# Patient Record
Sex: MALE | Race: WHITE | NOT HISPANIC OR LATINO | Employment: OTHER | URBAN - METROPOLITAN AREA
[De-identification: names, ages, dates, MRNs, and addresses within clinical notes are randomized per-mention and may not be internally consistent; named-entity substitution may affect disease eponyms.]

---

## 2017-02-14 ENCOUNTER — OFFICE VISIT (OUTPATIENT)
Dept: FAMILY MEDICINE CLINIC | Facility: CLINIC | Age: 82
End: 2017-02-14

## 2017-02-14 VITALS
TEMPERATURE: 99 F | OXYGEN SATURATION: 96 % | DIASTOLIC BLOOD PRESSURE: 70 MMHG | HEART RATE: 79 BPM | BODY MASS INDEX: 29.82 KG/M2 | HEIGHT: 71 IN | WEIGHT: 213 LBS | SYSTOLIC BLOOD PRESSURE: 152 MMHG

## 2017-02-14 DIAGNOSIS — Z00.00 HEALTH CARE MAINTENANCE: ICD-10-CM

## 2017-02-14 DIAGNOSIS — I10 ESSENTIAL HYPERTENSION: Primary | ICD-10-CM

## 2017-02-14 DIAGNOSIS — E78.00 PURE HYPERCHOLESTEROLEMIA: ICD-10-CM

## 2017-02-14 DIAGNOSIS — G25.0 TREMOR, HEREDITARY, BENIGN: ICD-10-CM

## 2017-02-14 DIAGNOSIS — R39.15 URGENCY OF URINATION: ICD-10-CM

## 2017-02-14 PROCEDURE — 99213 OFFICE O/P EST LOW 20 MIN: CPT | Performed by: GENERAL PRACTICE

## 2017-02-14 RX ORDER — ATORVASTATIN CALCIUM 10 MG/1
10 TABLET, FILM COATED ORAL DAILY
Qty: 90 TABLET | Refills: 1 | Status: SHIPPED | OUTPATIENT
Start: 2017-02-14 | End: 2017-07-19 | Stop reason: SDUPTHER

## 2017-02-14 RX ORDER — AMLODIPINE BESYLATE 5 MG/1
5 TABLET ORAL DAILY
Qty: 90 TABLET | Refills: 1 | Status: SHIPPED | OUTPATIENT
Start: 2017-02-14 | End: 2017-07-19 | Stop reason: SDUPTHER

## 2017-02-14 RX ORDER — METOPROLOL TARTRATE 50 MG/1
50 TABLET, FILM COATED ORAL 2 TIMES DAILY
Qty: 180 TABLET | Refills: 1 | Status: SHIPPED | OUTPATIENT
Start: 2017-02-14 | End: 2017-07-19 | Stop reason: SDUPTHER

## 2017-02-14 NOTE — PROGRESS NOTES
Subjective   Jim Marvin is a 81 y.o. male.     Chief Complaint   Patient presents with   • Follow-up   • Hyperlipidemia   • Hypertension   • Med Refill     All meds        History of Present Illness patient is a 81 y.o.   Very nice  male who is here today for lysed last visit with me as I'm retiring the patient is due lab work and he is fasting patient states she is taking a little weight AND he is trying to get it off these dieting and getting some exercise he is actually gone from 2:30 down to 213 Valent in the last 3 or 4 months he gone from 222 2/2/13 this patient has never had a colonoscopy he is always refused I am going to give him a kit that he can check his stool for blood in 10 years to refuse to have a colonoscopy course a 81 years old male.  Patient is in need of refill on his Lipitor Lopressor and Norvasc.  As no other major complaint about himself                      The following portions of the patient's history were reviewed and updated as appropriate: allergies, current medications, past family history, past medical history, past social history, past surgical history and problem list.      Review of Systems   Cardiovascular:        Hypertension hyperlipidemia on medications   Endocrine:        Known treatment for prostate cancer with radiation this patient has not seen the [facility] doctors since Dr. Andersen left about a year ago.  We will be checking a PSA   Neurological:        Patient appears to have a little bit of a tremor says I have had this all my life.   Psychiatric/Behavioral:         states he is under some stress does not need any medications for this         Objective   Physical Exam   Neck:   A carotid bruit heard   Cardiovascular: Normal rate and regular rhythm.    Pulmonary/Chest: Effort normal and breath sounds normal.   Abdominal:   I have given the patient a kit to check his stool for blood since he refuses to have a colonoscopy   Genitourinary:   Genitourinary  Comments: History of prostate cancer treated with radiation is not had a PSA for about a year we will do that today   Neurological:   Patient has a little bit of a fine tremor which she states she is had all his life.         Assessment/Plan   Jim was seen today for follow-up, hyperlipidemia, hypertension and med refill.    Diagnoses and all orders for this visit:    Essential hypertension    Pure hypercholesterolemia    Tremor, hereditary, benign            Manish Gates MD  2/14/2017       Patient very nice 81-year-old gentleman who is here today basically said to get his medicines refilled and say goodbyes I'm retiring I have refilled his Lipitor Lopressor and Norvasc for him this patient has refused to get a colonoscopy over the years I have given him a stool kit to check his stool for blood this has been done before for him but its been a while.  Patient has a little bit of a fine tremor he says its been there all my life.  Patient is fasting so we will draw labs on him today told me should hear from me in 5-7 days he goes 10 days please call patient states he has pneumonia vaccines are up-to-date and he has had shingles vaccine

## 2017-02-15 ENCOUNTER — TELEPHONE (OUTPATIENT)
Dept: FAMILY MEDICINE CLINIC | Facility: CLINIC | Age: 82
End: 2017-02-15

## 2017-02-15 LAB
ALBUMIN SERPL-MCNC: 4 G/DL (ref 3.5–5.2)
ALBUMIN/GLOB SERPL: 1.4 G/DL
ALP SERPL-CCNC: 69 U/L (ref 39–117)
ALT SERPL-CCNC: 17 U/L (ref 1–41)
APPEARANCE UR: (no result)
AST SERPL-CCNC: 18 U/L (ref 1–40)
BACTERIA #/AREA URNS HPF: ABNORMAL /HPF
BILIRUB SERPL-MCNC: 0.5 MG/DL (ref 0.1–1.2)
BILIRUB UR QL STRIP: NEGATIVE
BUN SERPL-MCNC: 10 MG/DL (ref 8–23)
BUN/CREAT SERPL: 11 (ref 7–25)
CALCIUM SERPL-MCNC: 9.3 MG/DL (ref 8.6–10.5)
CASTS URNS MICRO: ABNORMAL
CHLORIDE SERPL-SCNC: 98 MMOL/L (ref 98–107)
CHOLEST SERPL-MCNC: 141 MG/DL (ref 0–200)
CO2 SERPL-SCNC: 27.4 MMOL/L (ref 22–29)
COLOR UR: (no result)
CREAT SERPL-MCNC: 0.91 MG/DL (ref 0.76–1.27)
EPI CELLS #/AREA URNS HPF: ABNORMAL /HPF
GLOBULIN SER CALC-MCNC: 2.9 GM/DL
GLUCOSE SERPL-MCNC: 105 MG/DL (ref 65–99)
GLUCOSE UR QL: NEGATIVE
HDLC SERPL-MCNC: 38 MG/DL (ref 40–60)
HGB UR QL STRIP: NEGATIVE
INTERPRETATION: NORMAL
KETONES UR QL STRIP: NEGATIVE
LDLC SERPL CALC-MCNC: 82 MG/DL (ref 0–100)
LEUKOCYTE ESTERASE UR QL STRIP: NEGATIVE
Lab: NORMAL
NITRITE UR QL STRIP: NEGATIVE
PH UR STRIP: 7.5 [PH] (ref 5–8)
POTASSIUM SERPL-SCNC: 4.7 MMOL/L (ref 3.5–5.2)
PROT SERPL-MCNC: 6.9 G/DL (ref 6–8.5)
PROT UR QL STRIP: (no result)
PSA SERPL-MCNC: 0.03 NG/ML (ref 0–4)
RBC #/AREA URNS HPF: ABNORMAL /HPF
SODIUM SERPL-SCNC: 138 MMOL/L (ref 136–145)
SP GR UR: 1.02 (ref 1–1.03)
TRIGL SERPL-MCNC: 104 MG/DL (ref 0–150)
UROBILINOGEN UR STRIP-MCNC: (no result) MG/DL
VLDLC SERPL CALC-MCNC: 20.8 MG/DL (ref 5–40)
WBC #/AREA URNS HPF: ABNORMAL /HPF

## 2017-02-15 NOTE — TELEPHONE ENCOUNTER
Told the patient his general chemistries are normal blood sugars kidney functions electrolytes all normal thyroid total cholesterol normal LDL normal does have a low HDL but he's had that for a little while to the lower and it was triglycerides also normal going to repeat these in 6 months also told me needs to get his weight down he says I'm working on it

## 2017-05-09 ENCOUNTER — OFFICE VISIT (OUTPATIENT)
Dept: INTERNAL MEDICINE | Facility: CLINIC | Age: 82
End: 2017-05-09

## 2017-05-09 VITALS
HEART RATE: 80 BPM | SYSTOLIC BLOOD PRESSURE: 148 MMHG | HEIGHT: 70 IN | DIASTOLIC BLOOD PRESSURE: 76 MMHG | RESPIRATION RATE: 16 BRPM | TEMPERATURE: 97.4 F | WEIGHT: 210 LBS | BODY MASS INDEX: 30.06 KG/M2

## 2017-05-09 DIAGNOSIS — I10 ESSENTIAL HYPERTENSION: ICD-10-CM

## 2017-05-09 DIAGNOSIS — R30.0 DYSURIA: ICD-10-CM

## 2017-05-09 DIAGNOSIS — E78.2 MIXED HYPERLIPIDEMIA: Primary | ICD-10-CM

## 2017-05-09 PROCEDURE — 99214 OFFICE O/P EST MOD 30 MIN: CPT | Performed by: FAMILY MEDICINE

## 2017-05-09 RX ORDER — DOXYCYCLINE HYCLATE 100 MG/1
100 CAPSULE ORAL 2 TIMES DAILY
Qty: 20 CAPSULE | Refills: 0 | Status: SHIPPED | OUTPATIENT
Start: 2017-05-09 | End: 2017-11-07

## 2017-05-10 ENCOUNTER — TELEPHONE (OUTPATIENT)
Dept: INTERNAL MEDICINE | Facility: CLINIC | Age: 82
End: 2017-05-10

## 2017-05-12 DIAGNOSIS — N39.0 URINARY TRACT INFECTION, SITE UNSPECIFIED: Primary | ICD-10-CM

## 2017-05-12 LAB
APPEARANCE UR: ABNORMAL
BACTERIA #/AREA URNS HPF: ABNORMAL /HPF
BACTERIA UR CULT: ABNORMAL
BACTERIA UR CULT: ABNORMAL
BILIRUB UR QL STRIP: NEGATIVE
BUN SERPL-MCNC: 12 MG/DL (ref 8–23)
BUN/CREAT SERPL: 12.5 (ref 7–25)
CALCIUM SERPL-MCNC: 9.4 MG/DL (ref 8.6–10.5)
CASTS URNS MICRO: ABNORMAL
CASTS URNS QL MICRO: PRESENT /LPF
CHLORIDE SERPL-SCNC: 99 MMOL/L (ref 98–107)
CO2 SERPL-SCNC: 26.1 MMOL/L (ref 22–29)
COLOR UR: ABNORMAL
CREAT SERPL-MCNC: 0.96 MG/DL (ref 0.76–1.27)
EPI CELLS #/AREA URNS HPF: ABNORMAL /HPF
GLUCOSE SERPL-MCNC: 108 MG/DL (ref 65–99)
GLUCOSE UR QL: NEGATIVE
HGB UR QL STRIP: ABNORMAL
KETONES UR QL STRIP: ABNORMAL
LEUKOCYTE ESTERASE UR QL STRIP: ABNORMAL
MICRO URNS: ABNORMAL
MUCOUS THREADS URNS QL MICRO: PRESENT /HPF
NITRITE UR QL STRIP: POSITIVE
OTHER ANTIBIOTIC SUSC ISLT: ABNORMAL
PH UR STRIP: 7 [PH] (ref 5–7.5)
POTASSIUM SERPL-SCNC: 4.8 MMOL/L (ref 3.5–5.2)
PROT UR QL STRIP: ABNORMAL
RBC #/AREA URNS HPF: >30 /HPF
SODIUM SERPL-SCNC: 139 MMOL/L (ref 136–145)
SP GR UR: 1.02 (ref 1–1.03)
URINALYSIS REFLEX: ABNORMAL
UROBILINOGEN UR STRIP-MCNC: 1 MG/DL (ref 0.2–1)
WBC #/AREA URNS HPF: >30 /HPF

## 2017-07-19 RX ORDER — ATORVASTATIN CALCIUM 10 MG/1
TABLET, FILM COATED ORAL
Qty: 90 TABLET | Refills: 0 | Status: SHIPPED | OUTPATIENT
Start: 2017-07-19 | End: 2017-11-04 | Stop reason: SDUPTHER

## 2017-07-19 RX ORDER — AMLODIPINE BESYLATE 5 MG/1
TABLET ORAL
Qty: 90 TABLET | Refills: 0 | Status: SHIPPED | OUTPATIENT
Start: 2017-07-19 | End: 2017-11-04 | Stop reason: SDUPTHER

## 2017-07-19 RX ORDER — METOPROLOL TARTRATE 50 MG/1
TABLET, FILM COATED ORAL
Qty: 180 TABLET | Refills: 0 | Status: SHIPPED | OUTPATIENT
Start: 2017-07-19 | End: 2017-11-04 | Stop reason: SDUPTHER

## 2017-11-06 RX ORDER — ATORVASTATIN CALCIUM 10 MG/1
TABLET, FILM COATED ORAL
Qty: 90 TABLET | Refills: 0 | Status: SHIPPED | OUTPATIENT
Start: 2017-11-06 | End: 2018-02-10 | Stop reason: SDUPTHER

## 2017-11-06 RX ORDER — METOPROLOL TARTRATE 50 MG/1
TABLET, FILM COATED ORAL
Qty: 180 TABLET | Refills: 0 | Status: SHIPPED | OUTPATIENT
Start: 2017-11-06 | End: 2018-02-10 | Stop reason: SDUPTHER

## 2017-11-06 RX ORDER — AMLODIPINE BESYLATE 5 MG/1
TABLET ORAL
Qty: 90 TABLET | Refills: 0 | Status: SHIPPED | OUTPATIENT
Start: 2017-11-06 | End: 2018-02-10 | Stop reason: SDUPTHER

## 2017-11-07 ENCOUNTER — OFFICE VISIT (OUTPATIENT)
Dept: INTERNAL MEDICINE | Facility: CLINIC | Age: 82
End: 2017-11-07

## 2017-11-07 VITALS
TEMPERATURE: 97 F | BODY MASS INDEX: 30.99 KG/M2 | HEART RATE: 76 BPM | OXYGEN SATURATION: 96 % | DIASTOLIC BLOOD PRESSURE: 70 MMHG | WEIGHT: 216 LBS | SYSTOLIC BLOOD PRESSURE: 126 MMHG

## 2017-11-07 DIAGNOSIS — I10 ESSENTIAL HYPERTENSION: ICD-10-CM

## 2017-11-07 DIAGNOSIS — Z00.00 MEDICARE ANNUAL WELLNESS VISIT, SUBSEQUENT: ICD-10-CM

## 2017-11-07 DIAGNOSIS — E78.2 MIXED HYPERLIPIDEMIA: Primary | ICD-10-CM

## 2017-11-07 PROCEDURE — 99213 OFFICE O/P EST LOW 20 MIN: CPT | Performed by: FAMILY MEDICINE

## 2017-11-07 PROCEDURE — G0439 PPPS, SUBSEQ VISIT: HCPCS | Performed by: FAMILY MEDICINE

## 2017-11-07 NOTE — PROGRESS NOTES
Subjective   Jim Marvin is a 82 y.o. male.     Chief Complaint   Patient presents with   • Hypertension   • Hyperlipidemia   • Annual Exam         History of Present Illness   Mr. Marvin is seen here for Medicare wellness visit as well as review of hypertension hyperlipidemia treatment.  He is seen Dr. Burger for prostate issues and follow-up as well.  Otherwise he defers and refuses immunizations been doing quite well otherwise he does exercise regularly.      The following portions of the patient's history were reviewed and updated as appropriate: allergies, current medications, past social history and problem list.    Review of Systems   Constitutional: Negative.    HENT: Negative.    Eyes: Negative.    Respiratory: Negative.    Cardiovascular: Negative.    Gastrointestinal: Negative.    Endocrine: Negative.    Genitourinary: Negative.    Musculoskeletal: Negative.    Skin: Negative.    Allergic/Immunologic: Negative.    Neurological: Negative.    Hematological: Negative.    Psychiatric/Behavioral: Negative.        Objective   Vitals:    11/07/17 1150   BP: 126/70   Pulse: 76   Temp: 97 °F (36.1 °C)   SpO2: 96%     Physical Exam   Constitutional: He is oriented to person, place, and time. He appears well-developed and well-nourished.   HENT:   Head: Normocephalic and atraumatic.   Right Ear: Tympanic membrane and external ear normal.   Left Ear: Tympanic membrane and external ear normal.   Nose: Nose normal.   Mouth/Throat: Oropharynx is clear and moist.   Eyes: Conjunctivae and EOM are normal. Pupils are equal, round, and reactive to light.   Neck: Normal range of motion. Neck supple. No JVD present. No thyromegaly present.   Cardiovascular: Normal rate, regular rhythm, normal heart sounds and intact distal pulses.    Pulmonary/Chest: Effort normal and breath sounds normal.   Abdominal: Soft. Bowel sounds are normal.   Musculoskeletal: Normal range of motion.   Lymphadenopathy:     He has no cervical  adenopathy.   Neurological: He is alert and oriented to person, place, and time. No cranial nerve deficit. Coordination normal.   Skin: Skin is warm and dry. No rash noted.   Psychiatric: He has a normal mood and affect. His behavior is normal. Judgment and thought content normal.   Vitals reviewed.      Assessment/Plan   Problem List Items Addressed This Visit        Cardiovascular and Mediastinum    Hyperlipidemia - Primary    Relevant Orders    Comprehensive Metabolic Panel    CBC & Differential    Lipid Panel With / Chol / HDL Ratio    Hypertension    Relevant Orders    Comprehensive Metabolic Panel    CBC & Differential    Lipid Panel With / Chol / HDL Ratio      Other Visit Diagnoses     Medicare annual wellness visit, subsequent          Plan: Recheck in 6 months and labs today.
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
## 2017-11-08 LAB
ALBUMIN SERPL-MCNC: 4.2 G/DL (ref 3.5–5.2)
ALBUMIN/GLOB SERPL: 1.4 G/DL
ALP SERPL-CCNC: 62 U/L (ref 39–117)
ALT SERPL-CCNC: 22 U/L (ref 1–41)
AST SERPL-CCNC: 18 U/L (ref 1–40)
BASOPHILS # BLD AUTO: 0.12 10*3/MM3 (ref 0–0.2)
BASOPHILS NFR BLD AUTO: 1.6 % (ref 0–1.5)
BILIRUB SERPL-MCNC: 0.6 MG/DL (ref 0.1–1.2)
BUN SERPL-MCNC: 12 MG/DL (ref 8–23)
BUN/CREAT SERPL: 13.3 (ref 7–25)
CALCIUM SERPL-MCNC: 9.4 MG/DL (ref 8.6–10.5)
CHLORIDE SERPL-SCNC: 99 MMOL/L (ref 98–107)
CHOLEST SERPL-MCNC: 150 MG/DL (ref 0–200)
CHOLEST/HDLC SERPL: 3.57 {RATIO}
CO2 SERPL-SCNC: 27.1 MMOL/L (ref 22–29)
CREAT SERPL-MCNC: 0.9 MG/DL (ref 0.76–1.27)
EOSINOPHIL # BLD AUTO: 0.37 10*3/MM3 (ref 0–0.7)
EOSINOPHIL NFR BLD AUTO: 4.8 % (ref 0.3–6.2)
ERYTHROCYTE [DISTWIDTH] IN BLOOD BY AUTOMATED COUNT: 13.2 % (ref 11.5–14.5)
GFR SERPLBLD CREATININE-BSD FMLA CKD-EPI: 81 ML/MIN/1.73
GFR SERPLBLD CREATININE-BSD FMLA CKD-EPI: 98 ML/MIN/1.73
GLOBULIN SER CALC-MCNC: 2.9 GM/DL
GLUCOSE SERPL-MCNC: 99 MG/DL (ref 65–99)
HCT VFR BLD AUTO: 47.3 % (ref 40.4–52.2)
HDLC SERPL-MCNC: 42 MG/DL (ref 40–60)
HGB BLD-MCNC: 15.5 G/DL (ref 13.7–17.6)
IMM GRANULOCYTES # BLD: 0.04 10*3/MM3 (ref 0–0.03)
IMM GRANULOCYTES NFR BLD: 0.5 % (ref 0–0.5)
LDLC SERPL CALC-MCNC: 77 MG/DL (ref 0–100)
LYMPHOCYTES # BLD AUTO: 1.31 10*3/MM3 (ref 0.9–4.8)
LYMPHOCYTES NFR BLD AUTO: 17.1 % (ref 19.6–45.3)
MCH RBC QN AUTO: 32.8 PG (ref 27–32.7)
MCHC RBC AUTO-ENTMCNC: 32.8 G/DL (ref 32.6–36.4)
MCV RBC AUTO: 100 FL (ref 79.8–96.2)
MONOCYTES # BLD AUTO: 1.15 10*3/MM3 (ref 0.2–1.2)
MONOCYTES NFR BLD AUTO: 15 % (ref 5–12)
NEUTROPHILS # BLD AUTO: 4.67 10*3/MM3 (ref 1.9–8.1)
NEUTROPHILS NFR BLD AUTO: 61 % (ref 42.7–76)
PLATELET # BLD AUTO: 157 10*3/MM3 (ref 140–500)
POTASSIUM SERPL-SCNC: 4.5 MMOL/L (ref 3.5–5.2)
PROT SERPL-MCNC: 7.1 G/DL (ref 6–8.5)
RBC # BLD AUTO: 4.73 10*6/MM3 (ref 4.6–6)
SODIUM SERPL-SCNC: 140 MMOL/L (ref 136–145)
TRIGL SERPL-MCNC: 153 MG/DL (ref 0–150)
VLDLC SERPL CALC-MCNC: 30.6 MG/DL (ref 5–40)
WBC # BLD AUTO: 7.66 10*3/MM3 (ref 4.5–10.7)

## 2018-02-12 RX ORDER — AMLODIPINE BESYLATE 5 MG/1
TABLET ORAL
Qty: 90 TABLET | Refills: 0 | Status: SHIPPED | OUTPATIENT
Start: 2018-02-12 | End: 2018-05-15 | Stop reason: SDUPTHER

## 2018-02-12 RX ORDER — ATORVASTATIN CALCIUM 10 MG/1
TABLET, FILM COATED ORAL
Qty: 90 TABLET | Refills: 0 | Status: SHIPPED | OUTPATIENT
Start: 2018-02-12 | End: 2018-05-15 | Stop reason: SDUPTHER

## 2018-02-12 RX ORDER — METOPROLOL TARTRATE 50 MG/1
TABLET, FILM COATED ORAL
Qty: 180 TABLET | Refills: 0 | Status: SHIPPED | OUTPATIENT
Start: 2018-02-12 | End: 2018-05-15 | Stop reason: SDUPTHER

## 2018-03-02 ENCOUNTER — DOCUMENTATION (OUTPATIENT)
Dept: INTERNAL MEDICINE | Facility: CLINIC | Age: 83
End: 2018-03-02

## 2018-05-15 ENCOUNTER — OFFICE VISIT (OUTPATIENT)
Dept: INTERNAL MEDICINE | Facility: CLINIC | Age: 83
End: 2018-05-15

## 2018-05-15 VITALS
HEART RATE: 74 BPM | DIASTOLIC BLOOD PRESSURE: 78 MMHG | WEIGHT: 216 LBS | OXYGEN SATURATION: 92 % | SYSTOLIC BLOOD PRESSURE: 142 MMHG | BODY MASS INDEX: 30.99 KG/M2 | TEMPERATURE: 95.9 F

## 2018-05-15 DIAGNOSIS — E78.2 MIXED HYPERLIPIDEMIA: ICD-10-CM

## 2018-05-15 DIAGNOSIS — I63.9 CEREBELLAR INFARCT (HCC): ICD-10-CM

## 2018-05-15 DIAGNOSIS — I10 ESSENTIAL HYPERTENSION: Primary | ICD-10-CM

## 2018-05-15 PROCEDURE — 99214 OFFICE O/P EST MOD 30 MIN: CPT | Performed by: FAMILY MEDICINE

## 2018-05-15 RX ORDER — AMLODIPINE BESYLATE 5 MG/1
5 TABLET ORAL DAILY
Qty: 90 TABLET | Refills: 1 | Status: SHIPPED | OUTPATIENT
Start: 2018-05-15 | End: 2018-11-15 | Stop reason: SDUPTHER

## 2018-05-15 RX ORDER — ATORVASTATIN CALCIUM 10 MG/1
20 TABLET, FILM COATED ORAL DAILY
Qty: 90 TABLET | Refills: 1 | Status: SHIPPED | OUTPATIENT
Start: 2018-05-15 | End: 2018-05-17 | Stop reason: DRUGHIGH

## 2018-05-15 RX ORDER — ASPIRIN 325 MG
325 TABLET ORAL
COMMUNITY
Start: 2018-02-23

## 2018-05-15 RX ORDER — ATORVASTATIN CALCIUM 10 MG/1
10 TABLET, FILM COATED ORAL DAILY
Qty: 90 TABLET | Refills: 1 | Status: SHIPPED | OUTPATIENT
Start: 2018-05-15 | End: 2018-05-15

## 2018-05-15 RX ORDER — METOPROLOL TARTRATE 50 MG/1
50 TABLET, FILM COATED ORAL 2 TIMES DAILY
Qty: 180 TABLET | Refills: 1 | Status: SHIPPED | OUTPATIENT
Start: 2018-05-15 | End: 2019-07-30 | Stop reason: SDUPTHER

## 2018-05-15 NOTE — PROGRESS NOTES
Subjective   Jim Marvin is a 82 y.o. male.     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  He was upset because they placed him from Lipitor 10 mg.  This is quite a jump with no explanation I described to him the fact that according to protocol easily do that with stroke patients.  At this point we'll go ahead and place him from Lipitor 10 Lipitor 20 and follow-up in 6 months.  He has a follow-up with cardiology with a loop recorder also has a follow-up with the nurse practitioner for neurology in June.  Symptomatically he is back to baseline.  His hospitalization was reviewed with him.    The following portions of the patient's history were reviewed and updated as appropriate: allergies, current medications, past social history and problem list.    Review of Systems   Constitutional: Negative.    HENT: Negative.    Eyes: Negative.    Respiratory: Negative.    Cardiovascular: Negative.    Gastrointestinal: Negative.    Endocrine: Negative.    Genitourinary: Negative.    Musculoskeletal: Negative.    Skin: Negative.    Allergic/Immunologic: Negative.    Neurological: Positive for tremors.       Objective   Vitals:    05/15/18 1129   BP: 142/78   Pulse: 74   Temp: 95.9 °F (35.5 °C)   SpO2: 92%     Physical Exam   Constitutional: He is oriented to person, place, and time. He appears well-developed.   HENT:   Head: Normocephalic.   Right Ear: External ear normal.   Left Ear: External ear normal.   Mouth/Throat: Oropharynx is clear and moist.   Eyes: Pupils are equal, round, and reactive to light.   Neck: Normal range of motion. Neck supple.   Cardiovascular: Normal rate, regular rhythm and normal heart sounds.    Pulmonary/Chest: Effort normal and breath sounds normal.   Abdominal: Soft. Bowel  sounds are normal.   Musculoskeletal:        Lumbar back: He exhibits decreased range of motion.   Neurological: He is alert and oriented to person, place, and time. He displays tremor. No cranial nerve deficit or sensory deficit. Coordination and gait normal.   Nursing note and vitals reviewed.      Assessment/Plan   Problem List Items Addressed This Visit        Cardiovascular and Mediastinum    Hyperlipidemia    Relevant Medications    atorvastatin (LIPITOR) 10 MG tablet    Hypertension - Primary      Other Visit Diagnoses     Cerebellar infarct          Plan: Increase Lipitor from 10-20 mg daily.  Recheck in 6 months labs at that time follow-up with neurology follow-up with cardiology for loop recorder.

## 2018-05-17 RX ORDER — ATORVASTATIN CALCIUM 20 MG/1
20 TABLET, FILM COATED ORAL DAILY
Qty: 90 TABLET | Refills: 1 | Status: SHIPPED | OUTPATIENT
Start: 2018-05-17 | End: 2018-08-09 | Stop reason: SDUPTHER

## 2018-08-09 RX ORDER — METOPROLOL TARTRATE 50 MG/1
TABLET, FILM COATED ORAL
Qty: 180 TABLET | Refills: 0 | Status: SHIPPED | OUTPATIENT
Start: 2018-08-09 | End: 2018-11-15 | Stop reason: SDUPTHER

## 2018-08-09 RX ORDER — AMLODIPINE BESYLATE 5 MG/1
TABLET ORAL
Qty: 90 TABLET | Refills: 0 | Status: SHIPPED | OUTPATIENT
Start: 2018-08-09 | End: 2018-11-08 | Stop reason: SDUPTHER

## 2018-08-09 RX ORDER — ATORVASTATIN CALCIUM 10 MG/1
TABLET, FILM COATED ORAL
Qty: 90 TABLET | Refills: 0 | Status: SHIPPED | OUTPATIENT
Start: 2018-08-09 | End: 2018-11-09 | Stop reason: SDUPTHER

## 2018-11-09 RX ORDER — ATORVASTATIN CALCIUM 20 MG/1
20 TABLET, FILM COATED ORAL DAILY
Qty: 90 TABLET | Refills: 1 | Status: SHIPPED | OUTPATIENT
Start: 2018-11-09 | End: 2019-05-06 | Stop reason: SDUPTHER

## 2018-11-09 RX ORDER — AMLODIPINE BESYLATE 5 MG/1
TABLET ORAL
Qty: 90 TABLET | Refills: 0 | Status: SHIPPED | OUTPATIENT
Start: 2018-11-09 | End: 2019-02-10 | Stop reason: SDUPTHER

## 2018-11-15 ENCOUNTER — OFFICE VISIT (OUTPATIENT)
Dept: INTERNAL MEDICINE | Facility: CLINIC | Age: 83
End: 2018-11-15

## 2018-11-15 VITALS
TEMPERATURE: 97.8 F | HEART RATE: 80 BPM | BODY MASS INDEX: 32 KG/M2 | DIASTOLIC BLOOD PRESSURE: 62 MMHG | SYSTOLIC BLOOD PRESSURE: 118 MMHG | OXYGEN SATURATION: 93 % | WEIGHT: 223 LBS

## 2018-11-15 DIAGNOSIS — I10 ESSENTIAL HYPERTENSION: Primary | ICD-10-CM

## 2018-11-15 DIAGNOSIS — E78.2 MIXED HYPERLIPIDEMIA: ICD-10-CM

## 2018-11-15 DIAGNOSIS — I63.9 CEREBELLAR INFARCT (HCC): ICD-10-CM

## 2018-11-15 PROCEDURE — 99213 OFFICE O/P EST LOW 20 MIN: CPT | Performed by: FAMILY MEDICINE

## 2018-11-15 NOTE — PROGRESS NOTES
Subjective   Jim Marvin is a 83 y.o. male.     Chief Complaint   Patient presents with   • Hypertension   • Hyperlipidemia         History of Present Illness   Delightful gentleman who continues with exercise and is pretty active.  He was admitted for cerebellar infarct at [facility] a couple years ago.  He is followed by Dr. Diaz who has a loop recorder but has not found any atrial fibrillation.  Otherwise relatively stable with history of hyperlipidemia hypertension.  See him back in 6 months for Medicare wellness.      The following portions of the patient's history were reviewed and updated as appropriate: allergies, current medications, past social history and problem list.    Review of Systems    Objective   Vitals:    11/15/18 1052   BP: 118/62   Pulse: 80   Temp: 97.8 °F (36.6 °C)   SpO2: 93%     Physical Exam   Constitutional: He is oriented to person, place, and time. He appears well-developed and well-nourished.   HENT:   Head: Normocephalic and atraumatic.   Right Ear: Tympanic membrane and external ear normal.   Left Ear: Tympanic membrane and external ear normal.   Nose: Nose normal.   Mouth/Throat: Oropharynx is clear and moist.   Eyes: Conjunctivae and EOM are normal. Pupils are equal, round, and reactive to light.   Neck: Normal range of motion. Neck supple. No JVD present. No thyromegaly present.   Cardiovascular: Normal rate, regular rhythm, normal heart sounds and intact distal pulses.   Pulmonary/Chest: Effort normal and breath sounds normal.   Abdominal: Soft. Bowel sounds are normal.   Musculoskeletal:        Right knee: He exhibits decreased range of motion.        Left knee: He exhibits decreased range of motion.        Lumbar back: He exhibits decreased range of motion.   Lymphadenopathy:     He has no cervical adenopathy.   Neurological: He is alert and oriented to person, place, and time. No cranial nerve deficit. Coordination normal.   Skin: Skin is warm and dry. No rash  noted.   Psychiatric: He has a normal mood and affect. His behavior is normal. Judgment and thought content normal.   Vitals reviewed.      Assessment/Plan   Problem List Items Addressed This Visit        Cardiovascular and Mediastinum    Hyperlipidemia    Hypertension - Primary      Other Visit Diagnoses     Cerebellar infarct (CMS/[facility])          Present for Medicare wellness 6 months meds remain the same.

## 2019-02-11 RX ORDER — AMLODIPINE BESYLATE 5 MG/1
TABLET ORAL
Qty: 90 TABLET | Refills: 1 | Status: SHIPPED | OUTPATIENT
Start: 2019-02-11 | End: 2019-07-30 | Stop reason: SDUPTHER

## 2019-02-11 RX ORDER — METOPROLOL TARTRATE 50 MG/1
TABLET, FILM COATED ORAL
Qty: 180 TABLET | Refills: 1 | Status: SHIPPED | OUTPATIENT
Start: 2019-02-11 | End: 2019-05-14 | Stop reason: SDUPTHER

## 2019-05-06 RX ORDER — ATORVASTATIN CALCIUM 20 MG/1
20 TABLET, FILM COATED ORAL DAILY
Qty: 90 TABLET | Refills: 1 | Status: SHIPPED | OUTPATIENT
Start: 2019-05-06 | End: 2019-10-26 | Stop reason: SDUPTHER

## 2019-05-14 ENCOUNTER — OFFICE VISIT (OUTPATIENT)
Dept: INTERNAL MEDICINE | Facility: CLINIC | Age: 84
End: 2019-05-14

## 2019-05-14 VITALS
SYSTOLIC BLOOD PRESSURE: 118 MMHG | DIASTOLIC BLOOD PRESSURE: 72 MMHG | OXYGEN SATURATION: 93 % | HEART RATE: 78 BPM | TEMPERATURE: 97.1 F | WEIGHT: 224 LBS | BODY MASS INDEX: 32.14 KG/M2

## 2019-05-14 DIAGNOSIS — I10 ESSENTIAL HYPERTENSION: Primary | ICD-10-CM

## 2019-05-14 DIAGNOSIS — I65.29 STENOSIS OF CAROTID ARTERY, UNSPECIFIED LATERALITY: ICD-10-CM

## 2019-05-14 DIAGNOSIS — Z00.00 MEDICARE ANNUAL WELLNESS VISIT, SUBSEQUENT: ICD-10-CM

## 2019-05-14 DIAGNOSIS — E78.2 MIXED HYPERLIPIDEMIA: ICD-10-CM

## 2019-05-14 PROCEDURE — G0439 PPPS, SUBSEQ VISIT: HCPCS | Performed by: FAMILY MEDICINE

## 2019-05-14 PROCEDURE — 99214 OFFICE O/P EST MOD 30 MIN: CPT | Performed by: FAMILY MEDICINE
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
## 2019-05-14 NOTE — PATIENT INSTRUCTIONS
Medicare Wellness  Personal Prevention Plan of Service     Date of Office Visit:  2019  Encounter Provider:  Antonio Finley Jr., MD  Place of Service:  [facility]  Patient Name: Jim Marvin  :  1935    As part of the Medicare Wellness portion of your visit today, we are providing you with this personalized preventive plan of services (PPPS). This plan is based upon recommendations of the [place] Preventive Services Task Force (USPSTF) and the Advisory Committee on Immunization Practices (ACIP).    This lists the preventive care services that should be considered, and provides dates of when you are due. Items listed as completed are up-to-date and do not require any further intervention.    Health Maintenance   Topic Date Due   • TDAP/TD VACCINES (1 - Tdap) 10/04/1956   • ZOSTER VACCINE (1 of 2) 10/04/1987   • MEDICARE ANNUAL WELLNESS  2018   • LIPID PANEL  2019   • PNEUMOCOCCAL VACCINES (65+ LOW/MEDIUM RISK) (2 of 2 - PPSV23) 2019 (Originally 11/15/2017)   • INFLUENZA VACCINE  2019       No orders of the defined types were placed in this encounter.      No Follow-up on file.

## 2019-05-14 NOTE — PROGRESS NOTES
Subjective   Jim Marvin is a 83 y.o. male.     Chief Complaint   Patient presents with   • Annual Exam   • Hypertension   • Hyperlipidemia         History of Present Illness   Delightful gentleman who is followed by vascular surgery [facility].  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  Will have follow-up with vascular surgery in the fall.  Regarding recheck labs to make sure these optimized on Lipitor treatment of lowering LDL.  Antihypertensive is continued.    Medicare wellness exam is performed as well.      The following portions of the patient's history were reviewed and updated as appropriate: allergies, current medications, past social history and problem list.    Review of Systems   Constitutional: Negative.    HENT: Negative.    Eyes: Negative.    Respiratory: Negative.    Cardiovascular: Negative.    Gastrointestinal: Negative.    Endocrine: Negative.    Genitourinary: Negative.    Musculoskeletal: Negative.    Skin: Negative.    Allergic/Immunologic: Negative.    Neurological: Negative.    Hematological: Negative.    Psychiatric/Behavioral: Negative.        Objective   Vitals:    05/14/19 1306   BP: 118/72   Pulse: 78   Temp: 97.1 °F (36.2 °C)   SpO2: 93%     Physical Exam   Constitutional: He is oriented to person, place, and time. He appears well-developed and well-nourished.   HENT:   Head: Normocephalic and atraumatic.   Right Ear: Tympanic membrane and external ear normal.   Left Ear: Tympanic membrane and external ear normal.   Nose: Nose normal.   Mouth/Throat: Oropharynx is clear and moist.   Eyes: Conjunctivae and EOM are normal. Pupils are equal, round, and reactive to light.   Neck: Normal range of motion. Neck supple. No JVD present. No thyromegaly present.   Cardiovascular: Normal rate, regular rhythm, normal heart sounds and intact  distal pulses.   Pulmonary/Chest: Effort normal and breath sounds normal.   Abdominal: Soft. Bowel sounds are normal.   Musculoskeletal:        Right hip: He exhibits decreased range of motion.        Left hip: He exhibits decreased range of motion.        Right knee: He exhibits decreased range of motion.        Left knee: He exhibits decreased range of motion.        Lumbar back: He exhibits decreased range of motion.   Lymphadenopathy:     He has no cervical adenopathy.   Neurological: He is alert and oriented to person, place, and time. No cranial nerve deficit. Coordination normal.   Skin: Skin is warm and dry. No rash noted.   Psychiatric: He has a normal mood and affect. His behavior is normal. Judgment and thought content normal.   Vitals reviewed.      Assessment/Plan   Problem List Items Addressed This Visit        Cardiovascular and Mediastinum    Hyperlipidemia    Relevant Orders    CBC & Differential    Comprehensive Metabolic Panel    Lipid Panel With / Chol / HDL Ratio    TSH    T4, Free    Urinalysis With Microscopic If Indicated (No Culture) - Urine, Clean Catch    Hypertension - Primary    Relevant Orders    CBC & Differential    Comprehensive Metabolic Panel    Lipid Panel With / Chol / HDL Ratio    TSH    T4, Free    Urinalysis With Microscopic If Indicated (No Culture) - Urine, Clean Catch      Other Visit Diagnoses     Stenosis of carotid artery, unspecified laterality        Relevant Orders    CBC & Differential    Comprehensive Metabolic Panel    Lipid Panel With / Chol / HDL Ratio    TSH    T4, Free    Urinalysis With Microscopic If Indicated (No Culture) - Urine, Clean Catch    Medicare annual wellness visit, subsequent          Labs today recheck in 6 months consider increasing Lipitor if need be.  Follow-up with vascular surgery.

## 2019-05-15 LAB
ALBUMIN SERPL-MCNC: 4.1 G/DL (ref 3.5–5.2)
ALBUMIN/GLOB SERPL: 1.4 G/DL
ALP SERPL-CCNC: 63 U/L (ref 39–117)
ALT SERPL-CCNC: 21 U/L (ref 1–41)
APPEARANCE UR: CLEAR
AST SERPL-CCNC: 19 U/L (ref 1–40)
BACTERIA #/AREA URNS HPF: NORMAL /HPF
BASOPHILS # BLD AUTO: 0.14 10*3/MM3 (ref 0–0.2)
BASOPHILS NFR BLD AUTO: 2 % (ref 0–1.5)
BILIRUB SERPL-MCNC: 0.6 MG/DL (ref 0.2–1.2)
BILIRUB UR QL STRIP: NEGATIVE
BUN SERPL-MCNC: 10 MG/DL (ref 8–23)
BUN/CREAT SERPL: 11.8 (ref 7–25)
CALCIUM SERPL-MCNC: 9.8 MG/DL (ref 8.6–10.5)
CASTS URNS MICRO: NORMAL
CHLORIDE SERPL-SCNC: 99 MMOL/L (ref 98–107)
CHOLEST SERPL-MCNC: 137 MG/DL (ref 0–200)
CHOLEST/HDLC SERPL: 3.11 {RATIO}
CO2 SERPL-SCNC: 25.3 MMOL/L (ref 22–29)
COLOR UR: YELLOW
CREAT SERPL-MCNC: 0.85 MG/DL (ref 0.76–1.27)
EOSINOPHIL # BLD AUTO: 0.37 10*3/MM3 (ref 0–0.4)
EOSINOPHIL NFR BLD AUTO: 5.2 % (ref 0.3–6.2)
EPI CELLS #/AREA URNS HPF: NORMAL /HPF
ERYTHROCYTE [DISTWIDTH] IN BLOOD BY AUTOMATED COUNT: 12.7 % (ref 12.3–15.4)
GLOBULIN SER CALC-MCNC: 3 GM/DL
GLUCOSE SERPL-MCNC: 95 MG/DL (ref 65–99)
GLUCOSE UR QL: NEGATIVE
HCT VFR BLD AUTO: 48.6 % (ref 37.5–51)
HDLC SERPL-MCNC: 44 MG/DL (ref 40–60)
HGB BLD-MCNC: 15.5 G/DL (ref 13–17.7)
HGB UR QL STRIP: NEGATIVE
IMM GRANULOCYTES # BLD AUTO: 0.07 10*3/MM3 (ref 0–0.05)
IMM GRANULOCYTES NFR BLD AUTO: 1 % (ref 0–0.5)
KETONES UR QL STRIP: NEGATIVE
LDLC SERPL CALC-MCNC: 69 MG/DL (ref 0–100)
LEUKOCYTE ESTERASE UR QL STRIP: ABNORMAL
LYMPHOCYTES # BLD AUTO: 1.28 10*3/MM3 (ref 0.7–3.1)
LYMPHOCYTES NFR BLD AUTO: 17.9 % (ref 19.6–45.3)
MCH RBC QN AUTO: 32.4 PG (ref 26.6–33)
MCHC RBC AUTO-ENTMCNC: 31.9 G/DL (ref 31.5–35.7)
MCV RBC AUTO: 101.5 FL (ref 79–97)
MONOCYTES # BLD AUTO: 0.98 10*3/MM3 (ref 0.1–0.9)
MONOCYTES NFR BLD AUTO: 13.7 % (ref 5–12)
NEUTROPHILS # BLD AUTO: 4.33 10*3/MM3 (ref 1.7–7)
NEUTROPHILS NFR BLD AUTO: 60.2 % (ref 42.7–76)
NITRITE UR QL STRIP: NEGATIVE
NRBC BLD AUTO-RTO: 0 /100 WBC (ref 0–0.2)
PH UR STRIP: 6.5 [PH] (ref 5–8)
PLATELET # BLD AUTO: 122 10*3/MM3 (ref 140–450)
POTASSIUM SERPL-SCNC: 4.7 MMOL/L (ref 3.5–5.2)
PROT SERPL-MCNC: 7.1 G/DL (ref 6–8.5)
PROT UR QL STRIP: NEGATIVE
RBC # BLD AUTO: 4.79 10*6/MM3 (ref 4.14–5.8)
RBC #/AREA URNS HPF: NORMAL /HPF
SODIUM SERPL-SCNC: 138 MMOL/L (ref 136–145)
SP GR UR: 1.02 (ref 1–1.03)
T4 FREE SERPL-MCNC: 1.01 NG/DL (ref 0.93–1.7)
TRIGL SERPL-MCNC: 122 MG/DL (ref 0–150)
TSH SERPL DL<=0.005 MIU/L-ACNC: 5.04 MIU/ML (ref 0.27–4.2)
UROBILINOGEN UR STRIP-MCNC: ABNORMAL MG/DL
VLDLC SERPL CALC-MCNC: 24.4 MG/DL
WBC # BLD AUTO: 7.17 10*3/MM3 (ref 3.4–10.8)
WBC #/AREA URNS HPF: NORMAL /HPF

## 2019-06-11 ENCOUNTER — TELEPHONE (OUTPATIENT)
Dept: INTERNAL MEDICINE | Facility: CLINIC | Age: 84
End: 2019-06-11

## 2019-07-30 RX ORDER — AMLODIPINE BESYLATE 5 MG/1
TABLET ORAL
Qty: 90 TABLET | Refills: 1 | Status: SHIPPED | OUTPATIENT
Start: 2019-07-30 | End: 2020-01-27

## 2019-07-30 RX ORDER — METOPROLOL TARTRATE 50 MG/1
TABLET, FILM COATED ORAL
Qty: 180 TABLET | Refills: 1 | Status: SHIPPED | OUTPATIENT
Start: 2019-07-30 | End: 2020-01-27

## 2019-10-28 RX ORDER — ATORVASTATIN CALCIUM 20 MG/1
20 TABLET, FILM COATED ORAL DAILY
Qty: 90 TABLET | Refills: 1 | Status: SHIPPED | OUTPATIENT
Start: 2019-10-28 | End: 2020-04-27

## 2019-11-19 ENCOUNTER — OFFICE VISIT (OUTPATIENT)
Dept: INTERNAL MEDICINE | Facility: CLINIC | Age: 84
End: 2019-11-19

## 2019-11-19 VITALS
OXYGEN SATURATION: 91 % | DIASTOLIC BLOOD PRESSURE: 88 MMHG | SYSTOLIC BLOOD PRESSURE: 122 MMHG | TEMPERATURE: 97 F | WEIGHT: 221 LBS | BODY MASS INDEX: 31.71 KG/M2 | HEART RATE: 80 BPM

## 2019-11-19 DIAGNOSIS — Z86.73 HISTORY OF CEREBELLAR STROKE: ICD-10-CM

## 2019-11-19 DIAGNOSIS — E78.2 MIXED HYPERLIPIDEMIA: ICD-10-CM

## 2019-11-19 DIAGNOSIS — I65.21 INTERNAL CAROTID ARTERY STENOSIS, RIGHT: ICD-10-CM

## 2019-11-19 DIAGNOSIS — I10 ESSENTIAL HYPERTENSION: Primary | ICD-10-CM

## 2019-11-19 PROCEDURE — 99213 OFFICE O/P EST LOW 20 MIN: CPT | Performed by: FAMILY MEDICINE

## 2019-11-19 NOTE — PROGRESS NOTES
Subjective   Jim Marvin is a 84 y.o. male.     Chief Complaint   Patient presents with   • Hypertension   • Hyperlipidemia   Cellulitis left foot resolved, right carotid stenosis.      History of Present Illness   Delightful gentleman recently in [facility] with cellulitis of the left foot.  This is resolved.  He has a history of right internal carotid stenosis up to 70%.  He is not to the great degree of needing carotid endarterectomy does not wish to do it.  Otherwise he maintains on aspirin treatment of hypertension hyperlipidemia.  He is functionally doing very well.      The following portions of the patient's history were reviewed and updated as appropriate: allergies, current medications, past social history and problem list.    Review of Systems   Constitutional: Negative.    HENT: Negative.    Eyes: Negative.    Respiratory: Negative.    Cardiovascular: Negative.    Gastrointestinal: Negative.    Endocrine: Negative.    Genitourinary: Negative.    Musculoskeletal: Positive for arthralgias.   Skin: Negative.    Allergic/Immunologic: Negative.    Neurological: Negative.    Hematological: Negative.    Psychiatric/Behavioral: Negative.        Objective   Vitals:    11/19/19 1354   BP: 122/88   Pulse: 80   Temp: 97 °F (36.1 °C)   SpO2: 91%     Physical Exam   Constitutional: He is oriented to person, place, and time. He appears well-developed and well-nourished.   HENT:   Head: Normocephalic and atraumatic.   Right Ear: Tympanic membrane and external ear normal.   Left Ear: Tympanic membrane and external ear normal.   Nose: Nose normal.   Mouth/Throat: Oropharynx is clear and moist.   Eyes: Conjunctivae and EOM are normal. Pupils are equal, round, and reactive to light.   Neck: Normal range of motion. Neck supple. No JVD present. No thyromegaly present.   Cardiovascular: Normal rate, regular rhythm, normal heart sounds and intact distal pulses.   Pulmonary/Chest: Effort normal and breath sounds  normal.   Abdominal: Soft. Bowel sounds are normal.   Musculoskeletal:        Lumbar back: He exhibits decreased range of motion.        Legs:  Lymphadenopathy:     He has no cervical adenopathy.   Neurological: He is alert and oriented to person, place, and time. No cranial nerve deficit. Coordination normal.   Skin: Skin is warm and dry. No rash noted.   Psychiatric: He has a normal mood and affect. His behavior is normal. Judgment and thought content normal.   Vitals reviewed.      Assessment/Plan   Problem List Items Addressed This Visit        Cardiovascular and Mediastinum    Hyperlipidemia    Hypertension - Primary      Other Visit Diagnoses     Internal carotid artery stenosis, right        History of cerebellar stroke          Continue current regimen with aspirin plus treatment of hyperlipidemia hypertension.  Follow-up in 6 months for Medicare wellness sooner if needed.  He refuses flu shot.

## 2020-01-27 RX ORDER — AMLODIPINE BESYLATE 5 MG/1
TABLET ORAL
Qty: 90 TABLET | Refills: 1 | Status: SHIPPED | OUTPATIENT
Start: 2020-01-27 | End: 2020-06-29 | Stop reason: SDUPTHER

## 2020-01-27 RX ORDER — METOPROLOL TARTRATE 50 MG/1
TABLET, FILM COATED ORAL
Qty: 180 TABLET | Refills: 1 | Status: SHIPPED | OUTPATIENT
Start: 2020-01-27 | End: 2020-06-29 | Stop reason: SDUPTHER

## 2020-04-27 RX ORDER — ATORVASTATIN CALCIUM 20 MG/1
20 TABLET, FILM COATED ORAL DAILY
Qty: 90 TABLET | Refills: 1 | Status: SHIPPED | OUTPATIENT
Start: 2020-04-27 | End: 2020-06-29 | Stop reason: SDUPTHER

## 2020-06-29 ENCOUNTER — OFFICE VISIT (OUTPATIENT)
Dept: INTERNAL MEDICINE | Facility: CLINIC | Age: 85
End: 2020-06-29

## 2020-06-29 VITALS
BODY MASS INDEX: 30.66 KG/M2 | OXYGEN SATURATION: 98 % | HEART RATE: 83 BPM | HEIGHT: 71 IN | SYSTOLIC BLOOD PRESSURE: 128 MMHG | WEIGHT: 219 LBS | DIASTOLIC BLOOD PRESSURE: 78 MMHG

## 2020-06-29 DIAGNOSIS — I10 ESSENTIAL HYPERTENSION: ICD-10-CM

## 2020-06-29 DIAGNOSIS — G25.0 ESSENTIAL TREMOR: ICD-10-CM

## 2020-06-29 DIAGNOSIS — Z00.00 MEDICARE ANNUAL WELLNESS VISIT, SUBSEQUENT: ICD-10-CM

## 2020-06-29 DIAGNOSIS — E78.2 MIXED HYPERLIPIDEMIA: Primary | ICD-10-CM

## 2020-06-29 PROCEDURE — 99214 OFFICE O/P EST MOD 30 MIN: CPT | Performed by: FAMILY MEDICINE

## 2020-06-29 PROCEDURE — G0439 PPPS, SUBSEQ VISIT: HCPCS | Performed by: FAMILY MEDICINE

## 2020-06-29 RX ORDER — ATORVASTATIN CALCIUM 20 MG/1
20 TABLET, FILM COATED ORAL DAILY
Qty: 90 TABLET | Refills: 1 | Status: SHIPPED | OUTPATIENT
Start: 2020-06-29 | End: 2021-01-12 | Stop reason: SDUPTHER

## 2020-06-29 RX ORDER — AMLODIPINE BESYLATE 5 MG/1
5 TABLET ORAL DAILY
Qty: 90 TABLET | Refills: 1 | Status: SHIPPED | OUTPATIENT
Start: 2020-06-29 | End: 2021-01-12 | Stop reason: SDUPTHER

## 2020-06-29 RX ORDER — PRIMIDONE 50 MG/1
TABLET ORAL
Qty: 60 TABLET | Refills: 1 | Status: SHIPPED | OUTPATIENT
Start: 2020-06-29 | End: 2021-01-12 | Stop reason: SDUPTHER

## 2020-06-29 RX ORDER — METOPROLOL TARTRATE 50 MG/1
50 TABLET, FILM COATED ORAL 2 TIMES DAILY
Qty: 180 TABLET | Refills: 1 | Status: SHIPPED | OUTPATIENT
Start: 2020-06-29 | End: 2021-01-12 | Stop reason: SDUPTHER

## 2020-06-29 NOTE — PROGRESS NOTES
Subjective   Jim Marvin is a 84 y.o. male.     Chief Complaint   Patient presents with   • Medicare Wellness-subsequent         History of Present Illness   Very pleasant gentleman treated for Hypertension hyperlipidemia.  Overall he is been doing very well he has been exercising his home staying safe as per his COVID-19.    We discussed essential tremor which I see is better with drinking alcohol.  Both of his parents had essential tremor as well.    Try low-dose of Mysoline as needed.  I will prescribe him half dose of Mysoline 50 mg twice daily as needed to start with.    The following portions of the patient's history were reviewed and updated as appropriate: allergies, current medications, past social history and problem list.    Review of Systems   Constitutional: Negative.    HENT: Negative.    Eyes: Negative.    Respiratory: Negative.    Cardiovascular: Negative.    Gastrointestinal: Negative.    Endocrine: Negative.    Genitourinary: Negative.    Musculoskeletal: Negative.    Skin: Negative.    Allergic/Immunologic: Negative.    Neurological: Positive for tremors.   Hematological: Negative.    Psychiatric/Behavioral: Negative.        Objective   Vitals:    06/29/20 1610   BP: 128/78   Pulse: 83   SpO2: 98%     Physical Exam   Constitutional: He is oriented to person, place, and time. He appears well-developed and well-nourished.   HENT:   Head: Normocephalic and atraumatic.   Right Ear: Tympanic membrane and external ear normal.   Left Ear: Tympanic membrane and external ear normal.   Nose: Nose normal.   Mouth/Throat: Oropharynx is clear and moist.   Eyes: Pupils are equal, round, and reactive to light. Conjunctivae and EOM are normal.   Neck: Normal range of motion. Neck supple. No JVD present. No thyromegaly present.   Cardiovascular: Normal rate, regular rhythm, normal heart sounds and intact distal pulses.   Pulmonary/Chest: Effort normal and breath sounds normal.   Abdominal: Soft. Bowel sounds  are normal.   Musculoskeletal: Normal range of motion.   Lymphadenopathy:     He has no cervical adenopathy.   Neurological: He is alert and oriented to person, place, and time. He displays tremor. No cranial nerve deficit. Coordination normal.   Tremor consistent with essential tremor especially right hand.   Skin: Skin is warm and dry. No rash noted.   Psychiatric: He has a normal mood and affect. His behavior is normal. Judgment and thought content normal.   Vitals reviewed.      Assessment/Plan   Problem List Items Addressed This Visit        Cardiovascular and Mediastinum    Hyperlipidemia - Primary    Hypertension      Other Visit Diagnoses     Medicare annual wellness visit, subsequent        Essential tremor          Plan: Labs in next visit medications refilled try Mysoline 50 mg half tablet twice daily as needed tremor.
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
## 2020-06-29 NOTE — PATIENT INSTRUCTIONS
Medicare Wellness  Personal Prevention Plan of Service     Date of Office Visit:  2020  Encounter Provider:  Antonio Finley Jr., MD  Place of Service:  [facility]  Patient Name: Jim Marvin  :  1935    As part of the Medicare Wellness portion of your visit today, we are providing you with this personalized preventive plan of services (PPPS). This plan is based upon recommendations of the [place] Preventive Services Task Force (USPSTF) and the Advisory Committee on Immunization Practices (ACIP).    This lists the preventive care services that should be considered, and provides dates of when you are due. Items listed as completed are up-to-date and do not require any further intervention.    Health Maintenance   Topic Date Due   • TDAP/TD VACCINES (1 - Tdap) 10/04/1948   • ZOSTER VACCINE (1 of 2) 10/04/1987   • Pneumococcal Vaccine Once at 65 Years Old  10/04/2002   • MEDICARE ANNUAL WELLNESS  2020   • LIPID PANEL  2020   • INFLUENZA VACCINE  2020       No orders of the defined types were placed in this encounter.      No follow-ups on file.

## 2020-06-30 RX ORDER — PRIMIDONE 50 MG/1
TABLET ORAL
Qty: 180 TABLET | OUTPATIENT
Start: 2020-06-30

## 2021-01-12 ENCOUNTER — OFFICE VISIT (OUTPATIENT)
Dept: INTERNAL MEDICINE | Facility: CLINIC | Age: 86
End: 2021-01-12

## 2021-01-12 VITALS
TEMPERATURE: 97.1 F | DIASTOLIC BLOOD PRESSURE: 78 MMHG | HEART RATE: 71 BPM | WEIGHT: 220 LBS | BODY MASS INDEX: 30.8 KG/M2 | HEIGHT: 71 IN | OXYGEN SATURATION: 95 % | SYSTOLIC BLOOD PRESSURE: 132 MMHG

## 2021-01-12 DIAGNOSIS — E78.2 MIXED HYPERLIPIDEMIA: Primary | ICD-10-CM

## 2021-01-12 DIAGNOSIS — I10 ESSENTIAL HYPERTENSION: ICD-10-CM

## 2021-01-12 DIAGNOSIS — G25.0 ESSENTIAL TREMOR: ICD-10-CM

## 2021-01-12 PROBLEM — I63.9 CEREBELLAR STROKE, ACUTE (HCC): Status: ACTIVE | Noted: 2018-02-20

## 2021-01-12 PROBLEM — I63.239 CAROTID STENOSIS, SYMPTOMATIC, WITH INFARCTION: Status: ACTIVE | Noted: 2018-02-20

## 2021-01-12 PROBLEM — L03.129 LYMPHANGITIS, ACUTE, LOWER LEG: Status: ACTIVE | Noted: 2019-10-04

## 2021-01-12 PROBLEM — L03.116 CELLULITIS OF LEFT LOWER EXTREMITY: Status: ACTIVE | Noted: 2019-10-03

## 2021-01-12 PROBLEM — J96.01 ACUTE RESPIRATORY FAILURE WITH HYPOXIA (HCC): Status: ACTIVE | Noted: 2019-10-03

## 2021-01-12 PROBLEM — J45.909 REACTIVE AIRWAY DISEASE: Status: ACTIVE | Noted: 2019-10-03

## 2021-01-12 PROBLEM — E66.9 OBESITY: Status: ACTIVE | Noted: 2019-10-03

## 2021-01-12 PROBLEM — I65.02 VERTEBRAL ARTERY OCCLUSION, LEFT: Status: ACTIVE | Noted: 2018-02-20

## 2021-01-12 PROCEDURE — 99213 OFFICE O/P EST LOW 20 MIN: CPT | Performed by: FAMILY MEDICINE

## 2021-01-12 RX ORDER — MULTIPLE VITAMINS W/ MINERALS TAB 9MG-400MCG
1 TAB ORAL DAILY
COMMUNITY

## 2021-01-12 RX ORDER — ATORVASTATIN CALCIUM 20 MG/1
20 TABLET, FILM COATED ORAL DAILY
Qty: 90 TABLET | Refills: 1 | Status: SHIPPED | OUTPATIENT
Start: 2021-01-12 | End: 2021-07-13 | Stop reason: SDUPTHER

## 2021-01-12 RX ORDER — AMLODIPINE BESYLATE 5 MG/1
5 TABLET ORAL DAILY
Qty: 90 TABLET | Refills: 1 | Status: SHIPPED | OUTPATIENT
Start: 2021-01-12 | End: 2021-01-20

## 2021-01-12 RX ORDER — PRIMIDONE 50 MG/1
TABLET ORAL
Qty: 60 TABLET | Refills: 1 | Status: SHIPPED | OUTPATIENT
Start: 2021-01-12 | End: 2021-07-13

## 2021-01-12 RX ORDER — METOPROLOL TARTRATE 50 MG/1
50 TABLET, FILM COATED ORAL 2 TIMES DAILY
Qty: 180 TABLET | Refills: 1 | Status: SHIPPED | OUTPATIENT
Start: 2021-01-12 | End: 2021-01-20

## 2021-01-12 NOTE — PROGRESS NOTES
"Chief Complaint  Hypertension (6 month follow up) and Hyperlipidemia    Subjective          Jim Marvin presents to [facility] for   Patient is a delightful gentleman with review of treatment of hypertension hyperlipidemia and essential tremor.  His medications are reviewed and continued without changing them.  He defers labs today.  We will see him back in 6 months for Medicare wellness.  Reviewed cardiac studies and loop recorder from [facility].      Objective   Vital Signs:   /78 (BP Location: Right arm, Patient Position: Sitting, Cuff Size: Adult)   Pulse 71   Temp 97.1 °F (36.2 °C)   Ht 180.3 cm (71\")   Wt 99.8 kg (220 lb)   SpO2 95%   BMI 30.68 kg/m²     Physical Exam  Vitals signs reviewed.   Constitutional:       Appearance: He is well-developed.   HENT:      Head: Normocephalic and atraumatic.      Right Ear: Tympanic membrane and external ear normal.      Left Ear: Tympanic membrane and external ear normal.   Eyes:      Conjunctiva/sclera: Conjunctivae normal.      Pupils: Pupils are equal, round, and reactive to light.   Neck:      Musculoskeletal: Normal range of motion and neck supple.      Thyroid: No thyromegaly.      Vascular: No JVD.   Cardiovascular:      Rate and Rhythm: Normal rate and regular rhythm.      Heart sounds: Normal heart sounds.   Pulmonary:      Effort: Pulmonary effort is normal.      Breath sounds: Normal breath sounds.   Abdominal:      General: Bowel sounds are normal.      Palpations: Abdomen is soft.   Musculoskeletal:      Lumbar back: He exhibits decreased range of motion.   Lymphadenopathy:      Cervical: No cervical adenopathy.   Skin:     General: Skin is warm and dry.      Findings: No rash.   Neurological:      Mental Status: He is alert and oriented to person, place, and time.      Cranial Nerves: No cranial nerve deficit.      Coordination: Coordination normal.   Psychiatric:         Behavior: Behavior normal.         " Thought Content: Thought content normal.         Judgment: Judgment normal.        Result Review :   The following data was reviewed by: Antonio Finley Jr., MD on 01/12/2021:      Data reviewed: Cardiology studies loop recorder          Assessment and Plan    Problem List Items Addressed This Visit        Cardiac and Vasculature    Hyperlipidemia - Primary    Relevant Medications    atorvastatin (LIPITOR) 20 MG tablet    Hypertension    Relevant Medications    amLODIPine (NORVASC) 5 MG tablet    metoprolol tartrate (LOPRESSOR) 50 MG tablet      Other Visit Diagnoses     Essential tremor        Relevant Medications    primidone (MYSOLINE) 50 MG tablet      Medications refill recheck in 6 months for Medicare wellness.    Follow Up   Return in about 6 months (around 7/12/2021) for Medicare Wellness.  Patient was given instructions and counseling regarding his condition or for health maintenance advice. Please see specific information pulled into the AVS if appropriate.

## 2021-01-13 RX ORDER — PRIMIDONE 50 MG/1
TABLET ORAL
Qty: 180 TABLET | OUTPATIENT
Start: 2021-01-13

## 2021-01-20 RX ORDER — METOPROLOL TARTRATE 50 MG/1
TABLET, FILM COATED ORAL
Qty: 180 TABLET | Refills: 1 | Status: SHIPPED | OUTPATIENT
Start: 2021-01-20 | End: 2021-07-13 | Stop reason: SDUPTHER

## 2021-01-20 RX ORDER — AMLODIPINE BESYLATE 5 MG/1
5 TABLET ORAL DAILY
Qty: 90 TABLET | Refills: 1 | Status: SHIPPED | OUTPATIENT
Start: 2021-01-20 | End: 2021-07-13 | Stop reason: SDUPTHER

## 2021-07-13 ENCOUNTER — OFFICE VISIT (OUTPATIENT)
Dept: INTERNAL MEDICINE | Facility: CLINIC | Age: 86
End: 2021-07-13

## 2021-07-13 VITALS
SYSTOLIC BLOOD PRESSURE: 124 MMHG | OXYGEN SATURATION: 91 % | BODY MASS INDEX: 30.8 KG/M2 | HEIGHT: 71 IN | DIASTOLIC BLOOD PRESSURE: 72 MMHG | HEART RATE: 82 BPM | TEMPERATURE: 98.6 F | WEIGHT: 220 LBS

## 2021-07-13 DIAGNOSIS — I10 ESSENTIAL HYPERTENSION: ICD-10-CM

## 2021-07-13 DIAGNOSIS — G25.0 ESSENTIAL TREMOR: ICD-10-CM

## 2021-07-13 DIAGNOSIS — Z00.00 MEDICARE ANNUAL WELLNESS VISIT, SUBSEQUENT: ICD-10-CM

## 2021-07-13 DIAGNOSIS — E78.2 MIXED HYPERLIPIDEMIA: Primary | ICD-10-CM

## 2021-07-13 PROCEDURE — 99213 OFFICE O/P EST LOW 20 MIN: CPT | Performed by: FAMILY MEDICINE

## 2021-07-13 PROCEDURE — G0439 PPPS, SUBSEQ VISIT: HCPCS | Performed by: FAMILY MEDICINE

## 2021-07-13 RX ORDER — AMLODIPINE BESYLATE 5 MG/1
5 TABLET ORAL DAILY
Qty: 90 TABLET | Refills: 1 | Status: SHIPPED | OUTPATIENT
Start: 2021-07-13 | End: 2022-01-18 | Stop reason: SDUPTHER

## 2021-07-13 RX ORDER — METOPROLOL TARTRATE 50 MG/1
50 TABLET, FILM COATED ORAL 2 TIMES DAILY
Qty: 180 TABLET | Refills: 1 | Status: SHIPPED | OUTPATIENT
Start: 2021-07-13 | End: 2022-01-18 | Stop reason: SDUPTHER

## 2021-07-13 RX ORDER — ATORVASTATIN CALCIUM 20 MG/1
20 TABLET, FILM COATED ORAL DAILY
Qty: 90 TABLET | Refills: 1 | Status: SHIPPED | OUTPATIENT
Start: 2021-07-13 | End: 2022-01-17

## 2021-07-13 NOTE — PROGRESS NOTES
"Chief Complaint  Medicare Wellness-subsequent    Subjective          Jim Marvin presents to [facility]  Pleasant gentleman here for recheck of hypertension hyperlipidemia essential tremor.  He is stopped taking primidone because he felt like he just did not like the side effects.  We will continue treating his hyperlipidemia and hypertension although he keeps hesitating getting lab work.  Symptomatically has been quite stable.      Objective   Vital Signs:   /72   Pulse 82   Temp 98.6 °F (37 °C)   Ht 180.3 cm (71\")   Wt 99.8 kg (220 lb)   SpO2 91%   BMI 30.68 kg/m²     Physical Exam  Vitals reviewed.   Constitutional:       Appearance: He is well-developed.   HENT:      Head: Normocephalic and atraumatic.      Right Ear: Tympanic membrane and external ear normal.      Left Ear: Tympanic membrane and external ear normal.   Eyes:      Conjunctiva/sclera: Conjunctivae normal.      Pupils: Pupils are equal, round, and reactive to light.   Neck:      Thyroid: No thyromegaly.      Vascular: No JVD.   Cardiovascular:      Rate and Rhythm: Normal rate and regular rhythm.      Heart sounds: Normal heart sounds.   Pulmonary:      Effort: Pulmonary effort is normal.      Breath sounds: Normal breath sounds.   Abdominal:      General: Bowel sounds are normal.      Palpations: Abdomen is soft.   Musculoskeletal:         General: Normal range of motion.      Cervical back: Normal range of motion and neck supple.   Lymphadenopathy:      Cervical: No cervical adenopathy.   Skin:     General: Skin is warm and dry.      Findings: No rash.   Neurological:      Mental Status: He is alert and oriented to person, place, and time.      Cranial Nerves: No cranial nerve deficit.      Motor: Tremor present.      Coordination: Coordination normal.   Psychiatric:         Behavior: Behavior normal.         Thought Content: Thought content normal.         Judgment: Judgment normal.        Result " Review :                 Assessment and Plan    Diagnoses and all orders for this visit:    1. Mixed hyperlipidemia (Primary)  Comments:  No change in current treatment    2. Essential hypertension  Comments:  No change in current treatment    3. Medicare annual wellness visit, subsequent    4. Essential tremor  Comments:  He does not wish to take primidone    Other orders  -     metoprolol tartrate (LOPRESSOR) 50 MG tablet; Take 1 tablet by mouth 2 (Two) Times a Day.  Dispense: 180 tablet; Refill: 1  -     atorvastatin (LIPITOR) 20 MG tablet; Take 1 tablet by mouth Daily.  Dispense: 90 tablet; Refill: 1  -     amLODIPine (NORVASC) 5 MG tablet; Take 1 tablet by mouth Daily.  Dispense: 90 tablet; Refill: 1        Follow Up   No follow-ups on file.  Patient was given instructions and counseling regarding his condition or for health maintenance advice. Please see specific information pulled into the AVS if appropriate.
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
## 2021-09-20 ENCOUNTER — NURSE TRIAGE (OUTPATIENT)
Dept: CALL CENTER | Facility: HOSPITAL | Age: 86
End: 2021-09-20

## 2021-09-20 NOTE — TELEPHONE ENCOUNTER
Rina-His daughter has noticed Sob for the last 2 weeks. He is not eating well. She noted that Sunday he was more Sob. He does not mention getting sob at rest, when he sleeps. He is getting sob with activity, not much of an activity for him. He is states his feet are swollen. She only sees the feet. Needs to be seen 24 hrs or go to the Ed, Do not desire the ED.    Reason for Disposition  • [1] MILD swelling of both ankles (i.e., pedal edema) AND [2] new-onset or worsening    Additional Information  • Negative: SEVERE difficulty breathing (e.g., struggling for each breath, speaks in single words)  • Negative: Looks like a broken bone or dislocated joint (e.g., crooked or deformed)  • Negative: Sounds like a life-threatening emergency to the triager  • Negative: Chest pain  • Negative: Followed a leg injury  • Negative: [1] Small area of swelling AND [2] followed an insect bite to the area  • Negative: Swelling of one ankle joint  • Negative: Swelling of knee is main symptom  • Negative: Pregnant  • Negative: Postpartum (from 0 to 6 weeks after delivery)  • Negative: Difficulty breathing at rest  • Negative: Entire foot is cool or blue in comparison to other side  • Negative: [1] Can't walk or can barely walk AND [2] new-onset  • Negative: [1] Difficulty breathing with exertion (e.g., walking) AND [2] new-onset or worsening  • Negative: [1] Red area or streak AND [2] fever  • Negative: [1] Swelling is painful to touch AND [2] fever  • Negative: [1] Cast on leg or ankle AND [2] now increased pain  • Negative: Patient sounds very sick or weak to the triager  • Negative: SEVERE leg swelling (e.g., swelling extends above knee, entire leg is swollen, weeping fluid)  • Negative: [1] Red area or streak [2] large (> 2 in. or 5 cm)  • Negative: [1] Thigh or calf pain AND [2] only 1 side AND [3] present > 1 hour  • Negative: [1] Thigh, calf, or ankle swelling AND [2] only 1 side  • Negative: [1] Thigh, calf, or ankle swelling AND  "[2] bilateral AND [3] 1 side is more swollen  • Negative: [1] MODERATE leg swelling (e.g., swelling extends up to knees) AND [2] new-onset or worsening  • Negative: Swelling of face, arm or hands  (Exception: slight puffiness of fingers occurring during hot weather)  • Negative: Looks like a boil, infected sore, deep ulcer or other infected rash (spreading redness, pus)    Answer Assessment - Initial Assessment Questions  1. ONSET: \"When did the swelling start?\" (e.g., minutes, hours, days)      A few weeks ago, has been getting worse.   2. LOCATION: \"What part of the leg is swollen?\"  \"Are both legs swollen or just one leg?\"      Both legs lower extremties and feet.   3. SEVERITY: \"How bad is the swelling?\" (e.g., localized; mild, moderate, severe)   - Localized - small area of swelling localized to one leg   - MILD pedal edema - swelling limited to foot and ankle, pitting edema < 1/4 inch (6 mm) deep, rest and elevation eliminate most or all swelling   - MODERATE edema - swelling of lower leg to knee, pitting edema > 1/4 inch (6 mm) deep, rest and elevation only partially reduce swelling   - SEVERE edema - swelling extends above knee, facial or hand swelling present       Mild   4. REDNESS: \"Does the swelling look red or infected?\"      n  5. PAIN: \"Is the swelling painful to touch?\" If Yes, ask: \"How painful is it?\"   (Scale 1-10; mild, moderate or severe)      n  6. FEVER: \"Do you have a fever?\" If Yes, ask: \"What is it, how was it measured, and when did it start?\"       n  7. CAUSE: \"What do you think is causing the leg swelling?\"      unknown  8. MEDICAL HISTORY: \"Do you have a history of heart failure, kidney disease, liver failure, or cancer?\"      n  9. RECURRENT SYMPTOM: \"Have you had leg swelling before?\" If Yes, ask: \"When was the last time?\" \"What happened that time?\"      y  10. OTHER SYMPTOMS: \"Do you have any other symptoms?\" (e.g., chest pain, difficulty breathing)        Sob with activity, With any " "activity.  11. PREGNANCY: \"Is there any chance you are pregnant?\" \"When was your last menstrual period?\"        n    Protocols used: LEG SWELLING AND EDEMA-ADULT-AH      "

## 2021-09-22 ENCOUNTER — HOSPITAL ENCOUNTER (INPATIENT)
Facility: HOSPITAL | Age: 86
LOS: 4 days | Discharge: HOME | End: 2021-09-26
Attending: EMERGENCY MEDICINE | Admitting: HOSPITALIST

## 2021-09-22 ENCOUNTER — OFFICE VISIT (OUTPATIENT)
Dept: INTERNAL MEDICINE | Facility: CLINIC | Age: 86
End: 2021-09-22

## 2021-09-22 ENCOUNTER — APPOINTMENT (OUTPATIENT)
Dept: GENERAL RADIOLOGY | Facility: HOSPITAL | Age: 86
End: 2021-09-22

## 2021-09-22 ENCOUNTER — APPOINTMENT (OUTPATIENT)
Dept: CT IMAGING | Facility: HOSPITAL | Age: 86
End: 2021-09-22

## 2021-09-22 VITALS
SYSTOLIC BLOOD PRESSURE: 142 MMHG | HEART RATE: 98 BPM | DIASTOLIC BLOOD PRESSURE: 88 MMHG | WEIGHT: 206.4 LBS | OXYGEN SATURATION: 84 % | BODY MASS INDEX: 28.9 KG/M2 | TEMPERATURE: 98.2 F | HEIGHT: 71 IN

## 2021-09-22 DIAGNOSIS — R06.09 DYSPNEA ON EXERTION: ICD-10-CM

## 2021-09-22 DIAGNOSIS — R09.02 HYPOXIA: ICD-10-CM

## 2021-09-22 DIAGNOSIS — R93.89 ABNORMAL CT OF THE CHEST: ICD-10-CM

## 2021-09-22 DIAGNOSIS — R91.8 GROUND GLASS OPACITY PRESENT ON IMAGING OF LUNG: Primary | ICD-10-CM

## 2021-09-22 DIAGNOSIS — I71.20 THORACIC AORTIC ANEURYSM WITHOUT RUPTURE (HCC): ICD-10-CM

## 2021-09-22 DIAGNOSIS — R09.02 HYPOXIA: Primary | ICD-10-CM

## 2021-09-22 LAB
ALBUMIN SERPL-MCNC: 3.4 G/DL (ref 3.5–5.2)
ALBUMIN/GLOB SERPL: 1.1 G/DL
ALP SERPL-CCNC: 63 U/L (ref 39–117)
ALT SERPL W P-5'-P-CCNC: 39 U/L (ref 1–41)
ANION GAP SERPL CALCULATED.3IONS-SCNC: 9 MMOL/L (ref 5–15)
APTT PPP: 31.9 SECONDS (ref 22.7–35.4)
AST SERPL-CCNC: 27 U/L (ref 1–40)
BILIRUB SERPL-MCNC: 0.8 MG/DL (ref 0–1.2)
BUN SERPL-MCNC: 10 MG/DL (ref 8–23)
BUN/CREAT SERPL: 14.1 (ref 7–25)
CALCIUM SPEC-SCNC: 8.7 MG/DL (ref 8.6–10.5)
CHLORIDE SERPL-SCNC: 100 MMOL/L (ref 98–107)
CO2 SERPL-SCNC: 23 MMOL/L (ref 22–29)
CREAT SERPL-MCNC: 0.71 MG/DL (ref 0.76–1.27)
D-LACTATE SERPL-SCNC: 1 MMOL/L (ref 0.5–2)
DEPRECATED RDW RBC AUTO: 41.6 FL (ref 37–54)
EOSINOPHIL # BLD MANUAL: 0.5 10*3/MM3 (ref 0–0.4)
EOSINOPHIL NFR BLD MANUAL: 7 % (ref 0.3–6.2)
ERYTHROCYTE [DISTWIDTH] IN BLOOD BY AUTOMATED COUNT: 12.2 % (ref 12.3–15.4)
GFR SERPL CREATININE-BSD FRML MDRD: 106 ML/MIN/1.73
GLOBULIN UR ELPH-MCNC: 3.1 GM/DL
GLUCOSE SERPL-MCNC: 109 MG/DL (ref 65–99)
HCT VFR BLD AUTO: 39.8 % (ref 37.5–51)
HGB BLD-MCNC: 13.6 G/DL (ref 13–17.7)
HOLD SPECIMEN: NORMAL
HOLD SPECIMEN: NORMAL
INR PPP: 1.13 (ref 0.9–1.1)
LYMPHOCYTES # BLD MANUAL: 0.64 10*3/MM3 (ref 0.7–3.1)
LYMPHOCYTES NFR BLD MANUAL: 8 % (ref 5–12)
LYMPHOCYTES NFR BLD MANUAL: 9 % (ref 19.6–45.3)
MCH RBC QN AUTO: 32.5 PG (ref 26.6–33)
MCHC RBC AUTO-ENTMCNC: 34.2 G/DL (ref 31.5–35.7)
MCV RBC AUTO: 95.2 FL (ref 79–97)
METAMYELOCYTES NFR BLD MANUAL: 1 % (ref 0–0)
MONOCYTES # BLD AUTO: 0.57 10*3/MM3 (ref 0.1–0.9)
NEUTROPHILS # BLD AUTO: 5.37 10*3/MM3 (ref 1.7–7)
NEUTROPHILS NFR BLD MANUAL: 75 % (ref 42.7–76)
NRBC BLD AUTO-RTO: 0 /100 WBC (ref 0–0.2)
NT-PROBNP SERPL-MCNC: 749.4 PG/ML (ref 0–1800)
PLAT MORPH BLD: NORMAL
PLATELET # BLD AUTO: 290 10*3/MM3 (ref 140–450)
PMV BLD AUTO: 8.5 FL (ref 6–12)
POTASSIUM SERPL-SCNC: 4.2 MMOL/L (ref 3.5–5.2)
PROCALCITONIN SERPL-MCNC: 0.07 NG/ML (ref 0–0.25)
PROT SERPL-MCNC: 6.5 G/DL (ref 6–8.5)
PROTHROMBIN TIME: 14.3 SECONDS (ref 11.7–14.2)
RBC # BLD AUTO: 4.18 10*6/MM3 (ref 4.14–5.8)
RBC MORPH BLD: NORMAL
SARS-COV-2 RNA RESP QL NAA+PROBE: NOT DETECTED
SODIUM SERPL-SCNC: 132 MMOL/L (ref 136–145)
TROPONIN T SERPL-MCNC: <0.01 NG/ML (ref 0–0.03)
WBC # BLD AUTO: 7.16 10*3/MM3 (ref 3.4–10.8)
WBC MORPH BLD: NORMAL
WHOLE BLOOD HOLD SPECIMEN: NORMAL
WHOLE BLOOD HOLD SPECIMEN: NORMAL

## 2021-09-22 PROCEDURE — 83605 ASSAY OF LACTIC ACID: CPT | Performed by: EMERGENCY MEDICINE

## 2021-09-22 PROCEDURE — 94799 UNLISTED PULMONARY SVC/PX: CPT

## 2021-09-22 PROCEDURE — 0 IOPAMIDOL PER 1 ML: Performed by: EMERGENCY MEDICINE

## 2021-09-22 PROCEDURE — 99284 EMERGENCY DEPT VISIT MOD MDM: CPT

## 2021-09-22 PROCEDURE — 71046 X-RAY EXAM CHEST 2 VIEWS: CPT

## 2021-09-22 PROCEDURE — 25010000002 MAGNESIUM SULFATE 2 GM/50ML SOLUTION: Performed by: EMERGENCY MEDICINE

## 2021-09-22 PROCEDURE — 85025 COMPLETE CBC W/AUTO DIFF WBC: CPT

## 2021-09-22 PROCEDURE — 25010000002 FUROSEMIDE PER 20 MG: Performed by: EMERGENCY MEDICINE

## 2021-09-22 PROCEDURE — 93005 ELECTROCARDIOGRAM TRACING: CPT

## 2021-09-22 PROCEDURE — 85730 THROMBOPLASTIN TIME PARTIAL: CPT | Performed by: EMERGENCY MEDICINE

## 2021-09-22 PROCEDURE — U0003 INFECTIOUS AGENT DETECTION BY NUCLEIC ACID (DNA OR RNA); SEVERE ACUTE RESPIRATORY SYNDROME CORONAVIRUS 2 (SARS-COV-2) (CORONAVIRUS DISEASE [COVID-19]), AMPLIFIED PROBE TECHNIQUE, MAKING USE OF HIGH THROUGHPUT TECHNOLOGIES AS DESCRIBED BY CMS-2020-01-R: HCPCS | Performed by: EMERGENCY MEDICINE

## 2021-09-22 PROCEDURE — 84484 ASSAY OF TROPONIN QUANT: CPT

## 2021-09-22 PROCEDURE — U0005 INFEC AGEN DETEC AMPLI PROBE: HCPCS | Performed by: EMERGENCY MEDICINE

## 2021-09-22 PROCEDURE — 85007 BL SMEAR W/DIFF WBC COUNT: CPT

## 2021-09-22 PROCEDURE — 80053 COMPREHEN METABOLIC PANEL: CPT

## 2021-09-22 PROCEDURE — 84145 PROCALCITONIN (PCT): CPT | Performed by: EMERGENCY MEDICINE

## 2021-09-22 PROCEDURE — 71275 CT ANGIOGRAPHY CHEST: CPT

## 2021-09-22 PROCEDURE — 99214 OFFICE O/P EST MOD 30 MIN: CPT | Performed by: NURSE PRACTITIONER

## 2021-09-22 PROCEDURE — 93010 ELECTROCARDIOGRAM REPORT: CPT | Performed by: INTERNAL MEDICINE

## 2021-09-22 PROCEDURE — 25010000002 DEXAMETHASONE PER 1 MG: Performed by: EMERGENCY MEDICINE

## 2021-09-22 PROCEDURE — 93005 ELECTROCARDIOGRAM TRACING: CPT | Performed by: EMERGENCY MEDICINE

## 2021-09-22 PROCEDURE — 94760 N-INVAS EAR/PLS OXIMETRY 1: CPT

## 2021-09-22 PROCEDURE — 83880 ASSAY OF NATRIURETIC PEPTIDE: CPT

## 2021-09-22 PROCEDURE — 87040 BLOOD CULTURE FOR BACTERIA: CPT | Performed by: EMERGENCY MEDICINE

## 2021-09-22 PROCEDURE — 85610 PROTHROMBIN TIME: CPT | Performed by: EMERGENCY MEDICINE

## 2021-09-22 RX ORDER — FUROSEMIDE 10 MG/ML
60 INJECTION INTRAMUSCULAR; INTRAVENOUS ONCE
Status: COMPLETED | OUTPATIENT
Start: 2021-09-22 | End: 2021-09-22

## 2021-09-22 RX ORDER — MAGNESIUM SULFATE HEPTAHYDRATE 40 MG/ML
2 INJECTION, SOLUTION INTRAVENOUS ONCE
Status: COMPLETED | OUTPATIENT
Start: 2021-09-22 | End: 2021-09-22

## 2021-09-22 RX ORDER — ALBUTEROL SULFATE 2.5 MG/3ML
2.5 SOLUTION RESPIRATORY (INHALATION)
Status: COMPLETED | OUTPATIENT
Start: 2021-09-22 | End: 2021-09-22

## 2021-09-22 RX ORDER — SODIUM CHLORIDE 0.9 % (FLUSH) 0.9 %
10 SYRINGE (ML) INJECTION AS NEEDED
Status: DISCONTINUED | OUTPATIENT
Start: 2021-09-22 | End: 2021-09-26 | Stop reason: HOSPADM

## 2021-09-22 RX ORDER — IPRATROPIUM BROMIDE AND ALBUTEROL SULFATE 2.5; .5 MG/3ML; MG/3ML
3 SOLUTION RESPIRATORY (INHALATION) ONCE
Status: COMPLETED | OUTPATIENT
Start: 2021-09-22 | End: 2021-09-22

## 2021-09-22 RX ORDER — DEXAMETHASONE SODIUM PHOSPHATE 10 MG/ML
6 INJECTION INTRAMUSCULAR; INTRAVENOUS ONCE
Status: COMPLETED | OUTPATIENT
Start: 2021-09-22 | End: 2021-09-22

## 2021-09-22 RX ADMIN — ALBUTEROL SULFATE 2.5 MG: 2.5 SOLUTION RESPIRATORY (INHALATION) at 21:49

## 2021-09-22 RX ADMIN — SODIUM CHLORIDE 500 ML: 9 INJECTION, SOLUTION INTRAVENOUS at 20:58

## 2021-09-22 RX ADMIN — IPRATROPIUM BROMIDE AND ALBUTEROL SULFATE 3 ML: 2.5; .5 SOLUTION RESPIRATORY (INHALATION) at 22:58

## 2021-09-22 RX ADMIN — IOPAMIDOL 95 ML: 755 INJECTION, SOLUTION INTRAVENOUS at 21:25

## 2021-09-22 RX ADMIN — DEXAMETHASONE SODIUM PHOSPHATE 6 MG: 10 INJECTION INTRAMUSCULAR; INTRAVENOUS at 23:01

## 2021-09-22 RX ADMIN — MAGNESIUM SULFATE HEPTAHYDRATE 2 G: 2 INJECTION, SOLUTION INTRAVENOUS at 21:00

## 2021-09-22 RX ADMIN — IPRATROPIUM BROMIDE AND ALBUTEROL SULFATE 3 ML: 2.5; .5 SOLUTION RESPIRATORY (INHALATION) at 21:34

## 2021-09-22 RX ADMIN — ALBUTEROL SULFATE 2.5 MG: 2.5 SOLUTION RESPIRATORY (INHALATION) at 21:40

## 2021-09-22 RX ADMIN — ALBUTEROL SULFATE 2.5 MG: 2.5 SOLUTION RESPIRATORY (INHALATION) at 23:05

## 2021-09-22 RX ADMIN — FUROSEMIDE 60 MG: 20 INJECTION, SOLUTION INTRAMUSCULAR; INTRAVENOUS at 23:03

## 2021-09-22 RX ADMIN — ALBUTEROL SULFATE 2.5 MG: 2.5 SOLUTION RESPIRATORY (INHALATION) at 23:17

## 2021-09-22 NOTE — PROGRESS NOTES
"        Chief Complaint  Shortness of Breath and Weight Loss (no appetite )       Subjective:      History of Present Illness {CC  Problem List  Visit  Diagnosis   Encounters  Notes  Medications  Labs  Result Review Imaging  Media :23}     Jim Marvin is a patient of Antonio Finley Jr., MD and presents to [facility] for shortness of breath.  He is new to me.  Dr Finley did not have an opening and I am seeing him for acute issue.     He states over the last 3 weeks he has not had appetite.  Breathing is more labored.  States when he gets up in morning he expectorates. Other than that, no cough.   No fever or chills.    He has had both COVID vaccines.   No known exposure.     He has had change of taste.  \"nothing tastes right\" and he has been eating less.     Prior smoker, never had colonoscopy. Hx prostate cancer s/p radiation.     No soa when laying down.  Sleeps well. He states when he gets up to move, he gets short of breath.  He denies chest pain.   He denies swelling in legs.  He has not been on long trip or been in bed for extended amount of time.  Denies HA or dizziness.     Lives alone:      Medical assistant came to get me as he was being roomed as O2 sats in 80s and he had labored breathing.     He was given duoneb and he remained in mid 80's.       He has agreed to be transferred to ER for further evaluation.      Objective:      Physical Exam  Vitals reviewed.   Constitutional:       Appearance: Normal appearance. He is well-developed.   HENT:      Head:      Comments: Wearing mask due to COVID   Neck:      Thyroid: No thyromegaly.   Cardiovascular:      Rate and Rhythm: Normal rate and regular rhythm.      Pulses: Normal pulses.      Heart sounds: Normal heart sounds. No murmur heard.     Pulmonary:      Effort: Pulmonary effort is normal.      Breath sounds: Normal breath sounds.      Comments: E/U   Diminished in bases   Labored breathing when I entered " "the room.   Musculoskeletal:      Cervical back: Normal range of motion and neck supple.   Lymphadenopathy:      Cervical: No cervical adenopathy.   Skin:     General: Skin is warm and dry.      Capillary Refill: Capillary refill takes 2 to 3 seconds.   Neurological:      Mental Status: He is alert and oriented to person, place, and time.      Comments: Tremors UEs (chronic per patient)    Psychiatric:         Mood and Affect: Mood normal.         Behavior: Behavior normal. Behavior is cooperative.         Thought Content: Thought content normal.         Judgment: Judgment normal.        Result Review  Data Reviewed:{ Labs  Result Review  Imaging  Med Tab  Media :23}          Vital Signs:   /88 (BP Location: Left arm, Patient Position: Sitting, Cuff Size: Adult)   Pulse 98   Temp 98.2 °F (36.8 °C)   Ht 180.3 cm (70.98\")   Wt 93.6 kg (206 lb 6.4 oz)   SpO2 (!) 84% Comment: after neb  BMI 28.80 kg/m²          Assessment and Plan:      Assessment and Plan {CC Problem List  Visit Diagnosis  ROS  Review (Popup)  Health Maintenance  Quality  BestPractice  Medications  SmartSets  SnapShot Encounters  Media :23}     Problem List Items Addressed This Visit     None      Visit Diagnoses     Hypoxia    -  Primary    Dyspnea on exertion            Acute: complicated   Will send to ER - I can not see any prior scans regarding possible chronic lung issues or cardiology work up.   He was admitted for resp failure in 2019 - looks like related to cellulitis at [facility].     Diff:  Covid, PE, HF     Two medical assistants transferred him to ER.  He gave his wallet to his daughter that met him there per MA.     Follow Up {Instructions Charge Capture  Follow-up Communications :23}     Return if symptoms worsen or fail to improve.  Patient was given instructions and counseling regarding his condition or for health maintenance advice. Please see specific information pulled into the AVS if " appropriate.    Dragon disclaimer:   Much of this encounter note is an electronic transcription/translation of spoken language to printed text. The electronic translation of spoken language may permit erroneous, or at times, nonsensical words or phrases to be inadvertently transcribed; Although I have reviewed the note for such errors, some may still exist.     Additional Patient Counseling:       There are no Patient Instructions on file for this visit.

## 2021-09-23 ENCOUNTER — TELEPHONE (OUTPATIENT)
Dept: INTERNAL MEDICINE | Facility: CLINIC | Age: 86
End: 2021-09-23

## 2021-09-23 PROBLEM — E44.0 MODERATE MALNUTRITION (HCC): Status: ACTIVE | Noted: 2021-09-23

## 2021-09-23 PROBLEM — J43.9 EMPHYSEMA LUNG: Status: ACTIVE | Noted: 2021-09-23

## 2021-09-23 PROBLEM — B33.8 RSV (RESPIRATORY SYNCYTIAL VIRUS INFECTION): Status: ACTIVE | Noted: 2021-09-23

## 2021-09-23 PROBLEM — R09.02 HYPOXIA: Status: ACTIVE | Noted: 2021-09-23

## 2021-09-23 PROBLEM — Z86.73 HISTORY OF CEREBELLAR STROKE: Status: ACTIVE | Noted: 2018-02-20

## 2021-09-23 PROBLEM — J96.01 ACUTE RESPIRATORY FAILURE WITH HYPOXIA (HCC): Status: ACTIVE | Noted: 2021-09-23

## 2021-09-23 LAB
ANION GAP SERPL CALCULATED.3IONS-SCNC: 10.9 MMOL/L (ref 5–15)
B PARAPERT DNA SPEC QL NAA+PROBE: NOT DETECTED
B PERT DNA SPEC QL NAA+PROBE: NOT DETECTED
BASOPHILS # BLD AUTO: 0.04 10*3/MM3 (ref 0–0.2)
BASOPHILS NFR BLD AUTO: 0.6 % (ref 0–1.5)
BUN SERPL-MCNC: 10 MG/DL (ref 8–23)
BUN/CREAT SERPL: 14.3 (ref 7–25)
C PNEUM DNA NPH QL NAA+NON-PROBE: NOT DETECTED
CALCIUM SPEC-SCNC: 8.6 MG/DL (ref 8.6–10.5)
CHLORIDE SERPL-SCNC: 99 MMOL/L (ref 98–107)
CO2 SERPL-SCNC: 24.1 MMOL/L (ref 22–29)
CREAT SERPL-MCNC: 0.7 MG/DL (ref 0.76–1.27)
DEPRECATED RDW RBC AUTO: 41.6 FL (ref 37–54)
EOSINOPHIL # BLD AUTO: 0 10*3/MM3 (ref 0–0.4)
EOSINOPHIL NFR BLD AUTO: 0 % (ref 0.3–6.2)
ERYTHROCYTE [DISTWIDTH] IN BLOOD BY AUTOMATED COUNT: 11.9 % (ref 12.3–15.4)
FLUAV SUBTYP SPEC NAA+PROBE: NOT DETECTED
FLUBV RNA ISLT QL NAA+PROBE: NOT DETECTED
GFR SERPL CREATININE-BSD FRML MDRD: 108 ML/MIN/1.73
GLUCOSE SERPL-MCNC: 228 MG/DL (ref 65–99)
HADV DNA SPEC NAA+PROBE: NOT DETECTED
HBA1C MFR BLD: 5.64 % (ref 4.8–5.6)
HCOV 229E RNA SPEC QL NAA+PROBE: NOT DETECTED
HCOV HKU1 RNA SPEC QL NAA+PROBE: NOT DETECTED
HCOV NL63 RNA SPEC QL NAA+PROBE: NOT DETECTED
HCOV OC43 RNA SPEC QL NAA+PROBE: NOT DETECTED
HCT VFR BLD AUTO: 41.2 % (ref 37.5–51)
HGB BLD-MCNC: 14.1 G/DL (ref 13–17.7)
HMPV RNA NPH QL NAA+NON-PROBE: NOT DETECTED
HPIV1 RNA SPEC QL NAA+PROBE: NOT DETECTED
HPIV2 RNA SPEC QL NAA+PROBE: NOT DETECTED
HPIV3 RNA NPH QL NAA+PROBE: NOT DETECTED
HPIV4 P GENE NPH QL NAA+PROBE: NOT DETECTED
IMM GRANULOCYTES # BLD AUTO: 0.1 10*3/MM3 (ref 0–0.05)
IMM GRANULOCYTES NFR BLD AUTO: 1.6 % (ref 0–0.5)
INR PPP: 1.15 (ref 0.9–1.1)
LDH SERPL-CCNC: 289 U/L (ref 135–225)
LYMPHOCYTES # BLD AUTO: 0.29 10*3/MM3 (ref 0.7–3.1)
LYMPHOCYTES NFR BLD AUTO: 4.7 % (ref 19.6–45.3)
M PNEUMO IGG SER IA-ACNC: NOT DETECTED
MAGNESIUM SERPL-MCNC: 2.4 MG/DL (ref 1.6–2.4)
MCH RBC QN AUTO: 32.9 PG (ref 26.6–33)
MCHC RBC AUTO-ENTMCNC: 34.2 G/DL (ref 31.5–35.7)
MCV RBC AUTO: 96.3 FL (ref 79–97)
MONOCYTES # BLD AUTO: 0.18 10*3/MM3 (ref 0.1–0.9)
MONOCYTES NFR BLD AUTO: 2.9 % (ref 5–12)
NEUTROPHILS NFR BLD AUTO: 5.56 10*3/MM3 (ref 1.7–7)
NEUTROPHILS NFR BLD AUTO: 90.2 % (ref 42.7–76)
NRBC BLD AUTO-RTO: 0 /100 WBC (ref 0–0.2)
PLATELET # BLD AUTO: 295 10*3/MM3 (ref 140–450)
PMV BLD AUTO: 8.9 FL (ref 6–12)
POTASSIUM SERPL-SCNC: 3.8 MMOL/L (ref 3.5–5.2)
PROTHROMBIN TIME: 14.5 SECONDS (ref 11.7–14.2)
RBC # BLD AUTO: 4.28 10*6/MM3 (ref 4.14–5.8)
RHINOVIRUS RNA SPEC NAA+PROBE: NOT DETECTED
RSV RNA NPH QL NAA+NON-PROBE: DETECTED
SARS-COV-2 RNA NPH QL NAA+NON-PROBE: NOT DETECTED
SODIUM SERPL-SCNC: 134 MMOL/L (ref 136–145)
WBC # BLD AUTO: 6.17 10*3/MM3 (ref 3.4–10.8)

## 2021-09-23 PROCEDURE — 85025 COMPLETE CBC W/AUTO DIFF WBC: CPT | Performed by: NURSE PRACTITIONER

## 2021-09-23 PROCEDURE — 25010000002 DEXAMETHASONE PER 1 MG: Performed by: NURSE PRACTITIONER

## 2021-09-23 PROCEDURE — 85610 PROTHROMBIN TIME: CPT | Performed by: NURSE PRACTITIONER

## 2021-09-23 PROCEDURE — 97166 OT EVAL MOD COMPLEX 45 MIN: CPT

## 2021-09-23 PROCEDURE — 83036 HEMOGLOBIN GLYCOSYLATED A1C: CPT | Performed by: NURSE PRACTITIONER

## 2021-09-23 PROCEDURE — 80048 BASIC METABOLIC PNL TOTAL CA: CPT | Performed by: NURSE PRACTITIONER

## 2021-09-23 PROCEDURE — 0202U NFCT DS 22 TRGT SARS-COV-2: CPT | Performed by: NURSE PRACTITIONER

## 2021-09-23 PROCEDURE — 83615 LACTATE (LD) (LDH) ENZYME: CPT | Performed by: INTERNAL MEDICINE

## 2021-09-23 PROCEDURE — 97535 SELF CARE MNGMENT TRAINING: CPT

## 2021-09-23 PROCEDURE — 83735 ASSAY OF MAGNESIUM: CPT | Performed by: NURSE PRACTITIONER

## 2021-09-23 RX ORDER — ACETAMINOPHEN 160 MG/5ML
650 SOLUTION ORAL EVERY 4 HOURS PRN
Status: DISCONTINUED | OUTPATIENT
Start: 2021-09-23 | End: 2021-09-26 | Stop reason: HOSPADM

## 2021-09-23 RX ORDER — ASPIRIN 325 MG
325 TABLET ORAL DAILY
Status: DISCONTINUED | OUTPATIENT
Start: 2021-09-23 | End: 2021-09-26 | Stop reason: HOSPADM

## 2021-09-23 RX ORDER — ONDANSETRON 2 MG/ML
4 INJECTION INTRAMUSCULAR; INTRAVENOUS EVERY 6 HOURS PRN
Status: DISCONTINUED | OUTPATIENT
Start: 2021-09-23 | End: 2021-09-26 | Stop reason: HOSPADM

## 2021-09-23 RX ORDER — SODIUM CHLORIDE 0.9 % (FLUSH) 0.9 %
10 SYRINGE (ML) INJECTION AS NEEDED
Status: DISCONTINUED | OUTPATIENT
Start: 2021-09-23 | End: 2021-09-26 | Stop reason: HOSPADM

## 2021-09-23 RX ORDER — ACETAMINOPHEN 325 MG/1
650 TABLET ORAL EVERY 4 HOURS PRN
Status: DISCONTINUED | OUTPATIENT
Start: 2021-09-23 | End: 2021-09-26 | Stop reason: HOSPADM

## 2021-09-23 RX ORDER — NITROGLYCERIN 0.4 MG/1
0.4 TABLET SUBLINGUAL
Status: DISCONTINUED | OUTPATIENT
Start: 2021-09-23 | End: 2021-09-26 | Stop reason: HOSPADM

## 2021-09-23 RX ORDER — DEXAMETHASONE SODIUM PHOSPHATE 10 MG/ML
6 INJECTION INTRAMUSCULAR; INTRAVENOUS DAILY
Status: DISCONTINUED | OUTPATIENT
Start: 2021-09-23 | End: 2021-09-23

## 2021-09-23 RX ORDER — SODIUM CHLORIDE 0.9 % (FLUSH) 0.9 %
10 SYRINGE (ML) INJECTION EVERY 12 HOURS SCHEDULED
Status: DISCONTINUED | OUTPATIENT
Start: 2021-09-23 | End: 2021-09-26 | Stop reason: HOSPADM

## 2021-09-23 RX ORDER — METOPROLOL TARTRATE 50 MG/1
50 TABLET, FILM COATED ORAL 2 TIMES DAILY
Status: DISCONTINUED | OUTPATIENT
Start: 2021-09-23 | End: 2021-09-26 | Stop reason: HOSPADM

## 2021-09-23 RX ORDER — ACETAMINOPHEN 650 MG/1
650 SUPPOSITORY RECTAL EVERY 4 HOURS PRN
Status: DISCONTINUED | OUTPATIENT
Start: 2021-09-23 | End: 2021-09-26 | Stop reason: HOSPADM

## 2021-09-23 RX ORDER — ATORVASTATIN CALCIUM 20 MG/1
20 TABLET, FILM COATED ORAL DAILY
Status: DISCONTINUED | OUTPATIENT
Start: 2021-09-23 | End: 2021-09-26 | Stop reason: HOSPADM

## 2021-09-23 RX ORDER — PREDNISONE 20 MG/1
40 TABLET ORAL
Status: DISCONTINUED | OUTPATIENT
Start: 2021-09-24 | End: 2021-09-25

## 2021-09-23 RX ORDER — DIPHENHYDRAMINE HCL 25 MG
25 CAPSULE ORAL EVERY 6 HOURS PRN
Status: DISCONTINUED | OUTPATIENT
Start: 2021-09-23 | End: 2021-09-26 | Stop reason: HOSPADM

## 2021-09-23 RX ADMIN — ASPIRIN 325 MG: 325 TABLET ORAL at 14:52

## 2021-09-23 RX ADMIN — ATORVASTATIN CALCIUM 20 MG: 20 TABLET, FILM COATED ORAL at 14:53

## 2021-09-23 RX ADMIN — SODIUM CHLORIDE, PRESERVATIVE FREE 10 ML: 5 INJECTION INTRAVENOUS at 09:20

## 2021-09-23 RX ADMIN — METOPROLOL TARTRATE 50 MG: 50 TABLET, FILM COATED ORAL at 21:26

## 2021-09-23 RX ADMIN — SODIUM CHLORIDE, PRESERVATIVE FREE 10 ML: 5 INJECTION INTRAVENOUS at 21:26

## 2021-09-23 RX ADMIN — DEXAMETHASONE SODIUM PHOSPHATE 6 MG: 10 INJECTION INTRAMUSCULAR; INTRAVENOUS at 09:20

## 2021-09-23 NOTE — TELEPHONE ENCOUNTER
Caller: jessica joya    Relationship: Emergency Contact    Best call back number: [phone]     What is the best time to reach you: ANYTIME    Who are you requesting to speak with (clinical staff, provider,  specific staff member): [phone]    What was the call regarding: THE PATIENT CURRENTLY HAS RSV. THE PATIENT'S DAUGHTER WOULD LIKE TO KNOW WHAT HE SHOULD DO FROM HERE AND HOW CONTAGIOUS  HE IS TO THE FAMILY HE RODE WITH IN THE CAR AND THE STAFF THAT HE SAW IN-OFFICE.    Do you require a callback: YES, PLEASE

## 2021-09-23 NOTE — TELEPHONE ENCOUNTER
I spoke to Madisyn (patients daughter) and informed her that yes RSV is contagious. I provided her the sx to watch out for and if they are displayed she would need to go the her family physician and let him/her know about her father testing positive. I told her the sx to look for is     Runny nose.  Decrease in appetite.  Coughing.  Sneezing.  Fever.  Wheezing.    Madisyn did indicate her and her spouse and grandchild have been around her father unmasked.    Madisyn indicated she understood and thank you.

## 2021-09-24 LAB
ANION GAP SERPL CALCULATED.3IONS-SCNC: 7.4 MMOL/L (ref 5–15)
BASOPHILS # BLD AUTO: 0.02 10*3/MM3 (ref 0–0.2)
BASOPHILS NFR BLD AUTO: 0.2 % (ref 0–1.5)
BUN SERPL-MCNC: 13 MG/DL (ref 8–23)
BUN/CREAT SERPL: 21.3 (ref 7–25)
CALCIUM SPEC-SCNC: 9.1 MG/DL (ref 8.6–10.5)
CHLORIDE SERPL-SCNC: 102 MMOL/L (ref 98–107)
CO2 SERPL-SCNC: 25.6 MMOL/L (ref 22–29)
CREAT SERPL-MCNC: 0.61 MG/DL (ref 0.76–1.27)
DEPRECATED RDW RBC AUTO: 40.6 FL (ref 37–54)
EOSINOPHIL # BLD AUTO: 0 10*3/MM3 (ref 0–0.4)
EOSINOPHIL NFR BLD AUTO: 0 % (ref 0.3–6.2)
ERYTHROCYTE [DISTWIDTH] IN BLOOD BY AUTOMATED COUNT: 12 % (ref 12.3–15.4)
GFR SERPL CREATININE-BSD FRML MDRD: 126 ML/MIN/1.73
GLUCOSE SERPL-MCNC: 127 MG/DL (ref 65–99)
HCT VFR BLD AUTO: 37.9 % (ref 37.5–51)
HGB BLD-MCNC: 13.3 G/DL (ref 13–17.7)
IMM GRANULOCYTES # BLD AUTO: 0.14 10*3/MM3 (ref 0–0.05)
IMM GRANULOCYTES NFR BLD AUTO: 1.2 % (ref 0–0.5)
LYMPHOCYTES # BLD AUTO: 0.48 10*3/MM3 (ref 0.7–3.1)
LYMPHOCYTES NFR BLD AUTO: 4 % (ref 19.6–45.3)
MCH RBC QN AUTO: 32.8 PG (ref 26.6–33)
MCHC RBC AUTO-ENTMCNC: 35.1 G/DL (ref 31.5–35.7)
MCV RBC AUTO: 93.6 FL (ref 79–97)
MONOCYTES # BLD AUTO: 0.98 10*3/MM3 (ref 0.1–0.9)
MONOCYTES NFR BLD AUTO: 8.1 % (ref 5–12)
NEUTROPHILS NFR BLD AUTO: 10.44 10*3/MM3 (ref 1.7–7)
NEUTROPHILS NFR BLD AUTO: 86.5 % (ref 42.7–76)
NRBC BLD AUTO-RTO: 0 /100 WBC (ref 0–0.2)
NT-PROBNP SERPL-MCNC: 317.1 PG/ML (ref 0–1800)
PLATELET # BLD AUTO: 353 10*3/MM3 (ref 140–450)
PMV BLD AUTO: 8.8 FL (ref 6–12)
POTASSIUM SERPL-SCNC: 4.3 MMOL/L (ref 3.5–5.2)
RBC # BLD AUTO: 4.05 10*6/MM3 (ref 4.14–5.8)
SODIUM SERPL-SCNC: 135 MMOL/L (ref 136–145)
WBC # BLD AUTO: 12.06 10*3/MM3 (ref 3.4–10.8)

## 2021-09-24 PROCEDURE — 63710000001 DIPHENHYDRAMINE PER 50 MG: Performed by: NURSE PRACTITIONER

## 2021-09-24 PROCEDURE — 97116 GAIT TRAINING THERAPY: CPT

## 2021-09-24 PROCEDURE — 25010000002 FUROSEMIDE PER 20 MG: Performed by: INTERNAL MEDICINE

## 2021-09-24 PROCEDURE — 83880 ASSAY OF NATRIURETIC PEPTIDE: CPT | Performed by: INTERNAL MEDICINE

## 2021-09-24 PROCEDURE — 85025 COMPLETE CBC W/AUTO DIFF WBC: CPT | Performed by: NURSE PRACTITIONER

## 2021-09-24 PROCEDURE — 97535 SELF CARE MNGMENT TRAINING: CPT

## 2021-09-24 PROCEDURE — 97161 PT EVAL LOW COMPLEX 20 MIN: CPT

## 2021-09-24 PROCEDURE — 94799 UNLISTED PULMONARY SVC/PX: CPT

## 2021-09-24 PROCEDURE — 80048 BASIC METABOLIC PNL TOTAL CA: CPT | Performed by: NURSE PRACTITIONER

## 2021-09-24 PROCEDURE — 63710000001 PREDNISONE PER 1 MG: Performed by: NURSE PRACTITIONER

## 2021-09-24 RX ORDER — FUROSEMIDE 10 MG/ML
40 INJECTION INTRAMUSCULAR; INTRAVENOUS ONCE
Status: COMPLETED | OUTPATIENT
Start: 2021-09-24 | End: 2021-09-24

## 2021-09-24 RX ADMIN — METOPROLOL TARTRATE 50 MG: 50 TABLET, FILM COATED ORAL at 20:03

## 2021-09-24 RX ADMIN — ATORVASTATIN CALCIUM 20 MG: 20 TABLET, FILM COATED ORAL at 08:22

## 2021-09-24 RX ADMIN — SODIUM CHLORIDE, PRESERVATIVE FREE 10 ML: 5 INJECTION INTRAVENOUS at 08:22

## 2021-09-24 RX ADMIN — DIPHENHYDRAMINE HYDROCHLORIDE 25 MG: 25 CAPSULE ORAL at 00:49

## 2021-09-24 RX ADMIN — FUROSEMIDE 40 MG: 10 INJECTION, SOLUTION INTRAMUSCULAR; INTRAVENOUS at 18:54

## 2021-09-24 RX ADMIN — DIPHENHYDRAMINE HYDROCHLORIDE 25 MG: 25 CAPSULE ORAL at 21:47

## 2021-09-24 RX ADMIN — SODIUM CHLORIDE, PRESERVATIVE FREE 10 ML: 5 INJECTION INTRAVENOUS at 20:03

## 2021-09-24 RX ADMIN — PREDNISONE 40 MG: 20 TABLET ORAL at 08:22

## 2021-09-24 RX ADMIN — METOPROLOL TARTRATE 50 MG: 50 TABLET, FILM COATED ORAL at 08:21

## 2021-09-24 RX ADMIN — ASPIRIN 325 MG: 325 TABLET ORAL at 08:21

## 2021-09-25 ENCOUNTER — APPOINTMENT (OUTPATIENT)
Dept: CARDIOLOGY | Facility: HOSPITAL | Age: 86
End: 2021-09-25

## 2021-09-25 LAB
ALBUMIN SERPL-MCNC: 3.2 G/DL (ref 3.5–5.2)
ANION GAP SERPL CALCULATED.3IONS-SCNC: 10.1 MMOL/L (ref 5–15)
ASCENDING AORTA: 3.9 CM
BASOPHILS # BLD AUTO: 0.02 10*3/MM3 (ref 0–0.2)
BASOPHILS NFR BLD AUTO: 0.2 % (ref 0–1.5)
BH CV ECHO MEAS - ACS: 2.1 CM
BH CV ECHO MEAS - AO MAX PG (FULL): 2.4 MMHG
BH CV ECHO MEAS - AO MAX PG: 5.7 MMHG
BH CV ECHO MEAS - AO MEAN PG (FULL): 2 MMHG
BH CV ECHO MEAS - AO MEAN PG: 4 MMHG
BH CV ECHO MEAS - AO ROOT AREA (BSA CORRECTED): 1.4
BH CV ECHO MEAS - AO ROOT AREA: 7.1 CM^2
BH CV ECHO MEAS - AO ROOT DIAM: 3 CM
BH CV ECHO MEAS - AO V2 MAX: 119 CM/SEC
BH CV ECHO MEAS - AO V2 MEAN: 93.6 CM/SEC
BH CV ECHO MEAS - AO V2 VTI: 28.7 CM
BH CV ECHO MEAS - ASC AORTA: 3.9 CM
BH CV ECHO MEAS - AVA(I,A): 2.7 CM^2
BH CV ECHO MEAS - AVA(I,D): 2.7 CM^2
BH CV ECHO MEAS - AVA(V,A): 2.6 CM^2
BH CV ECHO MEAS - AVA(V,D): 2.6 CM^2
BH CV ECHO MEAS - BSA(HAYCOCK): 2.2 M^2
BH CV ECHO MEAS - BSA: 2.1 M^2
BH CV ECHO MEAS - BZI_BMI: 28.5 KILOGRAMS/M^2
BH CV ECHO MEAS - BZI_METRIC_HEIGHT: 180.3 CM
BH CV ECHO MEAS - BZI_METRIC_WEIGHT: 92.5 KG
BH CV ECHO MEAS - EDV(CUBED): 68.9 ML
BH CV ECHO MEAS - EDV(MOD-SP2): 52 ML
BH CV ECHO MEAS - EDV(MOD-SP4): 63 ML
BH CV ECHO MEAS - EDV(TEICH): 74.2 ML
BH CV ECHO MEAS - EF(CUBED): 52.5 %
BH CV ECHO MEAS - EF(MOD-BP): 55.6 %
BH CV ECHO MEAS - EF(MOD-SP2): 53.8 %
BH CV ECHO MEAS - EF(MOD-SP4): 55.6 %
BH CV ECHO MEAS - EF(TEICH): 44.8 %
BH CV ECHO MEAS - ESV(CUBED): 32.8 ML
BH CV ECHO MEAS - ESV(MOD-SP2): 24 ML
BH CV ECHO MEAS - ESV(MOD-SP4): 28 ML
BH CV ECHO MEAS - ESV(TEICH): 41 ML
BH CV ECHO MEAS - FS: 22 %
BH CV ECHO MEAS - IVS/LVPW: 0.87
BH CV ECHO MEAS - IVSD: 1.3 CM
BH CV ECHO MEAS - LA DIMENSION: 3.2 CM
BH CV ECHO MEAS - LA/AO: 1.1
BH CV ECHO MEAS - LAT PEAK E' VEL: 9.9 CM/SEC
BH CV ECHO MEAS - LV DIASTOLIC VOL/BSA (35-75): 29.6 ML/M^2
BH CV ECHO MEAS - LV MASS(C)D: 216.6 GRAMS
BH CV ECHO MEAS - LV MASS(C)DI: 101.8 GRAMS/M^2
BH CV ECHO MEAS - LV MAX PG: 3.2 MMHG
BH CV ECHO MEAS - LV MEAN PG: 2 MMHG
BH CV ECHO MEAS - LV SYSTOLIC VOL/BSA (12-30): 13.2 ML/M^2
BH CV ECHO MEAS - LV V1 MAX: 90.1 CM/SEC
BH CV ECHO MEAS - LV V1 MEAN: 60.2 CM/SEC
BH CV ECHO MEAS - LV V1 VTI: 22.2 CM
BH CV ECHO MEAS - LVIDD: 4.1 CM
BH CV ECHO MEAS - LVIDS: 3.2 CM
BH CV ECHO MEAS - LVLD AP2: 7.4 CM
BH CV ECHO MEAS - LVLD AP4: 8.1 CM
BH CV ECHO MEAS - LVLS AP2: 7.1 CM
BH CV ECHO MEAS - LVLS AP4: 6.6 CM
BH CV ECHO MEAS - LVOT AREA (M): 3.5 CM^2
BH CV ECHO MEAS - LVOT AREA: 3.5 CM^2
BH CV ECHO MEAS - LVOT DIAM: 2.1 CM
BH CV ECHO MEAS - LVPWD: 1.5 CM
BH CV ECHO MEAS - MED PEAK E' VEL: 6.2 CM/SEC
BH CV ECHO MEAS - MV A MAX VEL: 94.7 CM/SEC
BH CV ECHO MEAS - MV DEC SLOPE: 165.5 CM/SEC^2
BH CV ECHO MEAS - MV DEC TIME: 0.34 SEC
BH CV ECHO MEAS - MV E MAX VEL: 51.4 CM/SEC
BH CV ECHO MEAS - MV E/A: 0.54
BH CV ECHO MEAS - MV MAX PG: 3.8 MMHG
BH CV ECHO MEAS - MV MEAN PG: 1 MMHG
BH CV ECHO MEAS - MV P1/2T MAX VEL: 59.7 CM/SEC
BH CV ECHO MEAS - MV P1/2T: 105.7 MSEC
BH CV ECHO MEAS - MV V2 MAX: 97.6 CM/SEC
BH CV ECHO MEAS - MV V2 MEAN: 50.8 CM/SEC
BH CV ECHO MEAS - MV V2 VTI: 20.4 CM
BH CV ECHO MEAS - MVA P1/2T LCG: 3.7 CM^2
BH CV ECHO MEAS - MVA(P1/2T): 2.1 CM^2
BH CV ECHO MEAS - MVA(VTI): 3.8 CM^2
BH CV ECHO MEAS - PA ACC TIME: 0.12 SEC
BH CV ECHO MEAS - PA MAX PG (FULL): 1.8 MMHG
BH CV ECHO MEAS - PA MAX PG: 3.3 MMHG
BH CV ECHO MEAS - PA PR(ACCEL): 25.5 MMHG
BH CV ECHO MEAS - PA V2 MAX: 90.9 CM/SEC
BH CV ECHO MEAS - PVA(V,A): 3.6 CM^2
BH CV ECHO MEAS - PVA(V,D): 3.6 CM^2
BH CV ECHO MEAS - QP/QS: 0.86
BH CV ECHO MEAS - RAP SYSTOLE: 3 MMHG
BH CV ECHO MEAS - RV MAX PG: 1.5 MMHG
BH CV ECHO MEAS - RV MEAN PG: 1 MMHG
BH CV ECHO MEAS - RV V1 MAX: 61.4 CM/SEC
BH CV ECHO MEAS - RV V1 MEAN: 43.8 CM/SEC
BH CV ECHO MEAS - RV V1 VTI: 12.4 CM
BH CV ECHO MEAS - RVOT AREA: 5.3 CM^2
BH CV ECHO MEAS - RVOT DIAM: 2.6 CM
BH CV ECHO MEAS - RVSP: 24.5 MMHG
BH CV ECHO MEAS - SI(AO): 95.4 ML/M^2
BH CV ECHO MEAS - SI(CUBED): 17 ML/M^2
BH CV ECHO MEAS - SI(LVOT): 36.2 ML/M^2
BH CV ECHO MEAS - SI(MOD-SP2): 13.2 ML/M^2
BH CV ECHO MEAS - SI(MOD-SP4): 16.5 ML/M^2
BH CV ECHO MEAS - SI(TEICH): 15.6 ML/M^2
BH CV ECHO MEAS - SV(AO): 202.9 ML
BH CV ECHO MEAS - SV(CUBED): 36.2 ML
BH CV ECHO MEAS - SV(LVOT): 76.9 ML
BH CV ECHO MEAS - SV(MOD-SP2): 28 ML
BH CV ECHO MEAS - SV(MOD-SP4): 35 ML
BH CV ECHO MEAS - SV(RVOT): 65.8 ML
BH CV ECHO MEAS - SV(TEICH): 33.3 ML
BH CV ECHO MEAS - TR MAX VEL: 232 CM/SEC
BH CV ECHO MEASUREMENTS AVERAGE E/E' RATIO: 6.39
BH CV XLRA - RV BASE: 3.4 CM
BH CV XLRA - RV LENGTH: 7.1 CM
BH CV XLRA - RV MID: 2.3 CM
BH CV XLRA - TDI S': 11.7 CM/SEC
BUN SERPL-MCNC: 20 MG/DL (ref 8–23)
BUN/CREAT SERPL: 28.6 (ref 7–25)
CALCIUM SPEC-SCNC: 8.9 MG/DL (ref 8.6–10.5)
CHLORIDE SERPL-SCNC: 102 MMOL/L (ref 98–107)
CO2 SERPL-SCNC: 25.9 MMOL/L (ref 22–29)
CREAT SERPL-MCNC: 0.7 MG/DL (ref 0.76–1.27)
DEPRECATED RDW RBC AUTO: 41.2 FL (ref 37–54)
EOSINOPHIL # BLD AUTO: 0 10*3/MM3 (ref 0–0.4)
EOSINOPHIL NFR BLD AUTO: 0 % (ref 0.3–6.2)
ERYTHROCYTE [DISTWIDTH] IN BLOOD BY AUTOMATED COUNT: 11.8 % (ref 12.3–15.4)
GFR SERPL CREATININE-BSD FRML MDRD: 108 ML/MIN/1.73
GLUCOSE SERPL-MCNC: 94 MG/DL (ref 65–99)
HCT VFR BLD AUTO: 39.9 % (ref 37.5–51)
HGB BLD-MCNC: 13.5 G/DL (ref 13–17.7)
IMM GRANULOCYTES # BLD AUTO: 0.11 10*3/MM3 (ref 0–0.05)
IMM GRANULOCYTES NFR BLD AUTO: 1 % (ref 0–0.5)
LEFT ATRIUM VOLUME INDEX: 13.3 ML/M2
LYMPHOCYTES # BLD AUTO: 0.79 10*3/MM3 (ref 0.7–3.1)
LYMPHOCYTES NFR BLD AUTO: 7.5 % (ref 19.6–45.3)
MCH RBC QN AUTO: 32.5 PG (ref 26.6–33)
MCHC RBC AUTO-ENTMCNC: 33.8 G/DL (ref 31.5–35.7)
MCV RBC AUTO: 96.1 FL (ref 79–97)
MONOCYTES # BLD AUTO: 0.94 10*3/MM3 (ref 0.1–0.9)
MONOCYTES NFR BLD AUTO: 8.9 % (ref 5–12)
NEUTROPHILS NFR BLD AUTO: 8.72 10*3/MM3 (ref 1.7–7)
NEUTROPHILS NFR BLD AUTO: 82.4 % (ref 42.7–76)
NRBC BLD AUTO-RTO: 0 /100 WBC (ref 0–0.2)
PHOSPHATE SERPL-MCNC: 3.5 MG/DL (ref 2.5–4.5)
PLATELET # BLD AUTO: 355 10*3/MM3 (ref 140–450)
PMV BLD AUTO: 8.7 FL (ref 6–12)
POTASSIUM SERPL-SCNC: 4.1 MMOL/L (ref 3.5–5.2)
RBC # BLD AUTO: 4.15 10*6/MM3 (ref 4.14–5.8)
SINUS: 3 CM
SODIUM SERPL-SCNC: 138 MMOL/L (ref 136–145)
STJ: 3.1 CM
URATE SERPL-MCNC: 6.4 MG/DL (ref 3.4–7)
WBC # BLD AUTO: 10.58 10*3/MM3 (ref 3.4–10.8)

## 2021-09-25 PROCEDURE — 63710000001 PREDNISONE PER 1 MG: Performed by: NURSE PRACTITIONER

## 2021-09-25 PROCEDURE — 63710000001 DIPHENHYDRAMINE PER 50 MG: Performed by: NURSE PRACTITIONER

## 2021-09-25 PROCEDURE — 93306 TTE W/DOPPLER COMPLETE: CPT

## 2021-09-25 PROCEDURE — 93306 TTE W/DOPPLER COMPLETE: CPT | Performed by: INTERNAL MEDICINE

## 2021-09-25 PROCEDURE — 94799 UNLISTED PULMONARY SVC/PX: CPT

## 2021-09-25 PROCEDURE — 80069 RENAL FUNCTION PANEL: CPT | Performed by: HOSPITALIST

## 2021-09-25 PROCEDURE — 25010000002 PERFLUTREN (DEFINITY) 8.476 MG IN SODIUM CHLORIDE (PF) 0.9 % 10 ML INJECTION: Performed by: INTERNAL MEDICINE

## 2021-09-25 PROCEDURE — 84550 ASSAY OF BLOOD/URIC ACID: CPT | Performed by: HOSPITALIST

## 2021-09-25 PROCEDURE — 85025 COMPLETE CBC W/AUTO DIFF WBC: CPT | Performed by: HOSPITALIST

## 2021-09-25 RX ORDER — PREDNISONE 20 MG/1
20 TABLET ORAL
Status: DISCONTINUED | OUTPATIENT
Start: 2021-09-26 | End: 2021-09-26 | Stop reason: HOSPADM

## 2021-09-25 RX ADMIN — ATORVASTATIN CALCIUM 20 MG: 20 TABLET, FILM COATED ORAL at 09:22

## 2021-09-25 RX ADMIN — PERFLUTREN 3 ML: 6.52 INJECTION, SUSPENSION INTRAVENOUS at 08:48

## 2021-09-25 RX ADMIN — ASPIRIN 325 MG: 325 TABLET ORAL at 09:22

## 2021-09-25 RX ADMIN — SODIUM CHLORIDE, PRESERVATIVE FREE 10 ML: 5 INJECTION INTRAVENOUS at 20:32

## 2021-09-25 RX ADMIN — METOPROLOL TARTRATE 50 MG: 50 TABLET, FILM COATED ORAL at 20:31

## 2021-09-25 RX ADMIN — SODIUM CHLORIDE, PRESERVATIVE FREE 10 ML: 5 INJECTION INTRAVENOUS at 09:23

## 2021-09-25 RX ADMIN — DIPHENHYDRAMINE HYDROCHLORIDE 25 MG: 25 CAPSULE ORAL at 22:06

## 2021-09-25 RX ADMIN — PREDNISONE 40 MG: 20 TABLET ORAL at 09:22

## 2021-09-25 RX ADMIN — METOPROLOL TARTRATE 50 MG: 50 TABLET, FILM COATED ORAL at 09:22

## 2021-09-26 ENCOUNTER — READMISSION MANAGEMENT (OUTPATIENT)
Dept: CALL CENTER | Facility: HOSPITAL | Age: 86
End: 2021-09-26

## 2021-09-26 VITALS
HEART RATE: 71 BPM | BODY MASS INDEX: 28.56 KG/M2 | SYSTOLIC BLOOD PRESSURE: 160 MMHG | OXYGEN SATURATION: 93 % | RESPIRATION RATE: 16 BRPM | HEIGHT: 71 IN | DIASTOLIC BLOOD PRESSURE: 83 MMHG | WEIGHT: 204 LBS | TEMPERATURE: 95.7 F

## 2021-09-26 PROCEDURE — 63710000001 PREDNISONE PER 1 MG: Performed by: INTERNAL MEDICINE

## 2021-09-26 PROCEDURE — 94618 PULMONARY STRESS TESTING: CPT

## 2021-09-26 RX ORDER — PREDNISONE 20 MG/1
20 TABLET ORAL
Qty: 4 TABLET | Refills: 0 | Status: SHIPPED | OUTPATIENT
Start: 2021-09-27 | End: 2021-10-01

## 2021-09-26 RX ADMIN — SODIUM CHLORIDE, PRESERVATIVE FREE 10 ML: 5 INJECTION INTRAVENOUS at 08:52

## 2021-09-26 RX ADMIN — METOPROLOL TARTRATE 50 MG: 50 TABLET, FILM COATED ORAL at 08:51

## 2021-09-26 RX ADMIN — ASPIRIN 325 MG: 325 TABLET ORAL at 08:51

## 2021-09-26 RX ADMIN — ATORVASTATIN CALCIUM 20 MG: 20 TABLET, FILM COATED ORAL at 08:51

## 2021-09-26 RX ADMIN — PREDNISONE 20 MG: 20 TABLET ORAL at 08:51

## 2021-09-26 NOTE — OUTREACH NOTE
Prep Survey      Responses   [facility] facility patient discharged from?  [place]   Is LACE score < 7 ?  No   Emergency Room discharge w/ pulse ox?  No   Eligibility  [facility]   Date of Admission  09/22/21   Date of Discharge  09/26/21   Discharge Disposition  Home or Self Care   Discharge diagnosis  acute hypoxic resp failure, RSV PNA, Hx COPD   Does the patient have one of the following disease processes/diagnoses(primary or secondary)?  COPD/Pneumonia   Does the patient have Home health ordered?  No   Is there a DME ordered?  Yes   What DME was ordered?  O2 from [place]   Prep survey completed?  Yes          Susie Dowell RN

## 2021-09-27 ENCOUNTER — TRANSITIONAL CARE MANAGEMENT TELEPHONE ENCOUNTER (OUTPATIENT)
Dept: CALL CENTER | Facility: HOSPITAL | Age: 86
End: 2021-09-27

## 2021-09-27 LAB
BACTERIA SPEC AEROBE CULT: NORMAL
BACTERIA SPEC AEROBE CULT: NORMAL

## 2021-09-27 NOTE — OUTREACH NOTE
"Call Center TCM Note      Responses   [facility] patient discharged from?  [place]   Does the patient have one of the following disease processes/diagnoses(primary or secondary)?  COPD/Pneumonia   Was the primary reason for admission:  Pneumonia   TCM attempt successful?  Yes   Call start time  1810   Call end time  1814   Discharge diagnosis  acute hypoxic resp failure, RSV PNA, Hx COPD   Meds reviewed with patient/caregiver?  Yes   Is the patient having any side effects they believe may be caused by any medication additions or changes?  No   Does the patient have all medications ordered at discharge?  Yes   Is the patient taking all medications as directed (includes completed medication regime)?  Yes   Does the patient have a primary care provider?   Yes   Does the patient have an appointment with their PCP or specialist within 7 days of discharge?  No   Comments regarding PCP  Patient has declined a PCP appt. He \"might talk to him later over the phone but declines a appt at present.    What is preventing the patient from scheduling follow up appointments within 7 days of discharge?  Haven't had time   Nursing Interventions  Advised patient to make appointment, Educated patient on importance of making appointment   Has the patient kept scheduled appointments due by today?  N/A   Has home health visited the patient within 72 hours of discharge?  N/A   What DME was ordered?  O2 from [place]   Has all DME been delivered?  Yes   [facility]   Did the patient receive a copy of their discharge instructions?  Yes   Nursing interventions  Reviewed instructions with patient   What is the patient's perception of their health status since discharge?  Improving   Nursing Interventions  Nurse provided patient education   If the patient is a current smoker, are they able to teach back resources for cessation?  Not a smoker   Is the patient/caregiver able to teach back the hierarchy of who to call/visit " for symptoms/problems? PCP, Specialist, Home health nurse, Urgent Care, ED, 911  Yes   Is the patient/caregiver able to teach back signs and symptoms of worsening condition:  Fever/chills, Shortness of breath, Chest pain   TCM call completed?  Yes          Ivan Ravi RN    9/27/2021, 18:14 EDT

## 2021-10-11 ENCOUNTER — TELEPHONE (OUTPATIENT)
Dept: INTERNAL MEDICINE | Facility: CLINIC | Age: 86
End: 2021-10-11

## 2021-10-11 DIAGNOSIS — R91.8 GROUND GLASS OPACITY PRESENT ON IMAGING OF LUNG: Primary | ICD-10-CM

## 2021-10-11 NOTE — TELEPHONE ENCOUNTER
Caller: jessica joya    Relationship to patient: Emergency Contact    Best call back number: [phone]    Patient is needing: PATIENT NEEDS A HOSPITAL FOLLOW UP, WAS DISCHARGED ON 9/26, AND WILL NEED A REFERRAL TO A DERMATOLOGIST.   HUB ATTEMPTED TO WARM TRANSFER AND WAS HUNG UP ON.    PLEASE REACH OUT TO DAUGHTER AND SCHEDULE.

## 2021-10-11 NOTE — TELEPHONE ENCOUNTER
pts daughter calling says pt has appt this Friday to see Mayur for hospital follow up.  MD in the hospital order CT CHEST W/ CONTRAST but [facility] cannot get pt in until November.  She says it needs to be done by 10/20/2021.  She asked if we could call and get appt and a freestanding imaging location.  [facility] can do tomorrow but I need an order I can send to them.  Please advise.

## 2021-10-15 ENCOUNTER — OFFICE VISIT (OUTPATIENT)
Dept: INTERNAL MEDICINE | Facility: CLINIC | Age: 86
End: 2021-10-15

## 2021-10-15 VITALS
SYSTOLIC BLOOD PRESSURE: 132 MMHG | RESPIRATION RATE: 16 BRPM | OXYGEN SATURATION: 95 % | WEIGHT: 200.4 LBS | HEIGHT: 71 IN | HEART RATE: 86 BPM | DIASTOLIC BLOOD PRESSURE: 60 MMHG | BODY MASS INDEX: 28.06 KG/M2 | TEMPERATURE: 98.4 F

## 2021-10-15 DIAGNOSIS — E78.2 MIXED HYPERLIPIDEMIA: Chronic | ICD-10-CM

## 2021-10-15 DIAGNOSIS — R09.02 HYPOXIA: ICD-10-CM

## 2021-10-15 DIAGNOSIS — R91.8 GROUND GLASS OPACITY PRESENT ON IMAGING OF LUNG: ICD-10-CM

## 2021-10-15 DIAGNOSIS — I10 PRIMARY HYPERTENSION: Chronic | ICD-10-CM

## 2021-10-15 DIAGNOSIS — B33.8 RSV (RESPIRATORY SYNCYTIAL VIRUS INFECTION): ICD-10-CM

## 2021-10-15 DIAGNOSIS — J43.9 PULMONARY EMPHYSEMA, UNSPECIFIED EMPHYSEMA TYPE (HCC): ICD-10-CM

## 2021-10-15 DIAGNOSIS — J96.01 ACUTE RESPIRATORY FAILURE WITH HYPOXIA (HCC): Primary | ICD-10-CM

## 2021-10-15 PROCEDURE — 99214 OFFICE O/P EST MOD 30 MIN: CPT | Performed by: NURSE PRACTITIONER

## 2021-10-15 NOTE — PROGRESS NOTES
Chief Complaint  Hospital Follow Up Visit       Subjective:      History of Present Illness {CC  Problem List  Visit  Diagnosis   Encounters  Notes  Medications  Labs  Result Review Imaging  Media :23}     Jim Marvin is a patient of Antonio Finley Jr., MD and presents to [facility] for hospital follow up.     He chronically has hypertension, hyperlipidemia, emphysema.     He presented to ER with SOA after seeing me on 9/22/21.  In office that day, he was hypoxic.     He was admitted and found to have RSV.   Pulmonology evaluated - no CHF.  BNP was normal.   Underlying fibrosis is still consideration.    He was weaned down to 2 Liters NC and d/cd home on 9/26/2021. He continued prednisone for one day after discharge.     Needs follow up CT one month from discharge.   (order placed)     May require bronch for evaluation of ILD.     Quit smoking 25 years ago.   Feels better.   Strength better.   Appetite has improved.     He continues lopressor and norvasc for hypertension. No CP.   He continues lipitor for hyperlipidemia.   He is on ASA appropriately for prior cerebellar stroke.     Current outpatient and discharge medications have been reconciled for the patient.  Reviewed by: Drew Thorpe III, NP-C    History of Present Illness        Objective:      Physical Exam  Vitals reviewed.   Constitutional:       Appearance: Normal appearance. He is well-developed.      Comments: On 2 liters nasal canula    HENT:      Head:      Comments: Wearing mask due to COVID   Neck:      Thyroid: No thyromegaly.   Cardiovascular:      Rate and Rhythm: Normal rate and regular rhythm.      Pulses: Normal pulses.      Heart sounds: Normal heart sounds.   Pulmonary:      Effort: Pulmonary effort is normal.      Breath sounds: Normal breath sounds.      Comments: E/U   Musculoskeletal:         General: Normal range of motion.      Cervical back: Normal range of motion and  "neck supple.   Lymphadenopathy:      Cervical: No cervical adenopathy.   Skin:     General: Skin is warm and dry.      Capillary Refill: Capillary refill takes 2 to 3 seconds.   Neurological:      Mental Status: He is alert and oriented to person, place, and time.   Psychiatric:         Mood and Affect: Mood normal.         Behavior: Behavior normal. Behavior is cooperative.         Thought Content: Thought content normal.         Judgment: Judgment normal.        Result Review  Data Reviewed:{ Labs  Result Review  Imaging  Med Tab  Media :23}   The following data was reviewed by: Drew Thorpe III, NP-C on 10/15/2021  Lab Results - Last 18 Months   Lab Units 09/25/21  0317 09/24/21  0551 09/23/21  0637 09/22/21  1710 09/22/21  1710   BUN mg/dL 20 13 10   < > 10   CREATININE mg/dL 0.70* 0.61* 0.70*   < > 0.71*   EGFR IF NONAFRICN AM mL/min/1.73 108 126 108   < > 106   SODIUM mmol/L 138 135* 134*   < > 132*   POTASSIUM mmol/L 4.1 4.3 3.8   < > 4.2   CHLORIDE mmol/L 102 102 99   < > 100   CALCIUM mg/dL 8.9 9.1 8.6   < > 8.7   ALBUMIN g/dL 3.20*  --   --   --  3.40*   BILIRUBIN mg/dL  --   --   --   --  0.8   ALK PHOS U/L  --   --   --   --  63   AST (SGOT) U/L  --   --   --   --  27   ALT (SGPT) U/L  --   --   --   --  39   HEMOGLOBIN A1C %  --   --  5.64*  --   --    WBC 10*3/mm3 10.58 12.06* 6.17   < > 7.16   RBC 10*6/mm3 4.15 4.05* 4.28   < > 4.18   HEMATOCRIT % 39.9 37.9 41.2   < > 39.8   MCV fL 96.1 93.6 96.3   < > 95.2   MCH pg 32.5 32.8 32.9   < > 32.5   INR   --   --  1.15*  --  1.13*   URIC ACID mg/dL 6.4  --   --   --   --     < > = values in this interval not displayed.     Recent hospitalization notes 9/26/2021   Vital Signs:   /60 (BP Location: Left arm, Patient Position: Sitting, Cuff Size: Adult)   Pulse 86   Temp 98.4 °F (36.9 °C) (Temporal)   Resp 16   Ht 180.3 cm (71\")   Wt 90.9 kg (200 lb 6.4 oz)   SpO2 95% Comment: 2 l NC  BMI 27.95 kg/m²          Assessment and Plan: "      Assessment and Plan {CC Problem List  Visit Diagnosis  ROS  Review (Popup)  Health Maintenance  Quality  BestPractice  Medications  SmartSets  SnapShot Encounters  Media :23}     Problem List Items Addressed This Visit        Cardiac and Vasculature    Hyperlipidemia (Chronic)    Current Assessment & Plan     Lipid abnormalities are improving with treatment.  Pharmacotherapy as ordered.  Lipids will be reassessed in 6 months.         Hypertension (Chronic)    Current Assessment & Plan     Hypertension is improving with treatment.  Continue current treatment regimen.  Dietary sodium restriction.  Regular aerobic exercise.  Blood pressure will be reassessed at the next regular appointment.            Infectious Diseases    RSV (respiratory syncytial virus infection)       Pulmonary and Pneumonias    Acute respiratory failure with hypoxia (HCC) - Primary    Relevant Orders    Ambulatory Referral to Pulmonology    Emphysema lung (HCC)    Relevant Orders    Ambulatory Referral to Pulmonology    Ground glass opacity present on imaging of lung    Relevant Orders    Ambulatory Referral to Pulmonology    Hypoxia    Current Assessment & Plan     Continue 2 Liters NC   Follow up with pulmonology.   Follow up CT is ordered.                  Follow Up {Instructions Charge Capture  Follow-up Communications :23}     Return in about 2 months (around 12/15/2021).    Patient was given instructions and counseling regarding his condition or for health maintenance advice. Please see specific information pulled into the AVS if appropriate.    Dragon disclaimer:   Much of this encounter note is an electronic transcription/translation of spoken language to printed text. The electronic translation of spoken language may permit erroneous, or at times, nonsensical words or phrases to be inadvertently transcribed; Although I have reviewed the note for such errors, some may still exist.     Additional Patient Counseling:        There are no Patient Instructions on file for this visit.

## 2021-10-20 ENCOUNTER — TELEPHONE (OUTPATIENT)
Dept: INTERNAL MEDICINE | Facility: CLINIC | Age: 86
End: 2021-10-20

## 2021-10-20 NOTE — TELEPHONE ENCOUNTER
----- Message from Remigio Middleton sent at 10/20/2021  9:44 AM EDT -----  Regarding: LOST POCKET KNIFE  Caller: jessica joya    Relationship: Emergency Contact    Best call back number: [phone]    What is the best time to reach you: ANYTIME    What was the call regarding: PATIENT WAS IN THE OFFICE ON 10/15, AND HAS LOST HIS POCKET KNIFE. THE PATIENT'S DAUGHTER WOULD LIKE A CALLBACK IF THE KNIFE HAS BEEN FOUND IN THE OFFICE. SMALL CASE POCKET KNIFE.    Do you require a callback: YES IF FOUND

## 2021-10-21 LAB — QT INTERVAL: 403 MS

## 2021-11-05 ENCOUNTER — HOSPITAL ENCOUNTER (OUTPATIENT)
Dept: CT IMAGING | Facility: HOSPITAL | Age: 86
Discharge: HOME | End: 2021-11-05
Admitting: HOSPITALIST

## 2021-11-05 DIAGNOSIS — R91.8 GROUND GLASS OPACITY PRESENT ON IMAGING OF LUNG: ICD-10-CM

## 2021-11-05 LAB — CREAT BLDA-MCNC: 0.9 MG/DL (ref 0.6–1.3)

## 2021-11-05 PROCEDURE — 71260 CT THORAX DX C+: CPT

## 2021-11-05 PROCEDURE — 82565 ASSAY OF CREATININE: CPT

## 2021-11-05 PROCEDURE — 25010000002 IOPAMIDOL 61 % SOLUTION: Performed by: HOSPITALIST

## 2021-11-05 RX ADMIN — IOPAMIDOL 85 ML: 612 INJECTION, SOLUTION INTRAVENOUS at 16:51

## 2021-11-10 ENCOUNTER — TELEPHONE (OUTPATIENT)
Dept: INTERNAL MEDICINE | Facility: CLINIC | Age: 86
End: 2021-11-10

## 2021-11-10 NOTE — TELEPHONE ENCOUNTER
Caller: jessica joya    Relationship: Emergency Contact    Best call back number: [phone]    Caller requesting test results: DAUGHTER     What test was performed: CT LUNG     When was the test performed: 11-    Where was the test performed: [facility]

## 2021-11-12 ENCOUNTER — TELEPHONE (OUTPATIENT)
Dept: INTERNAL MEDICINE | Facility: CLINIC | Age: 86
End: 2021-11-12

## 2021-11-12 NOTE — TELEPHONE ENCOUNTER
I reviewed the CT result which shows that the previous groundglass opacities from the hospitalization had resolved as well as the lymph nodes getting smaller.  He will follow-up with pulmonary if he desires.  I could not say whether or not for him to keep the oxygen.

## 2021-11-12 NOTE — TELEPHONE ENCOUNTER
Caller: jessica joya    Relationship to patient: Emergency Contact    Best call back number: [phone] (H)    Patient is needing: PATIENT DAUGHTER CALLING TO CHECK THE STATUS OF TEST RESULTS FROM PATIENT CT SCAN ON LUNGS ON 11/05/21        PLEASE ADVISE

## 2022-01-17 RX ORDER — ATORVASTATIN CALCIUM 20 MG/1
20 TABLET, FILM COATED ORAL DAILY
Qty: 90 TABLET | Refills: 1 | Status: SHIPPED | OUTPATIENT
Start: 2022-01-17 | End: 2022-01-18 | Stop reason: SDUPTHER

## 2022-01-18 ENCOUNTER — OFFICE VISIT (OUTPATIENT)
Dept: INTERNAL MEDICINE | Facility: CLINIC | Age: 87
End: 2022-01-18

## 2022-01-18 VITALS
SYSTOLIC BLOOD PRESSURE: 125 MMHG | HEIGHT: 71 IN | OXYGEN SATURATION: 90 % | TEMPERATURE: 99 F | WEIGHT: 212 LBS | HEART RATE: 82 BPM | BODY MASS INDEX: 29.68 KG/M2 | DIASTOLIC BLOOD PRESSURE: 80 MMHG

## 2022-01-18 DIAGNOSIS — E78.2 MIXED HYPERLIPIDEMIA: Chronic | ICD-10-CM

## 2022-01-18 DIAGNOSIS — J12.1 PNEUMONIA DUE TO RESPIRATORY SYNCYTIAL VIRUS (RSV): Primary | ICD-10-CM

## 2022-01-18 DIAGNOSIS — I10 PRIMARY HYPERTENSION: Chronic | ICD-10-CM

## 2022-01-18 DIAGNOSIS — J43.9 PULMONARY EMPHYSEMA, UNSPECIFIED EMPHYSEMA TYPE: ICD-10-CM

## 2022-01-18 PROCEDURE — 99213 OFFICE O/P EST LOW 20 MIN: CPT | Performed by: FAMILY MEDICINE

## 2022-01-18 RX ORDER — METOPROLOL TARTRATE 50 MG/1
50 TABLET, FILM COATED ORAL 2 TIMES DAILY
Qty: 180 TABLET | Refills: 1 | Status: SHIPPED | OUTPATIENT
Start: 2022-01-18 | End: 2022-07-14

## 2022-01-18 RX ORDER — AMLODIPINE BESYLATE 5 MG/1
5 TABLET ORAL DAILY
Qty: 90 TABLET | Refills: 1 | Status: SHIPPED | OUTPATIENT
Start: 2022-01-18 | End: 2022-07-14

## 2022-01-18 RX ORDER — ATORVASTATIN CALCIUM 20 MG/1
20 TABLET, FILM COATED ORAL DAILY
Qty: 90 TABLET | Refills: 1 | Status: SHIPPED | OUTPATIENT
Start: 2022-01-18 | End: 2022-07-19 | Stop reason: SDUPTHER

## 2022-01-18 NOTE — PROGRESS NOTES
"Chief Complaint  Hyperlipidemia and Hypertension    Subjective          Jim Marvin presents to [facility]  Patient had pretty significant RSV pneumonia with hospitalization and lost 17 pounds. He has since recovered and has as needed nasal oxygen with a concentrator at home. A CT scan performed last fall showed marked improvement.    Reviewed history of pulmonary emphysema active management hyperlipidemia hypertension.      Objective   Vital Signs:   /80   Pulse 82   Temp 99 °F (37.2 °C) (Temporal)   Ht 180.3 cm (70.98\")   Wt 96.2 kg (212 lb)   SpO2 90%   BMI 29.58 kg/m²     Physical Exam  Vitals reviewed.   Constitutional:       Appearance: He is well-developed.   HENT:      Head: Normocephalic and atraumatic.      Right Ear: Tympanic membrane and external ear normal.      Left Ear: Tympanic membrane and external ear normal.      Nose: Nose normal.   Eyes:      Conjunctiva/sclera: Conjunctivae normal.      Pupils: Pupils are equal, round, and reactive to light.   Neck:      Thyroid: No thyromegaly.      Vascular: No JVD.   Cardiovascular:      Rate and Rhythm: Normal rate and regular rhythm.      Heart sounds: Normal heart sounds.   Pulmonary:      Effort: Pulmonary effort is normal.      Breath sounds: Examination of the right-lower field reveals decreased breath sounds. Examination of the left-lower field reveals decreased breath sounds. Decreased breath sounds present.   Abdominal:      General: Bowel sounds are normal.      Palpations: Abdomen is soft.   Musculoskeletal:      Cervical back: Normal range of motion and neck supple.      Lumbar back: Decreased range of motion.   Lymphadenopathy:      Cervical: No cervical adenopathy.   Skin:     General: Skin is warm and dry.      Findings: No rash.   Neurological:      Mental Status: He is alert and oriented to person, place, and time.      Cranial Nerves: No cranial nerve deficit.      Coordination: Coordination " normal.   Psychiatric:         Behavior: Behavior normal.         Thought Content: Thought content normal.         Judgment: Judgment normal.        Result Review :   The following data was reviewed by: Antonio Finley Jr, MD on 01/18/2022:  Common labs    Common Labsle 9/24/21 9/24/21 9/25/21 9/25/21 9/25/21 11/5/21    0551 [id number] 0317 [id number] 0317    Glucose 127 (A)    94    BUN 13    20    Creatinine 0.61 (A)    0.70 (A) 0.90   eGFR Non African Am 126    108    Sodium 135 (A)    138    Potassium 4.3    4.1    Chloride 102    102    Calcium 9.1    8.9    Albumin     3.20 (A)    WBC  12.06 (A) 10.58      Hemoglobin  13.3 13.5      Hematocrit  37.9 39.9      Platelets  353 355      Uric Acid    6.4     (A) Abnormal value       Comments are available for some flowsheets but are not being displayed.           Data reviewed: Radiologic studies CT chest          Assessment and Plan    Diagnoses and all orders for this visit:    1. Pneumonia due to respiratory syncytial virus (RSV) (Primary)    2. Mixed hyperlipidemia    3. Primary hypertension    4. Pulmonary emphysema, unspecified emphysema type (HCC)    Other orders  -     amLODIPine (NORVASC) 5 MG tablet; Take 1 tablet by mouth Daily.  Dispense: 90 tablet; Refill: 1  -     metoprolol tartrate (LOPRESSOR) 50 MG tablet; Take 1 tablet by mouth 2 (Two) Times a Day.  Dispense: 180 tablet; Refill: 1  -     atorvastatin (LIPITOR) 20 MG tablet; Take 1 tablet by mouth Daily.  Dispense: 90 tablet; Refill: 1        Follow Up   Return in about 6 months (around 7/18/2022) for Medicare Wellness.  Patient was given instructions and counseling regarding his condition or for health maintenance advice. Please see specific information pulled into the AVS if appropriate.

## 2022-01-28 ENCOUNTER — TELEPHONE (OUTPATIENT)
Dept: INTERNAL MEDICINE | Facility: CLINIC | Age: 87
End: 2022-01-28

## 2022-01-28 NOTE — TELEPHONE ENCOUNTER
Caller: Jim Marvin    Relationship: Self    Best call back number:[phone]      What is the best time to reach you: ANYTIME     Who are you requesting to speak with (clinical staff, provider,  specific staff member): CLINICAL STAFF    What was the call regarding: PATIENT STATES HER HAD A MISSED CALL. HE STATED IT MIGHT BE ABOUT HIS OXYGEN.     ATTEMPTED TO WARM TRANSFER BUT WAS UNSUCCESSFUL     Do you require a callback: YES

## 2022-02-11 ENCOUNTER — TELEPHONE (OUTPATIENT)
Dept: INTERNAL MEDICINE | Facility: CLINIC | Age: 87
End: 2022-02-11

## 2022-02-11 NOTE — TELEPHONE ENCOUNTER
Caller: SAHIL AVALOS    Relationship: [facility]    Best call back number: [phone]    What orders are you requesting (i.e. lab or imaging): DISCONTINUE OXYGEN ORDER    Additional notes: ITS FROM [facility] AT [facility]     CAN FAX ORDER -[phone] PATIENT DOESN'T WANT OXYGEN ANY LONGER

## 2022-07-14 RX ORDER — AMLODIPINE BESYLATE 5 MG/1
5 TABLET ORAL DAILY
Qty: 90 TABLET | Refills: 1 | Status: SHIPPED | OUTPATIENT
Start: 2022-07-14 | End: 2022-07-19 | Stop reason: SDUPTHER

## 2022-07-14 RX ORDER — METOPROLOL TARTRATE 50 MG/1
TABLET, FILM COATED ORAL
Qty: 180 TABLET | Refills: 1 | Status: SHIPPED | OUTPATIENT
Start: 2022-07-14 | End: 2022-07-19 | Stop reason: SDUPTHER

## 2022-07-19 ENCOUNTER — OFFICE VISIT (OUTPATIENT)
Dept: INTERNAL MEDICINE | Facility: CLINIC | Age: 87
End: 2022-07-19

## 2022-07-19 VITALS
SYSTOLIC BLOOD PRESSURE: 136 MMHG | OXYGEN SATURATION: 93 % | BODY MASS INDEX: 31.1 KG/M2 | WEIGHT: 210 LBS | TEMPERATURE: 98.4 F | DIASTOLIC BLOOD PRESSURE: 78 MMHG | HEART RATE: 79 BPM | HEIGHT: 69 IN

## 2022-07-19 DIAGNOSIS — E78.2 MIXED HYPERLIPIDEMIA: Primary | Chronic | ICD-10-CM

## 2022-07-19 DIAGNOSIS — J43.9 PULMONARY EMPHYSEMA, UNSPECIFIED EMPHYSEMA TYPE: ICD-10-CM

## 2022-07-19 DIAGNOSIS — I10 PRIMARY HYPERTENSION: Chronic | ICD-10-CM

## 2022-07-19 DIAGNOSIS — Z00.00 MEDICARE ANNUAL WELLNESS VISIT, SUBSEQUENT: ICD-10-CM

## 2022-07-19 PROCEDURE — 1170F FXNL STATUS ASSESSED: CPT | Performed by: FAMILY MEDICINE

## 2022-07-19 PROCEDURE — 1126F AMNT PAIN NOTED NONE PRSNT: CPT | Performed by: FAMILY MEDICINE

## 2022-07-19 PROCEDURE — 99213 OFFICE O/P EST LOW 20 MIN: CPT | Performed by: FAMILY MEDICINE

## 2022-07-19 PROCEDURE — G0439 PPPS, SUBSEQ VISIT: HCPCS | Performed by: FAMILY MEDICINE

## 2022-07-19 PROCEDURE — 1159F MED LIST DOCD IN RCRD: CPT | Performed by: FAMILY MEDICINE

## 2022-07-19 RX ORDER — AMLODIPINE BESYLATE 5 MG/1
5 TABLET ORAL DAILY
Qty: 90 TABLET | Refills: 1 | Status: SHIPPED | OUTPATIENT
Start: 2022-07-19

## 2022-07-19 RX ORDER — ATORVASTATIN CALCIUM 20 MG/1
20 TABLET, FILM COATED ORAL DAILY
Qty: 90 TABLET | Refills: 1 | Status: SHIPPED | OUTPATIENT
Start: 2022-07-19

## 2022-07-19 RX ORDER — METOPROLOL TARTRATE 50 MG/1
50 TABLET, FILM COATED ORAL 2 TIMES DAILY
Qty: 180 TABLET | Refills: 1 | Status: SHIPPED | OUTPATIENT
Start: 2022-07-19

## 2022-07-19 RX ORDER — VIT C/HESPERIDIN/BIOFLAVONOIDS 500-100 MG
TABLET ORAL EVERY OTHER DAY
COMMUNITY

## 2022-07-19 NOTE — PROGRESS NOTES
"Chief Complaint  Medicare Wellness-subsequent    Subjective        Jim Marvin presents to [facility]  Patient is seen for Medicare wellness as well as review of treatment of hypertension and hyperlipidemia.  He has recovered from previous RSV pneumonia with improvement in CT last fall.  Active management of hypertension hyperlipidemia reviewed with refill of medications.      Objective   Vital Signs:  /78 (BP Location: Left arm, Patient Position: Sitting, Cuff Size: Adult)   Pulse 79   Temp 98.4 °F (36.9 °C) (Tympanic)   Ht 175.3 cm (69\")   Wt 95.3 kg (210 lb)   SpO2 93%   BMI 31.01 kg/m²   Estimated body mass index is 31.01 kg/m² as calculated from the following:    Height as of this encounter: 175.3 cm (69\").    Weight as of this encounter: 95.3 kg (210 lb).          Physical Exam  Vitals reviewed.   Constitutional:       Appearance: He is well-developed. He is obese.   HENT:      Head: Normocephalic and atraumatic.      Right Ear: Tympanic membrane and external ear normal.      Left Ear: Tympanic membrane and external ear normal.   Eyes:      Conjunctiva/sclera: Conjunctivae normal.      Pupils: Pupils are equal, round, and reactive to light.   Neck:      Thyroid: No thyromegaly.      Vascular: No JVD.   Cardiovascular:      Rate and Rhythm: Normal rate and regular rhythm.      Heart sounds: Normal heart sounds.   Pulmonary:      Effort: Pulmonary effort is normal.      Breath sounds: Normal breath sounds.   Abdominal:      General: Bowel sounds are normal.      Palpations: Abdomen is soft.   Musculoskeletal:      Cervical back: Normal range of motion and neck supple.      Lumbar back: Decreased range of motion.   Lymphadenopathy:      Cervical: No cervical adenopathy.   Skin:     General: Skin is warm and dry.      Findings: No rash.   Neurological:      Mental Status: He is alert and oriented to person, place, and time.      Cranial Nerves: No cranial nerve " deficit.      Coordination: Coordination normal.      Gait: Gait abnormal.   Psychiatric:         Behavior: Behavior normal.         Thought Content: Thought content normal.         Judgment: Judgment normal.        Result Review :  The following data was reviewed by: Antonio Finley MD on 07/19/2022:    Data reviewed: Radiologic studies CT chest          Assessment and Plan   Diagnoses and all orders for this visit:    1. Mixed hyperlipidemia (Primary)    2. Primary hypertension    3. Pulmonary emphysema, unspecified emphysema type (HCC)    4. Medicare annual wellness visit, subsequent    Other orders  -     atorvastatin (LIPITOR) 20 MG tablet; Take 1 tablet by mouth Daily.  Dispense: 90 tablet; Refill: 1  -     amLODIPine (NORVASC) 5 MG tablet; Take 1 tablet by mouth Daily.  Dispense: 90 tablet; Refill: 1  -     metoprolol tartrate (LOPRESSOR) 50 MG tablet; Take 1 tablet by mouth 2 (Two) Times a Day.  Dispense: 180 tablet; Refill: 1             Follow Up   Return in about 1 year (around 7/19/2023) for Recheck, Medicare Wellness.  Patient was given instructions and counseling regarding his condition or for health maintenance advice. Please see specific information pulled into the AVS if appropriate.

## 2022-07-19 NOTE — PATIENT INSTRUCTIONS
Medicare Wellness  Personal Prevention Plan of Service     Date of Office Visit:    Encounter Provider:  Antonio Finley MD  Place of Service:  [facility]  Patient Name: Jim Marvin  :  1935    As part of the Medicare Wellness portion of your visit today, we are providing you with this personalized preventive plan of services (PPPS). This plan is based upon recommendations of the [place] Preventive Services Task Force (USPSTF) and the Advisory Committee on Immunization Practices (ACIP).    This lists the preventive care services that should be considered, and provides dates of when you are due. Items listed as completed are up-to-date and do not require any further intervention.    Health Maintenance   Topic Date Due    Pneumococcal Vaccine 65+ (1 - PCV) Never done    TDAP/TD VACCINES (1 - Tdap) Never done    ZOSTER VACCINE (1 of 2) Never done    LIPID PANEL  2020    COVID-19 Vaccine (4 - Booster for Pfizer series) 2022    ANNUAL WELLNESS VISIT  2022    INFLUENZA VACCINE  10/01/2022       No orders of the defined types were placed in this encounter.      No follow-ups on file.          
via end of day email/distribution list
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
## 2022-11-23 ENCOUNTER — OFFICE VISIT (OUTPATIENT)
Dept: INTERNAL MEDICINE | Facility: CLINIC | Age: 87
End: 2022-11-23

## 2022-11-23 VITALS
DIASTOLIC BLOOD PRESSURE: 65 MMHG | TEMPERATURE: 98 F | OXYGEN SATURATION: 96 % | HEART RATE: 70 BPM | BODY MASS INDEX: 30.96 KG/M2 | WEIGHT: 209 LBS | HEIGHT: 69 IN | SYSTOLIC BLOOD PRESSURE: 106 MMHG

## 2022-11-23 DIAGNOSIS — J44.1 COPD WITH EXACERBATION: ICD-10-CM

## 2022-11-23 DIAGNOSIS — R05.1 ACUTE COUGH: Primary | ICD-10-CM

## 2022-11-23 DIAGNOSIS — J10.1 INFLUENZA A: ICD-10-CM

## 2022-11-23 LAB
EXPIRATION DATE: ABNORMAL
FLUAV AG UPPER RESP QL IA.RAPID: DETECTED
FLUBV AG UPPER RESP QL IA.RAPID: NOT DETECTED
INTERNAL CONTROL: ABNORMAL
Lab: ABNORMAL
SARS-COV-2 AG UPPER RESP QL IA.RAPID: NOT DETECTED

## 2022-11-23 PROCEDURE — 99213 OFFICE O/P EST LOW 20 MIN: CPT | Performed by: NURSE PRACTITIONER

## 2022-11-23 PROCEDURE — 87428 SARSCOV & INF VIR A&B AG IA: CPT | Performed by: NURSE PRACTITIONER

## 2022-11-23 RX ORDER — GUAIFENESIN 600 MG/1
600 TABLET, EXTENDED RELEASE ORAL 2 TIMES DAILY
Qty: 14 TABLET | Refills: 0 | Status: SHIPPED | OUTPATIENT
Start: 2022-11-23 | End: 2022-11-30

## 2022-11-23 RX ORDER — METHYLPREDNISOLONE 4 MG/1
TABLET ORAL
Qty: 21 TABLET | Refills: 0 | Status: SHIPPED | OUTPATIENT
Start: 2022-11-23

## 2022-11-23 NOTE — PROGRESS NOTES
"Chief Complaint  Cough, Loss Of Appetite , and Nasal Congestion    Subjective    {Problem List  Visit Diagnosis   Encounters  Notes  Medications  Labs  Result Review Imaging  Media :23}    Jim Marvin presents to [facility]  History of Present Illness  Patient presents for evaluation of cough and decreased appetite.  This is an 87-year-old male patient of Dr. Finley with history of emphysema.    Cough has been present for about 3 days.  He endorses a decrease in appetite since then as well.  He denies outright shortness of breath.  He is a former smoker, quit in 1996.  He has an underlying diagnosis of emphysema.    He denies any known ill contacts.  He has not had a flu shot this season.  He has a history of RSV in September 2021.  He was hospitalized for this.    Denies development of other new issues today.      Objective   Vital Signs:  /65 (BP Location: Right arm, Patient Position: Sitting, Cuff Size: Adult)   Pulse 70   Temp 98 °F (36.7 °C) (Infrared)   Ht 175.3 cm (69\")   Wt 94.8 kg (209 lb)   SpO2 96%   BMI 30.86 kg/m²   Estimated body mass index is 30.86 kg/m² as calculated from the following:    Height as of this encounter: 175.3 cm (69\").    Weight as of this encounter: 94.8 kg (209 lb).          Physical Exam  Vitals and nursing note reviewed.   Constitutional:       General: He is not in acute distress.     Appearance: Normal appearance. He is well-developed. He is not ill-appearing, toxic-appearing or diaphoretic.   HENT:      Head: Normocephalic and atraumatic.      Right Ear: External ear normal.      Left Ear: External ear normal.   Eyes:      Pupils: Pupils are equal, round, and reactive to light.   Cardiovascular:      Rate and Rhythm: Normal rate and regular rhythm.      Pulses: Normal pulses.      Heart sounds: Normal heart sounds.   Pulmonary:      Effort: Pulmonary effort is normal. No respiratory distress.      Breath sounds: No stridor. " Wheezing (  Bilateral upper lobes) present. No rhonchi or rales.   Chest:      Chest wall: No tenderness.   Abdominal:      General: Bowel sounds are normal. There is no distension.      Palpations: Abdomen is soft.      Tenderness: There is no abdominal tenderness.   Musculoskeletal:         General: Normal range of motion.      Cervical back: Normal range of motion and neck supple.   Skin:     General: Skin is warm and dry.      Capillary Refill: Capillary refill takes less than 2 seconds.   Neurological:      General: No focal deficit present.      Mental Status: He is alert and oriented to person, place, and time. Mental status is at baseline.   Psychiatric:         Mood and Affect: Mood normal.         Behavior: Behavior normal.         Thought Content: Thought content normal.         Judgment: Judgment normal.        Result Review :  The following data was reviewed by: KATY Frazier on 11/23/2022:      Current outpatient and discharge medications have been reconciled for the patient.  Reviewed by: KATY Frazier    Lab Results   Component Value Date    GLUCOSE 94 09/25/2021    BUN 20 09/25/2021    CREATININE 0.90 11/05/2021    EGFRIFNONA 108 09/25/2021    EGFRIFAFRI 105 05/14/2019    BCR 28.6 (H) 09/25/2021    K 4.1 09/25/2021    CO2 25.9 09/25/2021    CALCIUM 8.9 09/25/2021    PROTENTOTREF 7.1 05/14/2019    ALBUMIN 3.20 (L) 09/25/2021    LABIL2 1.4 05/14/2019    AST 27 09/22/2021    ALT 39 09/22/2021             Assessment and Plan   Diagnoses and all orders for this visit:    1. Acute cough (Primary)  -     POCT SARS-CoV-2 Antigen SANTOS + Flu    2. COPD with exacerbation (HCC)  -     methylPREDNISolone (MEDROL) 4 MG dose pack; Take as directed on package instructions.  Dispense: 21 tablet; Refill: 0  -     guaiFENesin (Mucinex) 600 MG 12 hr tablet; Take 1 tablet by mouth 2 (Two) Times a Day for 7 days.  Dispense: 14 tablet; Refill: 0    3. Influenza A  -     methylPREDNISolone (MEDROL) 4 MG dose  pack; Take as directed on package instructions.  Dispense: 21 tablet; Refill: 0  -     guaiFENesin (Mucinex) 600 MG 12 hr tablet; Take 1 tablet by mouth 2 (Two) Times a Day for 7 days.  Dispense: 14 tablet; Refill: 0      He has flu A.  Unfortunately he is out of the 48-hour window for Tamiflu and I do not think it would be beneficial.  I do want to add a Medrol Dosepak and due to wheezing in the bilateral upper lobes and underlying history of emphysema with acute exacerbation related to flu A.    Add Mucinex 600 mg twice daily x7 days along with increasing fluids.    He knows that for any acute shortness of breath or development of chest discomfort he should proceed to the ER.    Follow-up as needed if symptoms persist or worsen and routinely with PCP, Dr. Finley.       Follow Up   Return if symptoms worsen or fail to improve.  Patient was given instructions and counseling regarding his condition or for health maintenance advice. Please see specific information pulled into the AVS if appropriate.

## 2023-04-10 RX ORDER — METOPROLOL TARTRATE 50 MG/1
TABLET, FILM COATED ORAL
Qty: 180 TABLET | Refills: 1 | Status: SHIPPED | OUTPATIENT
Start: 2023-04-10

## 2023-04-10 RX ORDER — AMLODIPINE BESYLATE 5 MG/1
5 TABLET ORAL DAILY
Qty: 90 TABLET | Refills: 1 | Status: SHIPPED | OUTPATIENT
Start: 2023-04-10

## 2023-04-10 RX ORDER — ATORVASTATIN CALCIUM 20 MG/1
20 TABLET, FILM COATED ORAL DAILY
Qty: 90 TABLET | Refills: 1 | Status: SHIPPED | OUTPATIENT
Start: 2023-04-10

## 2023-04-10 NOTE — TELEPHONE ENCOUNTER
Rx Refill Note  Requested Prescriptions     Pending Prescriptions Disp Refills   • metoprolol tartrate (LOPRESSOR) 50 MG tablet [Pharmacy Med Name: METOPROLOL TARTRATE 50MG TABLETS] 180 tablet 1     Sig: TAKE 1 TABLET BY MOUTH TWICE DAILY   • atorvastatin (LIPITOR) 20 MG tablet [Pharmacy Med Name: ATORVASTATIN 20MG TABLETS] 90 tablet 1     Sig: TAKE 1 TABLET BY MOUTH DAILY   • amLODIPine (NORVASC) 5 MG tablet [Pharmacy Med Name: AMLODIPINE BESYLATE 5MG TABLETS] 90 tablet 1     Sig: TAKE 1 TABLET BY MOUTH DAILY      Last office visit with prescribing clinician: 7/19/2022   Last telemedicine visit with prescribing clinician: 7/24/2023   Next office visit with prescribing clinician: 7/24/2023                         Would you like a call back once the refill request has been completed: [] Yes [] No    If the office needs to give you a call back, can they leave a voicemail: [] Yes [] No    Terri Stanley  04/10/23, 10:25 EDT

## 2023-07-24 ENCOUNTER — OFFICE VISIT (OUTPATIENT)
Dept: INTERNAL MEDICINE | Facility: CLINIC | Age: 88
End: 2023-07-24
Payer: MEDICARE

## 2023-07-24 VITALS
BODY MASS INDEX: 31.45 KG/M2 | OXYGEN SATURATION: 92 % | DIASTOLIC BLOOD PRESSURE: 74 MMHG | HEART RATE: 78 BPM | SYSTOLIC BLOOD PRESSURE: 136 MMHG | WEIGHT: 213 LBS

## 2023-07-24 DIAGNOSIS — I10 PRIMARY HYPERTENSION: Chronic | ICD-10-CM

## 2023-07-24 DIAGNOSIS — E78.2 MIXED HYPERLIPIDEMIA: Chronic | ICD-10-CM

## 2023-07-24 DIAGNOSIS — S41.102A OPEN WOUND OF LEFT UPPER ARM, INITIAL ENCOUNTER: Primary | ICD-10-CM

## 2023-07-24 DIAGNOSIS — Z00.00 MEDICARE ANNUAL WELLNESS VISIT, SUBSEQUENT: ICD-10-CM

## 2023-07-24 PROCEDURE — 99214 OFFICE O/P EST MOD 30 MIN: CPT | Performed by: FAMILY MEDICINE

## 2023-07-24 PROCEDURE — G0439 PPPS, SUBSEQ VISIT: HCPCS | Performed by: FAMILY MEDICINE

## 2023-07-24 PROCEDURE — 1170F FXNL STATUS ASSESSED: CPT | Performed by: FAMILY MEDICINE

## 2023-07-24 PROCEDURE — 1160F RVW MEDS BY RX/DR IN RCRD: CPT | Performed by: FAMILY MEDICINE

## 2023-07-24 PROCEDURE — 3075F SYST BP GE 130 - 139MM HG: CPT | Performed by: FAMILY MEDICINE

## 2023-07-24 PROCEDURE — 3078F DIAST BP <80 MM HG: CPT | Performed by: FAMILY MEDICINE

## 2023-07-24 PROCEDURE — 1159F MED LIST DOCD IN RCRD: CPT | Performed by: FAMILY MEDICINE

## 2023-07-24 RX ORDER — METOPROLOL TARTRATE 50 MG/1
50 TABLET, FILM COATED ORAL 2 TIMES DAILY
Qty: 180 TABLET | Refills: 1 | Status: SHIPPED | OUTPATIENT
Start: 2023-07-24

## 2023-07-24 RX ORDER — AMLODIPINE BESYLATE 5 MG/1
5 TABLET ORAL DAILY
Qty: 90 TABLET | Refills: 1 | Status: SHIPPED | OUTPATIENT
Start: 2023-07-24

## 2023-07-24 RX ORDER — ATORVASTATIN CALCIUM 20 MG/1
20 TABLET, FILM COATED ORAL DAILY
Qty: 90 TABLET | Refills: 1 | Status: SHIPPED | OUTPATIENT
Start: 2023-07-24

## 2023-07-24 NOTE — PATIENT INSTRUCTIONS
Medicare Wellness  Personal Prevention Plan of Service     Date of Office Visit:    Encounter Provider:  Antonio Finley MD  Place of Service:  [facility]  Patient Name: Jim Marvin  :  1935    As part of the Medicare Wellness portion of your visit today, we are providing you with this personalized preventive plan of services (PPPS). This plan is based upon recommendations of the [place] Preventive Services Task Force (USPSTF) and the Advisory Committee on Immunization Practices (ACIP).    This lists the preventive care services that should be considered, and provides dates of when you are due. Items listed as completed are up-to-date and do not require any further intervention.    Health Maintenance   Topic Date Due    LIPID PANEL  2020    ZOSTER VACCINE (1 of 2) 2024 (Originally 10/4/1987)    TDAP/TD VACCINES (1 - Tdap) 2024 (Originally 10/4/1956)    COVID-19 Vaccine (4 - Pfizer series) 2024 (Originally 2022)    Pneumococcal Vaccine 65+ (1 - PCV) 2024 (Originally 10/4/1941)    INFLUENZA VACCINE  10/01/2023    ANNUAL WELLNESS VISIT  2024       Orders Placed This Encounter   Procedures    Ambulatory Referral to Dermatology     Referral Priority:   Routine     Referral Type:   Consultation     Referral Reason:   Specialty Services Required     Referred to Provider:   Manish Manning MD     Requested Specialty:   Dermatology     Number of Visits Requested:   1       No follow-ups on file.

## 2023-07-24 NOTE — PROGRESS NOTES
"Chief Complaint  Medicare Wellness-subsequent    Subjective        Jim Marvin presents to [facility]  History of Present Illness  Juma is a delightful sahra who is treated for blood pressure and hyperlipidemia.  He defers labs for the moment.  He is doing well on his current medication.  About 9 months ago he was sitting outside and had a sharp pain in the left upper arm and has had slow to heal wound with a scab that falls off periodically.  Consideration will be spider bite but will get dermatology take a look at it preceded by treatment with mupirocin ointment 3 times daily.    Otherwise continue treatment for hypertension hyperlipidemia current medications which are working.  Previously had RSV with home oxygen for period of time but is gotten over that as well.      Objective   Vital Signs:  /74 (BP Location: Left arm, Patient Position: Sitting, Cuff Size: Adult)   Pulse 78   Wt 96.6 kg (213 lb)   SpO2 92%   BMI 31.45 kg/m²   Estimated body mass index is 31.45 kg/m² as calculated from the following:    Height as of 11/23/22: 175.3 cm (69\").    Weight as of this encounter: 96.6 kg (213 lb).             Physical Exam  Vitals reviewed.   Constitutional:       Appearance: He is well-developed. He is obese.   HENT:      Head: Normocephalic and atraumatic.      Right Ear: Tympanic membrane and external ear normal.      Left Ear: Tympanic membrane and external ear normal.      Nose: Nose normal.   Eyes:      Conjunctiva/sclera: Conjunctivae normal.      Pupils: Pupils are equal, round, and reactive to light.   Neck:      Thyroid: No thyromegaly.      Vascular: No JVD.   Cardiovascular:      Rate and Rhythm: Normal rate and regular rhythm.      Heart sounds: Normal heart sounds.   Pulmonary:      Effort: Pulmonary effort is normal.      Breath sounds: Normal breath sounds.   Abdominal:      General: Bowel sounds are normal.      Palpations: Abdomen is soft.   Musculoskeletal:  "        General: Normal range of motion.      Cervical back: Normal range of motion and neck supple.   Lymphadenopathy:      Cervical: No cervical adenopathy.   Skin:     General: Skin is warm and dry.      Findings: No rash.   Neurological:      Mental Status: He is alert and oriented to person, place, and time.      Cranial Nerves: No cranial nerve deficit.      Coordination: Coordination normal.   Psychiatric:         Behavior: Behavior normal.         Thought Content: Thought content normal.         Judgment: Judgment normal.      Result Review :  The following data was reviewed by: Antonio Finley MD on 07/24/2023:                 Assessment and Plan   Diagnoses and all orders for this visit:    1. Open wound of left upper arm, initial encounter (Primary)  Comments:  We will try Bactroban/mupirocin ointment 3 times daily for what looks to be a chronic spider bite and referral to Dr. Manning dermatology  Orders:  -     Ambulatory Referral to Dermatology    2. Primary hypertension  Comments:  metoprolol 50-mg bid amlodipine 5 mg daily    3. Mixed hyperlipidemia  Comments:  atorvastatin 20 mg daily    4. Medicare annual wellness visit, subsequent    Other orders  -     mupirocin (BACTROBAN) 2 % ointment; Apply 1 application  topically to the appropriate area as directed 3 (Three) Times a Day.  Dispense: 30 g; Refill: 1  -     amLODIPine (NORVASC) 5 MG tablet; Take 1 tablet by mouth Daily.  Dispense: 90 tablet; Refill: 1  -     atorvastatin (LIPITOR) 20 MG tablet; Take 1 tablet by mouth Daily.  Dispense: 90 tablet; Refill: 1  -     metoprolol tartrate (LOPRESSOR) 50 MG tablet; Take 1 tablet by mouth 2 (Two) Times a Day.  Dispense: 180 tablet; Refill: 1             Follow Up   No follow-ups on file.  Patient was given instructions and counseling regarding his condition or for health maintenance advice. Please see specific information pulled into the AVS if appropriate.
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
## 2024-01-18 RX ORDER — AMLODIPINE BESYLATE 5 MG/1
5 TABLET ORAL DAILY
Qty: 90 TABLET | Refills: 1 | Status: SHIPPED | OUTPATIENT
Start: 2024-01-18

## 2024-01-18 RX ORDER — ATORVASTATIN CALCIUM 20 MG/1
20 TABLET, FILM COATED ORAL DAILY
Qty: 90 TABLET | Refills: 1 | Status: SHIPPED | OUTPATIENT
Start: 2024-01-18

## 2024-01-18 RX ORDER — METOPROLOL TARTRATE 50 MG/1
50 TABLET, FILM COATED ORAL 2 TIMES DAILY
Qty: 180 TABLET | Refills: 1 | Status: SHIPPED | OUTPATIENT
Start: 2024-01-18

## 2024-05-08 ENCOUNTER — TELEPHONE (OUTPATIENT)
Dept: INTERNAL MEDICINE | Facility: CLINIC | Age: 89
End: 2024-05-08
Payer: MEDICARE
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
## 2024-05-09 NOTE — TELEPHONE ENCOUNTER
Caller: jessica joya    Relationship: Emergency Contact    Best call back number: [phone]    Who are you requesting to speak with (clinical staff, provider,  specific staff member): DR GRANGER     What was the call regarding: PLEASE ADVISE ABOUT REQUEST FOR EPI PEN. PATIENT IS GETTING ANXIOUS

## 2024-05-13 RX ORDER — EPINEPHRINE 0.3 MG/.3ML
0.3 INJECTION SUBCUTANEOUS ONCE
Qty: 1 EACH | Refills: 2 | Status: SHIPPED | OUTPATIENT
Start: 2024-05-13 | End: 2024-05-13

## 2024-06-03 ENCOUNTER — NURSE TRIAGE (OUTPATIENT)
Dept: CALL CENTER | Facility: HOSPITAL | Age: 89
End: 2024-06-03
Payer: MEDICARE

## 2024-06-03 NOTE — TELEPHONE ENCOUNTER
"Patient's daughter wants to get him an appointment with his PCP for shortness of breath. He has mild shortness of breath that is worse with exertion. He refuses to go to the hospital and wants to just order oxygen on [employer].   Triage completed and warm transferred to the office.   Reason for Disposition   [1] MILD difficulty breathing (e.g., minimal/no SOB at rest, SOB with walking, pulse <100) AND [2] NEW-onset or WORSE than normal    Additional Information   Negative: SEVERE difficulty breathing (e.g., struggling for each breath, speaks in single words)   Negative: [1] Breathing stopped AND [2] hasn't returned   Negative: Choking on something   Negative: Bluish (or gray) lips or face now   Negative: Difficult to awaken or acting confused (e.g., disoriented, slurred speech)   Negative: Passed out (i.e., lost consciousness, collapsed and was not responding)   Negative: Wheezing started suddenly after medicine, an allergic food or bee sting   Negative: Stridor (harsh sound while breathing in)   Negative: Slow, shallow and weak breathing   Negative: Sounds like a life-threatening emergency to the triager   Negative: Chest pain   Negative: [1] Wheezing (high pitched whistling sound) AND [2] previous asthma attacks or use of asthma medicines   Negative: [1] Difficulty breathing AND [2] only present when coughing   Negative: [1] Difficulty breathing AND [2] only from stuffy or runny nose   Negative: [1] Difficulty breathing AND [2] within 14 days of COVID-19 Exposure   Negative: [1] MODERATE difficulty breathing (e.g., speaks in phrases, SOB even at rest, pulse 100-120) AND [2] NEW-onset or WORSE than normal   Negative: Oxygen level (e.g., pulse oximetry) 90 percent or lower   Negative: Wheezing can be heard across the room   Negative: Drooling or spitting out saliva (because can't swallow)   Negative: History of prior \"blood clot\" in leg or lungs (i.e., deep vein thrombosis, pulmonary embolism)   Negative: History of " "inherited increased risk of blood clots (e.g., Factor 5 Leiden, Anti-thrombin 3, Protein C or Protein S deficiency, Prothrombin mutation)   Negative: Major surgery in the past month   Negative: Hip or leg fracture (broken bone) in past month (or had cast on leg or ankle in past month)   Negative: Illness requiring prolonged bedrest in past month (e.g., immobilization, long hospital stay)   Negative: Long-distance travel in past month (e.g., car, bus, train, plane; with trip lasting 6 or more hours)   Negative: Cancer treatment in past six months (or has cancer now)   Negative: Extra heartbeats, irregular heart beating, or heart is beating very fast  (i.e., \"palpitations\")   Negative: Fever > 103 F (39.4 C)   Negative: [1] Fever > 101 F (38.3 C) AND [2] age > 60 years   Negative: [1] Fever > 100.0 F (37.8 C) AND [2] bedridden (e.g., CVA, chronic illness, recovering from surgery)   Negative: [1] Fever > 100.0 F (37.8 C) AND [2] diabetes mellitus or weak immune system (e.g., HIV positive, cancer chemo, splenectomy, organ transplant, chronic steroids)   Negative: [1] Periods where breathing stops and then resumes normally AND [2] bedridden (e.g., CVA)   Negative: Pregnant or postpartum (from 0 to 6 weeks after delivery)   Negative: Patient sounds very sick or weak to the triager    Answer Assessment - Initial Assessment Questions  1. RESPIRATORY STATUS: \"Describe your breathing?\" (e.g., wheezing, shortness of breath, unable to speak, severe coughing)       Shortness of breath  2. ONSET: \"When did this breathing problem begin?\"       Few weeks   3. PATTERN \"Does the difficult breathing come and go, or has it been constant since it started?\"       Comes and goes   4. SEVERITY: \"How bad is your breathing?\" (e.g., mild, moderate, severe)     - MILD: No SOB at rest, mild SOB with walking, speaks normally in sentences, can lie down, no retractions, pulse < 100.     - MODERATE: SOB at rest, SOB with minimal exertion and " "prefers to sit, cannot lie down flat, speaks in phrases, mild retractions, audible wheezing, pulse 100-120.     - SEVERE: Very SOB at rest, speaks in single words, struggling to breathe, sitting hunched forward, retractions, pulse > 120    Mild to moderate   5. RECURRENT SYMPTOM: \"Have you had difficulty breathing before?\" If Yes, ask: \"When was the last time?\" and \"What happened that time?\"       yes  6. CARDIAC HISTORY: \"Do you have any history of heart disease?\" (e.g., heart attack, angina, bypass surgery, angioplasty)       No   7. LUNG HISTORY: \"Do you have any history of lung disease?\"  (e.g., pulmonary embolus, asthma, emphysema)      Former smoker, RSV in the past,   8. CAUSE: \"What do you think is causing the breathing problem?\"      Not sure   9. OTHER SYMPTOMS: \"Do you have any other symptoms? (e.g., dizziness, runny nose, cough, chest pain, fever)     Cough   10. O2 SATURATION MONITOR:  \"Do you use an oxygen saturation monitor (pulse oximeter) at home?\" If Yes, ask: \"What is your reading (oxygen level) today?\" \"What is your usual oxygen saturation reading?\" (e.g., 95%)      No   11. PREGNANCY: \"Is there any chance you are pregnant?\" \"When was your last menstrual period?\"   No   12. TRAVEL: \"Have you traveled out of the country in the last month?\" (e.g., travel history, exposures)   No    Protocols used: Breathing Difficulty-ADULT-AH    "

## 2024-06-04 ENCOUNTER — HOSPITAL ENCOUNTER (INPATIENT)
Facility: HOSPITAL | Age: 89
LOS: 4 days | Discharge: HOME | DRG: 189 | End: 2024-06-08
Attending: STUDENT IN AN ORGANIZED HEALTH CARE EDUCATION/TRAINING PROGRAM | Admitting: INTERNAL MEDICINE
Payer: MEDICARE

## 2024-06-04 ENCOUNTER — APPOINTMENT (OUTPATIENT)
Dept: CT IMAGING | Facility: HOSPITAL | Age: 89
DRG: 189 | End: 2024-06-04
Payer: MEDICARE

## 2024-06-04 ENCOUNTER — OFFICE VISIT (OUTPATIENT)
Dept: INTERNAL MEDICINE | Facility: CLINIC | Age: 89
End: 2024-06-04
Payer: MEDICARE

## 2024-06-04 ENCOUNTER — APPOINTMENT (OUTPATIENT)
Dept: GENERAL RADIOLOGY | Facility: HOSPITAL | Age: 89
DRG: 189 | End: 2024-06-04
Payer: MEDICARE

## 2024-06-04 VITALS
HEIGHT: 69 IN | BODY MASS INDEX: 30.07 KG/M2 | OXYGEN SATURATION: 81 % | WEIGHT: 203 LBS | DIASTOLIC BLOOD PRESSURE: 56 MMHG | TEMPERATURE: 98.8 F | SYSTOLIC BLOOD PRESSURE: 104 MMHG | HEART RATE: 106 BPM

## 2024-06-04 DIAGNOSIS — J43.1 PANLOBULAR EMPHYSEMA: ICD-10-CM

## 2024-06-04 DIAGNOSIS — J84.9 INTERSTITIAL LUNG DISEASE: ICD-10-CM

## 2024-06-04 DIAGNOSIS — J96.21 ACUTE ON CHRONIC RESPIRATORY FAILURE WITH HYPOXIA: Primary | ICD-10-CM

## 2024-06-04 DIAGNOSIS — R09.02 HYPOXIC: Primary | ICD-10-CM

## 2024-06-04 DIAGNOSIS — J96.01 ACUTE RESPIRATORY FAILURE WITH HYPOXIA: ICD-10-CM

## 2024-06-04 DIAGNOSIS — R06.02 SHORTNESS OF BREATH: ICD-10-CM

## 2024-06-04 DIAGNOSIS — J43.8 OTHER EMPHYSEMA: ICD-10-CM

## 2024-06-04 DIAGNOSIS — J96.01 ACUTE HYPOXIC RESPIRATORY FAILURE: ICD-10-CM

## 2024-06-04 LAB
ALBUMIN SERPL-MCNC: 3.4 G/DL (ref 3.5–5.2)
ALBUMIN/GLOB SERPL: 1.1 G/DL
ALP SERPL-CCNC: 71 U/L (ref 39–117)
ALT SERPL W P-5'-P-CCNC: 28 U/L (ref 1–41)
ANION GAP SERPL CALCULATED.3IONS-SCNC: 9.4 MMOL/L (ref 5–15)
AST SERPL-CCNC: 17 U/L (ref 1–40)
B PARAPERT DNA SPEC QL NAA+PROBE: NOT DETECTED
B PERT DNA SPEC QL NAA+PROBE: NOT DETECTED
BASOPHILS # BLD AUTO: 0.14 10*3/MM3 (ref 0–0.2)
BASOPHILS NFR BLD AUTO: 1.7 % (ref 0–1.5)
BILIRUB SERPL-MCNC: 0.9 MG/DL (ref 0–1.2)
BUN SERPL-MCNC: 12 MG/DL (ref 8–23)
BUN/CREAT SERPL: 14.1 (ref 7–25)
C PNEUM DNA NPH QL NAA+NON-PROBE: NOT DETECTED
CALCIUM SPEC-SCNC: 9.4 MG/DL (ref 8.6–10.5)
CHLORIDE SERPL-SCNC: 98 MMOL/L (ref 98–107)
CO2 SERPL-SCNC: 26.6 MMOL/L (ref 22–29)
CREAT SERPL-MCNC: 0.85 MG/DL (ref 0.76–1.27)
DEPRECATED RDW RBC AUTO: 42.5 FL (ref 37–54)
EGFRCR SERPLBLD CKD-EPI 2021: 84.6 ML/MIN/1.73
EOSINOPHIL # BLD AUTO: 0.33 10*3/MM3 (ref 0–0.4)
EOSINOPHIL NFR BLD AUTO: 3.9 % (ref 0.3–6.2)
ERYTHROCYTE [DISTWIDTH] IN BLOOD BY AUTOMATED COUNT: 12.1 % (ref 12.3–15.4)
FLUAV SUBTYP SPEC NAA+PROBE: NOT DETECTED
FLUBV RNA ISLT QL NAA+PROBE: NOT DETECTED
GLOBULIN UR ELPH-MCNC: 3.1 GM/DL
GLUCOSE SERPL-MCNC: 127 MG/DL (ref 65–99)
HADV DNA SPEC NAA+PROBE: NOT DETECTED
HCOV 229E RNA SPEC QL NAA+PROBE: NOT DETECTED
HCOV HKU1 RNA SPEC QL NAA+PROBE: NOT DETECTED
HCOV NL63 RNA SPEC QL NAA+PROBE: NOT DETECTED
HCOV OC43 RNA SPEC QL NAA+PROBE: NOT DETECTED
HCT VFR BLD AUTO: 43.9 % (ref 37.5–51)
HGB BLD-MCNC: 15.2 G/DL (ref 13–17.7)
HMPV RNA NPH QL NAA+NON-PROBE: NOT DETECTED
HOLD SPECIMEN: NORMAL
HOLD SPECIMEN: NORMAL
HPIV1 RNA ISLT QL NAA+PROBE: NOT DETECTED
HPIV2 RNA SPEC QL NAA+PROBE: NOT DETECTED
HPIV3 RNA NPH QL NAA+PROBE: NOT DETECTED
HPIV4 P GENE NPH QL NAA+PROBE: NOT DETECTED
IMM GRANULOCYTES # BLD AUTO: 0.18 10*3/MM3 (ref 0–0.05)
IMM GRANULOCYTES NFR BLD AUTO: 2.1 % (ref 0–0.5)
LYMPHOCYTES # BLD AUTO: 0.6 10*3/MM3 (ref 0.7–3.1)
LYMPHOCYTES NFR BLD AUTO: 7.1 % (ref 19.6–45.3)
M PNEUMO IGG SER IA-ACNC: NOT DETECTED
MCH RBC QN AUTO: 33.3 PG (ref 26.6–33)
MCHC RBC AUTO-ENTMCNC: 34.6 G/DL (ref 31.5–35.7)
MCV RBC AUTO: 96.3 FL (ref 79–97)
MONOCYTES # BLD AUTO: 1.68 10*3/MM3 (ref 0.1–0.9)
MONOCYTES NFR BLD AUTO: 19.9 % (ref 5–12)
NEUTROPHILS NFR BLD AUTO: 5.52 10*3/MM3 (ref 1.7–7)
NEUTROPHILS NFR BLD AUTO: 65.3 % (ref 42.7–76)
NRBC BLD AUTO-RTO: 0 /100 WBC (ref 0–0.2)
NT-PROBNP SERPL-MCNC: 1120 PG/ML (ref 0–1800)
PLATELET # BLD AUTO: 302 10*3/MM3 (ref 140–450)
PMV BLD AUTO: 8.4 FL (ref 6–12)
POTASSIUM SERPL-SCNC: 4.4 MMOL/L (ref 3.5–5.2)
PROT SERPL-MCNC: 6.5 G/DL (ref 6–8.5)
QT INTERVAL: 394 MS
QTC INTERVAL: 458 MS
RBC # BLD AUTO: 4.56 10*6/MM3 (ref 4.14–5.8)
RHINOVIRUS RNA SPEC NAA+PROBE: NOT DETECTED
RSV RNA NPH QL NAA+NON-PROBE: NOT DETECTED
SARS-COV-2 RNA NPH QL NAA+NON-PROBE: NOT DETECTED
SODIUM SERPL-SCNC: 134 MMOL/L (ref 136–145)
TROPONIN T SERPL HS-MCNC: 25 NG/L
WBC NRBC COR # BLD AUTO: 8.45 10*3/MM3 (ref 3.4–10.8)
WHOLE BLOOD HOLD COAG: NORMAL
WHOLE BLOOD HOLD SPECIMEN: NORMAL

## 2024-06-04 PROCEDURE — 0202U NFCT DS 22 TRGT SARS-COV-2: CPT | Performed by: STUDENT IN AN ORGANIZED HEALTH CARE EDUCATION/TRAINING PROGRAM

## 2024-06-04 PROCEDURE — 71045 X-RAY EXAM CHEST 1 VIEW: CPT

## 2024-06-04 PROCEDURE — G0378 HOSPITAL OBSERVATION PER HR: HCPCS

## 2024-06-04 PROCEDURE — 1159F MED LIST DOCD IN RCRD: CPT

## 2024-06-04 PROCEDURE — 1126F AMNT PAIN NOTED NONE PRSNT: CPT

## 2024-06-04 PROCEDURE — 99213 OFFICE O/P EST LOW 20 MIN: CPT

## 2024-06-04 PROCEDURE — 1160F RVW MEDS BY RX/DR IN RCRD: CPT

## 2024-06-04 PROCEDURE — 85025 COMPLETE CBC W/AUTO DIFF WBC: CPT | Performed by: STUDENT IN AN ORGANIZED HEALTH CARE EDUCATION/TRAINING PROGRAM

## 2024-06-04 PROCEDURE — 80053 COMPREHEN METABOLIC PANEL: CPT | Performed by: STUDENT IN AN ORGANIZED HEALTH CARE EDUCATION/TRAINING PROGRAM

## 2024-06-04 PROCEDURE — 25510000001 IOPAMIDOL PER 1 ML: Performed by: HOSPITALIST

## 2024-06-04 PROCEDURE — 99285 EMERGENCY DEPT VISIT HI MDM: CPT

## 2024-06-04 PROCEDURE — 93010 ELECTROCARDIOGRAM REPORT: CPT | Performed by: INTERNAL MEDICINE

## 2024-06-04 PROCEDURE — 93005 ELECTROCARDIOGRAM TRACING: CPT | Performed by: STUDENT IN AN ORGANIZED HEALTH CARE EDUCATION/TRAINING PROGRAM

## 2024-06-04 PROCEDURE — 83880 ASSAY OF NATRIURETIC PEPTIDE: CPT | Performed by: STUDENT IN AN ORGANIZED HEALTH CARE EDUCATION/TRAINING PROGRAM

## 2024-06-04 PROCEDURE — 84484 ASSAY OF TROPONIN QUANT: CPT | Performed by: STUDENT IN AN ORGANIZED HEALTH CARE EDUCATION/TRAINING PROGRAM

## 2024-06-04 PROCEDURE — 94799 UNLISTED PULMONARY SVC/PX: CPT

## 2024-06-04 PROCEDURE — 71275 CT ANGIOGRAPHY CHEST: CPT

## 2024-06-04 RX ORDER — UREA 10 %
2.5 LOTION (ML) TOPICAL NIGHTLY PRN
Status: DISCONTINUED | OUTPATIENT
Start: 2024-06-04 | End: 2024-06-08 | Stop reason: HOSPADM

## 2024-06-04 RX ORDER — BISACODYL 5 MG/1
5 TABLET, DELAYED RELEASE ORAL DAILY PRN
Status: DISCONTINUED | OUTPATIENT
Start: 2024-06-04 | End: 2024-06-08 | Stop reason: HOSPADM

## 2024-06-04 RX ORDER — DIPHENOXYLATE HYDROCHLORIDE AND ATROPINE SULFATE 2.5; .025 MG/1; MG/1
1 TABLET ORAL DAILY
Status: DISCONTINUED | OUTPATIENT
Start: 2024-06-05 | End: 2024-06-08 | Stop reason: HOSPADM

## 2024-06-04 RX ORDER — ATORVASTATIN CALCIUM 20 MG/1
20 TABLET, FILM COATED ORAL DAILY
Status: DISCONTINUED | OUTPATIENT
Start: 2024-06-05 | End: 2024-06-08 | Stop reason: HOSPADM

## 2024-06-04 RX ORDER — IPRATROPIUM BROMIDE AND ALBUTEROL SULFATE 2.5; .5 MG/3ML; MG/3ML
3 SOLUTION RESPIRATORY (INHALATION)
Status: DISCONTINUED | OUTPATIENT
Start: 2024-06-05 | End: 2024-06-08 | Stop reason: HOSPADM

## 2024-06-04 RX ORDER — BISACODYL 10 MG
10 SUPPOSITORY, RECTAL RECTAL DAILY PRN
Status: DISCONTINUED | OUTPATIENT
Start: 2024-06-04 | End: 2024-06-08 | Stop reason: HOSPADM

## 2024-06-04 RX ORDER — EPINEPHRINE 0.3 MG/.3ML
INJECTION SUBCUTANEOUS
COMMUNITY
Start: 2024-05-13

## 2024-06-04 RX ORDER — METOPROLOL TARTRATE 50 MG/1
50 TABLET, FILM COATED ORAL 2 TIMES DAILY
Status: DISCONTINUED | OUTPATIENT
Start: 2024-06-05 | End: 2024-06-08 | Stop reason: HOSPADM

## 2024-06-04 RX ORDER — MELATONIN
1000 DAILY
COMMUNITY

## 2024-06-04 RX ORDER — AMLODIPINE BESYLATE 5 MG/1
5 TABLET ORAL DAILY
Status: DISCONTINUED | OUTPATIENT
Start: 2024-06-05 | End: 2024-06-08 | Stop reason: HOSPADM

## 2024-06-04 RX ORDER — SODIUM CHLORIDE 0.9 % (FLUSH) 0.9 %
10 SYRINGE (ML) INJECTION AS NEEDED
Status: DISCONTINUED | OUTPATIENT
Start: 2024-06-04 | End: 2024-06-08 | Stop reason: HOSPADM

## 2024-06-04 RX ORDER — ALBUTEROL SULFATE 2.5 MG/3ML
2.5 SOLUTION RESPIRATORY (INHALATION) EVERY 6 HOURS PRN
Status: DISCONTINUED | OUTPATIENT
Start: 2024-06-04 | End: 2024-06-08 | Stop reason: HOSPADM

## 2024-06-04 RX ORDER — ONDANSETRON 2 MG/ML
4 INJECTION INTRAMUSCULAR; INTRAVENOUS EVERY 6 HOURS PRN
Status: DISCONTINUED | OUTPATIENT
Start: 2024-06-04 | End: 2024-06-08 | Stop reason: HOSPADM

## 2024-06-04 RX ORDER — AMOXICILLIN 250 MG
2 CAPSULE ORAL 2 TIMES DAILY PRN
Status: DISCONTINUED | OUTPATIENT
Start: 2024-06-04 | End: 2024-06-08 | Stop reason: HOSPADM

## 2024-06-04 RX ORDER — ACETAMINOPHEN 325 MG/1
650 TABLET ORAL EVERY 4 HOURS PRN
Status: DISCONTINUED | OUTPATIENT
Start: 2024-06-04 | End: 2024-06-08 | Stop reason: HOSPADM

## 2024-06-04 RX ORDER — POLYETHYLENE GLYCOL 3350 17 G/17G
17 POWDER, FOR SOLUTION ORAL DAILY PRN
Status: DISCONTINUED | OUTPATIENT
Start: 2024-06-04 | End: 2024-06-08 | Stop reason: HOSPADM

## 2024-06-04 RX ORDER — ONDANSETRON 4 MG/1
4 TABLET, ORALLY DISINTEGRATING ORAL EVERY 6 HOURS PRN
Status: DISCONTINUED | OUTPATIENT
Start: 2024-06-04 | End: 2024-06-08 | Stop reason: HOSPADM

## 2024-06-04 RX ORDER — METHYLPREDNISOLONE SODIUM SUCCINATE 40 MG/ML
40 INJECTION, POWDER, LYOPHILIZED, FOR SOLUTION INTRAMUSCULAR; INTRAVENOUS EVERY 8 HOURS
Status: DISCONTINUED | OUTPATIENT
Start: 2024-06-05 | End: 2024-06-07

## 2024-06-04 RX ORDER — MELATONIN
1000 DAILY
Status: DISCONTINUED | OUTPATIENT
Start: 2024-06-05 | End: 2024-06-08 | Stop reason: HOSPADM

## 2024-06-04 RX ORDER — ASPIRIN 325 MG
325 TABLET ORAL DAILY
Status: DISCONTINUED | OUTPATIENT
Start: 2024-06-05 | End: 2024-06-08 | Stop reason: HOSPADM

## 2024-06-04 RX ADMIN — IOPAMIDOL 95 ML: 755 INJECTION, SOLUTION INTRAVENOUS at 16:51

## 2024-06-04 NOTE — PLAN OF CARE
Problem: Adult Inpatient Plan of Care  Goal: Plan of Care Review  Outcome: Ongoing, Progressing  Flowsheets (Taken 6/4/2024 1697)  Progress: no change  Plan of Care Reviewed With: patient  Outcome Evaluation: patient admitted from ER. on 7 liters high flow. aaox4.   Goal Outcome Evaluation:  Plan of Care Reviewed With: patient        Progress: no change  Outcome Evaluation: patient admitted from ER. on 7 liters high flow. aaox4.

## 2024-06-04 NOTE — PROGRESS NOTES
Clinical Pharmacy Services: Medication History    Jim Marvin is a 88 y.o. male presenting to [facility] for   Chief Complaint   Patient presents with    Shortness of Breath       He  has no past medical history on file.    Allergies as of 06/04/2024 - Reviewed 06/04/2024   Allergen Reaction Noted    Ace inhibitors Other (See Comments) 05/17/2016    Lisinopril Other (See Comments) 05/17/2016    Amlodipine Swelling 05/06/2024       Medication information was obtained from: Patient   Pharmacy and Phone Number:     Prior to Admission Medications       Prescriptions Last Dose Informant Patient Reported? Taking?    amLODIPine (NORVASC) 5 MG tablet  Self No Yes    TAKE 1 TABLET BY MOUTH DAILY    aspirin 325 MG tablet  Self Yes Yes    Take 1 tablet by mouth Daily.    atorvastatin (LIPITOR) 20 MG tablet  Self No Yes    TAKE 1 TABLET BY MOUTH DAILY    cholecalciferol (Vitamin D, Cholecalciferol,) 25 MCG (1000 UT) tablet  Self Yes Yes    Take 1 tablet by mouth Daily.    metoprolol tartrate (LOPRESSOR) 50 MG tablet  Self No Yes    TAKE 1 TABLET BY MOUTH TWICE DAILY    Multiple Vitamin (MULTI VITAMIN DAILY) tablet  Self Yes Yes    Take 1 tablet by mouth Daily.    Omega-3 Fatty Acids (OMEGA-3 FISH OIL) 1000 MG capsule  Self Yes Yes    Take 1 capsule by mouth Daily.    EPINEPHrine (EPIPEN) 0.3 MG/0.3ML solution auto-injector injection   Yes No    ADMINISTER 0.3 ML IN THE MUSCLE 1 TIME FOR 1 DOSE AS DIRECTED BY PRESCRIBER    multivitamin with minerals tablet tablet   Yes No    Take 1 tablet by mouth Daily.    mupirocin (BACTROBAN) 2 % ointment   No No    Apply 1 application  topically to the appropriate area as directed 3 (Three) Times a Day.    Zinc 30 MG tablet  Self Yes No    Take 1 tablet by mouth Every Other Day.              Medication notes:     This medication list is complete to the best of my knowledge as of 6/4/2024    Please call if questions.    Nataliia Caballero  Medication History Technician    [phone]    6/4/2024 17:36 EDT

## 2024-06-04 NOTE — Clinical Note
Level of Care: Telemetry [5]   Diagnosis: Hypoxia [507760]   Admitting Physician: JULIO C PERKINS [[id number]]   Attending Physician: JULIO C PERKINS [[id number]]   Certification: I Certify That Inpatient Hospital Services Are Medically Necessary For Greater Than 2 Midnights
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
## 2024-06-04 NOTE — PROGRESS NOTES
"Chief Complaint  Shortness of Breath and Anorexia (Unintentional weight loss)    Subjective        Jim Marvin presents to [facility]IMARY [facility]  History of Present Illness  88-year-old male presenting with shortness of breath and unintentional weight loss.  Patient of Dr. Finley.  Symptoms started about a month ago including shortness of breath.  Notices it especially when picking up his newspaper in the morning.  Bending over and then walking causes him to lose his breath.  Has to take extensive amount of time to recover.  States that this feels similar to 2-1/2 years ago when he had RSV.    Was seen in the ER on 5/6 for angioedema.  ER providers believe that amlodipine was a root cause for angioedema.  Patient has been on amlodipine for years.  States that he typically takes Benadryl if anything ever acts up like that.  Last dose of Benadryl was yesterday.  Has been continuing to take amlodipine at home since leaving ER at that time.    Has had significantly decreased appetite.  Has lost 10 pounds in the past month.    Patient's oxygenation was 81% on room air upon arrival.  EMS called to take patient to ER.  Shortness of Breath    Anorexia        Objective   Vital Signs:  /56 (BP Location: Left arm, Patient Position: Sitting, Cuff Size: Large Adult)   Pulse 106   Temp 98.8 °F (37.1 °C)   Ht 175.3 cm (69\")   Wt 92.1 kg (203 lb)   SpO2 (!) 81%   BMI 29.98 kg/m²   Estimated body mass index is 29.98 kg/m² as calculated from the following:    Height as of this encounter: 175.3 cm (69\").    Weight as of this encounter: 92.1 kg (203 lb).               Physical Exam  Vitals reviewed.   Constitutional:       Appearance: Normal appearance.   Cardiovascular:      Rate and Rhythm: Normal rate and regular rhythm.      Pulses: Normal pulses.      Heart sounds: Normal heart sounds.   Pulmonary:      Effort: Respiratory distress present.      Breath sounds: Wheezing present.   Musculoskeletal:   "       General: Normal range of motion.      Cervical back: Normal range of motion.   Skin:     General: Skin is warm and dry.      Capillary Refill: Capillary refill takes less than 2 seconds.   Neurological:      General: No focal deficit present.      Mental Status: He is alert and oriented to person, place, and time.   Psychiatric:         Mood and Affect: Mood normal.         Behavior: Behavior normal.         Thought Content: Thought content normal.         Judgment: Judgment normal.        Result Review :      Common labs          6/5/2024    03:05 6/6/2024    03:19 6/7/2024    05:12   Common Labs   Glucose 123  214  152    BUN 10  20  17    Creatinine 0.63  0.79  0.60    Sodium 134  134  134    Potassium 4.6  4.1  4.4    Chloride 98  99  102    Calcium 8.7  8.8  8.5    WBC 9.50  13.72  13.78    Hemoglobin 15.0  14.2  14.3    Hematocrit 44.3  42.5  40.9    Platelets 302  308  318    Hemoglobin A1C   6.00          No current facility-administered medications on file prior to visit.     Current Outpatient Medications on File Prior to Visit   Medication Sig Dispense Refill    amLODIPine (NORVASC) 5 MG tablet TAKE 1 TABLET BY MOUTH DAILY 90 tablet 1    aspirin 325 MG tablet Take 1 tablet by mouth Daily.      atorvastatin (LIPITOR) 20 MG tablet TAKE 1 TABLET BY MOUTH DAILY 90 tablet 1    cholecalciferol (Vitamin D, Cholecalciferol,) 25 MCG (1000 UT) tablet Take 1 tablet by mouth Daily.      EPINEPHrine (EPIPEN) 0.3 MG/0.3ML solution auto-injector injection ADMINISTER 0.3 ML IN THE MUSCLE 1 TIME FOR 1 DOSE AS DIRECTED BY PRESCRIBER      metoprolol tartrate (LOPRESSOR) 50 MG tablet TAKE 1 TABLET BY MOUTH TWICE DAILY 180 tablet 1    Multiple Vitamin (MULTI VITAMIN DAILY) tablet Take 1 tablet by mouth Daily.      multivitamin with minerals tablet tablet Take 1 tablet by mouth Daily.      mupirocin (BACTROBAN) 2 % ointment Apply 1 application  topically to the appropriate area as directed 3 (Three) Times a Day. 30 g 1     Omega-3 Fatty Acids (OMEGA-3 FISH OIL) 1000 MG capsule Take 1 capsule by mouth Daily.      Zinc 30 MG tablet Take 1 tablet by mouth Every Other Day.                  Assessment and Plan     Diagnoses and all orders for this visit:    1. Hypoxic (Primary)    2. Shortness of breath        Patient Instructions   EMS to take to the ER for further evaluation.           Follow Up     No follow-ups on file.  Patient was given instructions and counseling regarding his condition or for health maintenance advice. Please see specific information pulled into the AVS if appropriate.

## 2024-06-05 PROBLEM — I65.29 CAROTID ARTERY STENOSIS: Chronic | Status: ACTIVE | Noted: 2024-06-05

## 2024-06-05 LAB
ANION GAP SERPL CALCULATED.3IONS-SCNC: 10 MMOL/L (ref 5–15)
BUN SERPL-MCNC: 10 MG/DL (ref 8–23)
BUN/CREAT SERPL: 15.9 (ref 7–25)
CALCIUM SPEC-SCNC: 8.7 MG/DL (ref 8.6–10.5)
CHLORIDE SERPL-SCNC: 98 MMOL/L (ref 98–107)
CO2 SERPL-SCNC: 26 MMOL/L (ref 22–29)
CREAT SERPL-MCNC: 0.63 MG/DL (ref 0.76–1.27)
DEPRECATED RDW RBC AUTO: 42.1 FL (ref 37–54)
EGFRCR SERPLBLD CKD-EPI 2021: 92.6 ML/MIN/1.73
ERYTHROCYTE [DISTWIDTH] IN BLOOD BY AUTOMATED COUNT: 12 % (ref 12.3–15.4)
GEN 5 2HR TROPONIN T REFLEX: 19 NG/L
GLUCOSE SERPL-MCNC: 123 MG/DL (ref 65–99)
HCT VFR BLD AUTO: 44.3 % (ref 37.5–51)
HGB BLD-MCNC: 15 G/DL (ref 13–17.7)
MCH RBC QN AUTO: 32.6 PG (ref 26.6–33)
MCHC RBC AUTO-ENTMCNC: 33.9 G/DL (ref 31.5–35.7)
MCV RBC AUTO: 96.3 FL (ref 79–97)
PLATELET # BLD AUTO: 302 10*3/MM3 (ref 140–450)
PMV BLD AUTO: 8.6 FL (ref 6–12)
POTASSIUM SERPL-SCNC: 4.6 MMOL/L (ref 3.5–5.2)
RBC # BLD AUTO: 4.6 10*6/MM3 (ref 4.14–5.8)
SODIUM SERPL-SCNC: 134 MMOL/L (ref 136–145)
TROPONIN T DELTA: -2 NG/L
TROPONIN T SERPL HS-MCNC: 21 NG/L
WBC NRBC COR # BLD AUTO: 9.5 10*3/MM3 (ref 3.4–10.8)

## 2024-06-05 PROCEDURE — 80048 BASIC METABOLIC PNL TOTAL CA: CPT | Performed by: INTERNAL MEDICINE

## 2024-06-05 PROCEDURE — 86235 NUCLEAR ANTIGEN ANTIBODY: CPT | Performed by: INTERNAL MEDICINE

## 2024-06-05 PROCEDURE — 86225 DNA ANTIBODY NATIVE: CPT | Performed by: INTERNAL MEDICINE

## 2024-06-05 PROCEDURE — 25010000002 METHYLPREDNISOLONE PER 40 MG: Performed by: INTERNAL MEDICINE

## 2024-06-05 PROCEDURE — 94760 N-INVAS EAR/PLS OXIMETRY 1: CPT

## 2024-06-05 PROCEDURE — 94664 DEMO&/EVAL PT USE INHALER: CPT

## 2024-06-05 PROCEDURE — 84484 ASSAY OF TROPONIN QUANT: CPT | Performed by: INTERNAL MEDICINE

## 2024-06-05 PROCEDURE — 94799 UNLISTED PULMONARY SVC/PX: CPT

## 2024-06-05 PROCEDURE — 94640 AIRWAY INHALATION TREATMENT: CPT

## 2024-06-05 PROCEDURE — 36415 COLL VENOUS BLD VENIPUNCTURE: CPT | Performed by: INTERNAL MEDICINE

## 2024-06-05 PROCEDURE — 94761 N-INVAS EAR/PLS OXIMETRY MLT: CPT

## 2024-06-05 PROCEDURE — 85027 COMPLETE CBC AUTOMATED: CPT | Performed by: INTERNAL MEDICINE

## 2024-06-05 RX ORDER — PANTOPRAZOLE SODIUM 40 MG/1
40 TABLET, DELAYED RELEASE ORAL
Status: DISCONTINUED | OUTPATIENT
Start: 2024-06-05 | End: 2024-06-08 | Stop reason: HOSPADM

## 2024-06-05 RX ADMIN — ASPIRIN 325 MG: 325 TABLET ORAL at 08:20

## 2024-06-05 RX ADMIN — METHYLPREDNISOLONE SODIUM SUCCINATE 40 MG: 40 INJECTION, POWDER, FOR SOLUTION INTRAMUSCULAR; INTRAVENOUS at 00:12

## 2024-06-05 RX ADMIN — METOPROLOL TARTRATE 50 MG: 50 TABLET, FILM COATED ORAL at 00:12

## 2024-06-05 RX ADMIN — Medication 1 TABLET: at 08:20

## 2024-06-05 RX ADMIN — IPRATROPIUM BROMIDE AND ALBUTEROL SULFATE 3 ML: .5; 3 SOLUTION RESPIRATORY (INHALATION) at 20:27

## 2024-06-05 RX ADMIN — METOPROLOL TARTRATE 50 MG: 50 TABLET, FILM COATED ORAL at 08:20

## 2024-06-05 RX ADMIN — IPRATROPIUM BROMIDE AND ALBUTEROL SULFATE 3 ML: .5; 3 SOLUTION RESPIRATORY (INHALATION) at 12:22

## 2024-06-05 RX ADMIN — IPRATROPIUM BROMIDE AND ALBUTEROL SULFATE 3 ML: .5; 3 SOLUTION RESPIRATORY (INHALATION) at 06:37

## 2024-06-05 RX ADMIN — METHYLPREDNISOLONE SODIUM SUCCINATE 40 MG: 40 INJECTION, POWDER, FOR SOLUTION INTRAMUSCULAR; INTRAVENOUS at 17:04

## 2024-06-05 RX ADMIN — METOPROLOL TARTRATE 50 MG: 50 TABLET, FILM COATED ORAL at 20:16

## 2024-06-05 RX ADMIN — ATORVASTATIN CALCIUM 20 MG: 20 TABLET, FILM COATED ORAL at 08:20

## 2024-06-05 RX ADMIN — PANTOPRAZOLE SODIUM 40 MG: 40 TABLET, DELAYED RELEASE ORAL at 17:04

## 2024-06-05 RX ADMIN — METHYLPREDNISOLONE SODIUM SUCCINATE 40 MG: 40 INJECTION, POWDER, FOR SOLUTION INTRAMUSCULAR; INTRAVENOUS at 08:20

## 2024-06-05 RX ADMIN — AMLODIPINE BESYLATE 5 MG: 5 TABLET ORAL at 08:20

## 2024-06-05 RX ADMIN — Medication 1000 UNITS: at 08:29

## 2024-06-05 RX ADMIN — METHYLPREDNISOLONE SODIUM SUCCINATE 40 MG: 40 INJECTION, POWDER, FOR SOLUTION INTRAMUSCULAR; INTRAVENOUS at 23:41

## 2024-06-05 NOTE — PROGRESS NOTES
Name: Jim Marvin ADMIT: 2024   : 1935  PCP: Antonio Finley MD    MRN: [id number] LOS: 1 days   AGE/SEX: 88 y.o. male  ROOM: [facility]     Subjective   Subjective   Sitting up at the side of the bed, eating breakfast.  Patient reports he is feeling better, denies current shortness of breath, however reports he does have shortness of breath with ambulation.  Does have nonproductive cough, denies chest pain or palpitations.  No nausea, vomiting, abdominal pain.    Review of Systems   As above  Objective   Objective   Vital Signs  Temp:  [97.3 °F (36.3 °C)-98.9 °F (37.2 °C)] 97.3 °F (36.3 °C)  Heart Rate:  [71-91] 72  Resp:  [16-24] 18  BP: (117-147)/(60-97) 117/60  SpO2:  [90 %-98 %] 93 %  on  Flow (L/min):  [6-7] 7;   Device (Oxygen Therapy): high-flow nasal cannula;humidified  Body mass index is 29.76 kg/m².  Physical Exam    General: Alert and oriented x3, no acute distress  HEENT: Normocephalic, atraumatic  CV: Regular rate and rhythm, no murmurs rubs or gallops  Lungs: Bilateral expiratory wheezing,+ rales, on 6 L nasal cannula  Abdomen: Soft, nontender, nondistended  Extremities: No significant peripheral edema , no cyanosis     Results Review     I reviewed the patient's new clinical results.  Results from last 7 days   Lab Units 24  0305 24  1530   WBC 10*3/mm3 9.50 8.45   HEMOGLOBIN g/dL 15.0 15.2   PLATELETS 10*3/mm3 302 302     Results from last 7 days   Lab Units 24  0305 24  1530   SODIUM mmol/L 134* 134*   POTASSIUM mmol/L 4.6 4.4   CHLORIDE mmol/L 98 98   CO2 mmol/L 26.0 26.6   BUN mg/dL 10 12   CREATININE mg/dL 0.63* 0.85   GLUCOSE mg/dL 123* 127*   Estimated Creatinine Clearance: 94 mL/min (A) (by C-G formula based on SCr of 0.63 mg/dL (L)).  Results from last 7 days   Lab Units 24  1530   ALBUMIN g/dL 3.4*   BILIRUBIN mg/dL 0.9   ALK PHOS U/L 71   AST (SGOT) U/L 17   ALT (SGPT) U/L 28     Results from last 7 days   Lab Units 24  0305 24  1530  "  CALCIUM mg/dL 8.7 9.4   ALBUMIN g/dL  --  3.4*       COVID19   Date Value Ref Range Status   06/04/2024 Not Detected Not Detected - Ref. Range Final   09/23/2021 Not Detected Not Detected - Ref. Range Final     No results found for: \"HGBA1C\", \"POCGLU\"        CT Angiogram Chest Pulmonary Embolism  Narrative: CT ANGIOGRAM OF THE CHEST WITH CONTRAST INCLUDING RECONSTRUCTION IMAGES  06/04/2024     HISTORY: Shortness of breath. Hypoxia.     TECHNIQUE: Following the intravenous contrast injection CT angiography  was performed through the chest. Sagittal, coronal and 3D reconstruction  images were reviewed.     FINDINGS: The pulmonary arterial system is well opacified with no  evidence of pulmonary embolus. There is some aortic calcification.  Maximum diameter of the ascending aorta is approximately 4.1 cm. A few  small to slightly enlarged mediastinal and right hilar lymph nodes are  seen. A subcarinal node measures approximately 3.3 cm x 1.6 cm. There is  an approximately 2 cm right hilar lymph node. There are mild  interstitial changes of the lungs slightly more severe on the right than  on the left. These appear to have progressed slightly since the  11/05/2021 study but still may be chronic in nature. No suspicious  appearing lung masses are seen.     There are degenerative changes of the left shoulder. Right shoulder is  not included in the field-of-view.     Impression: 1. No evidence of pulmonary embolus.  2. Mild dilatation of the ascending aorta.  3. Interstitial changes of the lungs may be chronic in nature, right  greater than left.  4. Small amount of mediastinal and right hilar lymphadenopathy.  5. Single gallstone.  6. A short-term follow-up CT of the chest in 3 months to 4 months could  be obtained to assess stability of these findings.     Radiation dose reduction techniques were utilized, including automated  exposure control and exposure modulation based on body size.        XR Chest 1 View  Narrative: " XR CHEST 1 VW-     HISTORY: Male who is 88 years-old, CHF/COPD protocol     TECHNIQUE: Frontal view of the chest     COMPARISON: 9/22/2021     FINDINGS: The heart size is borderline. Cardiac loop recorder is  apparent. Pulmonary vasculature is congested. Aorta is tortuous. No  focal pulmonary consolidation, pleural effusion, or pneumothorax. No  acute osseous process.     Impression: No focal pulmonary consolidation. Borderline heart size with  pulmonary vascular congestion. Tortuous aorta.     This report was finalized on 6/4/2024 3:29 PM by Dr. Ortiz Barber M.D on Workstation: [id number]       Scheduled Medications  amLODIPine, 5 mg, Oral, Daily  aspirin, 325 mg, Oral, Daily  atorvastatin, 20 mg, Oral, Daily  cholecalciferol, 1,000 Units, Oral, Daily  ipratropium-albuterol, 3 mL, Nebulization, Q6H While Awake - RT  methylPREDNISolone sodium succinate, 40 mg, Intravenous, Q8H  metoprolol tartrate, 50 mg, Oral, BID  multivitamin, 1 tablet, Oral, Daily  pantoprazole, 40 mg, Oral, Q AM    Infusions   Diet  Diet: Cardiac; Healthy Heart (2-3 Na+); Fluid Consistency: Thin (IDDSI 0)    I have personally reviewed     [x]  Laboratory   [x]  Microbiology   [x]  Radiology   [x]  EKG/Telemetry  [x]  Cardiology/Vascular   []  Pathology    []  Records       Assessment/Plan     Active Hospital Problems    Diagnosis  POA    **Hypoxia [R09.02]  Yes    Carotid artery stenosis [I65.29]  Yes    Acute hypoxic respiratory failure [J96.01]  Yes    Emphysema lung [J43.9]  Yes    Acute respiratory failure with hypoxia [J96.01]  Yes    Hypertension [I10]  Yes    Hyperlipidemia [E78.5]  Yes      Resolved Hospital Problems   No resolved problems to display.       Patient is a 88-year-old male with history of hypertension, hyperlipidemia, COPD, presented to the ED complaining of shortness of breath that  started 2 weeks prior to admission and gradually worsened.    Acute hypoxic respiratory failure  COPD  Interstitial lung disease  -CT on  admission no PE, did show interstitial changes of the lungs may be chronic in nature, right  greater than left.Small amount of mediastinal and right hilar lymphadenopathy.  -Tmax was 100.6 on admission, has been afebrile since, WBC has been normal  -Respiratory panel was negative  -Monitor off antibiotics.  -Continue scheduled DuoNebs, IV methylprednisolone,  -Pulmonology consulted    Essential hypertension  -BP acutely stable, continue amlodipine 5 mg daily      History of CVA/bilateral carotid artery stenosis  Continue high-dose aspirin, statin      SCDs for DVT prophylaxis.  Full code.  Discussed with patient.  Discussed with RN  Anticipate discharge TBD      Copied text in this note has been reviewed and is accurate as of 06/05/24.         Dictated utilizing Dragon dictation        Eric Mosley MD  [facility]  06/05/24  15:31 EDT

## 2024-06-05 NOTE — H&P
HISTORY AND PHYSICAL   [facility]        Date of Admission: 2024  Patient Identification:  Name: Jim Marvin  Age: 88 y.o.  Sex: male  :  1935  MRN: [id number]                     Primary Care Physician: Antonio Finley MD    Chief Complaint:  88 year old gentleman presented to the emergency room with shortness of breath and low oxygen sats; he was seen at urgent care and sent to the ED; symptoms started two weeks ago; he denies chest pain; no leg swelling or weight gain; he feels worse with activity and better with rest; he is not normally on oxygen; denies sick contacts    History of Present Illness:   As above    Past Medical History:  History reviewed. No pertinent past medical history.  Past Surgical History:  Past Surgical History:   Procedure Laterality Date    CATARACT EXTRACTION Left 2022      Home Meds:  Medications Prior to Admission   Medication Sig Dispense Refill Last Dose    amLODIPine (NORVASC) 5 MG tablet TAKE 1 TABLET BY MOUTH DAILY 90 tablet 1 6/3/2024    aspirin 325 MG tablet Take 1 tablet by mouth Daily.   6/3/2024    atorvastatin (LIPITOR) 20 MG tablet TAKE 1 TABLET BY MOUTH DAILY 90 tablet 1 6/3/2024    cholecalciferol (Vitamin D, Cholecalciferol,) 25 MCG (1000 UT) tablet Take 1 tablet by mouth Daily.   Past Week    metoprolol tartrate (LOPRESSOR) 50 MG tablet TAKE 1 TABLET BY MOUTH TWICE DAILY 180 tablet 1 2024    Multiple Vitamin (MULTI VITAMIN DAILY) tablet Take 1 tablet by mouth Daily.   6/3/2024    Omega-3 Fatty Acids (OMEGA-3 FISH OIL) 1000 MG capsule Take 1 capsule by mouth Daily.   6/3/2024    EPINEPHrine (EPIPEN) 0.3 MG/0.3ML solution auto-injector injection ADMINISTER 0.3 ML IN THE MUSCLE 1 TIME FOR 1 DOSE AS DIRECTED BY PRESCRIBER       multivitamin with minerals tablet tablet Take 1 tablet by mouth Daily.       mupirocin (BACTROBAN) 2 % ointment Apply 1 application  topically to the appropriate area as directed 3 (Three) Times a Day. 30 g 1      Zinc 30 MG tablet Take 1 tablet by mouth Every Other Day.          Allergies:  Allergies   Allergen Reactions    Ace Inhibitors Other (See Comments)     Cannot remember     Lisinopril Other (See Comments)     Cannot remember    Amlodipine Swelling     angioedema     Immunizations:  Immunization History   Administered Date(s) Administered    COVID-19 (PFIZER) Purple Cap Monovalent 2021, 2021, 2021     Social History:   Social History     Social History Narrative    Not on file     Social History     Socioeconomic History    Marital status:    Tobacco Use    Smoking status: Former     Current packs/day: 0.00     Average packs/day: 1 pack/day for 40.0 years (40.0 ttl pk-yrs)     Types: Cigarettes     Start date:      Quit date:      Years since quittin.4    Smokeless tobacco: Never    Tobacco comments:     Quit 19 years ago    Vaping Use    Vaping status: Never Used   Substance and Sexual Activity    Alcohol use: Yes     Comment: 15-20 francisca and diet coke a week    Drug use: Never       Family History:  History reviewed. No pertinent family history.     Review of Systems  See history of present illness and past medical history.  Patient denies headache, dizziness, syncope, falls, trauma, change in vision, change in hearing, change in taste, changes in weight, changes in appetite, focal weakness, numbness, or paresthesia.  Patient denies chest pain, sinus pressure, rhinorrhea, epistaxis, hemoptysis, nausea, vomiting,hematemesis, diarrhea, constipation or hematochezia.  Denies cold or heat intolerance, polydipsia, polyuria, polyphagia. Denies hematuria, pyuria, dysuria, hesitancy, frequency or urgency. Denies consumption of raw and under cooked meats foods or change in water source.  Denies fever, chills, sweats, night sweats.  Denies missing any routine medications. Remainder of ROS is negative.    Objective:  T Max 24 hrs: Temp (24hrs), Av.8 °F (37.1 °C), Min:97.8 °F (36.6  "°C), Max:100.6 °F (38.1 °C)    Vitals Ranges:   Temp:  [97.8 °F (36.6 °C)-100.6 °F (38.1 °C)] 98.9 °F (37.2 °C)  Heart Rate:  [] 84  Resp:  [16-26] 16  BP: (104-147)/(56-97) 146/71      Exam:  /71 (BP Location: Left arm, Patient Position: Lying)   Pulse 84   Temp 98.9 °F (37.2 °C) (Oral)   Resp 16   Ht 175.3 cm (69\")   Wt 92.1 kg (203 lb)   SpO2 91%   BMI 29.98 kg/m²     General Appearance:    Alert, cooperative, no distress, appears stated age   Head:    Normocephalic, without obvious abnormality, atraumatic   Eyes:    PERRL, conjunctivae/corneas clear, EOM's intact, both eyes   Ears:    Normal external ear canals, both ears   Nose:   Nares normal, septum midline, mucosa normal, no drainage    or sinus tenderness   Throat:   Lips, mucosa, and tongue normal   Neck:   Supple, symmetrical, trachea midline, no adenopathy;     thyroid:  no enlargement/tenderness/nodules; no carotid    bruit or JVD   Back:     Symmetric, no curvature, ROM normal, no CVA tenderness   Lungs:     Decreased breath sounds bilaterally, respirations unlabored   Chest Wall:    No tenderness or deformity    Heart:    Regular rate and rhythm, S1 and S2 normal, no murmur, rub   or gallop   Abdomen:     Soft, nontender, bowel sounds active all four quadrants,     no masses, no hepatomegaly, no splenomegaly   Extremities:   Extremities normal, atraumatic, no cyanosis or edema      .    Data Review:  Labs in chart were reviewed.  WBC   Date Value Ref Range Status   06/04/2024 8.45 3.40 - 10.80 10*3/mm3 Final     Hemoglobin   Date Value Ref Range Status   06/04/2024 15.2 13.0 - 17.7 g/dL Final     Hematocrit   Date Value Ref Range Status   06/04/2024 43.9 37.5 - 51.0 % Final     Platelets   Date Value Ref Range Status   06/04/2024 302 140 - 450 10*3/mm3 Final     Sodium   Date Value Ref Range Status   06/04/2024 134 (L) 136 - 145 mmol/L Final     Potassium   Date Value Ref Range Status   06/04/2024 4.4 3.5 - 5.2 mmol/L Final "     Chloride   Date Value Ref Range Status   06/04/2024 98 98 - 107 mmol/L Final     CO2   Date Value Ref Range Status   06/04/2024 26.6 22.0 - 29.0 mmol/L Final     BUN   Date Value Ref Range Status   06/04/2024 12 8 - 23 mg/dL Final     Creatinine   Date Value Ref Range Status   06/04/2024 0.85 0.76 - 1.27 mg/dL Final     Glucose   Date Value Ref Range Status   06/04/2024 127 (H) 65 - 99 mg/dL Final     Calcium   Date Value Ref Range Status   06/04/2024 9.4 8.6 - 10.5 mg/dL Final                Imaging Results (All)       Procedure Component Value Units Date/Time    CT Angiogram Chest Pulmonary Embolism [[id number]] Collected: 06/04/24 1713     Updated: 06/04/24 1713    Narrative:      CT ANGIOGRAM OF THE CHEST WITH CONTRAST INCLUDING RECONSTRUCTION IMAGES  06/04/2024     HISTORY: Shortness of breath. Hypoxia.     TECHNIQUE: Following the intravenous contrast injection CT angiography  was performed through the chest. Sagittal, coronal and 3D reconstruction  images were reviewed.     FINDINGS: The pulmonary arterial system is well opacified with no  evidence of pulmonary embolus. There is some aortic calcification.  Maximum diameter of the ascending aorta is approximately 4.1 cm. A few  small to slightly enlarged mediastinal and right hilar lymph nodes are  seen. A subcarinal node measures approximately 3.3 cm x 1.6 cm. There is  an approximately 2 cm right hilar lymph node. There are mild  interstitial changes of the lungs slightly more severe on the right than  on the left. These appear to have progressed slightly since the  11/05/2021 study but still may be chronic in nature. No suspicious  appearing lung masses are seen.     There are degenerative changes of the left shoulder. Right shoulder is  not included in the field-of-view.       Impression:      1. No evidence of pulmonary embolus.  2. Mild dilatation of the ascending aorta.  3. Interstitial changes of the lungs may be chronic in nature, right  greater  than left.  4. Small amount of mediastinal and right hilar lymphadenopathy.  5. Single gallstone.  6. A short-term follow-up CT of the chest in 3 months to 4 months could  be obtained to assess stability of these findings.     Radiation dose reduction techniques were utilized, including automated  exposure control and exposure modulation based on body size.          XR Chest 1 View [[id number]] Collected: 06/04/24 1523     Updated: 06/04/24 1532    Narrative:      XR CHEST 1 VW-     HISTORY: Male who is 88 years-old, CHF/COPD protocol     TECHNIQUE: Frontal view of the chest     COMPARISON: 9/22/2021     FINDINGS: The heart size is borderline. Cardiac loop recorder is  apparent. Pulmonary vasculature is congested. Aorta is tortuous. No  focal pulmonary consolidation, pleural effusion, or pneumothorax. No  acute osseous process.       Impression:      No focal pulmonary consolidation. Borderline heart size with  pulmonary vascular congestion. Tortuous aorta.     This report was finalized on 6/4/2024 3:29 PM by Dr. Ortiz Barber M.D on Workstation: [id number]                 Assessment:  Active Hospital Problems    Diagnosis  POA    **Hypoxia [R09.02]  Yes    Acute hypoxic respiratory failure [J96.01]  Yes      Resolved Hospital Problems   No resolved problems to display.   Copd  Hypertension  Hyperglycemia  Obesity     Plan:  Steroids mininebs  No evidence of chf  Monitor on telemetry  Repeat troponin  Dw patient and ed provider    Rianna Castro MD  6/4/2024  23:43 EDT

## 2024-06-05 NOTE — PLAN OF CARE
Goal Outcome Evaluation:  Plan of Care Reviewed With: patient        Progress: no change  Outcome Evaluation: VSS, pt resting. Oxygen between 7-10L high flow. IV steroids given.

## 2024-06-05 NOTE — CONSULTS
Patient Identification:  Jim Marvin  88 y.o.  male  1935  [id number]          LOS 1    Requesting physician: Dr Mosley    Reason for Consultation: Hypoxemia and interstitial disease    History of Present Illness:   88-year-old male presented to the hospital with worsening shortness of breath.  Symptoms had significantly worsened over the last 2 weeks.  Says that he has had shortness of breath with activity for the last 2 and half years ever since he had RSV infection.  Has not used oxygen at home previously.  Previous smoker and quit many years ago.  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  Denies chest pain, purulent sputum, hemoptysis, night sweats or unintentional weight loss.  Chest imaging shows interstitial disease and emphysematous changes with blebs in the upper lobes.    Past Medical History:  History reviewed. No pertinent past medical history.    Past Surgical History:  Past Surgical History:   Procedure Laterality Date    CATARACT EXTRACTION Left 07/06/2022        Home Meds:  Medications Prior to Admission   Medication Sig Dispense Refill Last Dose    amLODIPine (NORVASC) 5 MG tablet TAKE 1 TABLET BY MOUTH DAILY 90 tablet 1 6/3/2024    aspirin 325 MG tablet Take 1 tablet by mouth Daily.   6/3/2024    atorvastatin (LIPITOR) 20 MG tablet TAKE 1 TABLET BY MOUTH DAILY 90 tablet 1 6/3/2024    cholecalciferol (Vitamin D, Cholecalciferol,) 25 MCG (1000 UT) tablet Take 1 tablet by mouth Daily.   Past Week    metoprolol tartrate (LOPRESSOR) 50 MG tablet TAKE 1 TABLET BY MOUTH TWICE DAILY 180 tablet 1 6/4/2024    Multiple Vitamin (MULTI VITAMIN DAILY) tablet Take 1 tablet by mouth Daily.   6/3/2024    Omega-3 Fatty Acids (OMEGA-3 FISH OIL) 1000 MG capsule Take 1 capsule by mouth Daily.   6/3/2024    EPINEPHrine (EPIPEN) 0.3 MG/0.3ML solution auto-injector injection ADMINISTER 0.3 ML IN THE MUSCLE 1 TIME FOR 1 DOSE AS DIRECTED BY PRESCRIBER       multivitamin with  "minerals tablet tablet Take 1 tablet by mouth Daily.       mupirocin (BACTROBAN) 2 % ointment Apply 1 application  topically to the appropriate area as directed 3 (Three) Times a Day. 30 g 1     Zinc 30 MG tablet Take 1 tablet by mouth Every Other Day.            Allergies:  Allergies   Allergen Reactions    Ace Inhibitors Other (See Comments)     Cannot remember     Lisinopril Other (See Comments)     Cannot remember    Amlodipine Swelling     angioedema       Social History:   Social History     Socioeconomic History    Marital status:    Tobacco Use    Smoking status: Former     Current packs/day: 0.00     Average packs/day: 1 pack/day for 40.0 years (40.0 ttl pk-yrs)     Types: Cigarettes     Start date:      Quit date:      Years since quittin.4    Smokeless tobacco: Never    Tobacco comments:     Quit 19 years ago    Vaping Use    Vaping status: Never Used   Substance and Sexual Activity    Alcohol use: Yes     Comment: 15-20 francisca and diet coke a week    Drug use: Never       Family History:  History reviewed. No pertinent family history.    Review of Systems:  Denies fevers or chills  Denies nausea or vomiting  No new vision or hearing changes  No chest pain  Shortness of breath  No diarrhea, hematemesis or hematochezia, no dysuria or frequency  No musculoskeletal complaints  No heat or cold intolerance  No skin rashes  No dizziness or confusion.  No seizure activity  No new anxiety or depression  12 system review of systems performed and all else negative    Objective:    PHYSICAL EXAM:    /60 (BP Location: Right arm, Patient Position: Lying)   Pulse 72   Temp 97.3 °F (36.3 °C) (Oral)   Resp 18   Ht 175.3 cm (69\")   Wt 91.4 kg (201 lb 8 oz)   SpO2 93%   BMI 29.76 kg/m²  Body mass index is 29.76 kg/m². 93% 91.4 kg (201 lb 8 oz)    GENERAL APPEARANCE:   Well developed  Well nourished  No acute distress   EYES:    PERRL                                                             "               Conjunctivae normal  Sclerae nonicteric.  HENT:   Atraumatic, normocephalic  External ears and nose normal  Moist mucous membranes and no ulcers  NECK:  Thyroid not enlarged  Trachea midline   RESPIRATORY:    Nonlabored breathing   Normal breath sounds  Mild bibasilar rales.  Bilateral end-expiratory wheezing  No dullness  CARDIOVASCULAR:    RRR  Normal S1, S2  No murmur  Lower extremity edema: none    GI:   Bowel sounds normal  Abdomen soft , nondistended, nontender  No abdominal masses  MUSCULOSKELETAL:  Normal movement of extremities  No tenderness, no deformities  No clubbing or cyanosis   Skin:    No visible rashes  No palpable nodules  Cap refill normal.  No mottling.   PSYCHIATRIC:  Speech and behavior appropriate  Normal mood and affect  Oriented to person, place and time  NEUROLOGIC:  Cranial nerves II through XII grossly intact.  Sensation intact.      Lab Review:   Results from last 7 days   Lab Units 06/05/24  0305 06/04/24  1530   WBC 10*3/mm3 9.50 8.45   HEMOGLOBIN g/dL 15.0 15.2   HEMATOCRIT % 44.3 43.9   PLATELETS 10*3/mm3 302 302     Results from last 7 days   Lab Units 06/05/24  0305 06/04/24  1530   SODIUM mmol/L 134* 134*   POTASSIUM mmol/L 4.6 4.4   CHLORIDE mmol/L 98 98   CO2 mmol/L 26.0 26.6   BUN mg/dL 10 12   CREATININE mg/dL 0.63* 0.85   CALCIUM mg/dL 8.7 9.4   BILIRUBIN mg/dL  --  0.9   ALK PHOS U/L  --  71   ALT (SGPT) U/L  --  28   AST (SGOT) U/L  --  17   GLUCOSE mg/dL 123* 127*             Procalitonin Results:                    Imaging reviewed  chest CT reviewed           Assessment:  Interstitial lung disease  Emphysematous blebs  COPD with wheezing  Acute hypoxemic respiratory failure  Hypertension  Truncal obesity      Recommendations:  Difficult to discern how much is interstitial lung disease versus edema.  Will get 2D echo for further evaluation.  Last echo was in 2021.  High-resolution CT chest, inspiratory, expiratory, prone and supine.  Checking ILD autoimmune  labs.  Plan for pulmonary function testing as outpatient in our office after discharge.  Wean oxygen as able.  Add bronchodilators and steroid for COPD with exacerbation and wheezing.      Thank you for allowing me to participate in the care of this patient.  I will continue to follow along with you.      Gibson Aquino MD  [facility], [facility]  Pulmonary and Critical Care Medicine    6/5/2024  08:32 EDT

## 2024-06-05 NOTE — CASE MANAGEMENT/SOCIAL WORK
Discharge Planning Assessment  [facility]     Patient Name: Jim Marvin  MRN: [id number]  Today's Date: 6/5/2024    Admit Date: 6/4/2024    Plan: Home alone   Discharge Needs Assessment       Row Name 06/05/24 1613       Living Environment    People in Home alone    Current Living Arrangements home    Potentially Unsafe Housing Conditions none    In the past 12 months has the electric, gas, oil, or water company threatened to shut off services in your home? No    Primary Care Provided by self    Provides Primary Care For no one    Family Caregiver if Needed child(linda), adult    Family Caregiver Names Daughter Madisyn [phone]    Quality of Family Relationships supportive    Able to Return to Prior Arrangements yes       Resource/Environmental Concerns    Resource/Environmental Concerns none    Transportation Concerns none       Transportation Needs    In the past 12 months, has lack of transportation kept you from medical appointments or from getting medications? no    In the past 12 months, has lack of transportation kept you from meetings, work, or from getting things needed for daily living? No       Food Insecurity    Within the past 12 months, you worried that your food would run out before you got the money to buy more. Never true    Within the past 12 months, the food you bought just didn't last and you didn't have money to get more. Never true       Transition Planning    Patient/Family Anticipates Transition to home    Patient/Family Anticipated Services at Transition durable medical equipment    Transportation Anticipated family or friend will provide       Discharge Needs Assessment    Equipment Currently Used at Home none    Concerns to be Addressed no discharge needs identified    Anticipated Changes Related to Illness none    Equipment Needed After Discharge oxygen                   Discharge Plan       Row Name 06/05/24 1615       Plan    Plan Home alone    Patient/Family in Agreement with  Plan yes    Plan Comments Met with pt in room. Introduced self, explained  role, verified face sheet. Plan is for him to return home, his daughter will transport. He lives home alone but he has several family members who are available to help him if needed. He uses no DME and has not used HH in the past. He may need oxygen when he discharges. Will need an at rest oxygen reading and may need an overnight ox. CCP to follow. FARRUKH Saunders RN                  Continued Care and Services - Admitted Since 6/4/2024    No active coordination exists for this encounter.          Demographic Summary       Row Name 06/05/24 1613       General Information    Admission Type inpatient    Arrived From home    Preferred Language English                   Functional Status    No documentation.                  Psychosocial    No documentation.                  Abuse/Neglect    No documentation.                  Legal    No documentation.                  Substance Abuse    No documentation.                  Patient Forms    No documentation.                     Wallace Serrano RN

## 2024-06-05 NOTE — PLAN OF CARE
Problem: Adult Inpatient Plan of Care  Goal: Plan of Care Review  Outcome: Ongoing, Progressing   Goal Outcome Evaluation:         VSS. 6-7L HF NC, increased to 10L when up to toilet, exertional dyspnea. Ax1 to toilet. No pain per pt. IV solu-medrol steroids started.

## 2024-06-05 NOTE — CONSULTS
"Nutrition Services    Patient Name:  Jim Marvin  YOB: 1935  MRN: [id number]  Admit Date:  6/4/2024    Assessment Date:  06/05/24    Summary: Consult for RN admit screen.  Pt is a 89 y/o male who presented with SOB. Pt has a hx of HLD, HTN, carotid stenosis, COPD.   Pt sitting up in bed finishing his breakfast when I visited. Pt appeared to have eaten about 75% of breakfast this morning. Pt stated for the past 2 weeks he has not had much of an appetite and has been eating less than normal. Pt was not able to quantify the amount he has been eating. Pt stated he weighed 214 lbs two weeks ago and now he is down to 203 lbs. RD unable to verify weight loss due to limited recent weights in EMR. Pt denied any chew/swallow issues or n/v. RD offered supplements and pt was agreeable.     RECS:  Boost Plus Vanilla QD    CLINICAL NUTRITION ASSESSMENT      Reason for Assessment Nurse Admission Screen, Physician Consult     Diagnosis/Problem   SOB. Pt has a hx of HLD, HTN, carotid stenosis, COPD.    Medical/Surgical History History reviewed. No pertinent past medical history.    Past Surgical History:   Procedure Laterality Date    CATARACT EXTRACTION Left 07/06/2022        Anthropometrics        Current Height  Current Weight  BMI kg/m2 Height: 175.3 cm (69\")  Weight: 91.4 kg (201 lb 8 oz) (06/05/24 0500)  Body mass index is 29.76 kg/m².   Adjusted BMI (if applicable)    BMI Category Overweight (25 - 29.9)   Ideal Body Weight (IBW) 155 lbs   Usual Body Weight (UBW) 214 lbs   Weight Trend Unknown   Weight History Wt Readings from Last 30 Encounters:   06/05/24 0500 91.4 kg (201 lb 8 oz)   06/04/24 1832 92.1 kg (203 lb)   06/04/24 1320 92.1 kg (203 lb)   07/24/23 1328 96.6 kg (213 lb)   11/23/22 1250 94.8 kg (209 lb)   07/19/22 1324 95.3 kg (210 lb)   01/18/22 1343 96.2 kg (212 lb)   10/15/21 1259 90.9 kg (200 lb 6.4 oz)   09/25/21 0804 92.5 kg (204 lb)   09/23/21 0053 92.6 kg (204 lb 1.6 oz)   09/22/21 1509 " 93.6 kg (206 lb 6.4 oz)   07/13/21 1358 99.8 kg (220 lb)   01/12/21 1522 99.8 kg (220 lb)   06/29/20 1610 99.3 kg (219 lb)   11/19/19 1354 100 kg (221 lb)   05/14/19 1306 102 kg (224 lb)   11/15/18 1052 101 kg (223 lb)   05/15/18 1129 98 kg (216 lb)   11/07/17 1150 98 kg (216 lb)   05/09/17 1149 95.3 kg (210 lb)   02/14/17 0952 96.6 kg (213 lb)   05/17/16 1027 101 kg (222 lb)   10/20/15 1030 101 kg (222 lb 0.1 oz)   03/20/15 1123 103 kg (225 lb 15.9 oz)   06/20/14 1046 106 kg (233 lb 0.1 oz)   04/26/13 1117 95.7 kg (211 lb)   04/15/13 1514 96.2 kg (211 lb 15.9 oz)   02/26/13 1243 99.3 kg (219 lb 0.1 oz)        Estimated/Assessed Needs        Energy Requirements    Weight for Calculation 71 kg IBW   Method for Estimation  25-30 kcal/kg   EST Needs (kcal/day) 4335-2530       Protein Requirements    Weight for Calculation 71 kg IBW   EST Protein Needs (g/kg) 1.2 - 1.5 gm/kg   EST Daily Needs (g/day) .5       Fluid Requirements     Method for Estimation 1 mL/kcal    Estimated Needs (mL/day)        Fluid Deficit    Current Na Level (mEq/L)    Desired Na Level (mEq/L)    Estimated Fluid Deficit (L)       Labs       Pertinent Labs    Results from last 7 days   Lab Units 06/05/24  0305 06/04/24  1530   SODIUM mmol/L 134* 134*   POTASSIUM mmol/L 4.6 4.4   CHLORIDE mmol/L 98 98   CO2 mmol/L 26.0 26.6   BUN mg/dL 10 12   CREATININE mg/dL 0.63* 0.85   CALCIUM mg/dL 8.7 9.4   BILIRUBIN mg/dL  --  0.9   ALK PHOS U/L  --  71   ALT (SGPT) U/L  --  28   AST (SGOT) U/L  --  17   GLUCOSE mg/dL 123* 127*     Results from last 7 days   Lab Units 06/05/24  0305 06/04/24  1530   HEMOGLOBIN g/dL 15.0 15.2   HEMATOCRIT % 44.3 43.9   WBC 10*3/mm3 9.50 8.45   ALBUMIN g/dL  --  3.4*     Results from last 7 days   Lab Units 06/05/24  0305 06/04/24  1530   PLATELETS 10*3/mm3 302 302     COVID19   Date Value Ref Range Status   06/04/2024 Not Detected Not Detected - Ref. Range Final     Lab Results   Component Value Date    HGBA1C 5.64 (H)  09/23/2021          Medications           Scheduled Medications amLODIPine, 5 mg, Oral, Daily  aspirin, 325 mg, Oral, Daily  atorvastatin, 20 mg, Oral, Daily  cholecalciferol, 1,000 Units, Oral, Daily  ipratropium-albuterol, 3 mL, Nebulization, Q6H While Awake - RT  methylPREDNISolone sodium succinate, 40 mg, Intravenous, Q8H  metoprolol tartrate, 50 mg, Oral, BID  multivitamin, 1 tablet, Oral, Daily       Infusions     PRN Medications   acetaminophen    albuterol    senna-docusate sodium **AND** polyethylene glycol **AND** bisacodyl **AND** bisacodyl    melatonin    ondansetron ODT **OR** ondansetron    sodium chloride     Physical Findings          General Findings alert, oriented, overweight, on oxygen therapy   Oral/Mouth Cavity dental appliance   Edema  not assessed   Gastrointestinal WDL   Skin  skin intact   Tubes/Drains/Lines none   NFPE Not indicated at this time   --  Current Nutrition Orders & Evaluation of Intake       Oral Nutrition     Food Allergies NKFA   Current PO Diet Diet: Cardiac; Healthy Heart (2-3 Na+); Fluid Consistency: Thin (IDDSI 0)   Supplement n/a   PO Evaluation     % PO Intake Decreased appetite x 2 weeks per pt    Factors Affecting Intake: decreased appetite     PES STATEMENT / NUTRITION DIAGNOSIS      Nutrition Dx Problem  Problem: Inadequate Oral Intake  Etiology: Medical Diagnosis - SOB. Pt has a hx of HLD, HTN, carotid stenosis, COPD.     Signs/Symptoms: Report of Minimal PO Intake   --  NUTRITION INTERVENTION / PLAN OF CARE      Intervention Goal(s) Maintain nutrition status, Establish goals of care, Meet estimated needs, Increase intake, Accepts oral nutrition supplement, No significant weight loss, and PO intake goal %: 75%         RD Intervention/Action Encourage intake, Continue to monitor, Care plan reviewed, and Recommend/order: Boost QD         Prescription/Orders:       PO Diet       Supplements Boost QD      Enteral Nutrition       Parenteral Nutrition    New  Prescription Ordered? Yes   --      Monitor/Evaluation Per protocol, PO intake, Supplement intake, Weight, Skin status, GI status, Symptoms, POC/GOC, Swallow function   Discharge Plan/Needs No discharge needs identified at this time   --    RD to follow per protocol.      Electronically signed by:  Ramesh Otto  06/05/24 08:37 EDT

## 2024-06-06 ENCOUNTER — APPOINTMENT (OUTPATIENT)
Dept: CT IMAGING | Facility: HOSPITAL | Age: 89
DRG: 189 | End: 2024-06-06
Payer: MEDICARE

## 2024-06-06 LAB
ANION GAP SERPL CALCULATED.3IONS-SCNC: 8 MMOL/L (ref 5–15)
BUN SERPL-MCNC: 20 MG/DL (ref 8–23)
BUN/CREAT SERPL: 25.3 (ref 7–25)
CALCIUM SPEC-SCNC: 8.8 MG/DL (ref 8.6–10.5)
CENTROMERE B AB SER-ACNC: <0.2 AI (ref 0–0.9)
CHLORIDE SERPL-SCNC: 99 MMOL/L (ref 98–107)
CHROMATIN AB SERPL-ACNC: <0.2 AI (ref 0–0.9)
CO2 SERPL-SCNC: 27 MMOL/L (ref 22–29)
CREAT SERPL-MCNC: 0.79 MG/DL (ref 0.76–1.27)
DEPRECATED RDW RBC AUTO: 42.8 FL (ref 37–54)
DSDNA AB SER-ACNC: <1 IU/ML (ref 0–9)
EGFRCR SERPLBLD CKD-EPI 2021: 86.5 ML/MIN/1.73
ENA JO1 AB SER-ACNC: <0.2 AI (ref 0–0.9)
ENA RNP AB SER-ACNC: <0.2 AI (ref 0–0.9)
ENA SCL70 AB SER-ACNC: <0.2 AI (ref 0–0.9)
ENA SM AB SER-ACNC: <0.2 AI (ref 0–0.9)
ENA SS-A AB SER-ACNC: <0.2 AI (ref 0–0.9)
ENA SS-B AB SER-ACNC: <0.2 AI (ref 0–0.9)
ERYTHROCYTE [DISTWIDTH] IN BLOOD BY AUTOMATED COUNT: 11.7 % (ref 12.3–15.4)
GLUCOSE SERPL-MCNC: 214 MG/DL (ref 65–99)
HCT VFR BLD AUTO: 42.5 % (ref 37.5–51)
HGB BLD-MCNC: 14.2 G/DL (ref 13–17.7)
Lab: NORMAL
MAGNESIUM SERPL-MCNC: 2.5 MG/DL (ref 1.6–2.4)
MCH RBC QN AUTO: 32.9 PG (ref 26.6–33)
MCHC RBC AUTO-ENTMCNC: 33.4 G/DL (ref 31.5–35.7)
MCV RBC AUTO: 98.4 FL (ref 79–97)
PHOSPHATE SERPL-MCNC: 2.2 MG/DL (ref 2.5–4.5)
PLATELET # BLD AUTO: 308 10*3/MM3 (ref 140–450)
PMV BLD AUTO: 8.7 FL (ref 6–12)
POTASSIUM SERPL-SCNC: 4.1 MMOL/L (ref 3.5–5.2)
RBC # BLD AUTO: 4.32 10*6/MM3 (ref 4.14–5.8)
SODIUM SERPL-SCNC: 134 MMOL/L (ref 136–145)
WBC NRBC COR # BLD AUTO: 13.72 10*3/MM3 (ref 3.4–10.8)

## 2024-06-06 PROCEDURE — 83735 ASSAY OF MAGNESIUM: CPT | Performed by: STUDENT IN AN ORGANIZED HEALTH CARE EDUCATION/TRAINING PROGRAM

## 2024-06-06 PROCEDURE — 94799 UNLISTED PULMONARY SVC/PX: CPT

## 2024-06-06 PROCEDURE — 80048 BASIC METABOLIC PNL TOTAL CA: CPT | Performed by: STUDENT IN AN ORGANIZED HEALTH CARE EDUCATION/TRAINING PROGRAM

## 2024-06-06 PROCEDURE — 84100 ASSAY OF PHOSPHORUS: CPT | Performed by: STUDENT IN AN ORGANIZED HEALTH CARE EDUCATION/TRAINING PROGRAM

## 2024-06-06 PROCEDURE — 25010000002 METHYLPREDNISOLONE PER 40 MG: Performed by: INTERNAL MEDICINE

## 2024-06-06 PROCEDURE — 94664 DEMO&/EVAL PT USE INHALER: CPT

## 2024-06-06 PROCEDURE — 94761 N-INVAS EAR/PLS OXIMETRY MLT: CPT

## 2024-06-06 PROCEDURE — 85027 COMPLETE CBC AUTOMATED: CPT | Performed by: STUDENT IN AN ORGANIZED HEALTH CARE EDUCATION/TRAINING PROGRAM

## 2024-06-06 PROCEDURE — 71250 CT THORAX DX C-: CPT

## 2024-06-06 RX ADMIN — Medication 1 TABLET: at 08:19

## 2024-06-06 RX ADMIN — IPRATROPIUM BROMIDE AND ALBUTEROL SULFATE 3 ML: .5; 3 SOLUTION RESPIRATORY (INHALATION) at 20:03

## 2024-06-06 RX ADMIN — AMLODIPINE BESYLATE 5 MG: 5 TABLET ORAL at 08:19

## 2024-06-06 RX ADMIN — DIBASIC SODIUM PHOSPHATE, MONOBASIC POTASSIUM PHOSPHATE AND MONOBASIC SODIUM PHOSPHATE 2 TABLET: 852; 155; 130 TABLET ORAL at 20:26

## 2024-06-06 RX ADMIN — ATORVASTATIN CALCIUM 20 MG: 20 TABLET, FILM COATED ORAL at 08:19

## 2024-06-06 RX ADMIN — IPRATROPIUM BROMIDE AND ALBUTEROL SULFATE 3 ML: .5; 3 SOLUTION RESPIRATORY (INHALATION) at 14:35

## 2024-06-06 RX ADMIN — PANTOPRAZOLE SODIUM 40 MG: 40 TABLET, DELAYED RELEASE ORAL at 05:58

## 2024-06-06 RX ADMIN — Medication 1000 UNITS: at 08:19

## 2024-06-06 RX ADMIN — METOPROLOL TARTRATE 50 MG: 50 TABLET, FILM COATED ORAL at 08:19

## 2024-06-06 RX ADMIN — DIBASIC SODIUM PHOSPHATE, MONOBASIC POTASSIUM PHOSPHATE AND MONOBASIC SODIUM PHOSPHATE 2 TABLET: 852; 155; 130 TABLET ORAL at 10:41

## 2024-06-06 RX ADMIN — ASPIRIN 325 MG: 325 TABLET ORAL at 08:19

## 2024-06-06 RX ADMIN — IPRATROPIUM BROMIDE AND ALBUTEROL SULFATE 3 ML: .5; 3 SOLUTION RESPIRATORY (INHALATION) at 07:33

## 2024-06-06 RX ADMIN — METHYLPREDNISOLONE SODIUM SUCCINATE 40 MG: 40 INJECTION, POWDER, FOR SOLUTION INTRAMUSCULAR; INTRAVENOUS at 16:40

## 2024-06-06 RX ADMIN — METOPROLOL TARTRATE 50 MG: 50 TABLET, FILM COATED ORAL at 20:26

## 2024-06-06 RX ADMIN — METHYLPREDNISOLONE SODIUM SUCCINATE 40 MG: 40 INJECTION, POWDER, FOR SOLUTION INTRAMUSCULAR; INTRAVENOUS at 08:21

## 2024-06-06 NOTE — PLAN OF CARE
Problem: Adult Inpatient Plan of Care  Goal: Plan of Care Review  Outcome: Ongoing, Progressing  Flowsheets (Taken 6/6/2024 5400)  Plan of Care Reviewed With: patient  Outcome Evaluation: Alert and oriented. O2 6 litres. IVsteroids given. Will continue supportive care.   Goal Outcome Evaluation:  Plan of Care Reviewed With: patient           Outcome Evaluation: Alert and oriented. O2 6 litres. IVsteroids given. Will continue supportive care.

## 2024-06-06 NOTE — PLAN OF CARE
Problem: Adult Inpatient Plan of Care  Goal: Plan of Care Review  Outcome: Ongoing, Progressing   Goal Outcome Evaluation:         VSS. NSR w/ PVCs. 7L HF NC. Ax1 to toilet. Tremors baseline. No pain per pt. Exertional dyspnea. IV steroids. Plans for ECHO and CT today.

## 2024-06-06 NOTE — PROGRESS NOTES
Name: Jim Marvin ADMIT: 2024   : 1935  PCP: Antonio Finley MD    MRN: [id number] LOS: 2 days   AGE/SEX: 88 y.o. male  ROOM: [facility]     Subjective   Subjective   Laying in bed, reports had frequent cough overnight, nonproductive, this morning better.  Denies shortness of breath as long as he is not ambulating, shortness of breath with exertion primarily.  Did not notice significant change compared to yesterday.    Review of Systems   As above  Objective   Objective   Vital Signs  Temp:  [97.3 °F (36.3 °C)-98.1 °F (36.7 °C)] 98.1 °F (36.7 °C)  Heart Rate:  [67-84] 76  Resp:  [16-18] 18  BP: (119-135)/(63-72) 125/65  SpO2:  [91 %-97 %] 91 %  on  Flow (L/min):  [5-9] 5;   Device (Oxygen Therapy): humidified;high-flow nasal cannula  Body mass index is 29.82 kg/m².  Physical Exam    General: Alert, laying in bed, not in distress,  HEENT: Normocephalic, atraumatic  CV: Regular rate and rhythm, no murmurs rubs or gallops  Lungs: +Bilateral rales, no wheezing,  Abdomen: Soft, nontender, nondistended  Extremities: No significant peripheral edema , no cyanosis     Results Review     I reviewed the patient's new clinical results.  Results from last 7 days   Lab Units 24  03124  0305 24  1530   WBC 10*3/mm3 13.72* 9.50 8.45   HEMOGLOBIN g/dL 14.2 15.0 15.2   PLATELETS 10*3/mm3 308 302 302     Results from last 7 days   Lab Units 24  03124  0305 24  1530   SODIUM mmol/L 134* 134* 134*   POTASSIUM mmol/L 4.1 4.6 4.4   CHLORIDE mmol/L 99 98 98   CO2 mmol/L 27.0 26.0 26.6   BUN mg/dL 20 10 12   CREATININE mg/dL 0.79 0.63* 0.85   GLUCOSE mg/dL 214* 123* 127*   Estimated Creatinine Clearance: 75.1 mL/min (by C-G formula based on SCr of 0.79 mg/dL).  Results from last 7 days   Lab Units 24  1530   ALBUMIN g/dL 3.4*   BILIRUBIN mg/dL 0.9   ALK PHOS U/L 71   AST (SGOT) U/L 17   ALT (SGPT) U/L 28     Results from last 7 days   Lab Units 24  0319 24  0300  "06/04/24  1530   CALCIUM mg/dL 8.8 8.7 9.4   ALBUMIN g/dL  --   --  3.4*   MAGNESIUM mg/dL 2.5*  --   --    PHOSPHORUS mg/dL 2.2*  --   --        COVID19   Date Value Ref Range Status   06/04/2024 Not Detected Not Detected - Ref. Range Final   09/23/2021 Not Detected Not Detected - Ref. Range Final     No results found for: \"HGBA1C\", \"POCGLU\"        CT Angiogram Chest Pulmonary Embolism  Narrative: CT ANGIOGRAM OF THE CHEST WITH CONTRAST INCLUDING RECONSTRUCTION IMAGES  06/04/2024     HISTORY: Shortness of breath. Hypoxia.     TECHNIQUE: Following the intravenous contrast injection CT angiography  was performed through the chest. Sagittal, coronal and 3D reconstruction  images were reviewed.     FINDINGS: The pulmonary arterial system is well opacified with no  evidence of pulmonary embolus. There is some aortic calcification.  Maximum diameter of the ascending aorta is approximately 4.1 cm. A few  small to slightly enlarged mediastinal and right hilar lymph nodes are  seen. A subcarinal node measures approximately 3.3 cm x 1.6 cm. There is  an approximately 2 cm right hilar lymph node. There are mild  interstitial changes of the lungs slightly more severe on the right than  on the left. These appear to have progressed slightly since the  11/05/2021 study but still may be chronic in nature. No suspicious  appearing lung masses are seen.     There are degenerative changes of the left shoulder. Right shoulder is  not included in the field-of-view.     Impression: 1. No evidence of pulmonary embolus.  2. Mild dilatation of the ascending aorta.  3. Interstitial changes of the lungs may be chronic in nature, right  greater than left.  4. Small amount of mediastinal and right hilar lymphadenopathy.  5. Single gallstone.  6. A short-term follow-up CT of the chest in 3 months to 4 months could  be obtained to assess stability of these findings.     Radiation dose reduction techniques were utilized, including " automated  exposure control and exposure modulation based on body size.        This report was finalized on 6/6/2024 9:20 AM by Dr. Gerry Orozco M.D on Workstation: [id number]       Scheduled Medications  amLODIPine, 5 mg, Oral, Daily  aspirin, 325 mg, Oral, Daily  atorvastatin, 20 mg, Oral, Daily  cholecalciferol, 1,000 Units, Oral, Daily  ipratropium-albuterol, 3 mL, Nebulization, Q6H While Awake - RT  methylPREDNISolone sodium succinate, 40 mg, Intravenous, Q8H  metoprolol tartrate, 50 mg, Oral, BID  multivitamin, 1 tablet, Oral, Daily  pantoprazole, 40 mg, Oral, Q AM  phosphorus, 500 mg, Oral, BID    Infusions   Diet  Diet: Cardiac; Healthy Heart (2-3 Na+); Fluid Consistency: Thin (IDDSI 0)    I have personally reviewed     [x]  Laboratory   []  Microbiology   []  Radiology   []  EKG/Telemetry  []  Cardiology/Vascular   []  Pathology    []  Records       Assessment/Plan     Active Hospital Problems    Diagnosis  POA    **Hypoxia [R09.02]  Yes    Carotid artery stenosis [I65.29]  Yes    Acute hypoxic respiratory failure [J96.01]  Yes    Emphysema lung [J43.9]  Yes    Acute respiratory failure with hypoxia [J96.01]  Yes    Hypertension [I10]  Yes    Hyperlipidemia [E78.5]  Yes      Resolved Hospital Problems   No resolved problems to display.       Patient is a 88-year-old male with history of hypertension, hyperlipidemia, COPD, presented to the ED complaining of shortness of breath that  started 2 weeks prior to admission and gradually worsened.    Acute hypoxic respiratory failure  COPD  Interstitial lung disease  -CT on admission no PE, did show interstitial changes of the lungs may be chronic in nature, right  greater than left.Small amount of mediastinal and right hilar lymphadenopathy.  -Tmax was 100.6 on admission, has been afebrile since, WBC has been normal  -Respiratory panel was negative  -Monitor off antibiotics.  -scheduled DuoNebs, IV methylprednisolone,  -Pulmonology following, workup  pending pending for ILD, echo, HRCT ILD protoc l    Essential hypertension  -BP acutely stable, continue amlodipine 5 mg daily      History of CVA/bilateral carotid artery stenosis  Continue high-dose aspirin, statin      Hypophosphatemia  -Phosphorus 2.2, replacement ordered, repeat in a.m.    SCDs for DVT prophylaxis.  Full code.  Discussed with patient.  Discussed with RN  Anticipate discharge TBD      Copied text in this note has been reviewed and is accurate as of 06/06/24.         Dictated utilizing Dragon dictation        Eric Mosley MD  [facility]  06/06/24  12:46 EDT

## 2024-06-06 NOTE — PROGRESS NOTES
[facility] INPATIENT PROGRESS NOTE         [address]    2024      PATIENT IDENTIFICATION:  Name: Jim Marvin ADMIT: 2024   : 1935  PCP: Antonio Finley MD    MRN: [id number] LOS: 2 days   AGE/SEX: 88 y.o. male  ROOM: [facility]                     LOS 2    Reason for visit: Hypoxemia and interstitial disease       SUBJECTIVE:      On 8 L supplemental oxygen high flow.  No distress.  No productive cough, nausea or chest pain.    Objective   OBJECTIVE:    Vital Sign Min/Max for last 24 hours  Temp  Min: 97.3 °F (36.3 °C)  Max: 97.7 °F (36.5 °C)   BP  Min: 119/65  Max: 135/72   Pulse  Min: 67  Max: 84   Resp  Min: 16  Max: 18   SpO2  Min: 92 %  Max: 97 %   No data recorded   Weight  Min: 91.6 kg (201 lb 15.1 oz)  Max: 91.6 kg (201 lb 15.1 oz)    Vitals:    24 0500 24 0733 24 0737 24 0740   BP:    124/63   BP Location:    Right arm   Patient Position:    Lying   Pulse:  84 83 82   Resp:  18  18   Temp:    97.6 °F (36.4 °C)   TempSrc:    Oral   SpO2:  96% 92% 97%   Weight: 91.6 kg (201 lb 15.1 oz)      Height:                24  1832 24  0500 24  0500   Weight: 92.1 kg (203 lb) 91.4 kg (201 lb 8 oz) 91.6 kg (201 lb 15.1 oz)       Body mass index is 29.82 kg/m².                          Body mass index is 29.82 kg/m².    Intake/Output Summary (Last 24 hours) at 2024 0830  Last data filed at 2024 0000  Gross per 24 hour   Intake 1080 ml   Output --   Net 1080 ml         Exam:  GEN:  No distress, appears stated age  EYES:   PERRL, anicteric sclerae  ENT:    External ears/nose normal, OP clear  NECK:  No adenopathy, midline trachea  LUNGS: Normal chest on inspection, palpation and basilar rales on auscultation  CV:  Normal S1S2, without murmur  ABD:  Nontender, nondistended, no hepatosplenomegaly, +BS  EXT:  No edema.  No cyanosis or clubbing.  No mottling and normal cap refill.    Assessment     Scheduled meds:  amLODIPine, 5 mg, Oral,  "Daily  aspirin, 325 mg, Oral, Daily  atorvastatin, 20 mg, Oral, Daily  cholecalciferol, 1,000 Units, Oral, Daily  ipratropium-albuterol, 3 mL, Nebulization, Q6H While Awake - RT  methylPREDNISolone sodium succinate, 40 mg, Intravenous, Q8H  metoprolol tartrate, 50 mg, Oral, BID  multivitamin, 1 tablet, Oral, Daily  pantoprazole, 40 mg, Oral, Q AM      IV meds:                         Data Review:  Results from last 7 days   Lab Units 06/06/24  0319 06/05/24  0305 06/04/24  1530   SODIUM mmol/L 134* 134* 134*   POTASSIUM mmol/L 4.1 4.6 4.4   CHLORIDE mmol/L 99 98 98   CO2 mmol/L 27.0 26.0 26.6   BUN mg/dL 20 10 12   CREATININE mg/dL 0.79 0.63* 0.85   GLUCOSE mg/dL 214* 123* 127*   CALCIUM mg/dL 8.8 8.7 9.4         Estimated Creatinine Clearance: 75.1 mL/min (by C-G formula based on SCr of 0.79 mg/dL).  Results from last 7 days   Lab Units 06/06/24  0319 06/05/24  0305 06/04/24  1530   WBC 10*3/mm3 13.72* 9.50 8.45   HEMOGLOBIN g/dL 14.2 15.0 15.2   PLATELETS 10*3/mm3 308 302 302         Results from last 7 days   Lab Units 06/04/24  1530   ALT (SGPT) U/L 28   AST (SGOT) U/L 17                 No results found for: \"HGBA1C\", \"POCGLU\"      Imaging reviewed  CT chest viewed       Microbiology reviewed            Active Hospital Problems    Diagnosis  POA    **Hypoxia [R09.02]  Yes    Carotid artery stenosis [I65.29]  Yes    Acute hypoxic respiratory failure [J96.01]  Yes    Emphysema lung [J43.9]  Yes    Acute respiratory failure with hypoxia [J96.01]  Yes    Hypertension [I10]  Yes    Hyperlipidemia [E78.5]  Yes      Resolved Hospital Problems   No resolved problems to display.         ASSESSMENT:  Interstitial lung disease  Emphysematous blebs  COPD with wheezing  Acute hypoxemic respiratory failure  Hypertension  Truncal obesity      PLAN:  2D echo still pending.  HRCT ILD protocol ordered and pending.  ILD autoimmune labs sent.  Wean oxygen as able.  Continue bronchodilators and steroid.          Gibson Aquino, " MD  Pulmonary and Critical Care Medicine  [facility], [facility]  6/6/2024    08:30 EDT

## 2024-06-07 ENCOUNTER — APPOINTMENT (OUTPATIENT)
Dept: CARDIOLOGY | Facility: HOSPITAL | Age: 89
DRG: 189 | End: 2024-06-07
Payer: MEDICARE

## 2024-06-07 LAB
ANION GAP SERPL CALCULATED.3IONS-SCNC: 7 MMOL/L (ref 5–15)
AORTIC DIMENSIONLESS INDEX: 0.9 (DI)
ASCENDING AORTA: 2.9 CM
BH CV ECHO MEAS - ACS: 2.04 CM
BH CV ECHO MEAS - AO MAX PG: 7.6 MMHG
BH CV ECHO MEAS - AO MEAN PG: 4 MMHG
BH CV ECHO MEAS - AO V2 MAX: 138 CM/SEC
BH CV ECHO MEAS - AO V2 VTI: 24.4 CM
BH CV ECHO MEAS - AVA(I,D): 3.5 CM2
BH CV ECHO MEAS - EDV(CUBED): 140.6 ML
BH CV ECHO MEAS - EDV(MOD-SP2): 88 ML
BH CV ECHO MEAS - EDV(MOD-SP4): 117 ML
BH CV ECHO MEAS - EF(MOD-BP): 70.3 %
BH CV ECHO MEAS - EF(MOD-SP2): 70.5 %
BH CV ECHO MEAS - EF(MOD-SP4): 70.9 %
BH CV ECHO MEAS - ESV(CUBED): 39.1 ML
BH CV ECHO MEAS - ESV(MOD-SP2): 26 ML
BH CV ECHO MEAS - ESV(MOD-SP4): 34 ML
BH CV ECHO MEAS - FS: 34.7 %
BH CV ECHO MEAS - IVS/LVPW: 1 CM
BH CV ECHO MEAS - IVSD: 1.1 CM
BH CV ECHO MEAS - LAT PEAK E' VEL: 8.2 CM/SEC
BH CV ECHO MEAS - LV DIASTOLIC VOL/BSA (35-75): 56.4 CM2
BH CV ECHO MEAS - LV MASS(C)D: 220.8 GRAMS
BH CV ECHO MEAS - LV MAX PG: 4.9 MMHG
BH CV ECHO MEAS - LV MEAN PG: 3 MMHG
BH CV ECHO MEAS - LV SYSTOLIC VOL/BSA (12-30): 16.4 CM2
BH CV ECHO MEAS - LV V1 MAX: 111 CM/SEC
BH CV ECHO MEAS - LV V1 VTI: 22.6 CM
BH CV ECHO MEAS - LVIDD: 5.2 CM
BH CV ECHO MEAS - LVIDS: 3.4 CM
BH CV ECHO MEAS - LVOT AREA: 3.7 CM2
BH CV ECHO MEAS - LVOT DIAM: 2.18 CM
BH CV ECHO MEAS - LVPWD: 1.1 CM
BH CV ECHO MEAS - MED PEAK E' VEL: 7 CM/SEC
BH CV ECHO MEAS - MV A DUR: 0.14 SEC
BH CV ECHO MEAS - MV A MAX VEL: 116.5 CM/SEC
BH CV ECHO MEAS - MV DEC SLOPE: 394.5 CM/SEC2
BH CV ECHO MEAS - MV DEC TIME: 0.15 SEC
BH CV ECHO MEAS - MV E MAX VEL: 70.5 CM/SEC
BH CV ECHO MEAS - MV E/A: 0.6
BH CV ECHO MEAS - MV MAX PG: 4.8 MMHG
BH CV ECHO MEAS - MV MEAN PG: 2.03 MMHG
BH CV ECHO MEAS - MV P1/2T: 67.5 MSEC
BH CV ECHO MEAS - MV V2 VTI: 21.9 CM
BH CV ECHO MEAS - MVA(P1/2T): 3.3 CM2
BH CV ECHO MEAS - MVA(VTI): 3.9 CM2
BH CV ECHO MEAS - PA ACC TIME: 0.07 SEC
BH CV ECHO MEAS - PA V2 MAX: 117 CM/SEC
BH CV ECHO MEAS - PULM A REVS DUR: 0.11 SEC
BH CV ECHO MEAS - PULM A REVS VEL: 29.8 CM/SEC
BH CV ECHO MEAS - PULM DIAS VEL: 32.6 CM/SEC
BH CV ECHO MEAS - PULM S/D: 1.65
BH CV ECHO MEAS - PULM SYS VEL: 53.7 CM/SEC
BH CV ECHO MEAS - QP/QS: 0.53
BH CV ECHO MEAS - RAP SYSTOLE: 3 MMHG
BH CV ECHO MEAS - RV MAX PG: 1.48 MMHG
BH CV ECHO MEAS - RV V1 MAX: 60.8 CM/SEC
BH CV ECHO MEAS - RV V1 VTI: 12.3 CM
BH CV ECHO MEAS - RVOT DIAM: 2.16 CM
BH CV ECHO MEAS - RVSP: 34 MMHG
BH CV ECHO MEAS - SV(LVOT): 84.6 ML
BH CV ECHO MEAS - SV(MOD-SP2): 62 ML
BH CV ECHO MEAS - SV(MOD-SP4): 83 ML
BH CV ECHO MEAS - SV(RVOT): 44.9 ML
BH CV ECHO MEAS - SVI(LVOT): 40.8 ML/M2
BH CV ECHO MEAS - SVI(MOD-SP2): 29.9 ML/M2
BH CV ECHO MEAS - SVI(MOD-SP4): 40 ML/M2
BH CV ECHO MEAS - TAPSE (>1.6): 2.3 CM
BH CV ECHO MEAS - TR MAX PG: 31 MMHG
BH CV ECHO MEAS - TR MAX VEL: 278.3 CM/SEC
BH CV ECHO MEASUREMENTS AVERAGE E/E' RATIO: 9.28
BH CV XLRA - RV BASE: 3.4 CM
BH CV XLRA - RV LENGTH: 7.9 CM
BH CV XLRA - RV MID: 1.96 CM
BH CV XLRA - TDI S': 16.2 CM/SEC
BUN SERPL-MCNC: 17 MG/DL (ref 8–23)
BUN/CREAT SERPL: 28.3 (ref 7–25)
CALCIUM SPEC-SCNC: 8.5 MG/DL (ref 8.6–10.5)
CHLORIDE SERPL-SCNC: 102 MMOL/L (ref 98–107)
CO2 SERPL-SCNC: 25 MMOL/L (ref 22–29)
CREAT SERPL-MCNC: 0.6 MG/DL (ref 0.76–1.27)
DEPRECATED RDW RBC AUTO: 40.6 FL (ref 37–54)
EGFRCR SERPLBLD CKD-EPI 2021: 94 ML/MIN/1.73
ERYTHROCYTE [DISTWIDTH] IN BLOOD BY AUTOMATED COUNT: 11.8 % (ref 12.3–15.4)
GLUCOSE BLDC GLUCOMTR-MCNC: 129 MG/DL (ref 70–130)
GLUCOSE BLDC GLUCOMTR-MCNC: 140 MG/DL (ref 70–130)
GLUCOSE BLDC GLUCOMTR-MCNC: 180 MG/DL (ref 70–130)
GLUCOSE SERPL-MCNC: 152 MG/DL (ref 65–99)
HBA1C MFR BLD: 6 % (ref 4.8–5.6)
HCT VFR BLD AUTO: 40.9 % (ref 37.5–51)
HGB BLD-MCNC: 14.3 G/DL (ref 13–17.7)
LEFT ATRIUM VOLUME INDEX: 34.1 ML/M2
MCH RBC QN AUTO: 33.4 PG (ref 26.6–33)
MCHC RBC AUTO-ENTMCNC: 35 G/DL (ref 31.5–35.7)
MCV RBC AUTO: 95.6 FL (ref 79–97)
PHOSPHATE SERPL-MCNC: 3.2 MG/DL (ref 2.5–4.5)
PLATELET # BLD AUTO: 318 10*3/MM3 (ref 140–450)
PMV BLD AUTO: 8.5 FL (ref 6–12)
POTASSIUM SERPL-SCNC: 4.4 MMOL/L (ref 3.5–5.2)
RBC # BLD AUTO: 4.28 10*6/MM3 (ref 4.14–5.8)
SINUS: 3.4 CM
SODIUM SERPL-SCNC: 134 MMOL/L (ref 136–145)
STJ: 2.41 CM
WBC NRBC COR # BLD AUTO: 13.78 10*3/MM3 (ref 3.4–10.8)

## 2024-06-07 PROCEDURE — 85027 COMPLETE CBC AUTOMATED: CPT | Performed by: STUDENT IN AN ORGANIZED HEALTH CARE EDUCATION/TRAINING PROGRAM

## 2024-06-07 PROCEDURE — 25010000002 METHYLPREDNISOLONE PER 40 MG: Performed by: INTERNAL MEDICINE

## 2024-06-07 PROCEDURE — 94799 UNLISTED PULMONARY SVC/PX: CPT

## 2024-06-07 PROCEDURE — 82948 REAGENT STRIP/BLOOD GLUCOSE: CPT

## 2024-06-07 PROCEDURE — 83036 HEMOGLOBIN GLYCOSYLATED A1C: CPT | Performed by: STUDENT IN AN ORGANIZED HEALTH CARE EDUCATION/TRAINING PROGRAM

## 2024-06-07 PROCEDURE — 93306 TTE W/DOPPLER COMPLETE: CPT | Performed by: INTERNAL MEDICINE

## 2024-06-07 PROCEDURE — 80048 BASIC METABOLIC PNL TOTAL CA: CPT | Performed by: STUDENT IN AN ORGANIZED HEALTH CARE EDUCATION/TRAINING PROGRAM

## 2024-06-07 PROCEDURE — 63710000001 PREDNISONE PER 1 MG: Performed by: INTERNAL MEDICINE

## 2024-06-07 PROCEDURE — 93306 TTE W/DOPPLER COMPLETE: CPT

## 2024-06-07 PROCEDURE — 63710000001 INSULIN LISPRO (HUMAN) PER 5 UNITS: Performed by: STUDENT IN AN ORGANIZED HEALTH CARE EDUCATION/TRAINING PROGRAM

## 2024-06-07 PROCEDURE — 84100 ASSAY OF PHOSPHORUS: CPT | Performed by: STUDENT IN AN ORGANIZED HEALTH CARE EDUCATION/TRAINING PROGRAM

## 2024-06-07 PROCEDURE — 25010000002 THIAMINE HCL 200 MG/2ML SOLUTION: Performed by: STUDENT IN AN ORGANIZED HEALTH CARE EDUCATION/TRAINING PROGRAM

## 2024-06-07 PROCEDURE — 97161 PT EVAL LOW COMPLEX 20 MIN: CPT

## 2024-06-07 PROCEDURE — 25510000001 PERFLUTREN (DEFINITY) 8.476 MG IN SODIUM CHLORIDE (PF) 0.9 % 10 ML INJECTION: Performed by: INTERNAL MEDICINE

## 2024-06-07 RX ORDER — DEXTROSE MONOHYDRATE 25 G/50ML
25 INJECTION, SOLUTION INTRAVENOUS
Status: DISCONTINUED | OUTPATIENT
Start: 2024-06-07 | End: 2024-06-08 | Stop reason: HOSPADM

## 2024-06-07 RX ORDER — IBUPROFEN 600 MG/1
1 TABLET ORAL
Status: DISCONTINUED | OUTPATIENT
Start: 2024-06-07 | End: 2024-06-08 | Stop reason: HOSPADM

## 2024-06-07 RX ORDER — LORAZEPAM 1 MG/1
1 TABLET ORAL
Status: DISCONTINUED | OUTPATIENT
Start: 2024-06-07 | End: 2024-06-08 | Stop reason: HOSPADM

## 2024-06-07 RX ORDER — MIDAZOLAM HYDROCHLORIDE 1 MG/ML
4 INJECTION INTRAMUSCULAR; INTRAVENOUS
Status: DISCONTINUED | OUTPATIENT
Start: 2024-06-07 | End: 2024-06-08 | Stop reason: HOSPADM

## 2024-06-07 RX ORDER — FOLIC ACID 1 MG/1
1 TABLET ORAL DAILY
Status: DISCONTINUED | OUTPATIENT
Start: 2024-06-07 | End: 2024-06-08 | Stop reason: HOSPADM

## 2024-06-07 RX ORDER — MIDAZOLAM HYDROCHLORIDE 1 MG/ML
2 INJECTION INTRAMUSCULAR; INTRAVENOUS
Status: DISCONTINUED | OUTPATIENT
Start: 2024-06-07 | End: 2024-06-08 | Stop reason: HOSPADM

## 2024-06-07 RX ORDER — MIDAZOLAM HYDROCHLORIDE 5 MG/ML
4 INJECTION INTRAMUSCULAR; INTRAVENOUS
Status: DISCONTINUED | OUTPATIENT
Start: 2024-06-07 | End: 2024-06-08 | Stop reason: HOSPADM

## 2024-06-07 RX ORDER — THIAMINE HYDROCHLORIDE 100 MG/ML
200 INJECTION, SOLUTION INTRAMUSCULAR; INTRAVENOUS EVERY 8 HOURS SCHEDULED
Status: DISCONTINUED | OUTPATIENT
Start: 2024-06-07 | End: 2024-06-08 | Stop reason: HOSPADM

## 2024-06-07 RX ORDER — PREDNISONE 20 MG/1
40 TABLET ORAL
Status: DISCONTINUED | OUTPATIENT
Start: 2024-06-07 | End: 2024-06-08 | Stop reason: HOSPADM

## 2024-06-07 RX ORDER — INSULIN LISPRO 100 [IU]/ML
2-7 INJECTION, SOLUTION INTRAVENOUS; SUBCUTANEOUS
Status: DISCONTINUED | OUTPATIENT
Start: 2024-06-07 | End: 2024-06-08 | Stop reason: HOSPADM

## 2024-06-07 RX ORDER — LORAZEPAM 1 MG/1
2 TABLET ORAL
Status: DISCONTINUED | OUTPATIENT
Start: 2024-06-07 | End: 2024-06-08 | Stop reason: HOSPADM

## 2024-06-07 RX ORDER — NICOTINE POLACRILEX 4 MG
15 LOZENGE BUCCAL
Status: DISCONTINUED | OUTPATIENT
Start: 2024-06-07 | End: 2024-06-08 | Stop reason: HOSPADM

## 2024-06-07 RX ADMIN — PANTOPRAZOLE SODIUM 40 MG: 40 TABLET, DELAYED RELEASE ORAL at 05:40

## 2024-06-07 RX ADMIN — THIAMINE HYDROCHLORIDE 200 MG: 100 INJECTION, SOLUTION INTRAMUSCULAR; INTRAVENOUS at 22:00

## 2024-06-07 RX ADMIN — METHYLPREDNISOLONE SODIUM SUCCINATE 40 MG: 40 INJECTION, POWDER, FOR SOLUTION INTRAMUSCULAR; INTRAVENOUS at 00:05

## 2024-06-07 RX ADMIN — Medication 2.5 MG: at 23:42

## 2024-06-07 RX ADMIN — THIAMINE HYDROCHLORIDE 200 MG: 100 INJECTION, SOLUTION INTRAMUSCULAR; INTRAVENOUS at 17:51

## 2024-06-07 RX ADMIN — FOLIC ACID 1 MG: 1 TABLET ORAL at 21:59

## 2024-06-07 RX ADMIN — METOPROLOL TARTRATE 50 MG: 50 TABLET, FILM COATED ORAL at 20:28

## 2024-06-07 RX ADMIN — METOPROLOL TARTRATE 50 MG: 50 TABLET, FILM COATED ORAL at 08:13

## 2024-06-07 RX ADMIN — ATORVASTATIN CALCIUM 20 MG: 20 TABLET, FILM COATED ORAL at 08:13

## 2024-06-07 RX ADMIN — IPRATROPIUM BROMIDE AND ALBUTEROL SULFATE 3 ML: .5; 3 SOLUTION RESPIRATORY (INHALATION) at 23:58

## 2024-06-07 RX ADMIN — AMLODIPINE BESYLATE 5 MG: 5 TABLET ORAL at 08:13

## 2024-06-07 RX ADMIN — PERFLUTREN 4 ML: 6.52 INJECTION, SUSPENSION INTRAVENOUS at 10:52

## 2024-06-07 RX ADMIN — Medication 1000 UNITS: at 08:13

## 2024-06-07 RX ADMIN — PREDNISONE 40 MG: 20 TABLET ORAL at 12:38

## 2024-06-07 RX ADMIN — INSULIN LISPRO 2 UNITS: 100 INJECTION, SOLUTION INTRAVENOUS; SUBCUTANEOUS at 21:59

## 2024-06-07 RX ADMIN — IPRATROPIUM BROMIDE AND ALBUTEROL SULFATE 3 ML: .5; 3 SOLUTION RESPIRATORY (INHALATION) at 19:39

## 2024-06-07 RX ADMIN — Medication 1 TABLET: at 08:13

## 2024-06-07 RX ADMIN — METHYLPREDNISOLONE SODIUM SUCCINATE 40 MG: 40 INJECTION, POWDER, FOR SOLUTION INTRAMUSCULAR; INTRAVENOUS at 08:13

## 2024-06-07 RX ADMIN — INSULIN LISPRO 2 UNITS: 100 INJECTION, SOLUTION INTRAVENOUS; SUBCUTANEOUS at 08:13

## 2024-06-07 RX ADMIN — ASPIRIN 325 MG: 325 TABLET ORAL at 08:13

## 2024-06-07 NOTE — PLAN OF CARE
Goal Outcome Evaluation:  Plan of Care Reviewed With: patient        Progress: no change  Outcome Evaluation: Patient is alert and oriented. Steroid to per Oral. Ciwa protocol initiated. VSS assstX1. Echo completed.
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
## 2024-06-07 NOTE — PROGRESS NOTES
Name: Jim Marvin ADMIT: 2024   : 1935  PCP: Antonio Finley MD    MRN: [id number] LOS: 3 days   AGE/SEX: 88 y.o. male  ROOM: [facility]     Subjective   Subjective   Laying in bed.  No new complaints.  Denies shortness of breath as long as he does not ambulate.  Echocardiogram and CT still pending.    Review of Systems   As above  Objective   Objective   Vital Signs  Temp:  [97.3 °F (36.3 °C)-98.1 °F (36.7 °C)] 97.5 °F (36.4 °C)  Heart Rate:  [69-90] 69  Resp:  [18-20] 20  BP: (124-146)/(65-75) 146/75  SpO2:  [88 %-97 %] 97 %  on  Flow (L/min):  [5-7] 6;   Device (Oxygen Therapy): humidified;nasal cannula  Body mass index is 29.85 kg/m².  Physical Exam    General: Alert, laying in bed, not in distress,  HEENT: Normocephalic, atraumatic  CV: Regular rate and rhythm, no murmurs rubs or gallops  Lungs: +Bilateral rales, no wheezing, on 6L nasal cannula  Abdomen: Soft, nontender, nondistended  Extremities: No significant peripheral edema , no cyanosis     Results Review     I reviewed the patient's new clinical results.  Results from last 7 days   Lab Units 24  03024  1530   WBC 10*3/mm3 13.78* 13.72* 9.50 8.45   HEMOGLOBIN g/dL 14.3 14.2 15.0 15.2   PLATELETS 10*3/mm3 318 308 302 302     Results from last 7 days   Lab Units 24  0524  0305 24  1530   SODIUM mmol/L 134* 134* 134* 134*   POTASSIUM mmol/L 4.4 4.1 4.6 4.4   CHLORIDE mmol/L 102 99 98 98   CO2 mmol/L 25.0 27.0 26.0 26.6   BUN mg/dL 17 20 10 12   CREATININE mg/dL 0.60* 0.79 0.63* 0.85   GLUCOSE mg/dL 152* 214* 123* 127*   Estimated Creatinine Clearance: 98.9 mL/min (A) (by C-G formula based on SCr of 0.6 mg/dL (L)).  Results from last 7 days   Lab Units 24  1530   ALBUMIN g/dL 3.4*   BILIRUBIN mg/dL 0.9   ALK PHOS U/L 71   AST (SGOT) U/L 17   ALT (SGPT) U/L 28     Results from last 7 days   Lab Units 24  0512 24  0319 24  0305 24  1530    CALCIUM mg/dL 8.5* 8.8 8.7 9.4   ALBUMIN g/dL  --   --   --  3.4*   MAGNESIUM mg/dL  --  2.5*  --   --    PHOSPHORUS mg/dL 3.2 2.2*  --   --        COVID19   Date Value Ref Range Status   06/04/2024 Not Detected Not Detected - Ref. Range Final   09/23/2021 Not Detected Not Detected - Ref. Range Final     Hemoglobin A1C   Date/Time Value Ref Range Status   06/07/2024 0512 6.00 (H) 4.80 - 5.60 % Final           CT Chest Hi Resolution Diagnostic  Narrative: CT CHEST HI RESOLUTION DIAGNOSTIC-     HISTORY: ILD evaluation.     TECHNIQUE: Radiation dose reduction techniques were utilized, including  automated exposure control and exposure modulation based on body size.  Prone 1mm images were obtained through the chest without the  administration of IV contrast. Expiratory imaging was obtained.     COMPARISON: CT chest 6/4/2024, 11/5/2021 and 9/22/2020        FINDINGS: Small sliding hernia. Cholelithiasis. Nondistended  gallbladder. Heart is normal in size. Severe coronary artery  calcifications. Trace pericardial fluid. Dilated main pulmonary artery  (37 mm) previously 34 mm in 2021. Nondilated thoracic aorta. No  mediastinal/hilar adenopathy.     High-resolution imaging is at least partially expiratory phase as  evidenced by bowing of the posterior tracheal membrane. Evaluation of  the lungs is partially limited by respiratory motion. Trachea and main  bronchi are patent. No bronchiectasis or bronchial wall thickening.  Advanced biapical predominant paraseptal predominant emphysema. Mild  bibasilar subpleural predominant reticulation/fibrosis.  Reticulation/fibrosis is very similar dating back to 2021. No  honeycombing. No superimposed groundglass opacities. Few calcified  granulomas. Expiratory imaging is limited by degree of expiration  without obvious air trapping. No pleural effusion.           Impression: 1. Combined pulmonary emphysema and fibrosis. Emphysema is advanced with  apical and paraseptal predominance.  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 Reticulation/fibrosis  is very similar dating back to 2021. No honeycombing. No superimposed  groundglass opacities.  2. Dilated main pulmonary artery (37 mm).  3. Small sliding hiatal hernia.        This report was finalized on 6/7/2024 8:23 AM by Dr. Martell Chambers M.D on Workstation: [id number]       Scheduled Medications  amLODIPine, 5 mg, Oral, Daily  aspirin, 325 mg, Oral, Daily  atorvastatin, 20 mg, Oral, Daily  cholecalciferol, 1,000 Units, Oral, Daily  insulin lispro, 2-7 Units, Subcutaneous, 4x Daily AC & at Bedtime  ipratropium-albuterol, 3 mL, Nebulization, Q6H While Awake - RT  metoprolol tartrate, 50 mg, Oral, BID  multivitamin, 1 tablet, Oral, Daily  pantoprazole, 40 mg, Oral, Q AM  predniSONE, 40 mg, Oral, Daily With Breakfast    Infusions   Diet  Diet: Cardiac; Healthy Heart (2-3 Na+); Fluid Consistency: Thin (IDDSI 0)    I have personally reviewed     [x]  Laboratory   []  Microbiology   []  Radiology   []  EKG/Telemetry  []  Cardiology/Vascular   []  Pathology    []  Records       Assessment/Plan     Active Hospital Problems    Diagnosis  POA    **Hypoxia [R09.02]  Yes    Carotid artery stenosis [I65.29]  Yes    Acute hypoxic respiratory failure [J96.01]  Yes    Emphysema lung [J43.9]  Yes    Acute respiratory failure with hypoxia [J96.01]  Yes    Hypertension [I10]  Yes    Hyperlipidemia [E78.5]  Yes      Resolved Hospital Problems   No resolved problems to display.       Patient is a 88-year-old male with history of hypertension, hyperlipidemia, COPD, presented to the ED complaining of shortness of breath that  started 2 weeks prior to admission and gradually worsened.    Acute hypoxic respiratory failure  COPD  Interstitial lung disease  -CT on admission no PE, did show interstitial changes of the lungs may be chronic in nature, right  greater than left.Small amount of mediastinal and right hilar  lymphadenopathy.  -Respiratory panel was negative  -Monitor off antibiotics.  -scheduled DuoNebs,   -Status post IV steroids, transitioned to p.o. steroids 06/07/2024  -Pulmonology following, workup pending pending for ILD autoimmune labs, echo, HRCT ILD protoc     Essential hypertension  -BP acutely stable, continue amlodipine 5 mg daily      History of CVA/bilateral carotid artery stenosis  Continue high-dose aspirin, statin      Hypophosphatemia  Improved    Hyperglycemia in the setting diabetes and steroids  -Hemoglobin A1c 6.0  06/7/2024  -Initiate SSI      SCDs for DVT prophylaxis.  Full code.  Discussed with patient.    Anticipate discharge TBD      Copied text in this note has been reviewed and is accurate as of 06/07/24.         Dictated utilizing Dragon dictation        Eric Mosley MD  [facility]  06/07/24  09:50 EDT

## 2024-06-07 NOTE — PLAN OF CARE
Problem: Adult Inpatient Plan of Care  Goal: Plan of Care Review  Outcome: Ongoing, Progressing   Goal Outcome Evaluation:         VSS. 6-7L HF NC, exertional dyspnea, bumped up to 10-15L when up to toilet. Ax1 to toilet. No pain per pt. IV steroids and mini nebs. Plans for ECHO today.

## 2024-06-07 NOTE — CASE MANAGEMENT/SOCIAL WORK
Continued Stay Note  [facility]     Patient Name: Jim Marvin  MRN: [id number]  Today's Date: 6/7/2024    Admit Date: 6/4/2024    Plan: Home   Discharge Plan       Row Name 06/07/24 7869       Plan    Plan Home    Patient/Family in Agreement with Plan yes    Plan Comments Met with pt in room. He has not been able to wean off of oxygen. Spoke with him about going home with oxygen and he agrees. He would like to use [facility] as he used then the last time he required oxygen at home. Referral entered. Contacted [employer] rep and let him know. Will need a room air at rest sat and a walking ox at discharge. CCP to follow. FARRUKH Saunders RN                   Discharge Codes    No documentation.                 Expected Discharge Date and Time       Expected Discharge Date Expected Discharge Time    Jun 9, 2024               Wallace Serrano RN

## 2024-06-07 NOTE — DISCHARGE PLACEMENT REQUEST
"Jim Garcia (88 y.o. Male)       Date of Birth   1935    Social Security Number       Address   [address] [place] [address]    Home Phone   [phone]    MRN   [id number]       Jainism   Sabianist    Marital Status                               Admission Date   6/4/24    Admission Type   Emergency    Admitting Provider   Rianna Castro MD    Attending Provider   Eric Mosley MD    Department, Room/Bed   [address], E[address]/1       Discharge Date       Discharge Disposition       Discharge Destination                                 Attending Provider: Eric Mosley MD    Allergies: Ace Inhibitors, Lisinopril, Amlodipine    Isolation: None   Infection: None   Code Status: CPR    Ht: 175.3 cm (69.02\")   Wt: 91.7 kg (202 lb 2.6 oz)    Admission Cmt: None   Principal Problem: Hypoxia [R09.02]                   Active Insurance as of 6/4/2024       Primary Coverage       Payor Plan Insurance Group Employer/Plan Group    MEDICARE MEDICARE A & B        Payor Plan Address Payor Plan Phone Number Payor Plan Fax Number Effective Dates    [address] [phone]  10/1/2002 - None Entered    [facility] [id number]         Subscriber Name Subscriber Birth Date Member ID       JIM GARCIA 1935 [id number]               Secondary Coverage       Payor Plan Insurance Group Employer/Plan Group    HUMANA HUMANA [facility]              [id number]       Payor Plan Address Payor Plan Phone Number Payor Plan Fax Number Effective Dates    [address] [address]   9/1/2013 - None Entered    [facility] [address]         Subscriber Name Subscriber Birth Date Member ID       JIM GARCIA 1935 [id number]                     Emergency Contacts        (Rel.) Home Phone Work Phone Mobile Phone    jessica joya (Daughter) [phone] -- --              Emergency Contact Information       Name Relation Home Work jessica Wilkins Daughter [phone] "

## 2024-06-07 NOTE — PROGRESS NOTES
[facility] INPATIENT PROGRESS NOTE         [address]    2024      PATIENT IDENTIFICATION:  Name: Jim Marvin ADMIT: 2024   : 1935  PCP: Antonio Finley MD    MRN: [id number] LOS: 3 days   AGE/SEX: 88 y.o. male  ROOM: [facility]                     LOS 3    Reason for visit: Hypoxemia and interstitial disease       SUBJECTIVE:      Resting comfortably.  No new complaints.  2D echo still pending.    Objective   OBJECTIVE:    Vital Sign Min/Max for last 24 hours  Temp  Min: 97.3 °F (36.3 °C)  Max: 98.1 °F (36.7 °C)   BP  Min: 124/68  Max: 146/75   Pulse  Min: 69  Max: 90   Resp  Min: 18  Max: 20   SpO2  Min: 88 %  Max: 97 %   No data recorded   Weight  Min: 91.7 kg (202 lb 2.6 oz)  Max: 91.7 kg (202 lb 2.6 oz)    Vitals:    24 2304 24 0000 24 0400 24 0700   BP: 124/68   146/75   BP Location: Right arm   Right arm   Patient Position: Lying   Lying   Pulse: 78 88 88 69   Resp: 20   20   Temp: 97.5 °F (36.4 °C)   97.5 °F (36.4 °C)   TempSrc: Oral   Oral   SpO2: 95% 95% (!) 88% 97%   Weight:    91.7 kg (202 lb 2.6 oz)   Height:                24  0500 24  0500 24  0700   Weight: 91.4 kg (201 lb 8 oz) 91.6 kg (201 lb 15.1 oz) 91.7 kg (202 lb 2.6 oz)       Body mass index is 29.85 kg/m².                          Body mass index is 29.85 kg/m².    Intake/Output Summary (Last 24 hours) at 2024 09  Last data filed at 2024  Gross per 24 hour   Intake 480 ml   Output --   Net 480 ml         Exam:  GEN:  No distress, appears stated age  EYES:   PERRL, anicteric sclerae  ENT:    External ears/nose normal, OP clear  NECK:  No adenopathy, midline trachea  LUNGS: Normal chest on inspection, palpation and basilar rales on auscultation  CV:  Normal S1S2, without murmur  ABD:  Nontender, nondistended, no hepatosplenomegaly, +BS  EXT:  No edema.  No cyanosis or clubbing.  No mottling and normal cap refill.    Assessment     Scheduled meds:   amLODIPine, 5 mg, Oral, Daily  aspirin, 325 mg, Oral, Daily  atorvastatin, 20 mg, Oral, Daily  cholecalciferol, 1,000 Units, Oral, Daily  insulin lispro, 2-7 Units, Subcutaneous, 4x Daily AC & at Bedtime  ipratropium-albuterol, 3 mL, Nebulization, Q6H While Awake - RT  methylPREDNISolone sodium succinate, 40 mg, Intravenous, Q8H  metoprolol tartrate, 50 mg, Oral, BID  multivitamin, 1 tablet, Oral, Daily  pantoprazole, 40 mg, Oral, Q AM      IV meds:                         Data Review:  Results from last 7 days   Lab Units 06/07/24  0512 06/06/24  0319 06/05/24  0305 06/04/24  1530   SODIUM mmol/L 134* 134* 134* 134*   POTASSIUM mmol/L 4.4 4.1 4.6 4.4   CHLORIDE mmol/L 102 99 98 98   CO2 mmol/L 25.0 27.0 26.0 26.6   BUN mg/dL 17 20 10 12   CREATININE mg/dL 0.60* 0.79 0.63* 0.85   GLUCOSE mg/dL 152* 214* 123* 127*   CALCIUM mg/dL 8.5* 8.8 8.7 9.4         Estimated Creatinine Clearance: 98.9 mL/min (A) (by C-G formula based on SCr of 0.6 mg/dL (L)).  Results from last 7 days   Lab Units 06/07/24  0512 06/06/24  0319 06/05/24  0305 06/04/24  1530   WBC 10*3/mm3 13.78* 13.72* 9.50 8.45   HEMOGLOBIN g/dL 14.3 14.2 15.0 15.2   PLATELETS 10*3/mm3 318 308 302 302         Results from last 7 days   Lab Units 06/04/24  1530   ALT (SGPT) U/L 28   AST (SGOT) U/L 17                 Hemoglobin A1C   Date/Time Value Ref Range Status   06/07/2024 0512 6.00 (H) 4.80 - 5.60 % Final         Imaging reviewed  CT chest viewed       Microbiology reviewed            Active Hospital Problems    Diagnosis  POA    **Hypoxia [R09.02]  Yes    Carotid artery stenosis [I65.29]  Yes    Acute hypoxic respiratory failure [J96.01]  Yes    Emphysema lung [J43.9]  Yes    Acute respiratory failure with hypoxia [J96.01]  Yes    Hypertension [I10]  Yes    Hyperlipidemia [E78.5]  Yes      Resolved Hospital Problems   No resolved problems to display.         ASSESSMENT:  Interstitial lung disease  Emphysematous blebs  COPD with wheezing  Acute hypoxemic  respiratory failure  Hypertension  Truncal obesity      PLAN:  2D echo still pending.  HRCT ILD protocol reviewed.  No classic honeycombing noted.  Mild fibrosis with probable UIP pattern described.  ILD autoimmune labs sent.  Wean oxygen as able.  Continue bronchodilators and steroid.  Change steroid to p.o.        Gibson Aquino MD  Pulmonary and Critical Care Medicine  [facility], [facility]  6/7/2024    09:18 EDT

## 2024-06-07 NOTE — PLAN OF CARE
Goal Outcome Evaluation:  Plan of Care Reviewed With: patient           Outcome Evaluation: Patient is an 88 y.o male who presented to [facility] with SOA. Patient AOx4 sitting at EOB upon PT arrival. Patient reports he lives at home alone with no ARNALDO. Patient is independent at baseline and does not use an AD. Patient reports he has family who checks on him. Patient reports he does not wear O2 at baseline. Patient on 4L currently. Patient performed STS from EOB with SV. Patient ambulated 70ft with no AD and SV. Gait steady with no overt LOB noted. O2 sats dropped to 77% during ambulation. Patient able to recover back to >90% with a seated rest break. Patient appears at baseline from a functional mobility stand point. Encouraged patient to continue ambulating in room and hallway to maintain activity endurance. No skilled acute PT needs identified. PT will sign off.      Anticipated Discharge Disposition (PT): home

## 2024-06-08 ENCOUNTER — READMISSION MANAGEMENT (OUTPATIENT)
Dept: CALL CENTER | Facility: HOSPITAL | Age: 89
End: 2024-06-08
Payer: MEDICARE

## 2024-06-08 VITALS
WEIGHT: 207.89 LBS | SYSTOLIC BLOOD PRESSURE: 142 MMHG | HEIGHT: 69 IN | RESPIRATION RATE: 18 BRPM | HEART RATE: 63 BPM | OXYGEN SATURATION: 95 % | BODY MASS INDEX: 30.79 KG/M2 | DIASTOLIC BLOOD PRESSURE: 68 MMHG | TEMPERATURE: 97.6 F

## 2024-06-08 PROBLEM — J84.9 ILD (INTERSTITIAL LUNG DISEASE): Status: ACTIVE | Noted: 2024-06-08

## 2024-06-08 LAB
ANION GAP SERPL CALCULATED.3IONS-SCNC: 8.6 MMOL/L (ref 5–15)
BUN SERPL-MCNC: 16 MG/DL (ref 8–23)
BUN/CREAT SERPL: 29.1 (ref 7–25)
CALCIUM SPEC-SCNC: 8.5 MG/DL (ref 8.6–10.5)
CHLORIDE SERPL-SCNC: 102 MMOL/L (ref 98–107)
CO2 SERPL-SCNC: 27.4 MMOL/L (ref 22–29)
CREAT SERPL-MCNC: 0.55 MG/DL (ref 0.76–1.27)
DEPRECATED RDW RBC AUTO: 43.3 FL (ref 37–54)
EGFRCR SERPLBLD CKD-EPI 2021: 96.5 ML/MIN/1.73
ERYTHROCYTE [DISTWIDTH] IN BLOOD BY AUTOMATED COUNT: 12 % (ref 12.3–15.4)
GLUCOSE BLDC GLUCOMTR-MCNC: 134 MG/DL (ref 70–130)
GLUCOSE BLDC GLUCOMTR-MCNC: 138 MG/DL (ref 70–130)
GLUCOSE BLDC GLUCOMTR-MCNC: 154 MG/DL (ref 70–130)
GLUCOSE SERPL-MCNC: 130 MG/DL (ref 65–99)
HCT VFR BLD AUTO: 41.3 % (ref 37.5–51)
HGB BLD-MCNC: 13.7 G/DL (ref 13–17.7)
MCH RBC QN AUTO: 32.9 PG (ref 26.6–33)
MCHC RBC AUTO-ENTMCNC: 33.2 G/DL (ref 31.5–35.7)
MCV RBC AUTO: 99 FL (ref 79–97)
PHOSPHATE SERPL-MCNC: 2.6 MG/DL (ref 2.5–4.5)
PLATELET # BLD AUTO: 310 10*3/MM3 (ref 140–450)
PMV BLD AUTO: 8.5 FL (ref 6–12)
POTASSIUM SERPL-SCNC: 4.5 MMOL/L (ref 3.5–5.2)
RBC # BLD AUTO: 4.17 10*6/MM3 (ref 4.14–5.8)
SODIUM SERPL-SCNC: 138 MMOL/L (ref 136–145)
WBC NRBC COR # BLD AUTO: 11.49 10*3/MM3 (ref 3.4–10.8)

## 2024-06-08 PROCEDURE — 82948 REAGENT STRIP/BLOOD GLUCOSE: CPT

## 2024-06-08 PROCEDURE — 94761 N-INVAS EAR/PLS OXIMETRY MLT: CPT

## 2024-06-08 PROCEDURE — 63710000001 PREDNISONE PER 1 MG: Performed by: INTERNAL MEDICINE

## 2024-06-08 PROCEDURE — 25010000002 THIAMINE HCL 200 MG/2ML SOLUTION: Performed by: STUDENT IN AN ORGANIZED HEALTH CARE EDUCATION/TRAINING PROGRAM

## 2024-06-08 PROCEDURE — 94618 PULMONARY STRESS TESTING: CPT

## 2024-06-08 PROCEDURE — 80048 BASIC METABOLIC PNL TOTAL CA: CPT | Performed by: STUDENT IN AN ORGANIZED HEALTH CARE EDUCATION/TRAINING PROGRAM

## 2024-06-08 PROCEDURE — 94799 UNLISTED PULMONARY SVC/PX: CPT

## 2024-06-08 PROCEDURE — 85027 COMPLETE CBC AUTOMATED: CPT | Performed by: STUDENT IN AN ORGANIZED HEALTH CARE EDUCATION/TRAINING PROGRAM

## 2024-06-08 PROCEDURE — 63710000001 INSULIN LISPRO (HUMAN) PER 5 UNITS: Performed by: STUDENT IN AN ORGANIZED HEALTH CARE EDUCATION/TRAINING PROGRAM

## 2024-06-08 PROCEDURE — 94760 N-INVAS EAR/PLS OXIMETRY 1: CPT

## 2024-06-08 PROCEDURE — 84100 ASSAY OF PHOSPHORUS: CPT | Performed by: STUDENT IN AN ORGANIZED HEALTH CARE EDUCATION/TRAINING PROGRAM

## 2024-06-08 PROCEDURE — 94664 DEMO&/EVAL PT USE INHALER: CPT

## 2024-06-08 RX ORDER — IPRATROPIUM BROMIDE AND ALBUTEROL SULFATE 2.5; .5 MG/3ML; MG/3ML
3 SOLUTION RESPIRATORY (INHALATION) EVERY 6 HOURS PRN
Qty: 360 ML | Refills: 0 | Status: SHIPPED | OUTPATIENT
Start: 2024-06-08 | End: 2024-07-08

## 2024-06-08 RX ORDER — IPRATROPIUM BROMIDE AND ALBUTEROL SULFATE 2.5; .5 MG/3ML; MG/3ML
3 SOLUTION RESPIRATORY (INHALATION)
Qty: 270 ML | Refills: 0 | Status: SHIPPED | OUTPATIENT
Start: 2024-06-08 | End: 2024-06-08

## 2024-06-08 RX ORDER — PREDNISONE 10 MG/1
TABLET ORAL
Qty: 21 TABLET | Refills: 0 | Status: SHIPPED | OUTPATIENT
Start: 2024-06-09 | End: 2024-06-18

## 2024-06-08 RX ADMIN — PANTOPRAZOLE SODIUM 40 MG: 40 TABLET, DELAYED RELEASE ORAL at 07:00

## 2024-06-08 RX ADMIN — IPRATROPIUM BROMIDE AND ALBUTEROL SULFATE 3 ML: .5; 3 SOLUTION RESPIRATORY (INHALATION) at 11:01

## 2024-06-08 RX ADMIN — Medication 1000 UNITS: at 09:50

## 2024-06-08 RX ADMIN — FOLIC ACID 1 MG: 1 TABLET ORAL at 09:50

## 2024-06-08 RX ADMIN — THIAMINE HYDROCHLORIDE 200 MG: 100 INJECTION, SOLUTION INTRAMUSCULAR; INTRAVENOUS at 16:45

## 2024-06-08 RX ADMIN — PREDNISONE 40 MG: 20 TABLET ORAL at 09:50

## 2024-06-08 RX ADMIN — INSULIN LISPRO 2 UNITS: 100 INJECTION, SOLUTION INTRAVENOUS; SUBCUTANEOUS at 16:44

## 2024-06-08 RX ADMIN — Medication 1 TABLET: at 09:50

## 2024-06-08 RX ADMIN — ATORVASTATIN CALCIUM 20 MG: 20 TABLET, FILM COATED ORAL at 09:50

## 2024-06-08 RX ADMIN — ASPIRIN 325 MG: 325 TABLET ORAL at 09:50

## 2024-06-08 RX ADMIN — AMLODIPINE BESYLATE 5 MG: 5 TABLET ORAL at 09:50

## 2024-06-08 RX ADMIN — THIAMINE HYDROCHLORIDE 200 MG: 100 INJECTION, SOLUTION INTRAMUSCULAR; INTRAVENOUS at 07:00

## 2024-06-08 RX ADMIN — IPRATROPIUM BROMIDE AND ALBUTEROL SULFATE 3 ML: .5; 3 SOLUTION RESPIRATORY (INHALATION) at 07:20

## 2024-06-08 RX ADMIN — METOPROLOL TARTRATE 50 MG: 50 TABLET, FILM COATED ORAL at 09:50

## 2024-06-08 NOTE — PROGRESS NOTES
[facility]     Mar/chart reviewed  Follow up Hypoxemia, ILD  Some watt, no chest pain    Vital Sign Min/Max for last 24 hours  Temp  Min: 97.4 °F (36.3 °C)  Max: 98.1 °F (36.7 °C)   BP  Min: 127/62  Max: 149/70   Pulse  Min: 63  Max: 86   Resp  Min: 16  Max: 20   SpO2  Min: 91 %  Max: 100 %   Flow (L/min)  Min: 3  Max: 5   Weight  Min: 94.3 kg (207 lb 14.3 oz)  Max: 94.3 kg (207 lb 14.3 oz)     Nad, axox3,   perrl, eomi, no icterus,  mmm, no jvd, trachea midline, neck supple,  chest fair ae bilaterally, + crackles, no wheezes,   rrr,   soft, nt, nd +bs,  no c/c/ 1+ edema  Skin warm, dry no rashes    Labs: 6/8: reviewed:  Bun 16  Cr 0.55  Bicarb 27  Wbc 11.5  Hgb 13.7  Plts 310    6/6 CT chest high glenda: reviewed and would agree with dictated report, doesn't appear to have progressed since 2021  Echo: ef 66-70%; rvsp 34    A/P:  1. Acute hypoxemic respiratory failure -- wean oxygen as able, wouldn't be surprised if he has an element of chronic hypoxemia  2. Emphysema/COPD with ae -- bronchodilators, should have formal pft as outpatient; steroids  3. 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 --no specific therapy at this time  4. Obesity    Patient is new to me today  No objection to d/c home; po steroid prednisone taper; breztri or trelegy inhaler on d/c. Oxygen if needed  Follow up outpatient in 4-6 weeks

## 2024-06-08 NOTE — CASE MANAGEMENT/SOCIAL WORK
Continued Stay Note  [facility]     Patient Name: Jim Marvin  MRN: [id number]  Today's Date: 6/8/2024    Admit Date: 6/4/2024    Plan: Home via private auto, [facility] for home O2   Discharge Plan       Row Name 06/08/24 1651       Plan    Plan Home via private auto, [place] for home O2    Patient/Family in Agreement with Plan yes    Plan Comments Inbound call from staff RN who states pt needs home O2 at [place]. Staff RN states pt wants to use [facility] as he has in the past. Called and confirmed with pt. Spoke with Maura/Lauren and she is able to review pt demographics/order in EPIC. Pt will be on 3L continuous and 6L with exertion. Per Maura, it would be best to send pt home with 2 portable tanks. Delivered 2 tanks to pt at bedside. Instructed pt to call [facility] on the way home to arrange a time for in-home equipment set-up. Pt verbalized understanding.........JW                   Discharge Codes    No documentation.                 Expected Discharge Date and Time       Expected Discharge Date Expected Discharge Time    Jun 8, 2024               Sarah Campbell, RN

## 2024-06-08 NOTE — PLAN OF CARE
Exercise Oximetry    Patient Name:Jim Marvin   MRN: [id number]   Date: 06/08/24             ROOM AIR BASELINE   SpO2%  85%   Heart Rate 85   Blood Pressure      EXERCISE ON ROOM AIR SpO2% EXERCISE ON O2 @ LPM SpO2%   1 MINUTE  1 MINUTE  @ 3L   88   2 MINUTES  2 MINUTES @ 3L   86   3 MINUTES  3 MINUTES @ 6L   90   4 MINUTES  4 MINUTES  90   5 MINUTES  5 MINUTES  88   6 MINUTES  6 MINUTES @ 6L   88               Distance Walked   Distance Walked   Dyspnea (Claudia Scale)   Dyspnea (Claudia Scale)   Fatigue (Claudia Scale)   Fatigue (Claudia Scale)   SpO2% Post Exercise   SpO2% Post Exercise 88/89% on 6L    HR Post Exercise   HR Post Exercise 90   Time to Recovery   Time to Recovery     Comments: Goal Outcome Evaluation:       Pt's O2 sat on room air noted to be between 83-85%. Pt was placed on 3L and within 2 minutes of walk O2 sat 86%. Pt was placed on 6L remainder of walk w/ O2 sats 88%. MD made aware.

## 2024-06-08 NOTE — DISCHARGE PLACEMENT REQUEST
"Jim Garcia (88 y.o. Male)       Date of Birth   1935    Social Security Number       Address   [address] [place] [address]    Home Phone   [phone]    MRN   [id number]       Jehovah's witness   Amish    Marital Status                               Admission Date   6/4/24    Admission Type   Emergency    Admitting Provider   Rianna Castro MD    Attending Provider   Eric Mosley MD    Department, Room/Bed   [address], E[address]/1       Discharge Date       Discharge Disposition   Home or Self Care    Discharge Destination                                 Attending Provider: Eric Mosley MD    Allergies: Ace Inhibitors, Lisinopril, Amlodipine    Isolation: None   Infection: None   Code Status: CPR    Ht: 175.3 cm (69.02\")   Wt: 94.3 kg (207 lb 14.3 oz)    Admission Cmt: None   Principal Problem: ILD (interstitial lung disease) [J84.9]                   Active Insurance as of 6/4/2024       Primary Coverage       Payor Plan Insurance Group Employer/Plan Group    MEDICARE MEDICARE A & B        Payor Plan Address Payor Plan Phone Number Payor Plan Fax Number Effective Dates    [address] [phone]  10/1/2002 - None Entered    [facility] [id number]         Subscriber Name Subscriber Birth Date Member ID       JIM GARCIA 1935 [id number]               Secondary Coverage       Payor Plan Insurance Group Employer/Plan Group    HUMANA HUMANA [facility]              [id number]       Payor Plan Address Payor Plan Phone Number Payor Plan Fax Number Effective Dates    [address] [address]   9/1/2013 - None Entered    [facility] [id number]         Subscriber Name Subscriber Birth Date Member ID       JIM GARCIA 1935 [id number]                     Emergency Contacts        (Rel.) Home Phone Work Phone Mobile Phone    jessica joya (Daughter) [phone] -- --                "

## 2024-06-08 NOTE — OUTREACH NOTE
Prep Survey      Flowsheet Row Responses   [facility] patient discharged from? [place]   Is LACE score < 7 ? No   Eligibility [facility]   Date of Admission 06/04/24   Date of Discharge 06/08/24   Discharge Disposition Home-Health Care [facility] [place] [place]   Discharge diagnosis (interstitial lung disease)   Does the patient have one of the following disease processes/diagnoses(primary or secondary)? Other   Does the patient have Home health ordered? No   Is there a DME ordered? Yes   What DME was ordered? [facility] for home O2   Prep survey completed? Yes            SAKSHI IVORY - Registered Nurse

## 2024-06-08 NOTE — DISCHARGE SUMMARY
Patient Name: Jim Marvin  : 1935  MRN: [id number]    Date of Admission: 2024  Date of Discharge:  2024  Primary Care Physician: Antonio Finley MD      Chief Complaint:   Shortness of Breath      Discharge Diagnoses     Active Hospital Problems    Diagnosis  POA    **ILD (interstitial lung disease) [J84.9]  Yes    Carotid artery stenosis [I65.29]  Yes    Acute hypoxic respiratory failure [J96.01]  Yes    Hypoxia [R09.02]  Yes    Emphysema lung [J43.9]  Yes    Acute respiratory failure with hypoxia [J96.01]  Yes    Hypertension [I10]  Yes    Hyperlipidemia [E78.5]  Yes      Resolved Hospital Problems   No resolved problems to display.        Hospital Course       Acute hypoxic respiratory failure, COPD, ILD  Patient is a 88-year-old male with a history of hypertension, hyperlipidemia,, carotid stenosis, COPD not on oxygen at home, presented to the ED complaining of worsening shortness of breath for the last 2 weeks.  Initial CTA in admission with no PE, did show interstitial changes of the lungs  that may be chronic in nature.  Respiratory panel was negative, there was no findings concerning for pneumonia on imaging.  Patient was initiated on IV steroids, scheduled breathing treatments.  Pulmonology was consulted.  Patient underwent follow-up high resolution CT on  which showed combined pulmonary emphysema and fibrosis. Emphysema is advanced with apical and paraseptal predominance.  Patient was unable to wean off oxygen, underwent walk oximetry prior to discharge.  Requiring up to 7 L oxygen with exertion, O2 saturation was 91-93% at rest.  Discussed with pulmonology, discharged home on oxygen 3 L at at rest and 6 L with exertion.  Discharged on as needed nebulizer, steroid taper.  Number to follow-up with pulmonology in 4 to 6 weeks.    Prediabetes  Hemoglobin A1c was elevated at 6.0 consistent with prediabetes.  Lifestyle modifications, diet encouraged.  Will need to follow-up with PCP  for surveillance/management.    At the time of discharge patient was told to take all medications as prescribed, keep all follow-up appointments, and call their doctor or return to the hospital with any worsening or concerning symptoms.              Day of Discharge     Subjective:  No acute events overnight, feeling better.  Continues to have shortness of breath with exertion but improved compared to admission, denies shortness of breath  at rest.  Denies chest pain or palpitations.  No fevers no chills.  Wants to be discharged home.    Physical Exam:  Temp:  [97.4 °F (36.3 °C)-98.1 °F (36.7 °C)] 97.6 °F (36.4 °C)  Heart Rate:  [63-86] 63  Resp:  [16-20] 18  BP: (127-149)/(62-80) 142/68  Body mass index is 30.69 kg/m².  Physical Exam      General: Alert and oriented x3, no acute distress  HEENT: Normocephalic, atraumatic  CV: Regular rate and rhythm, no murmurs rubs or gallops  Lungs: Bilateral Rales, no wheezing, nonlabored breathing  Abdomen: Soft, nontender, nondistended  Extremities: No significant peripheral edema , no cyanosis     Consultants     Consult Orders (all) (From admission, onward)       Start     Ordered    06/07/24 0952  Inpatient Case Management  Consult  Once        Provider:  (Not yet assigned)    06/07/24 0951    06/05/24 0729  Inpatient Pulmonology Consult  Once        Specialty:  Pulmonary Disease  Provider:  Roderick Loaiza MD    06/05/24 0728    06/04/24 1814  Inpatient Nutrition Consult  Once        Provider:  (Not yet assigned)    06/04/24 1814                  Procedures     * Surgery not found *      Imaging Results (All)       Procedure Component Value Units Date/Time    CT Chest Hi Resolution Diagnostic [[id number]] Collected: 06/06/24 2344     Updated: 06/07/24 0826    Narrative:      CT CHEST HI RESOLUTION DIAGNOSTIC-     HISTORY: ILD evaluation.     TECHNIQUE: Radiation dose reduction techniques were utilized, including  automated exposure control and exposure  modulation based on body size.  Prone 1mm images were obtained through the chest without the  administration of IV contrast. Expiratory imaging was obtained.     COMPARISON: CT chest 6/4/2024, 11/5/2021 and 9/22/2020        FINDINGS: Small sliding hernia. Cholelithiasis. Nondistended  gallbladder. Heart is normal in size. Severe coronary artery  calcifications. Trace pericardial fluid. Dilated main pulmonary artery  (37 mm) previously 34 mm in 2021. Nondilated thoracic aorta. No  mediastinal/hilar adenopathy.     High-resolution imaging is at least partially expiratory phase as  evidenced by bowing of the posterior tracheal membrane. Evaluation of  the lungs is partially limited by respiratory motion. Trachea and main  bronchi are patent. No bronchiectasis or bronchial wall thickening.  Advanced biapical predominant paraseptal predominant emphysema. Mild  bibasilar subpleural predominant reticulation/fibrosis.  Reticulation/fibrosis is very similar dating back to 2021. No  honeycombing. No superimposed groundglass opacities. Few calcified  granulomas. Expiratory imaging is limited by degree of expiration  without obvious air trapping. No pleural effusion.             Impression:      1. Combined pulmonary emphysema and fibrosis. Emphysema is advanced with  apical and paraseptal predominance. 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 Reticulation/fibrosis  is very similar dating back to 2021. No honeycombing. No superimposed  groundglass opacities.  2. Dilated main pulmonary artery (37 mm).  3. Small sliding hiatal hernia.        This report was finalized on 6/7/2024 8:23 AM by Dr. Mratell Chambers M.D on Workstation: [id number]       CT Angiogram Chest Pulmonary Embolism [[id number]] Collected: 06/04/24 1713     Updated: 06/06/24 0923    Narrative:      CT ANGIOGRAM OF THE CHEST WITH CONTRAST INCLUDING RECONSTRUCTION IMAGES  06/04/2024     HISTORY: Shortness of  breath. Hypoxia.     TECHNIQUE: Following the intravenous contrast injection CT angiography  was performed through the chest. Sagittal, coronal and 3D reconstruction  images were reviewed.     FINDINGS: The pulmonary arterial system is well opacified with no  evidence of pulmonary embolus. There is some aortic calcification.  Maximum diameter of the ascending aorta is approximately 4.1 cm. A few  small to slightly enlarged mediastinal and right hilar lymph nodes are  seen. A subcarinal node measures approximately 3.3 cm x 1.6 cm. There is  an approximately 2 cm right hilar lymph node. There are mild  interstitial changes of the lungs slightly more severe on the right than  on the left. These appear to have progressed slightly since the  11/05/2021 study but still may be chronic in nature. No suspicious  appearing lung masses are seen.     There are degenerative changes of the left shoulder. Right shoulder is  not included in the field-of-view.       Impression:      1. No evidence of pulmonary embolus.  2. Mild dilatation of the ascending aorta.  3. Interstitial changes of the lungs may be chronic in nature, right  greater than left.  4. Small amount of mediastinal and right hilar lymphadenopathy.  5. Single gallstone.  6. A short-term follow-up CT of the chest in 3 months to 4 months could  be obtained to assess stability of these findings.     Radiation dose reduction techniques were utilized, including automated  exposure control and exposure modulation based on body size.        This report was finalized on 6/6/2024 9:20 AM by Dr. Gerry Orozco M.D on Workstation: [id number]       XR Chest 1 View [[id number]] Collected: 06/04/24 1523     Updated: 06/04/24 1532    Narrative:      XR CHEST 1 VW-     HISTORY: Male who is 88 years-old, CHF/COPD protocol     TECHNIQUE: Frontal view of the chest     COMPARISON: 9/22/2021     FINDINGS: The heart size is borderline. Cardiac loop recorder is  apparent. Pulmonary  vasculature is congested. Aorta is tortuous. No  focal pulmonary consolidation, pleural effusion, or pneumothorax. No  acute osseous process.       Impression:      No focal pulmonary consolidation. Borderline heart size with  pulmonary vascular congestion. Tortuous aorta.     This report was finalized on 6/4/2024 3:29 PM by Dr. Ortiz Barber M.D on Workstation: [id number]               Results for orders placed during the hospital encounter of 06/04/24    Adult Transthoracic Echo Complete W/ Cont if Necessary Per Protocol    Interpretation Summary    Left ventricular systolic function is normal. Left ventricular ejection fraction appears to be 66 - 70%.    Left ventricular diastolic function is consistent with (grade I) impaired relaxation.    Normal right ventricular cavity size and systolic function noted.    The left atrial cavity is mildly dilated.    Mild tricuspid valve regurgitation is present.    Calculated right ventricular systolic pressure from tricuspid regurgitation is 34 mmHg.    There is no evidence of pericardial effusion    Pertinent Labs     Results from last 7 days   Lab Units 06/08/24  0651 06/07/24  0512 06/06/24  0319 06/05/24  0305   WBC 10*3/mm3 11.49* 13.78* 13.72* 9.50   HEMOGLOBIN g/dL 13.7 14.3 14.2 15.0   PLATELETS 10*3/mm3 310 318 308 302     Results from last 7 days   Lab Units 06/08/24  0651 06/07/24  0512 06/06/24  0319 06/05/24  0305   SODIUM mmol/L 138 134* 134* 134*   POTASSIUM mmol/L 4.5 4.4 4.1 4.6   CHLORIDE mmol/L 102 102 99 98   CO2 mmol/L 27.4 25.0 27.0 26.0   BUN mg/dL 16 17 20 10   CREATININE mg/dL 0.55* 0.60* 0.79 0.63*   GLUCOSE mg/dL 130* 152* 214* 123*   Estimated Creatinine Clearance: 109.2 mL/min (A) (by C-G formula based on SCr of 0.55 mg/dL (L)).  Results from last 7 days   Lab Units 06/04/24  1530   ALBUMIN g/dL 3.4*   BILIRUBIN mg/dL 0.9   ALK PHOS U/L 71   AST (SGOT) U/L 17   ALT (SGPT) U/L 28     Results from last 7 days   Lab Units 06/08/24  0651  "06/07/24  0512 06/06/24  0319 06/05/24  0305 06/04/24  1530   CALCIUM mg/dL 8.5* 8.5* 8.8 8.7 9.4   ALBUMIN g/dL  --   --   --   --  3.4*   MAGNESIUM mg/dL  --   --  2.5*  --   --    PHOSPHORUS mg/dL 2.6 3.2 2.2*  --   --        Results from last 7 days   Lab Units 06/05/24  0305 06/05/24  0049 06/04/24  1530   HSTROP T ng/L 19 21 25*   PROBNP pg/mL  --   --  1,120.0           Invalid input(s): \"LDLCALC\"      Results from last 7 days   Lab Units 06/04/24  1519   COVID19  Not Detected       Test Results Pending at Discharge       Discharge Details        Discharge Medications        New Medications        Instructions Start Date   ipratropium-albuterol 0.5-2.5 mg/3 ml nebulizer  Commonly known as: DUO-NEB   Inhale the contents of 1 vial by nebulization Every 6 (Six) Hours As Needed for Wheezing for up to 30 days.      predniSONE 10 MG tablet  Commonly known as: DELTASONE   Take 4 tablets by mouth Daily With Breakfast for 3 days, THEN 2 tablets Daily With Breakfast for 3 days, THEN 1 tablet Daily With Breakfast for 3 days.   Start Date: June 9, 2024            Continue These Medications        Instructions Start Date   amLODIPine 5 MG tablet  Commonly known as: NORVASC   5 mg, Oral, Daily      aspirin 325 MG tablet   325 mg, Oral, Daily      atorvastatin 20 MG tablet  Commonly known as: LIPITOR   20 mg, Oral, Daily      cholecalciferol 25 MCG (1000 UT) tablet  Commonly known as: VITAMIN D3   1,000 Units, Oral, Daily      EPINEPHrine 0.3 MG/0.3ML solution auto-injector injection  Commonly known as: EPIPEN   ADMINISTER 0.3 ML IN THE MUSCLE 1 TIME FOR 1 DOSE AS DIRECTED BY PRESCRIBER      metoprolol tartrate 50 MG tablet  Commonly known as: LOPRESSOR   50 mg, Oral, 2 Times Daily      Multi Vitamin Daily tablet tablet  Generic drug: multivitamin   1 tablet, Oral, Daily      multivitamin with minerals tablet tablet   1 tablet, Oral, Daily      mupirocin 2 % ointment  Commonly known as: BACTROBAN   1 application , Topical, " 3 Times Daily      Omega-3 Fish Oil 1000 MG capsule   1 capsule, Oral, Daily      Zinc 30 MG tablet   1 tablet, Oral, Every Other Day               Allergies   Allergen Reactions    Ace Inhibitors Other (See Comments)     Cannot remember     Lisinopril Other (See Comments)     Cannot remember    Amlodipine Swelling     angioedema       Discharge Disposition:  Home or Self Care      Discharge Diet:  Diet Order   Procedures    Diet: Cardiac; Healthy Heart (2-3 Na+); Fluid Consistency: Thin (IDDSI 0)       Discharge Activity:       CODE STATUS:    Code Status and Medical Interventions:   Ordered at: 06/04/24 1639     Code Status (Patient has no pulse and is not breathing):    CPR (Attempt to Resuscitate)     Medical Interventions (Patient has pulse or is breathing):    Full       Future Appointments   Date Time Provider Department Center   7/26/2024  1:00 PM [facility]      Follow-up Information       Antonio Finley MD. Schedule an appointment as soon as possible for a visit in 1 week(s).    Specialty: Family Medicine  Contact information:  [address]  [place] [address] [phone]               Gibson Aquino MD. Schedule an appointment as soon as possible for a visit in 4 week(s).    Specialties: Pulmonary Disease, Intensive Care  Contact information:  [address]  [place] [address] [phone]                             Time Spent on Discharge:  Greater than 30 minutes      Eric Mosley MD  [facility]  06/08/24  15:17 EDT

## 2024-06-08 NOTE — PLAN OF CARE
Goal Outcome Evaluation:  Plan of Care Reviewed With: patient        Progress: improving  Outcome Evaluation: PT A&Ox4, wanted to refuse insulin but agreed to injection after necessity explained and pt educated on risks/benefits. O2 demands fluctuate 3-7L due to exertion. No c/o pain. Melatonin for sleep. Discharge TBD.

## 2024-06-09 NOTE — CASE MANAGEMENT/SOCIAL WORK
Case Management Discharge Note      Final Note: home with 02 from [facility]         Selected Continued Care - Discharged on 6/8/2024 Admission date: 6/4/2024 - Discharge disposition: Home or Self Care      Destination    No services have been selected for the patient.                Durable Medical Equipment Coordination complete.      Service Provider Selected Services Address Phone Fax Patient Preferred    [facility] Medical Equipment [address] [address] [phone] --              Dialysis/Infusion    No services have been selected for the patient.                Home Medical Care    No services have been selected for the patient.                Therapy    No services have been selected for the patient.                Community Resources    No services have been selected for the patient.                Community & DME    No services have been selected for the patient.                    Transportation Services  Private: Car    Final Discharge Disposition Code: 01 - home or self-care

## 2024-06-10 ENCOUNTER — TRANSITIONAL CARE MANAGEMENT TELEPHONE ENCOUNTER (OUTPATIENT)
Dept: CALL CENTER | Facility: HOSPITAL | Age: 89
End: 2024-06-10
Payer: MEDICARE

## 2024-06-10 NOTE — OUTREACH NOTE
Call Center TCM Note      Flowsheet Row Responses   [facility] patient discharged from? [facility]   Does the patient have one of the following disease processes/diagnoses(primary or secondary)? Other   TCM attempt successful? Yes  [VR- Daughter]   Call start time 1500   Call end time 1507   Discharge diagnosis (interstitial lung disease)   Meds reviewed with patient/caregiver? Yes   Is the patient having any side effects they believe may be caused by any medication additions or changes? No   Does the patient have all medications ordered at discharge? Yes   Is the patient taking all medications as directed (includes completed medication regime)? Yes   Comments [facility] FU appt with Mayur 6/17/24 1115 am   Does the patient have an appointment with their PCP within 7-14 days of discharge? Yes   Has home health visited the patient within 72 hours of discharge? N/A   What DME was ordered? [facility] for home O2   Has all DME been delivered? Yes   DME comments needs neb machine   Psychosocial issues? No   Did the patient receive a copy of their discharge instructions? Yes   Nursing interventions Reviewed instructions with patient   What is the patient's perception of their health status since discharge? Improving   Is the patient/caregiver able to teach back signs and symptoms related to disease process for when to call PCP? Yes   Is the patient/caregiver able to teach back signs and symptoms related to disease process for when to call 911? Yes   Is the patient/caregiver able to teach back the hierarchy of who to call/visit for symptoms/problems? PCP, Specialist, Home health nurse, Urgent Care, ED, 911 Yes   TCM call completed? Yes   Wrap up additional comments Pt reports some improvement. Pt did not get a neb machine. WIll send to [facility]   Call end time 1507            Patience Velez RN    6/10/2024, 15:08 EDT

## 2024-06-11 NOTE — OUTREACH NOTE
Case Management Call Center Follow-up      Flowsheet Row Responses   [facility] Call Center Tracking Reason? No DME   Has the Call Center Case Management Follow-up issue been resolved? Yes   Follow-up Comments I talked to Ishan from [facility] and she is working on getting a Nebulizer to the patient.            Shannon Epley, RN    6/11/2024, 14:03 EDT

## 2024-06-15 ENCOUNTER — NURSE TRIAGE (OUTPATIENT)
Dept: CALL CENTER | Facility: HOSPITAL | Age: 89
End: 2024-06-15
Payer: MEDICARE

## 2024-06-15 NOTE — TELEPHONE ENCOUNTER
"Reason for Disposition   Health Information question, no triage required and triager able to answer question    Additional Information   Negative: [1] Caller is not with the adult (patient) AND [2] reporting urgent symptoms   Negative: Lab result questions   Negative: Medication questions   Negative: Caller can't be reached by phone   Negative: Caller has already spoken to PCP or another triager   Negative: RN needs further essential information from caller in order to complete triage   Negative: Requesting regular office appointment   Negative: [1] Caller requesting NON-URGENT health information AND [2] PCP's office is the best resource    Answer Assessment - Initial Assessment Questions  1. REASON FOR CALL or QUESTION: \"What is your reason for calling today?\" or \"How can I best help you?\" or \"What question do you have that I can help answer?\"      I have never received my nebulizer but I did get my O2.    Protocols used: Information Only Call - No Triage-ADULT-    "

## 2024-06-15 NOTE — TELEPHONE ENCOUNTER
Caller was supposed to have home O2 and home nebulizer.  He did get the O2 but [facility] has never sent the nebulizer.  Epic records reviewed and nebulizer is ordered.  He is going to call [facility] but will route this to  to follow up and make sure an order is in there.

## 2024-06-17 NOTE — OUTREACH NOTE
Case Management Call Center Follow-up      Flowsheet Row Responses   [facility] Call Center Tracking Reason? No DME   Has the Call Center Case Management Follow-up issue been resolved? Yes   Follow-up Comments Talked to  Ishan from [facility] and she is looking into this and will get his nebulizer to him ASAP.            Shannon Epley, RN    6/17/2024, 14:39 EDT

## 2024-06-24 ENCOUNTER — APPOINTMENT (OUTPATIENT)
Dept: GENERAL RADIOLOGY | Facility: HOSPITAL | Age: 89
End: 2024-06-24
Payer: MEDICARE

## 2024-06-24 ENCOUNTER — HOSPITAL ENCOUNTER (INPATIENT)
Facility: HOSPITAL | Age: 89
LOS: 8 days | Discharge: NURSING, REHAB OR LONG-TERM CARE | End: 2024-07-02
Attending: EMERGENCY MEDICINE
Payer: MEDICARE

## 2024-06-24 ENCOUNTER — APPOINTMENT (OUTPATIENT)
Dept: CT IMAGING | Facility: HOSPITAL | Age: 89
End: 2024-06-24
Payer: MEDICARE

## 2024-06-24 DIAGNOSIS — I48.91 NEW ONSET A-FIB: ICD-10-CM

## 2024-06-24 DIAGNOSIS — I48.91 ATRIAL FIBRILLATION WITH RAPID VENTRICULAR RESPONSE: Primary | ICD-10-CM

## 2024-06-24 DIAGNOSIS — R53.1 GENERALIZED WEAKNESS: ICD-10-CM

## 2024-06-24 DIAGNOSIS — I50.9 ACUTE CONGESTIVE HEART FAILURE, UNSPECIFIED HEART FAILURE TYPE: ICD-10-CM

## 2024-06-24 DIAGNOSIS — R79.89 POSITIVE D DIMER: ICD-10-CM

## 2024-06-24 DIAGNOSIS — J96.11 HYPOXEMIC RESPIRATORY FAILURE, CHRONIC: ICD-10-CM

## 2024-06-24 LAB
ALBUMIN SERPL-MCNC: 3.2 G/DL (ref 3.5–5.2)
ALBUMIN/GLOB SERPL: 1.1 G/DL
ALP SERPL-CCNC: 64 U/L (ref 39–117)
ALT SERPL W P-5'-P-CCNC: 40 U/L (ref 1–41)
AMPHET+METHAMPHET UR QL: NEGATIVE
ANION GAP SERPL CALCULATED.3IONS-SCNC: 14.4 MMOL/L (ref 5–15)
APTT PPP: 26.5 SECONDS (ref 22.7–35.4)
AST SERPL-CCNC: 20 U/L (ref 1–40)
B PARAPERT DNA SPEC QL NAA+PROBE: NOT DETECTED
B PERT DNA SPEC QL NAA+PROBE: NOT DETECTED
BARBITURATES UR QL SCN: NEGATIVE
BASOPHILS # BLD AUTO: 0.07 10*3/MM3 (ref 0–0.2)
BASOPHILS NFR BLD AUTO: 0.7 % (ref 0–1.5)
BENZODIAZ UR QL SCN: NEGATIVE
BILIRUB SERPL-MCNC: 1.5 MG/DL (ref 0–1.2)
BILIRUB UR QL STRIP: NEGATIVE
BUN SERPL-MCNC: 14 MG/DL (ref 8–23)
BUN/CREAT SERPL: 17.1 (ref 7–25)
C PNEUM DNA NPH QL NAA+NON-PROBE: NOT DETECTED
CALCIUM SPEC-SCNC: 9 MG/DL (ref 8.6–10.5)
CANNABINOIDS SERPL QL: NEGATIVE
CHLORIDE SERPL-SCNC: 94 MMOL/L (ref 98–107)
CLARITY UR: CLEAR
CO2 SERPL-SCNC: 22.6 MMOL/L (ref 22–29)
COCAINE UR QL: NEGATIVE
COLOR UR: YELLOW
CREAT SERPL-MCNC: 0.82 MG/DL (ref 0.76–1.27)
D DIMER PPP FEU-MCNC: 1.62 MCGFEU/ML (ref 0–0.86)
D-LACTATE SERPL-SCNC: 1.7 MMOL/L (ref 0.5–2)
DEPRECATED RDW RBC AUTO: 42.5 FL (ref 37–54)
EGFRCR SERPLBLD CKD-EPI 2021: 85.5 ML/MIN/1.73
EOSINOPHIL # BLD AUTO: 0.04 10*3/MM3 (ref 0–0.4)
EOSINOPHIL NFR BLD AUTO: 0.4 % (ref 0.3–6.2)
ERYTHROCYTE [DISTWIDTH] IN BLOOD BY AUTOMATED COUNT: 12.3 % (ref 12.3–15.4)
ETHANOL BLD-MCNC: <10 MG/DL (ref 0–10)
ETHANOL UR QL: <0.01 %
FENTANYL UR-MCNC: NEGATIVE NG/ML
FLUAV SUBTYP SPEC NAA+PROBE: NOT DETECTED
FLUBV RNA ISLT QL NAA+PROBE: NOT DETECTED
GEN 5 2HR TROPONIN T REFLEX: 53 NG/L
GLOBULIN UR ELPH-MCNC: 2.8 GM/DL
GLUCOSE SERPL-MCNC: 137 MG/DL (ref 65–99)
GLUCOSE UR STRIP-MCNC: NEGATIVE MG/DL
HADV DNA SPEC NAA+PROBE: NOT DETECTED
HCOV 229E RNA SPEC QL NAA+PROBE: NOT DETECTED
HCOV HKU1 RNA SPEC QL NAA+PROBE: NOT DETECTED
HCOV NL63 RNA SPEC QL NAA+PROBE: NOT DETECTED
HCOV OC43 RNA SPEC QL NAA+PROBE: NOT DETECTED
HCT VFR BLD AUTO: 39.2 % (ref 37.5–51)
HGB BLD-MCNC: 13.3 G/DL (ref 13–17.7)
HGB UR QL STRIP.AUTO: NEGATIVE
HMPV RNA NPH QL NAA+NON-PROBE: NOT DETECTED
HPIV1 RNA ISLT QL NAA+PROBE: NOT DETECTED
HPIV2 RNA SPEC QL NAA+PROBE: NOT DETECTED
HPIV3 RNA NPH QL NAA+PROBE: NOT DETECTED
HPIV4 P GENE NPH QL NAA+PROBE: NOT DETECTED
IMM GRANULOCYTES # BLD AUTO: 0.1 10*3/MM3 (ref 0–0.05)
IMM GRANULOCYTES NFR BLD AUTO: 0.9 % (ref 0–0.5)
INR PPP: 1.18 (ref 0.9–1.1)
KETONES UR QL STRIP: ABNORMAL
LEUKOCYTE ESTERASE UR QL STRIP.AUTO: NEGATIVE
LYMPHOCYTES # BLD AUTO: 0.26 10*3/MM3 (ref 0.7–3.1)
LYMPHOCYTES NFR BLD AUTO: 2.5 % (ref 19.6–45.3)
M PNEUMO IGG SER IA-ACNC: NOT DETECTED
MAGNESIUM SERPL-MCNC: 1.8 MG/DL (ref 1.6–2.4)
MCH RBC QN AUTO: 32.3 PG (ref 26.6–33)
MCHC RBC AUTO-ENTMCNC: 33.9 G/DL (ref 31.5–35.7)
MCV RBC AUTO: 95.1 FL (ref 79–97)
METHADONE UR QL SCN: NEGATIVE
MONOCYTES # BLD AUTO: 1.31 10*3/MM3 (ref 0.1–0.9)
MONOCYTES NFR BLD AUTO: 12.4 % (ref 5–12)
NEUTROPHILS NFR BLD AUTO: 8.81 10*3/MM3 (ref 1.7–7)
NEUTROPHILS NFR BLD AUTO: 83.1 % (ref 42.7–76)
NITRITE UR QL STRIP: NEGATIVE
NRBC BLD AUTO-RTO: 0 /100 WBC (ref 0–0.2)
NT-PROBNP SERPL-MCNC: 4574 PG/ML (ref 0–1800)
OPIATES UR QL: NEGATIVE
OXYCODONE UR QL SCN: NEGATIVE
PH UR STRIP.AUTO: 5.5 [PH] (ref 5–8)
PLATELET # BLD AUTO: 206 10*3/MM3 (ref 140–450)
PMV BLD AUTO: 9 FL (ref 6–12)
POTASSIUM SERPL-SCNC: 4.6 MMOL/L (ref 3.5–5.2)
PROCALCITONIN SERPL-MCNC: 0.23 NG/ML (ref 0–0.25)
PROT SERPL-MCNC: 6 G/DL (ref 6–8.5)
PROT UR QL STRIP: ABNORMAL
PROTHROMBIN TIME: 15.2 SECONDS (ref 11.7–14.2)
RBC # BLD AUTO: 4.12 10*6/MM3 (ref 4.14–5.8)
RHINOVIRUS RNA SPEC NAA+PROBE: NOT DETECTED
RSV RNA NPH QL NAA+NON-PROBE: NOT DETECTED
SARS-COV-2 RNA NPH QL NAA+NON-PROBE: NOT DETECTED
SODIUM SERPL-SCNC: 131 MMOL/L (ref 136–145)
SP GR UR STRIP: 1.01 (ref 1–1.03)
TROPONIN T DELTA: -5 NG/L
TROPONIN T SERPL HS-MCNC: 58 NG/L
TSH SERPL DL<=0.05 MIU/L-ACNC: 3.84 UIU/ML (ref 0.27–4.2)
UROBILINOGEN UR QL STRIP: ABNORMAL
WBC NRBC COR # BLD AUTO: 10.59 10*3/MM3 (ref 3.4–10.8)

## 2024-06-24 PROCEDURE — 84145 PROCALCITONIN (PCT): CPT | Performed by: EMERGENCY MEDICINE

## 2024-06-24 PROCEDURE — 71045 X-RAY EXAM CHEST 1 VIEW: CPT

## 2024-06-24 PROCEDURE — 85379 FIBRIN DEGRADATION QUANT: CPT | Performed by: EMERGENCY MEDICINE

## 2024-06-24 PROCEDURE — 99285 EMERGENCY DEPT VISIT HI MDM: CPT

## 2024-06-24 PROCEDURE — 83735 ASSAY OF MAGNESIUM: CPT | Performed by: EMERGENCY MEDICINE

## 2024-06-24 PROCEDURE — 83880 ASSAY OF NATRIURETIC PEPTIDE: CPT | Performed by: EMERGENCY MEDICINE

## 2024-06-24 PROCEDURE — 80307 DRUG TEST PRSMV CHEM ANLYZR: CPT | Performed by: EMERGENCY MEDICINE

## 2024-06-24 PROCEDURE — 25510000001 IOPAMIDOL PER 1 ML: Performed by: STUDENT IN AN ORGANIZED HEALTH CARE EDUCATION/TRAINING PROGRAM

## 2024-06-24 PROCEDURE — 25010000002 FUROSEMIDE PER 20 MG: Performed by: EMERGENCY MEDICINE

## 2024-06-24 PROCEDURE — 83605 ASSAY OF LACTIC ACID: CPT | Performed by: EMERGENCY MEDICINE

## 2024-06-24 PROCEDURE — 80053 COMPREHEN METABOLIC PANEL: CPT | Performed by: EMERGENCY MEDICINE

## 2024-06-24 PROCEDURE — 25010000002 DIGOXIN PER 500 MCG: Performed by: EMERGENCY MEDICINE

## 2024-06-24 PROCEDURE — 85730 THROMBOPLASTIN TIME PARTIAL: CPT | Performed by: EMERGENCY MEDICINE

## 2024-06-24 PROCEDURE — 25010000002 ENOXAPARIN PER 10 MG: Performed by: EMERGENCY MEDICINE

## 2024-06-24 PROCEDURE — 25810000003 SODIUM CHLORIDE 0.9 % SOLUTION: Performed by: EMERGENCY MEDICINE

## 2024-06-24 PROCEDURE — 0202U NFCT DS 22 TRGT SARS-COV-2: CPT | Performed by: EMERGENCY MEDICINE

## 2024-06-24 PROCEDURE — 25010000002 MAGNESIUM SULFATE 2 GM/50ML SOLUTION: Performed by: EMERGENCY MEDICINE

## 2024-06-24 PROCEDURE — 85025 COMPLETE CBC W/AUTO DIFF WBC: CPT | Performed by: EMERGENCY MEDICINE

## 2024-06-24 PROCEDURE — 84443 ASSAY THYROID STIM HORMONE: CPT | Performed by: EMERGENCY MEDICINE

## 2024-06-24 PROCEDURE — 36415 COLL VENOUS BLD VENIPUNCTURE: CPT

## 2024-06-24 PROCEDURE — 93005 ELECTROCARDIOGRAM TRACING: CPT | Performed by: EMERGENCY MEDICINE

## 2024-06-24 PROCEDURE — 82077 ASSAY SPEC XCP UR&BREATH IA: CPT | Performed by: EMERGENCY MEDICINE

## 2024-06-24 PROCEDURE — 84484 ASSAY OF TROPONIN QUANT: CPT | Performed by: EMERGENCY MEDICINE

## 2024-06-24 PROCEDURE — 81003 URINALYSIS AUTO W/O SCOPE: CPT | Performed by: EMERGENCY MEDICINE

## 2024-06-24 PROCEDURE — 85610 PROTHROMBIN TIME: CPT | Performed by: EMERGENCY MEDICINE

## 2024-06-24 PROCEDURE — 71275 CT ANGIOGRAPHY CHEST: CPT

## 2024-06-24 PROCEDURE — 93010 ELECTROCARDIOGRAM REPORT: CPT | Performed by: INTERNAL MEDICINE

## 2024-06-24 RX ORDER — DIGOXIN 0.25 MG/ML
500 INJECTION INTRAMUSCULAR; INTRAVENOUS ONCE
Status: COMPLETED | OUTPATIENT
Start: 2024-06-24 | End: 2024-06-24

## 2024-06-24 RX ORDER — ENOXAPARIN SODIUM 100 MG/ML
1 INJECTION SUBCUTANEOUS EVERY 12 HOURS
Status: DISCONTINUED | OUTPATIENT
Start: 2024-06-25 | End: 2024-06-26

## 2024-06-24 RX ORDER — ATORVASTATIN CALCIUM 20 MG/1
20 TABLET, FILM COATED ORAL DAILY
Status: CANCELLED | OUTPATIENT
Start: 2024-06-24

## 2024-06-24 RX ORDER — SODIUM CHLORIDE 9 MG/ML
40 INJECTION, SOLUTION INTRAVENOUS AS NEEDED
Status: DISCONTINUED | OUTPATIENT
Start: 2024-06-24 | End: 2024-07-02 | Stop reason: HOSPADM

## 2024-06-24 RX ORDER — AMOXICILLIN 250 MG
2 CAPSULE ORAL 2 TIMES DAILY PRN
Status: DISCONTINUED | OUTPATIENT
Start: 2024-06-24 | End: 2024-07-02 | Stop reason: HOSPADM

## 2024-06-24 RX ORDER — ENOXAPARIN SODIUM 100 MG/ML
1 INJECTION SUBCUTANEOUS ONCE
Status: DISCONTINUED | OUTPATIENT
Start: 2024-06-24 | End: 2024-06-24

## 2024-06-24 RX ORDER — BISACODYL 10 MG
10 SUPPOSITORY, RECTAL RECTAL DAILY PRN
Status: DISCONTINUED | OUTPATIENT
Start: 2024-06-24 | End: 2024-07-02 | Stop reason: HOSPADM

## 2024-06-24 RX ORDER — ENOXAPARIN SODIUM 100 MG/ML
1 INJECTION SUBCUTANEOUS ONCE
Status: COMPLETED | OUTPATIENT
Start: 2024-06-24 | End: 2024-06-24

## 2024-06-24 RX ORDER — MAGNESIUM SULFATE HEPTAHYDRATE 40 MG/ML
2 INJECTION, SOLUTION INTRAVENOUS ONCE
Status: COMPLETED | OUTPATIENT
Start: 2024-06-24 | End: 2024-06-24

## 2024-06-24 RX ORDER — SODIUM CHLORIDE 0.9 % (FLUSH) 0.9 %
10 SYRINGE (ML) INJECTION AS NEEDED
Status: DISCONTINUED | OUTPATIENT
Start: 2024-06-24 | End: 2024-07-02 | Stop reason: HOSPADM

## 2024-06-24 RX ORDER — SODIUM CHLORIDE 0.9 % (FLUSH) 0.9 %
10 SYRINGE (ML) INJECTION EVERY 12 HOURS SCHEDULED
Status: DISCONTINUED | OUTPATIENT
Start: 2024-06-24 | End: 2024-07-02 | Stop reason: HOSPADM

## 2024-06-24 RX ORDER — DILTIAZEM HYDROCHLORIDE 5 MG/ML
20 INJECTION INTRAVENOUS ONCE
Status: COMPLETED | OUTPATIENT
Start: 2024-06-24 | End: 2024-06-24

## 2024-06-24 RX ORDER — NITROGLYCERIN 0.4 MG/1
0.4 TABLET SUBLINGUAL
Status: DISCONTINUED | OUTPATIENT
Start: 2024-06-24 | End: 2024-07-02 | Stop reason: HOSPADM

## 2024-06-24 RX ORDER — BISACODYL 5 MG/1
5 TABLET, DELAYED RELEASE ORAL DAILY PRN
Status: DISCONTINUED | OUTPATIENT
Start: 2024-06-24 | End: 2024-07-02 | Stop reason: HOSPADM

## 2024-06-24 RX ORDER — ASPIRIN 325 MG
325 TABLET ORAL DAILY
Status: CANCELLED | OUTPATIENT
Start: 2024-06-24

## 2024-06-24 RX ORDER — FUROSEMIDE 10 MG/ML
80 INJECTION INTRAMUSCULAR; INTRAVENOUS ONCE
Status: COMPLETED | OUTPATIENT
Start: 2024-06-24 | End: 2024-06-24

## 2024-06-24 RX ORDER — POLYETHYLENE GLYCOL 3350 17 G/17G
17 POWDER, FOR SOLUTION ORAL DAILY PRN
Status: DISCONTINUED | OUTPATIENT
Start: 2024-06-24 | End: 2024-07-02 | Stop reason: HOSPADM

## 2024-06-24 RX ADMIN — FUROSEMIDE 80 MG: 10 INJECTION, SOLUTION INTRAMUSCULAR; INTRAVENOUS at 20:09

## 2024-06-24 RX ADMIN — IOPAMIDOL 95 ML: 755 INJECTION, SOLUTION INTRAVENOUS at 21:07

## 2024-06-24 RX ADMIN — DILTIAZEM HYDROCHLORIDE 20 MG: 5 INJECTION, SOLUTION INTRAVENOUS at 18:09

## 2024-06-24 RX ADMIN — DIGOXIN 500 MCG: 250 INJECTION, SOLUTION INTRAMUSCULAR; INTRAVENOUS; PARENTERAL at 18:54

## 2024-06-24 RX ADMIN — SODIUM CHLORIDE 500 ML: 9 INJECTION, SOLUTION INTRAVENOUS at 18:13

## 2024-06-24 RX ADMIN — ENOXAPARIN SODIUM 90 MG: 100 INJECTION SUBCUTANEOUS at 19:36

## 2024-06-24 RX ADMIN — MAGNESIUM SULFATE HEPTAHYDRATE 2 G: 2 INJECTION, SOLUTION INTRAVENOUS at 19:00

## 2024-06-24 RX ADMIN — METOPROLOL TARTRATE 25 MG: 25 TABLET, FILM COATED ORAL at 18:52

## 2024-06-24 RX ADMIN — Medication 10 ML: at 21:45

## 2024-06-24 NOTE — ED NOTES
Patient to ED via EMS from home. Patient was recently at this facility, discharged about 2 weeks ago. Patient states he was feeling good while he was in the hospital but since he was discharged he has had some depression and lack of appetite. Patient states he feels hungry but does not have desire to eat. Patient lives alone. Patient on 3LNC at all times.     Patient denies SI/HI

## 2024-06-24 NOTE — H&P
Patient Name:  Jim Marvin  YOB: 1935  MRN:  [id number]  Admit Date:  2024  Patient Care Team:  Antonio Finley MD as PCP - General (Family Medicine)  Ruben Burger Jr., MD as Consulting Physician (Urology)      Subjective   History Present Illness     Chief Complaint   Patient presents with    Decreased Appetite     Depression   This is an 88-year-old with a past medical history of COPD, interstitial lung disease, hypertension/hyperlipidemia as well as carotid artery stenosis who presented to hospital with anhedonia, poor appetite and generalized weakness, and worsening dyspnea with exertion.  He was noted to have an elevated heart rate and EMS was called.  He was brought to the hospital and found to have a heart rate as high as 140 with atrial fibrillation.  He has known history of atrial fibrillation.  EKG in the ER showed a rate of 170 with nonspecific ST and T wave changes.  Cardiology was consulted he was given both diltiazem and digoxin.  His heart rate did improve while he was in the emergency department      Personal History     Past Medical History:   Diagnosis Date    COPD (chronic obstructive pulmonary disease)     Hyperlipidemia     Hypertension     Stroke      Past Surgical History:   Procedure Laterality Date    CATARACT EXTRACTION Left 2022     No family history on file.  Social History     Tobacco Use    Smoking status: Former     Current packs/day: 0.00     Average packs/day: 1 pack/day for 40.0 years (40.0 ttl pk-yrs)     Types: Cigarettes     Start date:      Quit date:      Years since quittin.4    Smokeless tobacco: Never    Tobacco comments:     Quit 19 years ago    Vaping Use    Vaping status: Never Used   Substance Use Topics    Alcohol use: Yes     Comment: 15-20 francisca and diet coke a week    Drug use: Never     No current facility-administered medications on file prior to encounter.     Current Outpatient Medications on File Prior to Encounter    Medication Sig Dispense Refill    amLODIPine (NORVASC) 5 MG tablet TAKE 1 TABLET BY MOUTH DAILY 90 tablet 1    aspirin 325 MG tablet Take 1 tablet by mouth Daily.      atorvastatin (LIPITOR) 20 MG tablet TAKE 1 TABLET BY MOUTH DAILY 90 tablet 1    cholecalciferol (Vitamin D, Cholecalciferol,) 25 MCG (1000 UT) tablet Take 1 tablet by mouth Daily.      EPINEPHrine (EPIPEN) 0.3 MG/0.3ML solution auto-injector injection ADMINISTER 0.3 ML IN THE MUSCLE 1 TIME FOR 1 DOSE AS DIRECTED BY PRESCRIBER      ipratropium-albuterol (DUO-NEB) 0.5-2.5 mg/3 ml nebulizer Inhale the contents of 1 vial by nebulization Every 6 (Six) Hours As Needed for Wheezing for up to 30 days. 360 mL 0    metoprolol tartrate (LOPRESSOR) 50 MG tablet TAKE 1 TABLET BY MOUTH TWICE DAILY 180 tablet 1    Multiple Vitamin (MULTI VITAMIN DAILY) tablet Take 1 tablet by mouth Daily.      multivitamin with minerals tablet tablet Take 1 tablet by mouth Daily.      mupirocin (BACTROBAN) 2 % ointment Apply 1 application  topically to the appropriate area as directed 3 (Three) Times a Day. 30 g 1    Omega-3 Fatty Acids (OMEGA-3 FISH OIL) 1000 MG capsule Take 1 capsule by mouth Daily.      Zinc 30 MG tablet Take 1 tablet by mouth Every Other Day.       Allergies   Allergen Reactions    Ace Inhibitors Other (See Comments)     Cannot remember     Lisinopril Other (See Comments)     Cannot remember    Amlodipine Swelling     angioedema       Objective    Objective     Vital Signs  Temp:  [98.6 °F (37 °C)] 98.6 °F (37 °C)  Heart Rate:  [] 70  Resp:  [20] 20  BP: (104-122)/(67-87) 115/67  SpO2:  [90 %-96 %] 90 %  on  Flow (L/min):  [3] 3;   Device (Oxygen Therapy): nasal cannula  Body mass index is 28.7 kg/m².    Physical Exam  Constitutional:       General: He is not in acute distress.  HENT:      Head: Normocephalic and atraumatic.   Cardiovascular:      Rate and Rhythm: Normal rate and regular rhythm.   Pulmonary:      Effort: Pulmonary effort is normal. No  respiratory distress.      Breath sounds: No wheezing.   Abdominal:      General: There is no distension.      Palpations: Abdomen is soft.      Tenderness: There is no abdominal tenderness.   Musculoskeletal:      Right lower leg: No edema.      Left lower leg: No edema.   Skin:     General: Skin is warm and dry.   Neurological:      General: No focal deficit present.      Mental Status: He is alert and oriented to person, place, and time.         Results Review:  I reviewed the patient's new clinical results.  I reviewed the patient's new imaging results and agree with the interpretation.  I reviewed the patient's other test results and agree with the interpretation  I personally viewed and interpreted the patient's EKG/Telemetry data  Discussed with ED provider.    Lab Results (last 24 hours)       Procedure Component Value Units Date/Time    CBC & Differential [[id number]]  (Abnormal) Collected: 06/24/24 1801    Specimen: Blood Updated: 06/24/24 1821    Narrative:      The following orders were created for panel order CBC & Differential.  Procedure                               Abnormality         Status                     ---------                               -----------         ------                     CBC Auto Differential[[id number]]        Abnormal            Final result                 Please view results for these tests on the individual orders.    Comprehensive Metabolic Panel [[id number]]  (Abnormal) Collected: 06/24/24 1801    Specimen: Blood Updated: 06/24/24 1840     Glucose 137 mg/dL      BUN 14 mg/dL      Creatinine 0.82 mg/dL      Sodium 131 mmol/L      Potassium 4.6 mmol/L      Chloride 94 mmol/L      CO2 22.6 mmol/L      Calcium 9.0 mg/dL      Total Protein 6.0 g/dL      Albumin 3.2 g/dL      ALT (SGPT) 40 U/L      AST (SGOT) 20 U/L      Alkaline Phosphatase 64 U/L      Total Bilirubin 1.5 mg/dL      Globulin 2.8 gm/dL      A/G Ratio 1.1 g/dL      BUN/Creatinine Ratio 17.1     Anion Gap 14.4  mmol/L      eGFR 85.5 mL/min/1.73     Narrative:      GFR Normal >60  Chronic Kidney Disease <60  Kidney Failure <15    The GFR formula is only valid for adults with stable renal function between ages 18 and 70.    Protime-INR [[id number]]  (Abnormal) Collected: 06/24/24 1801    Specimen: Blood Updated: 06/24/24 1826     Protime 15.2 Seconds      INR 1.18    aPTT [[id number]]  (Normal) Collected: 06/24/24 1801    Specimen: Blood Updated: 06/24/24 1826     PTT 26.5 seconds     BNP [[id number]]  (Abnormal) Collected: 06/24/24 1801    Specimen: Blood Updated: 06/24/24 1842     proBNP 4,574.0 pg/mL     Narrative:      This assay is used as an aid in the diagnosis of individuals suspected of having heart failure. It can be used as an aid in the diagnosis of acute decompensated heart failure (ADHF) in patients presenting with signs and symptoms of ADHF to the emergency department (ED). In addition, NT-proBNP of <300 pg/mL indicates ADHF is not likely.    Age Range Result Interpretation  NT-proBNP Concentration (pg/mL:      <50             Positive            >450                   Gray                 300-450                    Negative             <300    50-75           Positive            >900                  Gray                300-900                  Negative            <300      >75             Positive            >1800                  Gray                300-1800                  Negative            <300    High Sensitivity Troponin T [[id number]]  (Abnormal) Collected: 06/24/24 1801    Specimen: Blood Updated: 06/24/24 1848     HS Troponin T 58 ng/L     Narrative:      High Sensitive Troponin T Reference Range:  <14.0 ng/L- Negative Female for AMI  <22.0 ng/L- Negative Male for AMI  >=14 - Abnormal Female indicating possible myocardial injury.  >=22 - Abnormal Male indicating possible myocardial injury.   Clinicians would have to utilize clinical acumen, EKG, Troponin, and serial changes to determine if it is an  "Acute Myocardial Infarction or myocardial injury due to an underlying chronic condition.         D-dimer, Quantitative [[id number]]  (Abnormal) Collected: 06/24/24 1801    Specimen: Blood Updated: 06/24/24 1826     D-Dimer, Quantitative 1.62 MCGFEU/mL     Narrative:      According to the assay 's published package insert, a normal (<0.50 MCGFEU/mL) D-dimer result in conjunction with a non-high clinical probability assessment, excludes deep vein thrombosis (DVT) and pulmonary embolism (PE) with high sensitivity.    D-dimer values increase with age and this can make VTE exclusion of an older population difficult. To address this, the [employer], based on best available evidence and recent guidelines, recommends that clinicians use age-adjusted D-dimer thresholds in patients greater than 50 years of age with: a) a low probability of PE who do not meet all Pulmonary Embolism Rule Out Criteria, or b) in those with intermediate probability of PE.   The formula for an age-adjusted D-dimer cut-off is \"age/100\".  For example, a 60 year old patient would have an age-adjusted cut-off of 0.60 MCGFEU/mL and an 80 year old 0.80 MCGFEU/mL.    Procalcitonin [[id number]]  (Normal) Collected: 06/24/24 1801    Specimen: Blood Updated: 06/24/24 1842     Procalcitonin 0.23 ng/mL     Narrative:      As a Marker for Sepsis (Non-Neonates):    1. <0.5 ng/mL represents a low risk of severe sepsis and/or septic shock.  2. >2 ng/mL represents a high risk of severe sepsis and/or septic shock.    As a Marker for Lower Respiratory Tract Infections that require antibiotic therapy:    PCT on Admission    Antibiotic Therapy       6-12 Hrs later    >0.5                Strongly Recommended  >0.25 - <0.5        Recommended   0.1 - 0.25          Discouraged              Remeasure/reassess PCT  <0.1                Strongly Discouraged     Remeasure/reassess PCT    As 28 day mortality risk marker: \"Change in Procalcitonin " "Result\" (>80% or <=80%) if Day 0 (or Day 1) and Day 4 values are available. Refer to http://www.[facility]-pct-calculator.com    Change in PCT <=80%  A decrease of PCT levels below or equal to 80% defines a positive change in PCT test result representing a higher risk for 28-day all-cause mortality of patients diagnosed with severe sepsis for septic shock.    Change in PCT >80%  A decrease of PCT levels of more than 80% defines a negative change in PCT result representing a lower risk for 28-day all-cause mortality of patients diagnosed with severe sepsis or septic shock.       Lactic Acid, Plasma [[id number]]  (Normal) Collected: 06/24/24 1801    Specimen: Blood Updated: 06/24/24 1837     Lactate 1.7 mmol/L     TSH [[id number]]  (Normal) Collected: 06/24/24 1801    Specimen: Blood Updated: 06/24/24 1842     TSH 3.840 uIU/mL     Magnesium [[id number]]  (Normal) Collected: 06/24/24 1801    Specimen: Blood Updated: 06/24/24 1840     Magnesium 1.8 mg/dL     CBC Auto Differential [[id number]]  (Abnormal) Collected: 06/24/24 1801    Specimen: Blood Updated: 06/24/24 1821     WBC 10.59 10*3/mm3      RBC 4.12 10*6/mm3      Hemoglobin 13.3 g/dL      Hematocrit 39.2 %      MCV 95.1 fL      MCH 32.3 pg      MCHC 33.9 g/dL      RDW 12.3 %      RDW-SD 42.5 fl      MPV 9.0 fL      Platelets 206 10*3/mm3      Neutrophil % 83.1 %      Lymphocyte % 2.5 %      Monocyte % 12.4 %      Eosinophil % 0.4 %      Basophil % 0.7 %      Immature Grans % 0.9 %      Neutrophils, Absolute 8.81 10*3/mm3      Lymphocytes, Absolute 0.26 10*3/mm3      Monocytes, Absolute 1.31 10*3/mm3      Eosinophils, Absolute 0.04 10*3/mm3      Basophils, Absolute 0.07 10*3/mm3      Immature Grans, Absolute 0.10 10*3/mm3      nRBC 0.0 /100 WBC     Ethanol [[id number]] Collected: 06/24/24 1801    Specimen: Blood Updated: 06/24/24 1840     Ethanol <10 mg/dL      Ethanol % <0.010 %     Respiratory Panel PCR w/COVID-19(SARS-CoV-2) JORGE LUIS/ALTAF/KIET/PAD/COR/BRITTANY In-House, NP Swab in " UTM/VTM, 2 HR TAT - Swab, Nasopharynx [[id number]]  (Normal) Collected: 06/24/24 1810    Specimen: Swab from Nasopharynx Updated: 06/24/24 1920     ADENOVIRUS, PCR Not Detected     Coronavirus 229E Not Detected     Coronavirus HKU1 Not Detected     Coronavirus NL63 Not Detected     Coronavirus OC43 Not Detected     COVID19 Not Detected     Human Metapneumovirus Not Detected     Human Rhinovirus/Enterovirus Not Detected     Influenza A PCR Not Detected     Influenza B PCR Not Detected     Parainfluenza Virus 1 Not Detected     Parainfluenza Virus 2 Not Detected     Parainfluenza Virus 3 Not Detected     Parainfluenza Virus 4 Not Detected     RSV, PCR Not Detected     Bordetella pertussis pcr Not Detected     Bordetella parapertussis PCR Not Detected     Chlamydophila pneumoniae PCR Not Detected     Mycoplasma pneumo by PCR Not Detected    Narrative:      In the setting of a positive respiratory panel with a viral infection PLUS a negative procalcitonin without other underlying concern for bacterial infection, consider observing off antibiotics or discontinuation of antibiotics and continue supportive care. If the respiratory panel is positive for atypical bacterial infection (Bordetella pertussis, Chlamydophila pneumoniae, or Mycoplasma pneumoniae), consider antibiotic de-escalation to target atypical bacterial infection.    High Sensitivity Troponin T 2Hr [[id number]]  (Abnormal) Collected: 06/24/24 1938    Specimen: Blood Updated: 06/24/24 2015     HS Troponin T 53 ng/L      Troponin T Delta -5 ng/L     Narrative:      High Sensitive Troponin T Reference Range:  <14.0 ng/L- Negative Female for AMI  <22.0 ng/L- Negative Male for AMI  >=14 - Abnormal Female indicating possible myocardial injury.  >=22 - Abnormal Male indicating possible myocardial injury.   Clinicians would have to utilize clinical acumen, EKG, Troponin, and serial changes to determine if it is an Acute Myocardial Infarction or myocardial injury due to  an underlying chronic condition.                 Results for orders placed or performed during the hospital encounter of 09/22/21   Blood Culture - Blood, Arm, Left    Specimen: Arm, Left; Blood   Result Value Ref Range    Blood Culture No growth at 5 days        Imaging Results (Last 24 Hours)       Procedure Component Value Units Date/Time    XR Chest 1 View [[id number]] Collected: 06/24/24 1838     Updated: 06/24/24 1843    Narrative:      XR CHEST 1 VW-     HISTORY: Male who is 88 years-old, weakness     TECHNIQUE: Frontal view of the chest     COMPARISON: 6/4/2024     FINDINGS: The heart size is borderline. Cardiac loop recorder is  apparent. Pulmonary vasculature is congested. Aorta is tortuous. No  focal pulmonary consolidation, pleural effusion, or pneumothorax. No  acute osseous process.       Impression:      No focal pulmonary consolidation. Borderline heart size with  pulmonary vascular congestion. Tortuous aorta.     This report was finalized on 6/24/2024 6:39 PM by Dr. Ortiz Barber M.D on Workstation: [id number]               Results for orders placed during the hospital encounter of 06/04/24    Adult Transthoracic Echo Complete W/ Cont if Necessary Per Protocol    Interpretation Summary    Left ventricular systolic function is normal. Left ventricular ejection fraction appears to be 66 - 70%.    Left ventricular diastolic function is consistent with (grade I) impaired relaxation.    Normal right ventricular cavity size and systolic function noted.    The left atrial cavity is mildly dilated.    Mild tricuspid valve regurgitation is present.    Calculated right ventricular systolic pressure from tricuspid regurgitation is 34 mmHg.    There is no evidence of pericardial effusion      ECG 12 Lead Tachycardia   Preliminary Result   HEART RATE= 173  bpm   RR Interval= 347  ms   WY Interval=   ms   P Horizontal Axis=   deg   P Front Axis=   deg   QRSD Interval= 90  ms   QT Interval= 281  ms   QTcB= 477   ms   QRS Axis= 11  deg   T Wave Axis= 39  deg   - ABNORMAL ECG -   Atrial fibrillation with rapid V-rate   Ventricular premature complex   ST depression, probably rate related   Electronically Signed By:    Date and Time of Study: 2024-06-24 17:49:07                 Assessment/Plan     Active Hospital Problems    Diagnosis  POA    **Atrial fibrillation with RVR [I48.91]  Yes    ILD (interstitial lung disease) [J84.9]  Yes    Carotid artery stenosis [I65.29]  Yes    Hypoxia [R09.02]  Yes    Emphysema lung [J43.9]  Yes    Hypertension [I10]  Yes    Hyperlipidemia [E78.5]  Yes      Resolved Hospital Problems   No resolved problems to display.     Atrial fibrillation with RVR  -new onset.   -TSH wnl  -Status post digoxin load.  -TTE pending. 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  proBNP is elevated to 4500  Received 1 dose Lasix 80 mg IV.  Monitor strict intake and output  Echocardiogram has been ordered and pending  Trend troponin    Elevated D-dimer  Patient has been given a dose of Lasix which I will continue in the setting paroxysmal atrial fibrillation  Obtain venous duplex of bilateral lower extremities instead as well    Chronic obstructive pulmonary disease  Interstitial lung disease  Continue DuoNebs    Hypertension  Hyperlipidemia  Blood pressures currently adequate.  Hold off on resuming home antihypertensives at this time.    Carotid stenosis  On aspirin and atorvastatin    I discussed the patient's findings and my recommendations with patient and ED provider.    VTE Prophylaxis - Pharmacy to dose Lovenox.  Code Status - Full code.       Tomás Hanna MD  [facility]  06/24/24  20:22 EDT
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
## 2024-06-25 ENCOUNTER — APPOINTMENT (OUTPATIENT)
Dept: CARDIOLOGY | Facility: HOSPITAL | Age: 89
End: 2024-06-25
Payer: MEDICARE

## 2024-06-25 PROBLEM — J96.21 ACUTE ON CHRONIC RESPIRATORY FAILURE WITH HYPOXIA: Status: ACTIVE | Noted: 2019-10-03

## 2024-06-25 LAB
ALBUMIN SERPL-MCNC: 2.8 G/DL (ref 3.5–5.2)
ALBUMIN/GLOB SERPL: 1.1 G/DL
ALP SERPL-CCNC: 56 U/L (ref 39–117)
ALT SERPL W P-5'-P-CCNC: 36 U/L (ref 1–41)
ANION GAP SERPL CALCULATED.3IONS-SCNC: 9 MMOL/L (ref 5–15)
AST SERPL-CCNC: 20 U/L (ref 1–40)
BH CV LOWER VASCULAR LEFT COMMON FEMORAL AUGMENT: NORMAL
BH CV LOWER VASCULAR LEFT COMMON FEMORAL COMPETENT: NORMAL
BH CV LOWER VASCULAR LEFT COMMON FEMORAL COMPRESS: NORMAL
BH CV LOWER VASCULAR LEFT COMMON FEMORAL PHASIC: NORMAL
BH CV LOWER VASCULAR LEFT COMMON FEMORAL SPONT: NORMAL
BH CV LOWER VASCULAR LEFT DISTAL FEMORAL COMPRESS: NORMAL
BH CV LOWER VASCULAR LEFT GASTRONEMIUS COMPRESS: NORMAL
BH CV LOWER VASCULAR LEFT GREATER SAPH AK COMPRESS: NORMAL
BH CV LOWER VASCULAR LEFT GREATER SAPH BK COMPRESS: NORMAL
BH CV LOWER VASCULAR LEFT LESSER SAPH COMPRESS: NORMAL
BH CV LOWER VASCULAR LEFT MID FEMORAL AUGMENT: NORMAL
BH CV LOWER VASCULAR LEFT MID FEMORAL COMPETENT: NORMAL
BH CV LOWER VASCULAR LEFT MID FEMORAL COMPRESS: NORMAL
BH CV LOWER VASCULAR LEFT MID FEMORAL PHASIC: NORMAL
BH CV LOWER VASCULAR LEFT MID FEMORAL SPONT: NORMAL
BH CV LOWER VASCULAR LEFT PERONEAL COMPRESS: NORMAL
BH CV LOWER VASCULAR LEFT POPLITEAL AUGMENT: NORMAL
BH CV LOWER VASCULAR LEFT POPLITEAL COMPETENT: NORMAL
BH CV LOWER VASCULAR LEFT POPLITEAL COMPRESS: NORMAL
BH CV LOWER VASCULAR LEFT POPLITEAL PHASIC: NORMAL
BH CV LOWER VASCULAR LEFT POPLITEAL SPONT: NORMAL
BH CV LOWER VASCULAR LEFT POSTERIOR TIBIAL COMPRESS: NORMAL
BH CV LOWER VASCULAR LEFT PROFUNDA FEMORAL COMPRESS: NORMAL
BH CV LOWER VASCULAR LEFT PROXIMAL FEMORAL COMPRESS: NORMAL
BH CV LOWER VASCULAR LEFT SAPHENOFEMORAL JUNCTION COMPRESS: NORMAL
BH CV LOWER VASCULAR RIGHT COMMON FEMORAL AUGMENT: NORMAL
BH CV LOWER VASCULAR RIGHT COMMON FEMORAL COMPETENT: NORMAL
BH CV LOWER VASCULAR RIGHT COMMON FEMORAL COMPRESS: NORMAL
BH CV LOWER VASCULAR RIGHT COMMON FEMORAL PHASIC: NORMAL
BH CV LOWER VASCULAR RIGHT COMMON FEMORAL SPONT: NORMAL
BH CV LOWER VASCULAR RIGHT DISTAL FEMORAL COMPRESS: NORMAL
BH CV LOWER VASCULAR RIGHT GASTRONEMIUS COMPRESS: NORMAL
BH CV LOWER VASCULAR RIGHT GREATER SAPH AK COMPRESS: NORMAL
BH CV LOWER VASCULAR RIGHT GREATER SAPH BK COMPRESS: NORMAL
BH CV LOWER VASCULAR RIGHT LESSER SAPH COMPRESS: NORMAL
BH CV LOWER VASCULAR RIGHT MID FEMORAL AUGMENT: NORMAL
BH CV LOWER VASCULAR RIGHT MID FEMORAL COMPETENT: NORMAL
BH CV LOWER VASCULAR RIGHT MID FEMORAL COMPRESS: NORMAL
BH CV LOWER VASCULAR RIGHT MID FEMORAL PHASIC: NORMAL
BH CV LOWER VASCULAR RIGHT MID FEMORAL SPONT: NORMAL
BH CV LOWER VASCULAR RIGHT PERONEAL COMPRESS: NORMAL
BH CV LOWER VASCULAR RIGHT POPLITEAL AUGMENT: NORMAL
BH CV LOWER VASCULAR RIGHT POPLITEAL COMPETENT: NORMAL
BH CV LOWER VASCULAR RIGHT POPLITEAL COMPRESS: NORMAL
BH CV LOWER VASCULAR RIGHT POPLITEAL PHASIC: NORMAL
BH CV LOWER VASCULAR RIGHT POPLITEAL SPONT: NORMAL
BH CV LOWER VASCULAR RIGHT POSTERIOR TIBIAL COMPRESS: NORMAL
BH CV LOWER VASCULAR RIGHT PROFUNDA FEMORAL COMPRESS: NORMAL
BH CV LOWER VASCULAR RIGHT PROXIMAL FEMORAL COMPRESS: NORMAL
BH CV LOWER VASCULAR RIGHT SAPHENOFEMORAL JUNCTION COMPRESS: NORMAL
BILIRUB SERPL-MCNC: 0.9 MG/DL (ref 0–1.2)
BUN SERPL-MCNC: 15 MG/DL (ref 8–23)
BUN/CREAT SERPL: 21.7 (ref 7–25)
CALCIUM SPEC-SCNC: 8.2 MG/DL (ref 8.6–10.5)
CHLORIDE SERPL-SCNC: 99 MMOL/L (ref 98–107)
CHOLEST SERPL-MCNC: 132 MG/DL (ref 0–200)
CO2 SERPL-SCNC: 25 MMOL/L (ref 22–29)
CREAT SERPL-MCNC: 0.69 MG/DL (ref 0.76–1.27)
DEPRECATED RDW RBC AUTO: 42 FL (ref 37–54)
EGFRCR SERPLBLD CKD-EPI 2021: 90.1 ML/MIN/1.73
ERYTHROCYTE [DISTWIDTH] IN BLOOD BY AUTOMATED COUNT: 12.3 % (ref 12.3–15.4)
GLOBULIN UR ELPH-MCNC: 2.6 GM/DL
GLUCOSE SERPL-MCNC: 79 MG/DL (ref 65–99)
HCT VFR BLD AUTO: 36.8 % (ref 37.5–51)
HDLC SERPL-MCNC: 34 MG/DL (ref 40–60)
HGB BLD-MCNC: 12.6 G/DL (ref 13–17.7)
HOLD SPECIMEN: NORMAL
LDLC SERPL CALC-MCNC: 84 MG/DL (ref 0–100)
LDLC/HDLC SERPL: 2.46 {RATIO}
MAGNESIUM SERPL-MCNC: 2.3 MG/DL (ref 1.6–2.4)
MCH RBC QN AUTO: 32.7 PG (ref 26.6–33)
MCHC RBC AUTO-ENTMCNC: 34.2 G/DL (ref 31.5–35.7)
MCV RBC AUTO: 95.6 FL (ref 79–97)
PHOSPHATE SERPL-MCNC: 3.1 MG/DL (ref 2.5–4.5)
PLATELET # BLD AUTO: 190 10*3/MM3 (ref 140–450)
PMV BLD AUTO: 9 FL (ref 6–12)
POTASSIUM SERPL-SCNC: 3.4 MMOL/L (ref 3.5–5.2)
POTASSIUM SERPL-SCNC: 3.9 MMOL/L (ref 3.5–5.2)
PROT SERPL-MCNC: 5.4 G/DL (ref 6–8.5)
QT INTERVAL: 281 MS
QT INTERVAL: 360 MS
QTC INTERVAL: 453 MS
QTC INTERVAL: 477 MS
RBC # BLD AUTO: 3.85 10*6/MM3 (ref 4.14–5.8)
SODIUM SERPL-SCNC: 133 MMOL/L (ref 136–145)
TRIGL SERPL-MCNC: 71 MG/DL (ref 0–150)
VLDLC SERPL-MCNC: 14 MG/DL (ref 5–40)
WBC NRBC COR # BLD AUTO: 7.43 10*3/MM3 (ref 3.4–10.8)

## 2024-06-25 PROCEDURE — 80061 LIPID PANEL: CPT | Performed by: INTERNAL MEDICINE

## 2024-06-25 PROCEDURE — 80053 COMPREHEN METABOLIC PANEL: CPT | Performed by: STUDENT IN AN ORGANIZED HEALTH CARE EDUCATION/TRAINING PROGRAM

## 2024-06-25 PROCEDURE — 90791 PSYCH DIAGNOSTIC EVALUATION: CPT

## 2024-06-25 PROCEDURE — 84132 ASSAY OF SERUM POTASSIUM: CPT | Performed by: INTERNAL MEDICINE

## 2024-06-25 PROCEDURE — 93010 ELECTROCARDIOGRAM REPORT: CPT | Performed by: INTERNAL MEDICINE

## 2024-06-25 PROCEDURE — 97530 THERAPEUTIC ACTIVITIES: CPT

## 2024-06-25 PROCEDURE — 25010000002 ENOXAPARIN PER 10 MG: Performed by: STUDENT IN AN ORGANIZED HEALTH CARE EDUCATION/TRAINING PROGRAM

## 2024-06-25 PROCEDURE — 97166 OT EVAL MOD COMPLEX 45 MIN: CPT

## 2024-06-25 PROCEDURE — 85027 COMPLETE CBC AUTOMATED: CPT | Performed by: STUDENT IN AN ORGANIZED HEALTH CARE EDUCATION/TRAINING PROGRAM

## 2024-06-25 PROCEDURE — 36415 COLL VENOUS BLD VENIPUNCTURE: CPT | Performed by: STUDENT IN AN ORGANIZED HEALTH CARE EDUCATION/TRAINING PROGRAM

## 2024-06-25 PROCEDURE — 99223 1ST HOSP IP/OBS HIGH 75: CPT | Performed by: INTERNAL MEDICINE

## 2024-06-25 PROCEDURE — 93005 ELECTROCARDIOGRAM TRACING: CPT | Performed by: INTERNAL MEDICINE

## 2024-06-25 PROCEDURE — 83735 ASSAY OF MAGNESIUM: CPT | Performed by: STUDENT IN AN ORGANIZED HEALTH CARE EDUCATION/TRAINING PROGRAM

## 2024-06-25 PROCEDURE — 93970 EXTREMITY STUDY: CPT | Performed by: SURGERY

## 2024-06-25 PROCEDURE — 97162 PT EVAL MOD COMPLEX 30 MIN: CPT

## 2024-06-25 PROCEDURE — 84100 ASSAY OF PHOSPHORUS: CPT | Performed by: STUDENT IN AN ORGANIZED HEALTH CARE EDUCATION/TRAINING PROGRAM

## 2024-06-25 PROCEDURE — 93970 EXTREMITY STUDY: CPT

## 2024-06-25 RX ORDER — FOLIC ACID 1 MG/1
1 TABLET ORAL DAILY
Status: DISCONTINUED | OUTPATIENT
Start: 2024-06-25 | End: 2024-07-02 | Stop reason: HOSPADM

## 2024-06-25 RX ORDER — IPRATROPIUM BROMIDE AND ALBUTEROL SULFATE 2.5; .5 MG/3ML; MG/3ML
3 SOLUTION RESPIRATORY (INHALATION) EVERY 6 HOURS PRN
Status: DISCONTINUED | OUTPATIENT
Start: 2024-06-25 | End: 2024-06-28

## 2024-06-25 RX ORDER — ASPIRIN 325 MG
325 TABLET ORAL DAILY
Status: DISCONTINUED | OUTPATIENT
Start: 2024-06-25 | End: 2024-06-26

## 2024-06-25 RX ORDER — LORAZEPAM 0.5 MG/1
0.5 TABLET ORAL EVERY 6 HOURS PRN
Status: ACTIVE | OUTPATIENT
Start: 2024-06-25 | End: 2024-07-02

## 2024-06-25 RX ORDER — ATORVASTATIN CALCIUM 20 MG/1
20 TABLET, FILM COATED ORAL DAILY
Status: DISCONTINUED | OUTPATIENT
Start: 2024-06-25 | End: 2024-07-02 | Stop reason: HOSPADM

## 2024-06-25 RX ORDER — POTASSIUM CHLORIDE 750 MG/1
40 TABLET, FILM COATED, EXTENDED RELEASE ORAL EVERY 4 HOURS
Status: COMPLETED | OUTPATIENT
Start: 2024-06-25 | End: 2024-06-25

## 2024-06-25 RX ORDER — ATORVASTATIN CALCIUM 20 MG/1
40 TABLET, FILM COATED ORAL DAILY
Status: DISCONTINUED | OUTPATIENT
Start: 2024-06-25 | End: 2024-06-25

## 2024-06-25 RX ORDER — DIPHENOXYLATE HYDROCHLORIDE AND ATROPINE SULFATE 2.5; .025 MG/1; MG/1
1 TABLET ORAL DAILY
Status: DISCONTINUED | OUTPATIENT
Start: 2024-06-25 | End: 2024-07-02 | Stop reason: HOSPADM

## 2024-06-25 RX ADMIN — POTASSIUM CHLORIDE 40 MEQ: 750 TABLET, EXTENDED RELEASE ORAL at 09:33

## 2024-06-25 RX ADMIN — Medication 10 ML: at 09:41

## 2024-06-25 RX ADMIN — Medication 1 TABLET: at 16:16

## 2024-06-25 RX ADMIN — Medication 100 MG: at 12:05

## 2024-06-25 RX ADMIN — FOLIC ACID 1 MG: 1 TABLET ORAL at 12:05

## 2024-06-25 RX ADMIN — ASPIRIN 325 MG: 325 TABLET ORAL at 16:16

## 2024-06-25 RX ADMIN — SENNOSIDES AND DOCUSATE SODIUM 2 TABLET: 50; 8.6 TABLET ORAL at 21:03

## 2024-06-25 RX ADMIN — POTASSIUM CHLORIDE 40 MEQ: 750 TABLET, EXTENDED RELEASE ORAL at 16:16

## 2024-06-25 RX ADMIN — Medication 10 ML: at 20:43

## 2024-06-25 RX ADMIN — ATORVASTATIN CALCIUM 20 MG: 20 TABLET, FILM COATED ORAL at 12:04

## 2024-06-25 RX ADMIN — SODIUM CHLORIDE 5 MG/HR: 900 INJECTION, SOLUTION INTRAVENOUS at 02:36

## 2024-06-25 RX ADMIN — ENOXAPARIN SODIUM 90 MG: 100 INJECTION SUBCUTANEOUS at 09:33

## 2024-06-25 RX ADMIN — DILTIAZEM HYDROCHLORIDE 90 MG: 60 TABLET, FILM COATED ORAL at 12:04

## 2024-06-25 RX ADMIN — ENOXAPARIN SODIUM 90 MG: 100 INJECTION SUBCUTANEOUS at 20:42

## 2024-06-25 RX ADMIN — DILTIAZEM HYDROCHLORIDE 90 MG: 60 TABLET, FILM COATED ORAL at 20:42

## 2024-06-25 NOTE — DISCHARGE PLACEMENT REQUEST
"Jim Garcia (88 y.o. Male)       Date of Birth   1935    Social Security Number       Address   [address] [place] [address]    Home Phone   [phone]    MRN   [id number]       Restoration[employer]   Advent    Marital Status                               Admission Date   6/24/24    Admission Type   Emergency    Admitting Provider   Tomás Hanna MD    Attending Provider   Salvador Spears MD    Department, Room/Bed   [address], [address]/1       Discharge Date       Discharge Disposition       Discharge Destination                                 Attending Provider: Salvador Spears MD    Allergies: Ace Inhibitors, Lisinopril, Amlodipine    Isolation: None   Infection: None   Code Status: Prior    Ht: 177.8 cm (70\")   Wt: 88.7 kg (195 lb 8.8 oz)    Admission Cmt: None   Principal Problem: Atrial fibrillation with RVR [I48.91]                   Active Insurance as of 6/24/2024       Primary Coverage       Payor Plan Insurance Group Employer/Plan Group    MEDICARE MEDICARE A & B        Payor Plan Address Payor Plan Phone Number Payor Plan Fax Number Effective Dates    [address] [phone]  10/1/2002 - None Entered    [facility] [id number]         Subscriber Name Subscriber Birth Date Member ID       JIM GARCIA 1935 [id number]               Secondary Coverage       Payor Plan Insurance Group Employer/Plan Group    HUMANA HUMANA [facility]              [id number]       Payor Plan Address Payor Plan Phone Number Payor Plan Fax Number Effective Dates    [address] [address]   9/1/2013 - None Entered    [facility] [id number]         Subscriber Name Subscriber Birth Date Member ID       JIM GARCIA 1935 [id number]                     Emergency Contacts        (Rel.) Home Phone Work Phone Mobile Phone    jessica joya (Daughter) [phone] -- --                "

## 2024-06-25 NOTE — PROGRESS NOTES
[facility] Clinical Pharmacy Services: Medication Reconciliation     Jim Marvin is a 88 y.o. male presenting to [facility] for New onset a-fib [I48.91]  Positive D dimer [R79.89]  Atrial fibrillation with rapid ventricular response [I48.91]  Atrial fibrillation with RVR [I48.91]  Hypoxemic respiratory failure, chronic [J96.11]  Generalized weakness [R53.1]  Acute congestive heart failure, unspecified heart failure type [I50.9]       He  has a past medical history of COPD (chronic obstructive pulmonary disease), Hyperlipidemia, Hypertension, and Stroke.       Allergies as of 06/24/2024 - Reviewed 06/24/2024   Allergen Reaction Noted    Ace inhibitors Other (See Comments) 05/17/2016    Lisinopril Other (See Comments) 05/17/2016    Amlodipine Swelling 05/06/2024          Medication information was obtained from: Pharmacy - [facility] and [facility]   Pharmacy and Phone Number:    Preferred Pharmacy:  [id number] - [place], [place] - [address] [facility] - [phone]  - [phone]   [address] [facility] [address]  Phone: [phone] Fax: [phone]    [facility] [place] [id number]  Phone: [phone] Fax: [phone]       Prior to Admission Medications       Prescriptions Last Dose Informant Patient Reported? Taking?    amLODIPine (NORVASC) 5 MG tablet  Self No Yes    TAKE 1 TABLET BY MOUTH DAILY    atorvastatin (LIPITOR) 20 MG tablet  Self No Yes    TAKE 1 TABLET BY MOUTH DAILY    ipratropium-albuterol (DUO-NEB) 0.5-2.5 mg/3 ml nebulizer   No Yes    Inhale the contents of 1 vial by nebulization Every 6 (Six) Hours As Needed for Wheezing for up to 30 days.    metoprolol tartrate (LOPRESSOR) 50 MG tablet  Self No Yes    TAKE 1 TABLET BY MOUTH TWICE DAILY    aspirin 325 MG tablet  Self Yes No    Take 1 tablet by mouth Daily.    cholecalciferol (Vitamin D, Cholecalciferol,) 25 MCG (1000 UT) tablet   Self Yes No    Take 1 tablet by mouth Daily.    EPINEPHrine (EPIPEN) 0.3 MG/0.3ML solution auto-injector injection   Yes No    ADMINISTER 0.3 ML IN THE MUSCLE 1 TIME FOR 1 DOSE AS DIRECTED BY PRESCRIBER    Multiple Vitamin (MULTI VITAMIN DAILY) tablet  Self Yes No    Take 1 tablet by mouth Daily.    multivitamin with minerals tablet tablet   Yes No    Take 1 tablet by mouth Daily.    mupirocin (BACTROBAN) 2 % ointment   No No    Apply 1 application  topically to the appropriate area as directed 3 (Three) Times a Day.    Omega-3 Fatty Acids (OMEGA-3 FISH OIL) 1000 MG capsule  Self Yes No    Take 1 capsule by mouth Daily.    Zinc 30 MG tablet  Self Yes No    Take 1 tablet by mouth Every Other Day.           Medication notes: Patient unable to recall his medications. Med rec was completed through [facility] and [facility]. Unable to verify adherence and OTC products.       This medication list is complete to the best of my knowledge as of 6/25/2024       Please call if questions.     Denise Velasco, PharmD  Clinical Pharmacist

## 2024-06-25 NOTE — PROGRESS NOTES
Discharge Planning Assessment  [facility]     Patient Name: Jim aMrvin  MRN: [id number]  Today's Date: 6/25/2024    Admit Date: 6/24/2024    Plan: SNF vs. home with HH   Discharge Needs Assessment       Row Name 06/25/24 1119       Living Environment    People in Home alone    Current Living Arrangements home    Potentially Unsafe Housing Conditions none    In the past 12 months has the electric, gas, oil, or water company threatened to shut off services in your home? Yes    Primary Care Provided by self    Provides Primary Care For no one    Family Caregiver if Needed child(linda), adult;other relative(s);friend(s)    Family Caregiver Names daughter, Madisyn    Quality of Family Relationships helpful;involved;supportive       Resource/Environmental Concerns    Resource/Environmental Concerns none    Transportation Concerns none       Transportation Needs    In the past 12 months, has lack of transportation kept you from medical appointments or from getting medications? no    In the past 12 months, has lack of transportation kept you from meetings, work, or from getting things needed for daily living? No       Transition Planning    Patient/Family Anticipates Transition to inpatient rehabilitation facility    Patient/Family Anticipated Services at Transition home health care;skilled nursing    Transportation Anticipated family or friend will provide;agency       Discharge Needs Assessment    Readmission Within the Last 30 Days other (see comments)  recent admission noted, see EMR    Equipment Currently Used at Home cane, straight;oxygen;respiratory supplies    Concerns to be Addressed adjustment to diagnosis/illness    Outpatient/Agency/Support Group Needs skilled nursing facility    Discharge Facility/Level of Care Needs home with home health;nursing facility, skilled    Provided Post Acute Provider List? Yes    Post Acute Provider List Home Health;Inpatient Rehab    Provided Post Acute Provider Quality &  Resource List? Yes    Post Acute Provider Quality and Resource List Home Health;Inpatient Rehab    Delivered To Patient    Method of Delivery In person;Telephone    Patient's Choice of Community Agency(s) TBD.                   Discharge Plan       Row Name 06/25/24 1126       Plan    Plan SNF vs. home with HH    Patient/Family in Agreement with Plan yes    Plan Comments Spoke with patient, verified facesheet and discussed dc plan. Patient lives alone, he is mostly IADL; daughter, Madisyn, granddaughter and neighbor assist as needed. Patient has home O2 through [employer], he uses a cane. There are two steps with railing to enter pt's home; all ADL are on the main level. Patient reports he owns a vehicle & he is able drive short distances when he is feeling well; family and friends assist with transportation as needed. PCP is Dr. Finley and preferred pharmacy is [facility]. Patient was recently dc from hospital on 6/9, he reports he is feeling very weak and may need SNF prior to returning home. He has never been to rehab. He is agreeable with referrals to local rehabs in the area. He also requested a list, which CCP will provide along with medicare.gov info. He gave his permission for CCP to discuss dc planning with his daughter, Madisyn if needed. If he starts to feel better he would like to return home with HH instead of SNF. He is agreeable with a referral to [facility]; Nancy notified. CCP will follow up.                  Continued Care and Services - Admitted Since 6/24/2024    No active coordination exists for this encounter.       Selected Continued Care - Prior Encounters Includes continued care and service providers with selected services from prior encounters from 3/26/2024 to 6/25/2024      Discharged on 6/8/2024 Admission date: 6/4/2024 - Discharge disposition: Home or Self Care      Durable Medical Equipment       Service Provider Selected Services Address Phone Fax Patient Preferred    [facility] [employer] [employer] [id number], [facility] [address] [phone] --                             Demographic Summary    No documentation.                  Functional Status       Row Name 06/25/24 1125       Functional Status    Usual Activity Tolerance moderate    Current Activity Tolerance fair       Assessment of Health Literacy    Health Literacy Good       Functional Status, IADL    Medications independent    Meal Preparation independent    Housekeeping independent    Laundry independent    Shopping independent;assistive equipment;assistive person  family assists as needed       Mental Status    General Appearance WDL WDL       Mental Status Summary    Recent Changes in Mental Status/Cognitive Functioning no changes       Employment/    Employment Status retired                   Psychosocial    No documentation.                  Abuse/Neglect    No documentation.                  Legal    No documentation.                  Substance Abuse    No documentation.                  Patient Forms    No documentation.                     Tabby Hua RN

## 2024-06-25 NOTE — PROGRESS NOTES
Name: Jim Marvin ADMIT: 2024   : 1935  PCP: Antonio Finley MD    MRN: [id number] LOS: 1 days   AGE/SEX: 88 y.o. male  ROOM: [facility]     Subjective   Subjective   Chief Complaint   Patient presents with    Decreased Appetite     Depression     Not reporting any chest pain or palpitations now.  He is not having worse shortness of breath but he does have shortness of breath chronically.  No change in cough.  Not reporting any nausea vomiting or abdominal pain.  He has some tremor present reports that this is present chronically.  He was feeling depressed and had stopped taking some of his home medications.  He reports that he was sleeping all of the time and did not even feel like he had the energy to get out of bed to get something to eat.  He does not report any SI or HI.  He was also drinking approximately half a pint of liquor a day.  He reports he has a few drinks every evening and was asking if this was too often.  We discussed the complications of nightly drinking in relation to mental health and also his A-fib.  His worsened depression symptoms have been present for about 1 week however he reports he had been drinking nightly for much longer than this.     Objective   Objective   Vital Signs  Temp:  [98.4 °F (36.9 °C)-99.3 °F (37.4 °C)] 99.3 °F (37.4 °C)  Heart Rate:  [] 150  Resp:  [20-24] 24  BP: ()/() 110/82  SpO2:  [88 %-96 %] 95 %  on  Flow (L/min):  [2-5] 3;   Device (Oxygen Therapy): nasal cannula  Body mass index is 28.06 kg/m².    Physical Exam  Vitals and nursing note reviewed.   Constitutional:       General: He is not in acute distress.     Appearance: He is not diaphoretic.   Cardiovascular:      Rate and Rhythm: Normal rate and regular rhythm.      Pulses: Normal pulses.   Pulmonary:      Effort: Pulmonary effort is normal.      Breath sounds: Rales present. No wheezing.   Abdominal:      General: There is no distension.      Palpations: Abdomen is soft.       "Tenderness: There is no abdominal tenderness. There is no guarding or rebound.   Musculoskeletal:         General: No tenderness.   Skin:     General: Skin is warm and dry.   Neurological:      Mental Status: He is alert.      Cranial Nerves: No cranial nerve deficit.      Motor: Tremor present.   Psychiatric:         Mood and Affect: Mood is depressed.         Behavior: Behavior normal.       Results Review  I reviewed the patient's new clinical results.    Results from last 7 days   Lab Units 06/25/24  0441 06/24/24  1801   WBC 10*3/mm3 7.43 10.59   HEMOGLOBIN g/dL 12.6* 13.3   PLATELETS 10*3/mm3 190 206     Results from last 7 days   Lab Units 06/25/24  0441 06/24/24  1801   SODIUM mmol/L 133* 131*   POTASSIUM mmol/L 3.4* 4.6   CHLORIDE mmol/L 99 94*   CO2 mmol/L 25.0 22.6   BUN mg/dL 15 14   CREATININE mg/dL 0.69* 0.82   GLUCOSE mg/dL 79 137*   EGFR mL/min/1.73 90.1 85.5     Results from last 7 days   Lab Units 06/25/24  0441 06/24/24  1801   ALBUMIN g/dL 2.8* 3.2*   BILIRUBIN mg/dL 0.9 1.5*   ALK PHOS U/L 56 64   AST (SGOT) U/L 20 20   ALT (SGPT) U/L 36 40     Results from last 7 days   Lab Units 06/25/24  0441 06/24/24  1801   CALCIUM mg/dL 8.2* 9.0   ALBUMIN g/dL 2.8* 3.2*   MAGNESIUM mg/dL 2.3 1.8   PHOSPHORUS mg/dL 3.1  --      Results from last 7 days   Lab Units 06/24/24  1801   PROCALCITONIN ng/mL 0.23   LACTATE mmol/L 1.7     No results found for: \"HGBA1C\", \"POCGLU\"    CT Angiogram Chest Pulmonary Embolism    Result Date: 6/24/2024   1. No acute pulmonary thromboembolus. 2. Increasing groundglass opacities noted, particularly within the left lung. An infectious or inflammatory process is not excluded.  Radiation dose reduction techniques were utilized, including automated exposure control and exposure modulation based on body size.   This report was finalized on 6/24/2024 9:52 PM by Dr. Mya Kapoor M.D on Workstation: [id number]      XR Chest 1 View    Result Date: 6/24/2024  No focal pulmonary " consolidation. Borderline heart size with pulmonary vascular congestion. Tortuous aorta.  This report was finalized on 6/24/2024 6:39 PM by Dr. Ortiz Barber M.D on Workstation: [id number]       I have personally reviewed all medications:  Scheduled Medications  atorvastatin, 40 mg, Oral, Daily  dilTIAZem, 90 mg, Oral, Q12H  enoxaparin, 1 mg/kg, Subcutaneous, Q12H  folic acid, 1 mg, Oral, Daily  multivitamin, 1 tablet, Oral, Daily  potassium chloride ER, 40 mEq, Oral, Q4H  sodium chloride, 10 mL, Intravenous, Q12H  thiamine, 100 mg, Oral, Daily      Infusions  dilTIAZem, 5-15 mg/hr, Last Rate: 5 mg/hr (06/25/24 0807)      Diet  Diet: Regular/House; Fluid Consistency: Thin (IDDSI 0)    I have personally reviewed:  [x]  Laboratory   [x]  Microbiology   [x]  Radiology   [x]  EKG/Telemetry  []  Cardiology/Vascular   []  Pathology    [x]  Records       Assessment/Plan     Active Hospital Problems    Diagnosis  POA    **Atrial fibrillation with RVR [I48.91]  Yes    ILD (interstitial lung disease) [J84.9]  Yes    Carotid artery stenosis [I65.29]  Yes    Hypoxia [R09.02]  Yes    Emphysema lung [J43.9]  Yes    Acute on chronic respiratory failure with hypoxia [J96.21]  Yes    Hypertension [I10]  Yes    Hyperlipidemia [E78.5]  Yes      Resolved Hospital Problems   No resolved problems to display.       88 y.o. male admitted with Atrial fibrillation with RVR.    A-fib with RVR: Has converted back to sinus rhythm on diltiazem drip.  Plan to transition to oral medications noted.  Cardiology following.  ILD/COPD: Continue breathing treatments . 3 L baseline O2 with titration to 6 L with exertion.  Follows with pulmonology.  Acute on chronic hypoxic respiratory failure  Depression: Symptoms have been worse for approximately 1 week.  He is open to speaking with psychiatry and access.  Will place consults.  Chronic alcohol use: Given vitamin supplementation.  Not having acute withdrawal although he does have some reportedly  chronic tremor.  Ativan if needed.  Access consult as above.  Complicating his A-fib.  PPX: Per cardiology  Disposition: TBD    Expected discharge date/ time has not been documented.     Salvador Spears MD  [facility]  06/25/24  11:26 EDT    Dictated portions of note using Dragon dictation software.  Copied text in this note has been reviewed by me and remains accurate as of 06/25/24

## 2024-06-25 NOTE — PLAN OF CARE
Goal Outcome Evaluation:  Plan of Care Reviewed With: patient        Progress: no change  Outcome Evaluation: pt is an 89 yo male admitted with new onset afib, acute CHF and generalized weakness. Pt seen this date for OT Damon calixto, reports he has been on 3L at home since last admission to this hospital. He reports he lives alone, no AD used for short household distances, (I) with ADLs. Per report, daughter and his neighbor can assist as needed. He presents this date with impaired activity tolerance and strength impacting overall (I) with ADLs and functional mobility. He is on 3L this date, desats with short func mob trial to 75%, titrate up to 4L with RN present for recovery, requiring several minutes. 87-90% on 3L at end of session. Pt very weak this date and has baseline tremors resulting in unsteadiness as well. He completed STS with CGA, SBA for bed mobility. Overall, very limited by activity tolerance with functional activity. He will continue ot benefit from skilled OT to address deficits and maximize (I) prior to d/c. Rec SNF at this time.      Anticipated Discharge Disposition (OT): skilled nursing facility

## 2024-06-25 NOTE — CONSULTS
[facility]    Patient Name: Jim Marvin  :1935  88 y.o.  LOS: 1  Encounter Provider: Robby Hall MD      Patient Care Team:  Antonio Finley MD as PCP - General (Family Medicine)  Ruben Burger Jr., MD as Consulting Physician (Urology)    Chief Complaint/Consult question:  decreased appetite and depression     PMH/HPI: chronic hypoxemic respiratory failure, COPD, emphysema, HTN, HLD and carotid artery stenosis.     Pt was admitted earlier this month for shortness of breath and was note to be hypoxic and admitted for respiratory failure secondary to interstitial lung disease/emphysema.     Interval History: Jim Marvin is a 88 year old pt who presented to ER on 24 with complaints of generalized weakness, anhedonia and poor appetite for about a week. Pt also have BAÑUELOS and feels like his oxygen is not helping much. He denied any nausea, vomiting, diarrhea or bloody stools, dysuria, hematuria, new cough, CP or headache. He denied his heart racing. In ER, , INR 1.18, troponin T 58, proBNP 4574, D dimer 1.62, procal 0.23, Lactate 1.7, TSH 3.840, MG 1.8, respiratory  panel negative,  CXR showed nothing acute, EKG showed Afib rate of 170, Pt was given diltiazem and digoxin in ER with improvement in HR. he was subsequently admitted on diltiazem drip and started on Lovenox.    I have been asked to see for Afib RVR.  At the time of interview, patient is in no acute distress.  Lives alone at home but feels quite weak and off balance over the past several weeks including 1 mechanical fall.  Denies chest pain with limited physical activities.  Stable exertional dyspnea but denies palpitation, leg edema, orthopnea, PND, bleeding.  Does not recall having had a heart attack or stroke in the past.  Former smoker 1-2 packs per day for 40 years and quit 27 years ago, drinks half a pint of alcohol per night, denies substance.      ECHO 24    Left ventricular systolic function is normal.  "Left ventricular ejection fraction appears to be 66 - 70%.    Left ventricular diastolic function is consistent with (grade I) impaired relaxation.    Normal right ventricular cavity size and systolic function noted.    The left atrial cavity is mildly dilated.    Mild tricuspid valve regurgitation is present.    Calculated right ventricular systolic pressure from tricuspid regurgitation is 34 mmHg.    There is no evidence of pericardial effusion       Objective   Vital Signs  Temp:  [98.4 °F (36.9 °C)-99.3 °F (37.4 °C)] 99.3 °F (37.4 °C)  Heart Rate:  [] 150  Resp:  [20-24] 24  BP: ()/() 110/82    Intake/Output Summary (Last 24 hours) at 6/25/2024 1021  Last data filed at 6/25/2024 0915  Gross per 24 hour   Intake 670 ml   Output 1450 ml   Net -780 ml     Flowsheet Rows      Flowsheet Row First Filed Value   Admission Height 177.8 cm (70\") Documented at 06/24/2024 1725   Admission Weight 90.7 kg (200 lb) Documented at 06/24/2024 1821              Vitals and nursing note reviewed.   Constitutional:       Appearance: Not in distress. Frail. Chronically ill-appearing.   Pulmonary:      Effort: Pulmonary effort is normal.      Breath sounds: Scattered Wheezing present.   Cardiovascular:      PMI at left midclavicular line. Normal rate. Irregular rhythm.      Murmurs: There is no murmur.   Edema:     Peripheral edema absent.   Abdominal:      General: Bowel sounds are normal. There is no distension.           Pertinent Test Results:  Results from last 7 days   Lab Units 06/25/24 0441 06/24/24  1801   SODIUM mmol/L 133* 131*   POTASSIUM mmol/L 3.4* 4.6   CHLORIDE mmol/L 99 94*   CO2 mmol/L 25.0 22.6   BUN mg/dL 15 14   CREATININE mg/dL 0.69* 0.82   GLUCOSE mg/dL 79 137*   CALCIUM mg/dL 8.2* 9.0   AST (SGOT) U/L 20 20   ALT (SGPT) U/L 36 40     Results from last 7 days   Lab Units 06/24/24  1938 06/24/24  1801   HSTROP T ng/L 53* 58*     Results from last 7 days   Lab Units 06/25/24 0441 06/24/24  1801 "   WBC 10*3/mm3 7.43 10.59   HEMOGLOBIN g/dL 12.6* 13.3   HEMATOCRIT % 36.8* 39.2   PLATELETS 10*3/mm3 190 206     Results from last 7 days   Lab Units 06/24/24  1801   INR  1.18*   APTT seconds 26.5     Results from last 7 days   Lab Units 06/25/24  0441 06/24/24  1801   MAGNESIUM mg/dL 2.3 1.8     Results from last 7 days   Lab Units 06/25/24  0441   CHOLESTEROL mg/dL 132   TRIGLYCERIDES mg/dL 71   HDL CHOL mg/dL 34*     Results from last 7 days   Lab Units 06/24/24  1801   PROBNP pg/mL 4,574.0*     Results from last 7 days   Lab Units 06/24/24  1801   TSH uIU/mL 3.840           Medication Review:   dilTIAZem, 90 mg, Oral, Q12H  enoxaparin, 1 mg/kg, Subcutaneous, Q12H  potassium chloride ER, 40 mEq, Oral, Q4H  sodium chloride, 10 mL, Intravenous, Q12H         dilTIAZem, 5-15 mg/hr, Last Rate: 5 mg/hr (06/25/24 0807)                                Assessment & Plan     Active Hospital Problems    Diagnosis  POA    **Atrial fibrillation with RVR [I48.91]  Yes    ILD (interstitial lung disease) [J84.9]  Yes    Carotid artery stenosis [I65.29]  Yes    Hypoxia [R09.02]  Yes    Emphysema lung [J43.9]  Yes    Acute on chronic respiratory failure with hypoxia [J96.21]  Yes    Hypertension [I10]  Yes    Hyperlipidemia [E78.5]  Yes      Resolved Hospital Problems   No resolved problems to display.        A-fib with RVR: Admitted with A-fib with RVR of rate up to 170s.  Unclear if patient has had A-fib in the past, he is unaware of diagnosis.  Immediate trigger may be COPD exacerbation in the setting of prior tobacco abuse and ongoing alcohol abuse.  Patient was administered IV digoxin 500 and diltiazem in the ED, and placed on diltiazem drip.  He appears to have converted on telemetry, will recheck twelve-lead ECG.  In the meantime, we will initiate p.o. diltiazem 90 bid and turn off IV diltiazem after 1 hour.  Will discontinue home metoprolol 50 bid given concomitant ILD/COPD.  Likely consolidate to extended release  diltiazem tomorrow depending on clinical course.  [id number] score 3, on therapeutic Lovenox, we will likely switch to Eliquis tomorrow with close monitoring given high fall risk.    Elevated troponin: Minimal troponin elevation 58 -> 53, not ACS based on trend, ECG and absence of chest pain.  Likely demand ischemia in the setting of A-fib with RVR.  Defer formal ischemic evaluation given advanced age.  Hypertension: Home meds include amlodipine 5 daily and Lopressor 50 bid which we will discontinue at this time given initiation of diltiazem as above. proBNP elevated at 4574 this admission in the setting of A-fib, echo showed normal biventricular function with mild diastolic dysfunction, no significant valvular abnormalities.  Volume status difficult to assess on exam, intermittent diuretics.  Hyperlipidemia: Lipids this admission 6/2024 showed HDL 34, LDL 84, reasonably well-controlled.  Continue home atorvastatin 20 daily.  COPD/ILD: Management per primary team and other specialists.  Prediabetes: Hemoglobin A1c 6.0 this admission. Management per primary team and other specialists.  Alcohol abuse: > 40 pack-year history of cigarette abuse but patient quit many years ago.  He still consumes half a pint of alcohol per night, strongly encouraged moderation.    High risk due to age, frailty, new onset A-fib with RVR in the setting of multiple comorbodities predisposing to major adverse events including myocardial infarction, stroke, hospitalization, and multi-agent drug therapy requiring intensive monitoring for toxicity (switching IV to oral diltiazem, high risk anticoagulation therapy)    Robby Hall MD  [facility]  06/25/24  06:55 EDT

## 2024-06-25 NOTE — PROGRESS NOTES
"[facility] Clinical Pharmacy Services: Enoxaparin Consult    Jim Marvin has a pharmacy consult to dose full-dose enoxaparin per Dr. Hanna's request.     Indication: A Fib - requiring full anticoagulation  Home Anticoagulation: none     Relevant clinical data and objective history reviewed:  88 y.o. male 177.8 cm (70\") 90.7 kg (200 lb)   Body mass index is 28.7 kg/m².   Results from last 7 days   Lab Units 06/24/24  1801   PLATELETS 10*3/mm3 206     Estimated Creatinine Clearance: 73.3 mL/min (by C-G formula based on SCr of 0.82 mg/dL).    Assessment/Plan    Will start patient on  90 mg (1mg/kg) subcutaneous every 12 hours, adjusted for renal function. Consult order will be discontinued but pharmacy will continue to follow.     Michelle Sims, [facility]  Clinical Pharmacist    "

## 2024-06-25 NOTE — PLAN OF CARE
Goal Outcome Evaluation:  Plan of Care Reviewed With: patient        Progress: no change  Outcome Evaluation: A&O x4, no c/o pain or nausea, no chest pain, in a-fib RVR converting back to SR to A-fib, 7 beat run of SVT, to A-fib rvr to SR, on 3L O2 baseline, very SOA with activity, Cardio consulted, Dr. Smiley started Cardizem gtt until rounding MD's see patient, tolerating Regular diet, patient does not knw of an allergy to Amlodipine and has been taking the medication for years, could not confirm home meds with patient, will have to call home parmacy in am when open, duplex of micaela CAR ordered, baseline tremors in arms and legs, plan of care on going, no further requesta at this time, call light within reach, bed alarm on

## 2024-06-25 NOTE — CONSULTS
"Patient seen by [facility] center d/t depression. Patient interviewed alone in room 466 (4E). Introduced self and role. Patient agreed to be evaluated. Patient was pleasant and cooperative during evaluation. Patient is A/OX4.     The patient is a 88 y.o.  male living alone.   Gnosticist/Spiritual: No  Children: One daughter  Occupation: Retired. Previously worked as  in [employer] as a supervisor  Hobbies: Games on Computer during the day and t.v. at night.  Legal: Denies  : [employer] for 3 years  Support System: Daughter. The patient voiced his daughter is a great support for him. The patient voiced his neighbor helps him with tasks around the house.   Hx of Violence: Denies  Hx of abuse/Trauma: Denies  Feel Safe at Home: Yes    The patient presented to the ED on 6/24 with generalized weakness, poor appetite, anhedonia, and worsening dyspnea. The patient was recently discharged from the hospital on 6/8/24 after having hypoxemic respiratory failure. The patient voiced after going home he felt increasing depression and a lack of motivation. The patient stated he was previously on a steroid taper and noticed his depressive symptoms and lack of motivation started after he was finished with his steroids. The patient was diagnosed with a-fib this hospital admission.     The patient denied any mental health history or treatment.     The patient rated his depression 7 or 8/10. The patient stated he could not think of any precipitating factors that is causing him to feel depressed. The patient voiced at home he had no motivation and a poor appetite. Later in the conversation the patient voiced his appetite has been o.k but has lacked the motivation to cook for himself.   The patient stated he believes he was 214lb during his last admission and now is at 195lbs. The patient voiced if he was discharged today he would just go home and \"crawl into bed and stay there.\" The patient voiced he was bale to eat breakfast today " "and is looking forward to lunch. The patient denied anxiety, SI, wish to be dead, HI, or hallucinations. The patient voiced good sleep.     The patient voiced he drinks 1/2 pint of \"Dar Wei\" per day. The patient stated he has been drinking this much for \"a long time.\" The patient voiced it helps relax him. The patient stated he is not interested in quitting ETOH and voiced \"I'm 88 years old, if I want to drink, I'm going to drink.\" The patient's last drink was on 6/23/24. Patient's BAL negative upon ED arrival. The patient stated his only time of sobriety is when he is in the hospital. The patient denied any history of issues with ETOH withdrawal. The patient's nurse was updated on patient's ETOH use. The patient's nurse reported the treatment team is already aware of his ETOH use. The patient denied any other substance use. UDS negative.     The patient voiced he is interested in speaking with the psychiatrist regarding medication management. A psychiatrist consult has been ordered. The patient voiced he is fine with [facility] following him but is not sure of his outpatient needs at this time. [facility] will follow and provide resources as needed.   "

## 2024-06-25 NOTE — PLAN OF CARE
Goal Outcome Evaluation:  Plan of Care Reviewed With: patient           Outcome Evaluation: Pt is an 87 yo male admitted with new onset A fib, CHF exacerbation and generalized weakness. Pt reports recently has required O2 at home since hospital admission a few weeks ago. Pt lives at home alone, denies use of AD. He reports he has not mobilized much recently as he does not have the energy. Pt demo generalized weakness, impaired balance, decreased activity tolerance and general functional mobility deficits below his baseline. Pt completed bed mobility with sba, STS with cga and ambulated 20' cga with walker. Pt amb on 3L O2 but desats to 75% with activity, required increase to 4L for several mins for recovery, RN present in room. Pt may benefit from ongoing skilled PT services to address functional mobility deficits. PT recommends d/c to SNF.      Anticipated Discharge Disposition (PT): skilled nursing facility

## 2024-06-25 NOTE — ED NOTES
Nursing report ED to floor  Jim BEATTY Yon  88 y.o.  male    HPI :  HPI (Adult)  Stated Reason for Visit: lack of appetite x 2 weeks, depression  History Obtained From: patient, EMS    Chief Complaint  Chief Complaint   Patient presents with    Decreased Appetite     Depression       Admitting doctor:   Tomás Hanna MD    Admitting diagnosis:   The primary encounter diagnosis was Atrial fibrillation with rapid ventricular response. Diagnoses of New onset a-fib, Generalized weakness, Hypoxemic respiratory failure, chronic, Acute congestive heart failure, unspecified heart failure type, and Positive D dimer were also pertinent to this visit.    Code status:   Current Code Status       Date Active Code Status Order ID Comments User Context       Prior            Allergies:   Ace inhibitors, Lisinopril, and Amlodipine    Isolation:   No active isolations    Intake and Output    Intake/Output Summary (Last 24 hours) at 6/24/2024 2011  Last data filed at 6/24/2024 2004  Gross per 24 hour   Intake 550 ml   Output --   Net 550 ml       Weight:       06/24/24  1821   Weight: 90.7 kg (200 lb)       Most recent vitals:   Vitals:    06/24/24 1854 06/24/24 1946 06/24/24 1952 06/24/24 2009   BP: 114/87 115/67     Pulse: (!) 183 98  70   Resp:       Temp:       TempSrc:       SpO2:   90%    Weight:       Height:           Active LDAs/IV Access:   Lines, Drains & Airways       Active LDAs       Name Placement date Placement time Site Days    Peripheral IV 06/24/24 1802 Left Antecubital 06/24/24  1802  Antecubital  less than 1                    Labs (abnormal labs have a star):   Labs Reviewed   COMPREHENSIVE METABOLIC PANEL - Abnormal; Notable for the following components:       Result Value    Glucose 137 (*)     Sodium 131 (*)     Chloride 94 (*)     Albumin 3.2 (*)     Total Bilirubin 1.5 (*)     All other components within normal limits    Narrative:     GFR Normal >60  Chronic Kidney Disease <60  Kidney Failure <15    The  GFR formula is only valid for adults with stable renal function between ages 18 and 70.   PROTIME-INR - Abnormal; Notable for the following components:    Protime 15.2 (*)     INR 1.18 (*)     All other components within normal limits   BNP (IN-HOUSE) - Abnormal; Notable for the following components:    proBNP 4,574.0 (*)     All other components within normal limits    Narrative:     This assay is used as an aid in the diagnosis of individuals suspected of having heart failure. It can be used as an aid in the diagnosis of acute decompensated heart failure (ADHF) in patients presenting with signs and symptoms of ADHF to the emergency department (ED). In addition, NT-proBNP of <300 pg/mL indicates ADHF is not likely.    Age Range Result Interpretation  NT-proBNP Concentration (pg/mL:      <50             Positive            >450                   Gray                 300-450                    Negative             <300    50-75           Positive            >900                  Gray                300-900                  Negative            <300      >75             Positive            >1800                  Gray                300-1800                  Negative            <300   TROPONIN - Abnormal; Notable for the following components:    HS Troponin T 58 (*)     All other components within normal limits    Narrative:     High Sensitive Troponin T Reference Range:  <14.0 ng/L- Negative Female for AMI  <22.0 ng/L- Negative Male for AMI  >=14 - Abnormal Female indicating possible myocardial injury.  >=22 - Abnormal Male indicating possible myocardial injury.   Clinicians would have to utilize clinical acumen, EKG, Troponin, and serial changes to determine if it is an Acute Myocardial Infarction or myocardial injury due to an underlying chronic condition.        D-DIMER, QUANTITATIVE - Abnormal; Notable for the following components:    D-Dimer, Quantitative 1.62 (*)     All other components within normal limits     "Narrative:     According to the assay 's published package insert, a normal (<0.50 MCGFEU/mL) D-dimer result in conjunction with a non-high clinical probability assessment, excludes deep vein thrombosis (DVT) and pulmonary embolism (PE) with high sensitivity.    D-dimer values increase with age and this can make VTE exclusion of an older population difficult. To address this, the American College of Physicians, based on best available evidence and recent guidelines, recommends that clinicians use age-adjusted D-dimer thresholds in patients greater than 50 years of age with: a) a low probability of PE who do not meet all Pulmonary Embolism Rule Out Criteria, or b) in those with intermediate probability of PE.   The formula for an age-adjusted D-dimer cut-off is \"age/100\".  For example, a 60 year old patient would have an age-adjusted cut-off of 0.60 MCGFEU/mL and an 80 year old 0.80 MCGFEU/mL.   CBC WITH AUTO DIFFERENTIAL - Abnormal; Notable for the following components:    RBC 4.12 (*)     Neutrophil % 83.1 (*)     Lymphocyte % 2.5 (*)     Monocyte % 12.4 (*)     Immature Grans % 0.9 (*)     Neutrophils, Absolute 8.81 (*)     Lymphocytes, Absolute 0.26 (*)     Monocytes, Absolute 1.31 (*)     Immature Grans, Absolute 0.10 (*)     All other components within normal limits   RESPIRATORY PANEL PCR W/ COVID-19 (SARS-COV-2), NP SWAB IN UTM/VTP, 2 HR TAT - Normal    Narrative:     In the setting of a positive respiratory panel with a viral infection PLUS a negative procalcitonin without other underlying concern for bacterial infection, consider observing off antibiotics or discontinuation of antibiotics and continue supportive care. If the respiratory panel is positive for atypical bacterial infection (Bordetella pertussis, Chlamydophila pneumoniae, or Mycoplasma pneumoniae), consider antibiotic de-escalation to target atypical bacterial infection.   APTT - Normal   PROCALCITONIN - Normal    Narrative:     As " "a Marker for Sepsis (Non-Neonates):    1. <0.5 ng/mL represents a low risk of severe sepsis and/or septic shock.  2. >2 ng/mL represents a high risk of severe sepsis and/or septic shock.    As a Marker for Lower Respiratory Tract Infections that require antibiotic therapy:    PCT on Admission    Antibiotic Therapy       6-12 Hrs later    >0.5                Strongly Recommended  >0.25 - <0.5        Recommended   0.1 - 0.25          Discouraged              Remeasure/reassess PCT  <0.1                Strongly Discouraged     Remeasure/reassess PCT    As 28 day mortality risk marker: \"Change in Procalcitonin Result\" (>80% or <=80%) if Day 0 (or Day 1) and Day 4 values are available. Refer to http://www.[employer] NLGpct-calculator.com    Change in PCT <=80%  A decrease of PCT levels below or equal to 80% defines a positive change in PCT test result representing a higher risk for 28-day all-cause mortality of patients diagnosed with severe sepsis for septic shock.    Change in PCT >80%  A decrease of PCT levels of more than 80% defines a negative change in PCT result representing a lower risk for 28-day all-cause mortality of patients diagnosed with severe sepsis or septic shock.      LACTIC ACID, PLASMA - Normal   TSH - Normal   MAGNESIUM - Normal   ETHANOL   URINALYSIS W/ MICROSCOPIC IF INDICATED (NO CULTURE)   URINE DRUG SCREEN   HIGH SENSITIVITIY TROPONIN T 2HR   CBC AND DIFFERENTIAL    Narrative:     The following orders were created for panel order CBC & Differential.  Procedure                               Abnormality         Status                     ---------                               -----------         ------                     CBC Auto Differential[[id number]]        Abnormal            Final result                 Please view results for these tests on the individual orders.       EKG:   ECG 12 Lead Tachycardia   Preliminary Result   HEART RATE= 173  bpm   RR Interval= 347  ms   MA Interval=   ms   P " Horizontal Axis=   deg   P Front Axis=   deg   QRSD Interval= 90  ms   QT Interval= 281  ms   QTcB= 477  ms   QRS Axis= 11  deg   T Wave Axis= 39  deg   - ABNORMAL ECG -   Atrial fibrillation with rapid V-rate   Ventricular premature complex   ST depression, probably rate related   Electronically Signed By:    Date and Time of Study: 2024 17:49:07          Meds given in ED:   Medications   sodium chloride 0.9 % flush 10 mL (has no administration in time range)   dilTIAZem (CARDIZEM) injection 20 mg (20 mg Intravenous Given 24)   sodium chloride 0.9 % bolus 500 mL (0 mL Intravenous Stopped 24)   metoprolol tartrate (LOPRESSOR) tablet 25 mg (25 mg Oral Given 24)   digoxin (LANOXIN) injection 500 mcg (500 mcg Intravenous Given 24)   magnesium sulfate 2g/50 mL (PREMIX) infusion (0 g Intravenous Stopped 24)   Enoxaparin Sodium (LOVENOX) syringe 90 mg (90 mg Subcutaneous Given 24)   furosemide (LASIX) injection 80 mg (80 mg Intravenous Given 24)       Imaging results:  XR Chest 1 View    Result Date: 2024  No focal pulmonary consolidation. Borderline heart size with pulmonary vascular congestion. Tortuous aorta.  This report was finalized on 2024 6:39 PM by Dr. Ortiz Barber M.D on Workstation: [employer]       Ambulatory status:   - ad qiana    Social issues:   Social History     Socioeconomic History    Marital status:    Tobacco Use    Smoking status: Former     Current packs/day: 0.00     Average packs/day: 1 pack/day for 40.0 years (40.0 ttl pk-yrs)     Types: Cigarettes     Start date:      Quit date:      Years since quittin.4    Smokeless tobacco: Never    Tobacco comments:     Quit 19 years ago    Vaping Use    Vaping status: Never Used   Substance and Sexual Activity    Alcohol use: Yes     Comment: 15-20 francisca and diet coke a week    Drug use: Never       Peripheral Neurovascular  Peripheral Neurovascular  (Adult)  Peripheral Neurovascular WDL: WDL    Neuro Cognitive  Neuro Cognitive (Adult)  Cognitive/Neuro/Behavioral WDL: .WDL except, mood/behavior  Level of Consciousness: Alert  Arousal Level: opens eyes spontaneously  Orientation: oriented x 4  Mood/Behavior: cooperative, anxious    Learning  Learning Assessment (Adult)  Learning Readiness and Ability: no barriers identified    Respiratory  Respiratory WDL  Respiratory WDL: .WDL except, all  Rhythm/Pattern, Respiratory: shortness of breath    Abdominal Pain       Pain Assessments  Pain (Adult)  (0-10) Pain Rating: Rest: 0    NIH Stroke Scale       Jamar Hernandez RN  06/24/24 20:11 EDT

## 2024-06-25 NOTE — CONSULTS
Nutrition Services    Patient Name:  Jim Marvin  YOB: 1935  MRN: [id number]  Admit Date:  6/24/2024    RD consulted for nutrition assessment. Pt currently off the floor in vascular lab. RD will follow up tomorrow to perform nutrition assessment.     Electronically signed by:  Ramesh Otto  06/25/24 13:49 EDT
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
## 2024-06-26 LAB
ANION GAP SERPL CALCULATED.3IONS-SCNC: 10 MMOL/L (ref 5–15)
BUN SERPL-MCNC: 10 MG/DL (ref 8–23)
BUN/CREAT SERPL: 18.9 (ref 7–25)
CALCIUM SPEC-SCNC: 8 MG/DL (ref 8.6–10.5)
CHLORIDE SERPL-SCNC: 101 MMOL/L (ref 98–107)
CO2 SERPL-SCNC: 23 MMOL/L (ref 22–29)
CREAT SERPL-MCNC: 0.53 MG/DL (ref 0.76–1.27)
DEPRECATED RDW RBC AUTO: 42.4 FL (ref 37–54)
EGFRCR SERPLBLD CKD-EPI 2021: 97.6 ML/MIN/1.73
ERYTHROCYTE [DISTWIDTH] IN BLOOD BY AUTOMATED COUNT: 12.1 % (ref 12.3–15.4)
GLUCOSE SERPL-MCNC: 101 MG/DL (ref 65–99)
HCT VFR BLD AUTO: 36.5 % (ref 37.5–51)
HGB BLD-MCNC: 12.2 G/DL (ref 13–17.7)
MAGNESIUM SERPL-MCNC: 2.2 MG/DL (ref 1.6–2.4)
MCH RBC QN AUTO: 32.1 PG (ref 26.6–33)
MCHC RBC AUTO-ENTMCNC: 33.4 G/DL (ref 31.5–35.7)
MCV RBC AUTO: 96.1 FL (ref 79–97)
PLATELET # BLD AUTO: 217 10*3/MM3 (ref 140–450)
PMV BLD AUTO: 8.8 FL (ref 6–12)
POTASSIUM SERPL-SCNC: 4.1 MMOL/L (ref 3.5–5.2)
RBC # BLD AUTO: 3.8 10*6/MM3 (ref 4.14–5.8)
SODIUM SERPL-SCNC: 134 MMOL/L (ref 136–145)
WBC NRBC COR # BLD AUTO: 6.95 10*3/MM3 (ref 3.4–10.8)

## 2024-06-26 PROCEDURE — 97530 THERAPEUTIC ACTIVITIES: CPT

## 2024-06-26 PROCEDURE — 83735 ASSAY OF MAGNESIUM: CPT | Performed by: INTERNAL MEDICINE

## 2024-06-26 PROCEDURE — 25010000002 ENOXAPARIN PER 10 MG: Performed by: STUDENT IN AN ORGANIZED HEALTH CARE EDUCATION/TRAINING PROGRAM

## 2024-06-26 PROCEDURE — 99232 SBSQ HOSP IP/OBS MODERATE 35: CPT | Performed by: INTERNAL MEDICINE

## 2024-06-26 PROCEDURE — 80048 BASIC METABOLIC PNL TOTAL CA: CPT | Performed by: INTERNAL MEDICINE

## 2024-06-26 PROCEDURE — 85027 COMPLETE CBC AUTOMATED: CPT | Performed by: INTERNAL MEDICINE

## 2024-06-26 RX ORDER — DILTIAZEM HYDROCHLORIDE 180 MG/1
180 CAPSULE, COATED, EXTENDED RELEASE ORAL
Status: DISCONTINUED | OUTPATIENT
Start: 2024-06-26 | End: 2024-06-27

## 2024-06-26 RX ORDER — MIRTAZAPINE 15 MG/1
7.5 TABLET, FILM COATED ORAL NIGHTLY
Status: DISCONTINUED | OUTPATIENT
Start: 2024-06-26 | End: 2024-07-02 | Stop reason: HOSPADM

## 2024-06-26 RX ORDER — ESCITALOPRAM OXALATE 5 MG/1
5 TABLET ORAL NIGHTLY
Status: DISCONTINUED | OUTPATIENT
Start: 2024-06-26 | End: 2024-06-26

## 2024-06-26 RX ADMIN — ASPIRIN 325 MG: 325 TABLET ORAL at 08:06

## 2024-06-26 RX ADMIN — FOLIC ACID 1 MG: 1 TABLET ORAL at 08:06

## 2024-06-26 RX ADMIN — APIXABAN 5 MG: 5 TABLET, FILM COATED ORAL at 21:34

## 2024-06-26 RX ADMIN — ENOXAPARIN SODIUM 90 MG: 100 INJECTION SUBCUTANEOUS at 08:06

## 2024-06-26 RX ADMIN — ATORVASTATIN CALCIUM 20 MG: 20 TABLET, FILM COATED ORAL at 08:06

## 2024-06-26 RX ADMIN — DILTIAZEM HYDROCHLORIDE 180 MG: 180 CAPSULE, EXTENDED RELEASE ORAL at 08:11

## 2024-06-26 RX ADMIN — Medication 10 ML: at 08:50

## 2024-06-26 RX ADMIN — MIRTAZAPINE 7.5 MG: 15 TABLET, FILM COATED ORAL at 21:34

## 2024-06-26 RX ADMIN — Medication 1 TABLET: at 08:06

## 2024-06-26 RX ADMIN — Medication 100 MG: at 08:06

## 2024-06-26 RX ADMIN — Medication 10 ML: at 21:30

## 2024-06-26 NOTE — PROGRESS NOTES
[facility] follow up d/t depression; this writer reviewed chart and spoke with RN Kalani. Per RN, patient is doing well, walked to the bathroom and then slept overnight; he has seemed lonely and forgetful.  Last CIWA 3 at 20:30; psychiatrist, Dr. Rodriguez, consult ordered for today.  Discharge plan SNF versus home with home health; PT/OT recommending SNF placement, CCP involved.  No further needs/concerns noted at this time per RN and/or medical team; [facility] to continue following.

## 2024-06-26 NOTE — PROGRESS NOTES
Name: Jim Marvin ADMIT: 2024   : 1935  PCP: Antonio Finley MD    MRN: [id number] LOS: 2 days   AGE/SEX: 88 y.o. male  ROOM: [facility]     Subjective   Subjective   Chief Complaint   Patient presents with    Decreased Appetite     Depression     Resting comfortably. Did not report any CP SOA NVD.     Objective   Objective   Vital Signs  Temp:  [97.2 °F (36.2 °C)-98.6 °F (37 °C)] 98.6 °F (37 °C)  Heart Rate:  [78-97] 85  Resp:  [18-22] 18  BP: (133-136)/(59-71) 135/63  SpO2:  [88 %-94 %] 90 %  on  Flow (L/min):  [3] 3;   Device (Oxygen Therapy): nasal cannula  Body mass index is 28.06 kg/m².    Physical Exam  Vitals and nursing note reviewed.   Constitutional:       General: He is not in acute distress.     Appearance: He is not diaphoretic.   Cardiovascular:      Rate and Rhythm: Normal rate and regular rhythm.      Pulses: Normal pulses.   Pulmonary:      Effort: Pulmonary effort is normal.      Breath sounds: Rales present. No wheezing.   Abdominal:      General: There is no distension.      Palpations: Abdomen is soft.      Tenderness: There is no abdominal tenderness. There is no guarding or rebound.   Musculoskeletal:         General: No tenderness.   Skin:     General: Skin is warm and dry.   Neurological:      Mental Status: He is alert.      Cranial Nerves: No cranial nerve deficit.      Motor: Tremor present.   Psychiatric:         Mood and Affect: Mood is depressed.         Behavior: Behavior normal.       Results Review  I reviewed the patient's new clinical results.    Results from last 7 days   Lab Units 24  0459 24  0441 24  1801   WBC 10*3/mm3 6.95 7.43 10.59   HEMOGLOBIN g/dL 12.2* 12.6* 13.3   PLATELETS 10*3/mm3 217 190 206     Results from last 7 days   Lab Units 24  0459 24  1815 24  0441 24  1801   SODIUM mmol/L 134*  --  133* 131*   POTASSIUM mmol/L 4.1 3.9 3.4* 4.6   CHLORIDE mmol/L 101  --  99 94*   CO2 mmol/L 23.0  --  25.0 22.6   BUN  "mg/dL 10  --  15 14   CREATININE mg/dL 0.53*  --  0.69* 0.82   GLUCOSE mg/dL 101*  --  79 137*   EGFR mL/min/1.73 97.6  --  90.1 85.5     Results from last 7 days   Lab Units 06/25/24  0441 06/24/24  1801   ALBUMIN g/dL 2.8* 3.2*   BILIRUBIN mg/dL 0.9 1.5*   ALK PHOS U/L 56 64   AST (SGOT) U/L 20 20   ALT (SGPT) U/L 36 40     Results from last 7 days   Lab Units 06/26/24  0459 06/25/24  0441 06/24/24  1801   CALCIUM mg/dL 8.0* 8.2* 9.0   ALBUMIN g/dL  --  2.8* 3.2*   MAGNESIUM mg/dL 2.2 2.3 1.8   PHOSPHORUS mg/dL  --  3.1  --      Results from last 7 days   Lab Units 06/24/24  1801   PROCALCITONIN ng/mL 0.23   LACTATE mmol/L 1.7     No results found for: \"HGBA1C\", \"POCGLU\"    CT Angiogram Chest Pulmonary Embolism    Result Date: 6/24/2024   1. No acute pulmonary thromboembolus. 2. Increasing groundglass opacities noted, particularly within the left lung. An infectious or inflammatory process is not excluded.  Radiation dose reduction techniques were utilized, including automated exposure control and exposure modulation based on body size.   This report was finalized on 6/24/2024 9:52 PM by Dr. Mya Kapoor M.D on Workstation: [id number]      XR Chest 1 View    Result Date: 6/24/2024  No focal pulmonary consolidation. Borderline heart size with pulmonary vascular congestion. Tortuous aorta.  This report was finalized on 6/24/2024 6:39 PM by Dr. Ortiz Barber M.D on Workstation: [id number]       I have personally reviewed all medications:  Scheduled Medications  apixaban, 5 mg, Oral, Q12H  atorvastatin, 20 mg, Oral, Daily  dilTIAZem CD, 180 mg, Oral, Q24H  folic acid, 1 mg, Oral, Daily  multivitamin, 1 tablet, Oral, Daily  sodium chloride, 10 mL, Intravenous, Q12H  thiamine, 100 mg, Oral, Daily      Infusions       Diet  Diet: Regular/House; Fluid Consistency: Thin (IDDSI 0)    I have personally reviewed:  [x]  Laboratory   []  Microbiology   []  Radiology   [x]  EKG/Telemetry  [x]  Cardiology/Vascular   []  " Pathology    []  Records       Assessment/Plan     Active Hospital Problems    Diagnosis  POA    **Atrial fibrillation with RVR [I48.91]  Yes    ILD (interstitial lung disease) [J84.9]  Yes    Carotid artery stenosis [I65.29]  Yes    Hypoxia [R09.02]  Yes    Emphysema lung [J43.9]  Yes    Acute on chronic respiratory failure with hypoxia [J96.21]  Yes    Hypertension [I10]  Yes    Hyperlipidemia [E78.5]  Yes      Resolved Hospital Problems   No resolved problems to display.       88 y.o. male admitted with Atrial fibrillation with RVR.    A-fib with RVR: Converted back to sinus rhythm on diltiazem drip.  Transitioned to oral regimen. Eliquis starting.  Cardiology evaluated, outpatient follow up planned.  ILD/COPD: Continue breathing treatments. 3 L baseline O2 with titration to 6 L with exertion.  Follows with pulmonology.  Chronic hypoxic respiratory failure  Depression: Psychiatry consulted.  Chronic alcohol use: Complicating AFib. No acute withdrawal. Continue vitamin supplementation. Chronic tremor present.  PPX: as above  Disposition: SNF v HH/possibly tomorrow    Discussed with Dr Hall.    Expected Discharge Date: 6/27/2024; Expected Discharge Time:      Salvador Spears MD  [facility]  06/26/24  11:21 EDT    Dictated portions of note using Dragon dictation software.  Copied text in this note has been reviewed by me and remains accurate as of 06/26/24

## 2024-06-26 NOTE — CONSULTS
"IDENTIFYING INFORMATION: The patient is an 88-year-old white male admitted with complaints of depression and decreased appetite as well as increasing alcohol use    CHIEF COMPLAINT: None given    INFORMANT: Patient and chart    RELIABILITY: Good    HISTORY OF PRESENT ILLNESS: The patient is an 88-year-old white male with no reported history of psychiatric treatment.  He was admitted with complaints of decreased appetite and depression.  He denies any suicidal or or homicidal ideation.  He reports some recent reduction in appetite but no change in sleep.  The patient reports that he drinks 1/2 pint of hard liquor on a daily basis and states that \"I am 88 years old and if I want to drink I am going to drink\".  The patient denies prior suicide attempts or gestures.  He is retired  and is .  He lives alone.  He states that he enjoys playing [employer]ort[employer] and does so up to 3 hours daily.  He reports little else in the way of interests.  He denies abuse of any psychoactive substances other than alcohol.  He has never been in treatment for alcohol abuse in the past.    PAST PSYCHIATRIC HISTORY: None reported    PAST MEDICAL HISTORY: Significant for COPD, interstitial lung disease, hypertension, hyperlipidemia, coronary artery stenosis    MEDICATIONS:   Current Facility-Administered Medications   Medication Dose Route Frequency Provider Last Rate Last Admin    apixaban (ELIQUIS) tablet 5 mg  5 mg Oral Q12H Robby Hall MD        atorvastatin (LIPITOR) tablet 20 mg  20 mg Oral Daily Salvador Spears MD   20 mg at 06/26/24 0806    sennosides-docusate (PERICOLACE) 8.6-50 MG per tablet 2 tablet  2 tablet Oral BID PRN Tomás Hanna MD   2 tablet at 06/25/24 2103    And    polyethylene glycol (MIRALAX) packet 17 g  17 g Oral Daily PRN Tomás Hanna MD        And    bisacodyl (DULCOLAX) EC tablet 5 mg  5 mg Oral Daily PRN Tomás Hanna MD        And    bisacodyl (DULCOLAX) suppository 10 mg  10 mg Rectal Daily PRN " Tomás Hanna MD        Calcium Replacement - Follow Nurse / BPA Driven Protocol   Does not apply PRN Tomás Hanna MD        dilTIAZem CD (CARDIZEM CD) 24 hr capsule 180 mg  180 mg Oral Q24H Robby Hall MD   180 mg at 06/26/24 0811    escitalopram (LEXAPRO) tablet 5 mg  5 mg Oral Nightly Ryan Rodriguez III, MD        folic acid (FOLVITE) tablet 1 mg  1 mg Oral Daily Salvador Spears MD   1 mg at 06/26/24 0806    ipratropium-albuterol (DUO-NEB) nebulizer solution 3 mL  3 mL Nebulization Q6H PRN Salvador Spears MD        LORazepam (ATIVAN) tablet 0.5 mg  0.5 mg Oral Q6H PRN Salvador Spears MD        Magnesium Standard Dose Replacement - Follow Nurse / BPA Driven Protocol   Does not apply PRN Tomás Hanna MD        multivitamin (THERAGRAN) tablet 1 tablet  1 tablet Oral Daily Salvador Spears MD   1 tablet at 06/26/24 0806    nitroglycerin (NITROSTAT) SL tablet 0.4 mg  0.4 mg Sublingual Q5 Min PRN Tomás Hanna MD        Phosphorus Replacement - Follow Nurse / BPA Driven Protocol   Does not apply PRN Tomás Hanna MD        Potassium Replacement - Follow Nurse / BPA Driven Protocol   Does not apply PRN Tomás Hanna MD        sodium chloride 0.9 % flush 10 mL  10 mL Intravenous PRN Jason Dowell MD        sodium chloride 0.9 % flush 10 mL  10 mL Intravenous Q12H Tomás Hanna MD   10 mL at 06/26/24 0850    sodium chloride 0.9 % flush 10 mL  10 mL Intravenous PRN Tomás Hanna MD        sodium chloride 0.9 % infusion 40 mL  40 mL Intravenous PRN Tomás Hanna MD        thiamine (VITAMIN B-1) tablet 100 mg  100 mg Oral Daily Salvador Spears MD   100 mg at 06/26/24 0806         ALLERGIES: ACE inhibitors lisinopril    FAMILY HISTORY: Noncontributory    SOCIAL HISTORY: The patient is originally from [place].  He is retired from working in the tool and dye industry.  He reports alcohol use as noted previously    MENTAL STATUS EXAM: Patient is a well-developed well-nourished white male  appearing his stated age.  He has no apparent physical distress at the time of examination.  He is awake alert and oriented all spheres.  His mood is euthymic his affect congruent.  Speech is generally relevant and coherent.  There are no gross deficits in memory or cognition noted.  Intelligence is judged to be in the average range based on fund of knowledge, the patient's current throughout interview.  He denies current suicidal or homicidal ideation ricotta features.  Judgement and insight appeared intact.    ASSETS/LIABILITIES: To be assessed/alcohol use    DIAGNOSTIC IMPRESSION: Major depression disorder single episode moderate, alcohol use disorder, medical problems described previously    PLAN: I spoke with the patient regarding the deleterious effect that ongoing abuse of alcohol will have on his depressive symptoms as well as any potential response to antidepressant medication.  Given his complaints of a reduced appetite I will start him on Remeron 7.5 mg nightly.  Thank you for the opportunity to see this patient.

## 2024-06-26 NOTE — PLAN OF CARE
Goal Outcome Evaluation:  Plan of Care Reviewed With: patient        Progress: improving  Outcome Evaluation: A&O x3 forgetful, assist x1 walker/gait belt, on 3L O2 baseline, in SR controlled IV to PO cardiazem, no c/o pain or nausea, PT/OT consulted, CIWA 3 baseline tremors, Psych following for depression/CIWA, plan of care on going, no further requests at this time, call light with in reach  Patient SOA with activity, slow to recover, no c/o dizziness                                abnormal/expand...

## 2024-06-26 NOTE — PLAN OF CARE
Goal Outcome Evaluation:              Outcome Evaluation: Pt received on A fib and Cardizem drip. Around 0930 pt found on SR. EKG confirmed SR and Cardiologist informed. PO cardizem administerd and IV cardizem stopped 1 hr after PO administer per Cardiologist recommendation. Pt continue SR for the rest of the shift. [place] and Psych consulted for c/o depression. Pt denied any S/S of alcohol withdrawal, Ativan PRN if needed. VSS. Bed alarm on, call light reachable

## 2024-06-26 NOTE — PLAN OF CARE
Goal Outcome Evaluation:  Plan of Care Reviewed With: patient        Progress: improving  Outcome Evaluation: vss. a&ox4. 3L o2 - baseline. ambulates with x1 assist and RW. HH diet. meds whole with thins. educated on bp monitoring. plan to dc home when appropriate. con't plan of care.

## 2024-06-26 NOTE — PLAN OF CARE
Goal Outcome Evaluation:  Plan of Care Reviewed With: patient           Outcome Evaluation: Pt participated with PT this PM. Pt eager to mobilize out of bed. He completed bed mobility sba, transfers cga and ambulated 40' cga with walker. Pt required cues for safe use of the walker. Remains on 3L o2 and desats with activity. Difficulty monitoring O2 sats reading during session as sensor fell off his ear numerous times. Will conttinue to follow and progress as able.      Anticipated Discharge Disposition (PT): skilled nursing facility

## 2024-06-26 NOTE — PROGRESS NOTES
[facility]    Patient Name: Jim Marvin  :1935  88 y.o.  LOS: 2  Encounter Provider: Robby Hall MD      Patient Care Team:  Antonio Finley MD as PCP - General (Family Medicine)  Ruben Burger Jr., MD as Consulting Physician (Urology)    Chief Complaint/Consult question:   decreased appetite and depression      PMH/HPI: chronic hypoxemic respiratory failure, COPD, emphysema, HTN, HLD and carotid artery stenosis.      Pt was admitted earlier this month for shortness of breath and was note to be hypoxic and admitted for respiratory failure secondary to interstitial lung disease/emphysema.      Interval History: Jim Marvin is a 88 year old pt who presented to ER on 24 with complaints of generalized weakness, anhedonia and poor appetite for about a week. Pt also have BAÑUELOS and feels like his oxygen is not helping much.  EKG showed Afib rate of 170, Pt was given diltiazem and digoxin in ER with improvement in HR. he was subsequently admitted on diltiazem drip and started on Lovenox.     I have been asked to see for Afib RVR.  At the time of interview, patient is in no acute distress.  Lives alone at home but feels quite weak and off balance over the past several weeks including 1 mechanical fall.  Denies chest pain with limited physical activities.  Stable exertional dyspnea. Former smoker 1-2 packs per day for 40 years and quit 27 years ago, drinks half a pint of alcohol per night, denies substance.    Interval History: No acute events overnight, still in sinus rhythm on telemetry.  Switched from diltiazem drip to p.o. diltiazem yesterday, consolidating to long release form this morning 180 daily.         Objective   Vital Signs  Temp:  [97.2 °F (36.2 °C)-98.6 °F (37 °C)] 98.6 °F (37 °C)  Heart Rate:  [78-97] 85  Resp:  [18-22] 18  BP: (133-136)/(59-71) 135/63    Intake/Output Summary (Last 24 hours) at 2024 1041  Last data filed at 2024 0712  Gross per 24 hour  "  Intake 240 ml   Output 250 ml   Net -10 ml     Flowsheet Rows      Flowsheet Row First Filed Value   Admission Height 177.8 cm (70\") Documented at 06/24/2024 1725   Admission Weight 90.7 kg (200 lb) Documented at 06/24/2024 1821              Vitals and nursing note reviewed.   Constitutional:       Appearance: Not in distress.      Comments: Morbidly obese   Neck:      Comments: JVD difficult to assess due to body habitus  Pulmonary:      Effort: Pulmonary effort is normal.   Cardiovascular:      Normal rate. Regular rhythm.      Murmurs: There is no murmur.   Edema:     Peripheral edema absent.   Abdominal:      General: There is distension.      Palpations: Abdomen is soft.      Tenderness: There is no abdominal tenderness.   Skin:     General: Skin is warm and cool.   Neurological:      Mental Status: Alert and oriented to person, place and time.           Pertinent Test Results:  Results from last 7 days   Lab Units 06/26/24  0459 06/25/24  1815 06/25/24 0441 06/24/24  1801   SODIUM mmol/L 134*  --  133* 131*   POTASSIUM mmol/L 4.1 3.9 3.4* 4.6   CHLORIDE mmol/L 101  --  99 94*   CO2 mmol/L 23.0  --  25.0 22.6   BUN mg/dL 10  --  15 14   CREATININE mg/dL 0.53*  --  0.69* 0.82   GLUCOSE mg/dL 101*  --  79 137*   CALCIUM mg/dL 8.0*  --  8.2* 9.0   AST (SGOT) U/L  --   --  20 20   ALT (SGPT) U/L  --   --  36 40     Results from last 7 days   Lab Units 06/24/24  1938 06/24/24  1801   HSTROP T ng/L 53* 58*     Results from last 7 days   Lab Units 06/26/24  0459 06/25/24  0441 06/24/24  1801   WBC 10*3/mm3 6.95 7.43 10.59   HEMOGLOBIN g/dL 12.2* 12.6* 13.3   HEMATOCRIT % 36.5* 36.8* 39.2   PLATELETS 10*3/mm3 217 190 206     Results from last 7 days   Lab Units 06/24/24  1801   INR  1.18*   APTT seconds 26.5     Results from last 7 days   Lab Units 06/26/24  0459 06/25/24  0441 06/24/24  1801   MAGNESIUM mg/dL 2.2 2.3 1.8     Results from last 7 days   Lab Units 06/25/24  0441   CHOLESTEROL mg/dL 132   TRIGLYCERIDES " mg/dL 71   HDL CHOL mg/dL 34*     Results from last 7 days   Lab Units 06/24/24  1801   PROBNP pg/mL 4,574.0*     Results from last 7 days   Lab Units 06/24/24  1801   TSH uIU/mL 3.840           Medication Review:   aspirin, 325 mg, Oral, Daily  atorvastatin, 20 mg, Oral, Daily  dilTIAZem CD, 180 mg, Oral, Q24H  enoxaparin, 1 mg/kg, Subcutaneous, Q12H  folic acid, 1 mg, Oral, Daily  multivitamin, 1 tablet, Oral, Daily  sodium chloride, 10 mL, Intravenous, Q12H  thiamine, 100 mg, Oral, Daily         dilTIAZem, 5-15 mg/hr, Last Rate: Stopped (06/25/24 1301)        Assessment & Plan     Active Hospital Problems    Diagnosis  POA    **Atrial fibrillation with RVR [I48.91]  Yes    ILD (interstitial lung disease) [J84.9]  Yes    Carotid artery stenosis [I65.29]  Yes    Hypoxia [R09.02]  Yes    Emphysema lung [J43.9]  Yes    Acute on chronic respiratory failure with hypoxia [J96.21]  Yes    Hypertension [I10]  Yes    Hyperlipidemia [E78.5]  Yes      Resolved Hospital Problems   No resolved problems to display.        A-fib with RVR: Admitted with A-fib with RVR of rate up to 170s.  Unclear if patient has had A-fib in the past, he is unaware of diagnosis.  Immediate trigger may be COPD exacerbation in the setting of prior tobacco abuse and ongoing alcohol abuse.  Patient was administered IV digoxin 500 and diltiazem in the ED, and placed on diltiazem drip.  Patient has since converted back into normal sinus rhythm.  Diltiazem drip has been converted to p.o. diltiazem 90 bid and further switch to 24-hour release form of 180 daily today.  Discontinued home metoprolol 50 bid given concomitant ILD/COPD and depression.   HDUHA0Lvcy score 3, on therapeutic Lovenox, will switch to Eliquis today and discontinue  daily with close monitoring given high fall risk.    Elevated troponin: Minimal troponin elevation 58 -> 53, not ACS based on trend, ECG and absence of chest pain.  Likely demand ischemia in the setting of A-fib with  RVR.  Defer formal ischemic evaluation given advanced age.  Eliquis therapy as above.  Hypertension: Home meds include amlodipine 5 daily and Lopressor 50 bid which we will discontinue at this time given initiation of diltiazem as above. proBNP elevated at 4574 this admission in the setting of A-fib, echo showed normal biventricular function with mild diastolic dysfunction, no significant valvular abnormalities.  Appears to be euvolemic on exam today.  Hyperlipidemia: Lipids this admission 6/2024 showed HDL 34, LDL 84, reasonably well-controlled.  Continue home atorvastatin 20 daily.  COPD/ILD: Management per primary team and other specialists.  Prediabetes: Hemoglobin A1c 6.0 this admission. Management per primary team and other specialists.  Tobacco and alcohol abuse: > 40 pack-year history of cigarette abuse but patient quit many years ago.  He still consumes half a pint of alcohol per night, strongly encouraged moderation  Depression: Management per primary team and other specialists.  Alcohol reduction is key.    Thank you for involving us in the care of Mr. Marvin.  Cardiology will sign off at this time but please do not hesitate to reach back out to us if additional questions arise.    Robby Hall MD  [facility]  06/26/24  10:41 EDT

## 2024-06-27 LAB
ANION GAP SERPL CALCULATED.3IONS-SCNC: 9.9 MMOL/L (ref 5–15)
BUN SERPL-MCNC: 7 MG/DL (ref 8–23)
BUN/CREAT SERPL: 11.9 (ref 7–25)
CALCIUM SPEC-SCNC: 8.3 MG/DL (ref 8.6–10.5)
CHLORIDE SERPL-SCNC: 99 MMOL/L (ref 98–107)
CO2 SERPL-SCNC: 23.1 MMOL/L (ref 22–29)
CREAT SERPL-MCNC: 0.59 MG/DL (ref 0.76–1.27)
DEPRECATED RDW RBC AUTO: 42.3 FL (ref 37–54)
EGFRCR SERPLBLD CKD-EPI 2021: 94.5 ML/MIN/1.73
ERYTHROCYTE [DISTWIDTH] IN BLOOD BY AUTOMATED COUNT: 12 % (ref 12.3–15.4)
GLUCOSE SERPL-MCNC: 101 MG/DL (ref 65–99)
HCT VFR BLD AUTO: 36.1 % (ref 37.5–51)
HGB BLD-MCNC: 12.3 G/DL (ref 13–17.7)
MCH RBC QN AUTO: 32.8 PG (ref 26.6–33)
MCHC RBC AUTO-ENTMCNC: 34.1 G/DL (ref 31.5–35.7)
MCV RBC AUTO: 96.3 FL (ref 79–97)
PLATELET # BLD AUTO: 237 10*3/MM3 (ref 140–450)
PMV BLD AUTO: 8.7 FL (ref 6–12)
POTASSIUM SERPL-SCNC: 3.8 MMOL/L (ref 3.5–5.2)
RBC # BLD AUTO: 3.75 10*6/MM3 (ref 4.14–5.8)
SODIUM SERPL-SCNC: 132 MMOL/L (ref 136–145)
WBC NRBC COR # BLD AUTO: 6.58 10*3/MM3 (ref 3.4–10.8)

## 2024-06-27 PROCEDURE — 94799 UNLISTED PULMONARY SVC/PX: CPT

## 2024-06-27 PROCEDURE — 80048 BASIC METABOLIC PNL TOTAL CA: CPT | Performed by: INTERNAL MEDICINE

## 2024-06-27 PROCEDURE — 85027 COMPLETE CBC AUTOMATED: CPT | Performed by: INTERNAL MEDICINE

## 2024-06-27 RX ORDER — DILTIAZEM HYDROCHLORIDE 240 MG/1
240 CAPSULE, COATED, EXTENDED RELEASE ORAL
Status: DISCONTINUED | OUTPATIENT
Start: 2024-06-28 | End: 2024-07-02 | Stop reason: HOSPADM

## 2024-06-27 RX ORDER — DILTIAZEM HYDROCHLORIDE 60 MG/1
60 TABLET, FILM COATED ORAL ONCE
Status: COMPLETED | OUTPATIENT
Start: 2024-06-27 | End: 2024-06-27

## 2024-06-27 RX ADMIN — FOLIC ACID 1 MG: 1 TABLET ORAL at 07:40

## 2024-06-27 RX ADMIN — MIRTAZAPINE 7.5 MG: 15 TABLET, FILM COATED ORAL at 20:59

## 2024-06-27 RX ADMIN — Medication 10 ML: at 22:26

## 2024-06-27 RX ADMIN — Medication 1 TABLET: at 07:40

## 2024-06-27 RX ADMIN — IPRATROPIUM BROMIDE AND ALBUTEROL SULFATE 3 ML: .5; 3 SOLUTION RESPIRATORY (INHALATION) at 02:15

## 2024-06-27 RX ADMIN — DILTIAZEM HYDROCHLORIDE 180 MG: 180 CAPSULE, EXTENDED RELEASE ORAL at 07:39

## 2024-06-27 RX ADMIN — DILTIAZEM HYDROCHLORIDE 60 MG: 60 TABLET, FILM COATED ORAL at 12:32

## 2024-06-27 RX ADMIN — APIXABAN 5 MG: 5 TABLET, FILM COATED ORAL at 07:40

## 2024-06-27 RX ADMIN — Medication 10 ML: at 09:21

## 2024-06-27 RX ADMIN — Medication 100 MG: at 07:40

## 2024-06-27 RX ADMIN — ATORVASTATIN CALCIUM 20 MG: 20 TABLET, FILM COATED ORAL at 07:40

## 2024-06-27 RX ADMIN — APIXABAN 5 MG: 5 TABLET, FILM COATED ORAL at 20:59

## 2024-06-27 NOTE — PROGRESS NOTES
Name: Jim Marvin ADMIT: 2024   : 1935  PCP: Antonio Finley MD    MRN: [id number] LOS: 3 days   AGE/SEX: 88 y.o. male  ROOM: [facility]     Subjective   Subjective   Chief Complaint   Patient presents with    Decreased Appetite     Depression     Resting comfortably. Did not report any CP SOA NVD. Has fatigue. He had rapid heart rate with sitting earlier today. Increased to 5L with exertion.     Objective   Objective   Vital Signs  Temp:  [98.8 °F (37.1 °C)-99.5 °F (37.5 °C)] 99.3 °F (37.4 °C)  Heart Rate:  [] 102  Resp:  [16-18] 18  BP: (121-163)/() 126/62  SpO2:  [89 %-94 %] 92 %  on  Flow (L/min):  [3-6] 5;   Device (Oxygen Therapy): nasal cannula  Body mass index is 28.06 kg/m².    Physical Exam  Vitals and nursing note reviewed.   Constitutional:       General: He is not in acute distress.     Appearance: He is not diaphoretic.   Cardiovascular:      Rate and Rhythm: Normal rate and regular rhythm.      Pulses: Normal pulses.   Pulmonary:      Effort: Pulmonary effort is normal.      Breath sounds: Rales present. No wheezing.   Abdominal:      General: There is no distension.      Palpations: Abdomen is soft.      Tenderness: There is no abdominal tenderness. There is no guarding or rebound.   Musculoskeletal:         General: No tenderness.   Skin:     General: Skin is warm and dry.   Neurological:      Mental Status: He is alert.      Cranial Nerves: No cranial nerve deficit.      Motor: Tremor present.   Psychiatric:         Mood and Affect: Mood is depressed.         Behavior: Behavior normal.       Results Review  I reviewed the patient's new clinical results.    Results from last 7 days   Lab Units 24  0456 24  0459 24  0441 24  1801   WBC 10*3/mm3 6.58 6.95 7.43 10.59   HEMOGLOBIN g/dL 12.3* 12.2* 12.6* 13.3   PLATELETS 10*3/mm3 237 217 190 206     Results from last 7 days   Lab Units 24  0456 24  0459 24  1815 24  0441  "06/24/24  1801   SODIUM mmol/L 132* 134*  --  133* 131*   POTASSIUM mmol/L 3.8 4.1 3.9 3.4* 4.6   CHLORIDE mmol/L 99 101  --  99 94*   CO2 mmol/L 23.1 23.0  --  25.0 22.6   BUN mg/dL 7* 10  --  15 14   CREATININE mg/dL 0.59* 0.53*  --  0.69* 0.82   GLUCOSE mg/dL 101* 101*  --  79 137*   EGFR mL/min/1.73 94.5 97.6  --  90.1 85.5     Results from last 7 days   Lab Units 06/25/24  0441 06/24/24  1801   ALBUMIN g/dL 2.8* 3.2*   BILIRUBIN mg/dL 0.9 1.5*   ALK PHOS U/L 56 64   AST (SGOT) U/L 20 20   ALT (SGPT) U/L 36 40     Results from last 7 days   Lab Units 06/27/24  0456 06/26/24  0459 06/25/24  0441 06/24/24  1801   CALCIUM mg/dL 8.3* 8.0* 8.2* 9.0   ALBUMIN g/dL  --   --  2.8* 3.2*   MAGNESIUM mg/dL  --  2.2 2.3 1.8   PHOSPHORUS mg/dL  --   --  3.1  --      Results from last 7 days   Lab Units 06/24/24  1801   PROCALCITONIN ng/mL 0.23   LACTATE mmol/L 1.7     No results found for: \"HGBA1C\", \"POCGLU\"    No radiology results for the last day    I have personally reviewed all medications:  Scheduled Medications  apixaban, 5 mg, Oral, Q12H  atorvastatin, 20 mg, Oral, Daily  [START ON 6/28/2024] dilTIAZem CD, 240 mg, Oral, Q24H  folic acid, 1 mg, Oral, Daily  mirtazapine, 7.5 mg, Oral, Nightly  multivitamin, 1 tablet, Oral, Daily  sodium chloride, 10 mL, Intravenous, Q12H  thiamine, 100 mg, Oral, Daily      Infusions       Diet  Diet: Regular/House; Fluid Consistency: Thin (IDDSI 0)    I have personally reviewed:  [x]  Laboratory   []  Microbiology   []  Radiology   [x]  EKG/Telemetry  []  Cardiology/Vascular   []  Pathology    []  Records       Assessment/Plan     Active Hospital Problems    Diagnosis  POA    **Atrial fibrillation with RVR [I48.91]  Yes    ILD (interstitial lung disease) [J84.9]  Yes    Carotid artery stenosis [I65.29]  Yes    Hypoxia [R09.02]  Yes    Emphysema lung [J43.9]  Yes    Acute on chronic respiratory failure with hypoxia [J96.21]  Yes    Hypertension [I10]  Yes    Hyperlipidemia [E78.5]  Yes "      Resolved Hospital Problems   No resolved problems to display.       88 y.o. male admitted with Atrial fibrillation with RVR.    A-fib with RVR: Converted back to sinus rhythm on diltiazem drip.  Transitioned to oral regimen. Eliquis.  HR elevated this morning when sitting. Was ok on exam. Diltiazem dose increased. Cardiology evaluated, outpatient follow up planned.  ILD/COPD: Continue breathing treatments. 3 L baseline O2 with titration to 6 L with exertion.  Follows with pulmonology.  Chronic hypoxic respiratory failure  Depression: Remeron started. Psychiatry evaluated.  Chronic alcohol use: Complicating AFib. No acute withdrawal. Continue vitamin supplementation. Chronic tremor present.  PPX: as above  Disposition: SNF/possibly tomorrow    Expected Discharge Date: 6/27/2024; Expected Discharge Time:      Salvador Spears MD  [facility]  06/27/24  12:47 EDT    Dictated portions of note using Dragon dictation software.  Copied text in this note has been reviewed by me and remains accurate as of 06/27/24

## 2024-06-27 NOTE — NURSING NOTE
"Access follow-up regards depression:    Pt was found eating lunch. He stated he does have an appetite, it's just the food choices he doesn't care for. He denied current SI - \"I haven't sunk that deep yet\". He does state he does have depression and anxiety - \"but I went through this before three years ago when I had RSV\".     He denied any current craving for ETOH. He does not have a desire to quit. Education provided regards alcohol - risks to health, etc. Most current CIWA=4 over the 02441 hr. Pt denied s/s withdrawal.    Pt to discharge to SNF vs. . The pt denied need for Access follow-up. Psychiatry following for now. Discussed with primary RN  "

## 2024-06-27 NOTE — PLAN OF CARE
Problem: Malnutrition  Goal: Improved Nutritional Intake  Outcome: Ongoing, Progressing   Goal Outcome Evaluation:            RD to follow

## 2024-06-27 NOTE — PLAN OF CARE
Goal Outcome Evaluation:  Plan of Care Reviewed With: patient           Outcome Evaluation: 4-5L on O2 via NC during this shift. Breathing tx per RT. Nonproductive cough. SOA on activity. SR on the monitor.

## 2024-06-27 NOTE — CASE MANAGEMENT/SOCIAL WORK
Continued Stay Note  [facility]     Patient Name: Jim Marvin  MRN: [id number]  Today's Date: 6/27/2024    Admit Date: 6/24/2024    Plan: [facility] SNF, will need WC Van transport   Discharge Plan       Row Name 06/27/24 1140       Plan    Plan [facility] SNF, will need WC Van transport    Patient/Family in Agreement with Plan yes    Plan Comments CCP s/w Kirstin/Laquita to confirm acceptance and bed availability-bed available tomorrow at [facility]. CCP s/w Colette/Trilogcori, pt is still under review but will let CCP know if pt accepted and bed available. CCP s/w Dov/Mana who confirms bed available at [facility]. CCP then received VM from Madisyn, pts daughter, to discuss [place]. CCP s/w Madisyn via phone and explained [facility] and [facility] have accepted and beds available. CCP explained awaiting final determination from Radames Pierre, Mendoza Ramirez and Jacqueline. Madisyn verbalized understanding but wants [facility] at [place]. TG discussed transport at [facility]; Madisyn would like a WC Van at [facility]. Dov/Mana notified of Madisyn acceptance of bed at [facility]. [facility] updated to [facility] in [facility]. Ho DE GUZMAN/TG                   Discharge Codes    No documentation.                 Expected Discharge Date and Time       Expected Discharge Date Expected Discharge Time    Jun 27, 2024               Taylor Ascencio RN

## 2024-06-27 NOTE — PROGRESS NOTES
"Nutrition Services    Patient Name:  Jim Marivn  YOB: 1935  MRN: [id number]  Admit Date:  6/24/2024    Assessment Date:  06/27/24    Summary: Initial Visit for MST 3  Pt is a 87 y/o male who presented with decreased appetite, generalzied weakness, and worsening dyspnea. Pt has a hx of COPD, HLD< HTN, hx of stroke.  Pt laying in bed with family at bedside when I visited. Pt reported he typically eats 2 meals per day and a snack but over the past 2 weeks. he has not been eating as much. Pt has had a 5% weight loss x 1 month. Pt denied any n/v or chew/swallow issues. Pt does not currently show any signs of muscle wasting or fat loss. Pt started on remeron today. RD offered supplements and pt was agreeable.     Patient meets ASPEN/AND criteria for nutrition diagnosis of moderate malnutrition of acute illness based on: poor po intake and weight loss    RECS:  Boost Plus QD    CLINICAL NUTRITION ASSESSMENT      Reason for Assessment MST score 2+     Diagnosis/Problem   decreased appetite, generalzied weakness, and worsening dyspnea. Pt has a hx of COPD, HLD< HTN, hx of stroke.   Medical/Surgical History Past Medical History:   Diagnosis Date    COPD (chronic obstructive pulmonary disease)     Hyperlipidemia     Hypertension     Stroke        Past Surgical History:   Procedure Laterality Date    CATARACT EXTRACTION Left 07/06/2022        Anthropometrics        Current Height  Current Weight  BMI kg/m2 Height: 177.8 cm (70\")  Weight: 88.7 kg (195 lb 8.8 oz) (06/24/24 2124)  Body mass index is 28.06 kg/m².   Adjusted BMI (if applicable)    BMI Category Overweight (25 - 29.9)   Ideal Body Weight (IBW) 160 lbs   Usual Body Weight (UBW) 200 lbs   Weight Trend Loss   Weight History Wt Readings from Last 30 Encounters:   06/24/24 2124 88.7 kg (195 lb 8.8 oz)   06/24/24 2021 88.7 kg (195 lb 8.8 oz)   06/24/24 1821 90.7 kg (200 lb)   06/08/24 0500 94.3 kg (207 lb 14.3 oz)   06/07/24 1004 91.7 kg (202 lb 2.6 oz) "   06/07/24 0700 91.7 kg (202 lb 2.6 oz)   06/06/24 0500 91.6 kg (201 lb 15.1 oz)   06/05/24 0500 91.4 kg (201 lb 8 oz)   06/04/24 1832 92.1 kg (203 lb)   06/04/24 1320 92.1 kg (203 lb)   07/24/23 1328 96.6 kg (213 lb)   11/23/22 1250 94.8 kg (209 lb)   07/19/22 1324 95.3 kg (210 lb)   01/18/22 1343 96.2 kg (212 lb)   10/15/21 1259 90.9 kg (200 lb 6.4 oz)   09/25/21 0804 92.5 kg (204 lb)   09/23/21 0053 92.6 kg (204 lb 1.6 oz)   09/22/21 1509 93.6 kg (206 lb 6.4 oz)   07/13/21 1358 99.8 kg (220 lb)   01/12/21 1522 99.8 kg (220 lb)   06/29/20 1610 99.3 kg (219 lb)   11/19/19 1354 100 kg (221 lb)   05/14/19 1306 102 kg (224 lb)   11/15/18 1052 101 kg (223 lb)   05/15/18 1129 98 kg (216 lb)   11/07/17 1150 98 kg (216 lb)   05/09/17 1149 95.3 kg (210 lb)   02/14/17 0952 96.6 kg (213 lb)   05/17/16 1027 101 kg (222 lb)   10/20/15 1030 101 kg (222 lb 0.1 oz)   03/20/15 1123 103 kg (225 lb 15.9 oz)   06/20/14 1046 106 kg (233 lb 0.1 oz)   04/26/13 1117 95.7 kg (211 lb)   04/15/13 1514 96.2 kg (211 lb 15.9 oz)   02/26/13 1243 99.3 kg (219 lb 0.1 oz)        Estimated/Assessed Needs        Energy Requirements    Weight for Calculation 88 kg   Method for Estimation  20 kcal/kg   EST Needs (kcal/day) 1760       Protein Requirements    Weight for Calculation 88 kg   EST Protein Needs (g/kg) 1.2 - 1.5 gm/kg   EST Daily Needs (g/day) 105-132       Fluid Requirements     Method for Estimation 1 mL/kcal    Estimated Needs (mL/day)        Fluid Deficit    Current Na Level (mEq/L)    Desired Na Level (mEq/L)    Estimated Fluid Deficit (L)       Labs       Pertinent Labs    Results from last 7 days   Lab Units 06/27/24  0456 06/26/24  0459 06/25/24  1815 06/25/24  0441 06/24/24  1801   SODIUM mmol/L 132* 134*  --  133* 131*   POTASSIUM mmol/L 3.8 4.1 3.9 3.4* 4.6   CHLORIDE mmol/L 99 101  --  99 94*   CO2 mmol/L 23.1 23.0  --  25.0 22.6   BUN mg/dL 7* 10  --  15 14   CREATININE mg/dL 0.59* 0.53*  --  0.69* 0.82   CALCIUM mg/dL 8.3* 8.0*   --  8.2* 9.0   BILIRUBIN mg/dL  --   --   --  0.9 1.5*   ALK PHOS U/L  --   --   --  56 64   ALT (SGPT) U/L  --   --   --  36 40   AST (SGOT) U/L  --   --   --  20 20   GLUCOSE mg/dL 101* 101*  --  79 137*     Results from last 7 days   Lab Units 06/27/24 0456 06/26/24 0459 06/25/24 0441 06/24/24  1801   MAGNESIUM mg/dL  --  2.2 2.3 1.8   PHOSPHORUS mg/dL  --   --  3.1  --    HEMOGLOBIN g/dL 12.3* 12.2* 12.6* 13.3   HEMATOCRIT % 36.1* 36.5* 36.8* 39.2   WBC 10*3/mm3 6.58 6.95 7.43 10.59   TRIGLYCERIDES mg/dL  --   --  71  --    ALBUMIN g/dL  --   --  2.8* 3.2*     Results from last 7 days   Lab Units 06/27/24 0456 06/26/24 0459 06/25/24 0441 06/24/24  1801   INR   --   --   --  1.18*   APTT seconds  --   --   --  26.5   PLATELETS 10*3/mm3 237 217 190 206     COVID19   Date Value Ref Range Status   06/24/2024 Not Detected Not Detected - Ref. Range Final     Lab Results   Component Value Date    HGBA1C 6.00 (H) 06/07/2024          Medications           Scheduled Medications apixaban, 5 mg, Oral, Q12H  atorvastatin, 20 mg, Oral, Daily  [START ON 6/28/2024] dilTIAZem CD, 240 mg, Oral, Q24H  folic acid, 1 mg, Oral, Daily  mirtazapine, 7.5 mg, Oral, Nightly  multivitamin, 1 tablet, Oral, Daily  sodium chloride, 10 mL, Intravenous, Q12H  thiamine, 100 mg, Oral, Daily       Infusions     PRN Medications   senna-docusate sodium **AND** polyethylene glycol **AND** bisacodyl **AND** bisacodyl    Calcium Replacement - Follow Nurse / BPA Driven Protocol    ipratropium-albuterol    LORazepam    Magnesium Standard Dose Replacement - Follow Nurse / BPA Driven Protocol    nitroglycerin    Phosphorus Replacement - Follow Nurse / BPA Driven Protocol    Potassium Replacement - Follow Nurse / BPA Driven Protocol    [COMPLETED] Insert Peripheral IV **AND** sodium chloride    sodium chloride    sodium chloride     Physical Findings          General Findings alert, oriented, overweight, on oxygen therapy   Oral/Mouth Cavity WDL    Edema  not assessed   Gastrointestinal WDL, last bowel movement: 6/25   Skin  skin intact   Tubes/Drains/Lines none   NFPE See Malnutrition Severity Assessment     Malnutrition Severity Assessment      Patient meets criteria for : (P) Moderate (non-severe) Malnutrition  Malnutrition Type (Last 8 Hours)       Malnutrition Severity Assessment       Row Name 06/27/24 1513       Malnutrition Severity Assessment    Malnutrition Type Acute Disease or Injury - Related Malnutrition (P)       Row Name 06/27/24 1513       Insufficient Energy Intake     Insufficient Energy Intake  <75% of est. energy requirement for >7d) (P)       Row Name 06/27/24 1513       Unintentional Weight Loss     Unintentional Weight Loss Findings Severe (P)     Unintentional Weight Loss  Weight loss greater than 5% in one month (P)       Row Name 06/27/24 1513       Muscle Loss    Loss of Muscle Mass Findings None (P)       Row Name 06/27/24 1513       Fat Loss    Subcutaneous Fat Loss Findings None (P)       Row Name 06/27/24 1513       Criteria Met (Must meet criteria for severity in at least 2 of these categories: M Wasting, Fat Loss, Fluid, Secondary Signs, Wt. Status, Intake)    Patient meets criteria for  Moderate (non-severe) Malnutrition (P)                      Current Nutrition Orders & Evaluation of Intake       Oral Nutrition     Food Allergies NKFA   Current PO Diet Diet: Regular/House; Fluid Consistency: Thin (IDDSI 0)   Supplement n/a   PO Evaluation     % PO Intake Less than 2 meals per day and a snack x 2 weeks per pt    Factors Affecting Intake: decreased appetite     PES STATEMENT / NUTRITION DIAGNOSIS      Nutrition Dx Problem  Problem: Malnutrition (moderate)  Etiology: Medical Diagnosis - decreased appetite, generalzied weakness, and worsening dyspnea. Pt has a hx of COPD, HLD< HTN, hx of stroke.    Signs/Symptoms: Report of Minimal PO Intake and Unintended Weight Change   --  NUTRITION INTERVENTION / PLAN OF CARE       Intervention Goal(s) Maintain nutrition status, Establish goals of care, Reduce/improve symptoms, Meet estimated needs, Increase intake, Accepts oral nutrition supplement, Appropriate weight loss, and PO intake goal %: 75%         RD Intervention/Action Encourage intake, Continue to monitor, Care plan reviewed, and Recommend/order: Boost QD         Prescription/Orders:       PO Diet       Supplements Boost QD      Enteral Nutrition       Parenteral Nutrition    New Prescription Ordered? Yes   --      Monitor/Evaluation Per protocol, PO intake, Supplement intake, Weight, Skin status, GI status, Symptoms, POC/GOC, Swallow function   Discharge Plan/Needs No discharge needs identified at this time   --    RD to follow per protocol.      Electronically signed by:  Ramesh Otto  06/27/24 15:05 EDT

## 2024-06-27 NOTE — SIGNIFICANT NOTE
06/27/24 1014   OTHER   Discipline physical therapist   Rehab Time/Intention   Session Not Performed unable to treat, medical status change  (Hold per RN as pt with elevated HR to 230s sitting EOB for breakfast and with increased O2 needs this date. Will follow up PM with nsg if time allows, otherwise will follow up tomorrow)   Recommendation   PT - Next Appointment 06/28/24

## 2024-06-27 NOTE — PROGRESS NOTES
"The patient slept well with initiation of Remeron, but complains of feeling \"rough\" this morning\".  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  "

## 2024-06-27 NOTE — PLAN OF CARE
Goal Outcome Evaluation:  Plan of Care Reviewed With: patient        Progress: improving  Outcome Evaluation: vss. a&ox4. 5L o2 this shift. ambualtes with x1 assist and RW. voiding per brief/urinal. HH diet. meds whole with thins. pt afib with rvr and soa this AM while sitting EOB for breakfast. cardiology notified - see mar. pt now tolerating light activity. educated on bp monitoring. plan to dc to snf when appropriate. con't plan of care.

## 2024-06-28 ENCOUNTER — APPOINTMENT (OUTPATIENT)
Dept: GENERAL RADIOLOGY | Facility: HOSPITAL | Age: 89
End: 2024-06-28
Payer: MEDICARE

## 2024-06-28 LAB
ANION GAP SERPL CALCULATED.3IONS-SCNC: 11 MMOL/L (ref 5–15)
ARTERIAL PATENCY WRIST A: POSITIVE
ATMOSPHERIC PRESS: 745.6 MMHG
BASE EXCESS BLDA CALC-SCNC: 1 MMOL/L (ref 0–2)
BDY SITE: ABNORMAL
BUN SERPL-MCNC: 10 MG/DL (ref 8–23)
BUN/CREAT SERPL: 15.2 (ref 7–25)
CALCIUM SPEC-SCNC: 8.7 MG/DL (ref 8.6–10.5)
CHLORIDE SERPL-SCNC: 98 MMOL/L (ref 98–107)
CO2 BLDA-SCNC: 25.2 MMOL/L (ref 23–27)
CO2 SERPL-SCNC: 21 MMOL/L (ref 22–29)
CREAT SERPL-MCNC: 0.66 MG/DL (ref 0.76–1.27)
DEPRECATED RDW RBC AUTO: 41.5 FL (ref 37–54)
DEVICE COMMENT: ABNORMAL
EGFRCR SERPLBLD CKD-EPI 2021: 91.3 ML/MIN/1.73
ERYTHROCYTE [DISTWIDTH] IN BLOOD BY AUTOMATED COUNT: 11.9 % (ref 12.3–15.4)
GAS FLOW AIRWAY: 10 LPM
GLUCOSE SERPL-MCNC: 129 MG/DL (ref 65–99)
HCO3 BLDA-SCNC: 24.2 MMOL/L (ref 22–28)
HCT VFR BLD AUTO: 39.9 % (ref 37.5–51)
HEMODILUTION: NO
HGB BLD-MCNC: 13.6 G/DL (ref 13–17.7)
MCH RBC QN AUTO: 32.8 PG (ref 26.6–33)
MCHC RBC AUTO-ENTMCNC: 34.1 G/DL (ref 31.5–35.7)
MCV RBC AUTO: 96.1 FL (ref 79–97)
MODALITY: ABNORMAL
NT-PROBNP SERPL-MCNC: 807 PG/ML (ref 0–1800)
PCO2 BLDA: 33.3 MM HG (ref 35–45)
PH BLDA: 7.47 PH UNITS (ref 7.35–7.45)
PLATELET # BLD AUTO: 259 10*3/MM3 (ref 140–450)
PMV BLD AUTO: 9 FL (ref 6–12)
PO2 BLDA: 63.5 MM HG (ref 80–100)
POTASSIUM SERPL-SCNC: 4.2 MMOL/L (ref 3.5–5.2)
PROCALCITONIN SERPL-MCNC: 0.08 NG/ML (ref 0–0.25)
RBC # BLD AUTO: 4.15 10*6/MM3 (ref 4.14–5.8)
SAO2 % BLDCOA: 93.5 % (ref 92–98.5)
SODIUM SERPL-SCNC: 130 MMOL/L (ref 136–145)
TOTAL RATE: 20 BREATHS/MINUTE
WBC NRBC COR # BLD AUTO: 7.67 10*3/MM3 (ref 3.4–10.8)

## 2024-06-28 PROCEDURE — 94664 DEMO&/EVAL PT USE INHALER: CPT

## 2024-06-28 PROCEDURE — 36600 WITHDRAWAL OF ARTERIAL BLOOD: CPT

## 2024-06-28 PROCEDURE — 94799 UNLISTED PULMONARY SVC/PX: CPT

## 2024-06-28 PROCEDURE — 84145 PROCALCITONIN (PCT): CPT | Performed by: INTERNAL MEDICINE

## 2024-06-28 PROCEDURE — 25010000002 FUROSEMIDE PER 20 MG: Performed by: INTERNAL MEDICINE

## 2024-06-28 PROCEDURE — 94761 N-INVAS EAR/PLS OXIMETRY MLT: CPT

## 2024-06-28 PROCEDURE — 93010 ELECTROCARDIOGRAM REPORT: CPT | Performed by: STUDENT IN AN ORGANIZED HEALTH CARE EDUCATION/TRAINING PROGRAM

## 2024-06-28 PROCEDURE — 99232 SBSQ HOSP IP/OBS MODERATE 35: CPT | Performed by: INTERNAL MEDICINE

## 2024-06-28 PROCEDURE — 85027 COMPLETE CBC AUTOMATED: CPT | Performed by: INTERNAL MEDICINE

## 2024-06-28 PROCEDURE — 83880 ASSAY OF NATRIURETIC PEPTIDE: CPT | Performed by: INTERNAL MEDICINE

## 2024-06-28 PROCEDURE — 93005 ELECTROCARDIOGRAM TRACING: CPT | Performed by: INTERNAL MEDICINE

## 2024-06-28 PROCEDURE — 82803 BLOOD GASES ANY COMBINATION: CPT

## 2024-06-28 PROCEDURE — 71045 X-RAY EXAM CHEST 1 VIEW: CPT

## 2024-06-28 PROCEDURE — 94640 AIRWAY INHALATION TREATMENT: CPT

## 2024-06-28 PROCEDURE — 80048 BASIC METABOLIC PNL TOTAL CA: CPT | Performed by: INTERNAL MEDICINE

## 2024-06-28 PROCEDURE — 25010000002 METHYLPREDNISOLONE PER 125 MG: Performed by: INTERNAL MEDICINE

## 2024-06-28 RX ORDER — FUROSEMIDE 10 MG/ML
40 INJECTION INTRAMUSCULAR; INTRAVENOUS ONCE
Status: COMPLETED | OUTPATIENT
Start: 2024-06-28 | End: 2024-06-28

## 2024-06-28 RX ORDER — ALBUTEROL SULFATE 2.5 MG/3ML
2.5 SOLUTION RESPIRATORY (INHALATION) EVERY 6 HOURS PRN
Status: DISCONTINUED | OUTPATIENT
Start: 2024-06-28 | End: 2024-07-02 | Stop reason: HOSPADM

## 2024-06-28 RX ORDER — METHYLPREDNISOLONE SODIUM SUCCINATE 125 MG/2ML
125 INJECTION, POWDER, LYOPHILIZED, FOR SOLUTION INTRAMUSCULAR; INTRAVENOUS ONCE
Status: COMPLETED | OUTPATIENT
Start: 2024-06-28 | End: 2024-06-28

## 2024-06-28 RX ORDER — FUROSEMIDE 40 MG/1
40 TABLET ORAL
Status: DISPENSED | OUTPATIENT
Start: 2024-06-28 | End: 2024-07-01

## 2024-06-28 RX ORDER — DIGOXIN 125 MCG
125 TABLET ORAL
Status: DISCONTINUED | OUTPATIENT
Start: 2024-06-28 | End: 2024-07-02 | Stop reason: HOSPADM

## 2024-06-28 RX ORDER — IPRATROPIUM BROMIDE AND ALBUTEROL SULFATE 2.5; .5 MG/3ML; MG/3ML
3 SOLUTION RESPIRATORY (INHALATION)
Status: DISCONTINUED | OUTPATIENT
Start: 2024-06-28 | End: 2024-06-28

## 2024-06-28 RX ADMIN — IPRATROPIUM BROMIDE AND ALBUTEROL SULFATE 3 ML: .5; 3 SOLUTION RESPIRATORY (INHALATION) at 01:49

## 2024-06-28 RX ADMIN — Medication 10 ML: at 08:17

## 2024-06-28 RX ADMIN — IPRATROPIUM BROMIDE AND ALBUTEROL SULFATE 3 ML: .5; 3 SOLUTION RESPIRATORY (INHALATION) at 10:51

## 2024-06-28 RX ADMIN — ATORVASTATIN CALCIUM 20 MG: 20 TABLET, FILM COATED ORAL at 08:17

## 2024-06-28 RX ADMIN — APIXABAN 5 MG: 5 TABLET, FILM COATED ORAL at 20:32

## 2024-06-28 RX ADMIN — Medication 1 TABLET: at 08:17

## 2024-06-28 RX ADMIN — APIXABAN 5 MG: 5 TABLET, FILM COATED ORAL at 08:17

## 2024-06-28 RX ADMIN — MIRTAZAPINE 7.5 MG: 15 TABLET, FILM COATED ORAL at 20:32

## 2024-06-28 RX ADMIN — Medication 10 ML: at 20:33

## 2024-06-28 RX ADMIN — Medication 100 MG: at 11:14

## 2024-06-28 RX ADMIN — FOLIC ACID 1 MG: 1 TABLET ORAL at 08:17

## 2024-06-28 RX ADMIN — FUROSEMIDE 40 MG: 10 INJECTION, SOLUTION INTRAMUSCULAR; INTRAVENOUS at 12:31

## 2024-06-28 RX ADMIN — DILTIAZEM HYDROCHLORIDE 240 MG: 240 CAPSULE, EXTENDED RELEASE ORAL at 08:16

## 2024-06-28 RX ADMIN — METHYLPREDNISOLONE SODIUM SUCCINATE 125 MG: 125 INJECTION INTRAMUSCULAR; INTRAVENOUS at 11:15

## 2024-06-28 RX ADMIN — DIGOXIN 125 MCG: 125 TABLET ORAL at 14:26

## 2024-06-28 NOTE — PLAN OF CARE
Goal Outcome Evaluation:  Plan of Care Reviewed With: patient           Outcome Evaluation: C/o SOA on activity. Was disoriented to place , situation after midnight. 5L NC at the start of the shift   but is currently on 10L HHF NC. Paged NP on call re: the change/increase in O2, confusion, soa on activity. Ordered ABG and CXR portable both stat orders which were completed during EPIC downtime.Afib to SR and vice versa on the monitor. Denies chest pain.

## 2024-06-28 NOTE — PROGRESS NOTES
The patient is sleeping soundly when seen today.  Charting indicates no management issues and he has only evidence of autonomic hyperactivity is at a heart rate of 98 which may represent his baseline.  He seems to be tolerating Remeron without complaint.

## 2024-06-28 NOTE — CASE MANAGEMENT/SOCIAL WORK
Continued Stay Note  [facility]     Patient Name: Jim Marvin  MRN: [id number]  Today's Date: 6/28/2024    Admit Date: 6/24/2024    Plan: [facility]; will need to be on 8L 02 or less   Discharge Plan       Row Name 06/28/24 1225       Plan    Plan [facility]; will need to be on 8L 02 or less    Plan Comments Patient currently on 10L 02. CCP spoke with [facility]; they can when patient is on 8L 02 or lower. Bed available over the weekend if patient becomes medically stable. CCP will follow for medical stability and assist as needed. Sherry MCMILLAN                    Discharge Codes    No documentation.                 Expected Discharge Date and Time       Expected Discharge Date Expected Discharge Time    Jun 27, 2024               HIPOLITO Joshua

## 2024-06-28 NOTE — PROGRESS NOTES
[facility]    Patient Name: Jim Marvin  :1935  88 y.o.  LOS: 4  Encounter Provider: Robby Hall MD      Patient Care Team:  Antonio Finley MD as PCP - General (Family Medicine)  Ruben Burger Jr., MD as Consulting Physician (Urology)    Chief Complaint/Consult question:   decreased appetite and depression      PMH/HPI: chronic hypoxemic respiratory failure, COPD, emphysema, HTN, HLD and carotid artery stenosis.      Pt was admitted earlier this month for shortness of breath and was note to be hypoxic and admitted for respiratory failure secondary to interstitial lung disease/emphysema.      Interval History: Jim Marvin is a 88 year old pt who presented to ER on 24 with complaints of generalized weakness, anhedonia and poor appetite for about a week. Pt also have BAÑUELOS and feels like his oxygen is not helping much.  EKG showed Afib rate of 170, Pt was given diltiazem and digoxin in ER with improvement in HR. he was subsequently admitted on diltiazem drip and started on Lovenox.     I have been asked to see for Afib RVR.  At the time of interview, patient is in no acute distress.  Lives alone at home but feels quite weak and off balance over the past several weeks including 1 mechanical fall.  Denies chest pain with limited physical activities.  Stable exertional dyspnea. Former smoker 1-2 packs per day for 40 years and quit 27 years ago, drinks half a pint of alcohol per night, denies substance.    Interval History: Patient had episode of A-fib with RVR with HR in the 170s this morning, now back in normal sinus rhythm.  On diltiazem 240 daily, adding digoxin 125, also starting Lasix for 3 days per pulm.     Objective   Vital Signs  Temp:  [98.4 °F (36.9 °C)-99.5 °F (37.5 °C)] 98.8 °F (37.1 °C)  Heart Rate:  [] 98  Resp:  [18-20] 20  BP: ()/(54-69) 128/63    Intake/Output Summary (Last 24 hours) at 2024 1206  Last data filed at 2024 0300  Gross per 24  "hour   Intake 0 ml   Output 400 ml   Net -400 ml     Flowsheet Rows      Flowsheet Row First Filed Value   Admission Height 177.8 cm (70\") Documented at 06/24/2024 1725   Admission Weight 90.7 kg (200 lb) Documented at 06/24/2024 1821              Vitals and nursing note reviewed.   Constitutional:       Appearance: Not in distress.      Comments: Morbidly obese   Neck:      Comments: JVD difficult to assess due to body habitus  Pulmonary:      Effort: Pulmonary effort is normal.   Cardiovascular:      Tachycardia present. Regular rhythm.      Murmurs: There is no murmur.   Edema:     Peripheral edema absent.   Abdominal:      General: There is distension.      Palpations: Abdomen is soft.      Tenderness: There is no abdominal tenderness.   Skin:     General: Skin is warm and cool.   Neurological:      Mental Status: Alert and oriented to person, place and time.           Pertinent Test Results:  Results from last 7 days   Lab Units 06/28/24  0325 06/27/24  0456 06/26/24  0459 06/25/24  1815 06/25/24  0441 06/24/24  1801   SODIUM mmol/L 130* 132* 134*  --  133* 131*   POTASSIUM mmol/L 4.2 3.8 4.1 3.9 3.4* 4.6   CHLORIDE mmol/L 98 99 101  --  99 94*   CO2 mmol/L 21.0* 23.1 23.0  --  25.0 22.6   BUN mg/dL 10 7* 10  --  15 14   CREATININE mg/dL 0.66* 0.59* 0.53*  --  0.69* 0.82   GLUCOSE mg/dL 129* 101* 101*  --  79 137*   CALCIUM mg/dL 8.7 8.3* 8.0*  --  8.2* 9.0   AST (SGOT) U/L  --   --   --   --  20 20   ALT (SGPT) U/L  --   --   --   --  36 40     Results from last 7 days   Lab Units 06/24/24  1938 06/24/24  1801   HSTROP T ng/L 53* 58*     Results from last 7 days   Lab Units 06/28/24  0325 06/27/24  0456 06/26/24  0459 06/25/24  0441 06/24/24  1801   WBC 10*3/mm3 7.67 6.58 6.95 7.43 10.59   HEMOGLOBIN g/dL 13.6 12.3* 12.2* 12.6* 13.3   HEMATOCRIT % 39.9 36.1* 36.5* 36.8* 39.2   PLATELETS 10*3/mm3 259 237 217 190 206     Results from last 7 days   Lab Units 06/24/24  1801   INR  1.18*   APTT seconds 26.5 "     Results from last 7 days   Lab Units 06/26/24  0459 06/25/24  0441 06/24/24  1801   MAGNESIUM mg/dL 2.2 2.3 1.8     Results from last 7 days   Lab Units 06/25/24  0441   CHOLESTEROL mg/dL 132   TRIGLYCERIDES mg/dL 71   HDL CHOL mg/dL 34*     Results from last 7 days   Lab Units 06/28/24  0325 06/24/24  1801   PROBNP pg/mL 807.0 4,574.0*     Results from last 7 days   Lab Units 06/24/24  1801   TSH uIU/mL 3.840           Medication Review:   apixaban, 5 mg, Oral, Q12H  atorvastatin, 20 mg, Oral, Daily  dilTIAZem CD, 240 mg, Oral, Q24H  folic acid, 1 mg, Oral, Daily  furosemide, 40 mg, Intravenous, Once  furosemide, 40 mg, Oral, Daily With Breakfast  mirtazapine, 7.5 mg, Oral, Nightly  multivitamin, 1 tablet, Oral, Daily  sodium chloride, 10 mL, Intravenous, Q12H  thiamine, 100 mg, Oral, Daily                Assessment & Plan     Active Hospital Problems    Diagnosis  POA    **Atrial fibrillation with RVR [I48.91]  Yes    ILD (interstitial lung disease) [J84.9]  Yes    Carotid artery stenosis [I65.29]  Yes    Hypoxia [R09.02]  Yes    Emphysema lung [J43.9]  Yes    Acute on chronic respiratory failure with hypoxia [J96.21]  Yes    Hypertension [I10]  Yes    Hyperlipidemia [E78.5]  Yes      Resolved Hospital Problems   No resolved problems to display.        A-fib with RVR: Admitted with A-fib with RVR of rate up to 170s.  New diagnosis, immediate trigger may be COPD exacerbation in the setting of prior tobacco abuse and ongoing alcohol abuse.  Initially required diltiazem drip which has been converted to oral diltiazem, increased to 240 yesterday, defer further up titration given intermittent soft BP.  Discontinued home metoprolol 50 bid given concomitant ILD/COPD and depression.  We will add digoxin 125 daily and closely monitor tolerance given advanced age.  Defer amiodarone at this time given underlying COPD and ILD, see below.  [id number] score 3, on therapeutic Lovenox, started on Eliquis this  admission.  Elevated troponin: Minimal troponin elevation 58 -> 53, not ACS based on trend, ECG and absence of chest pain.  Likely demand ischemia in the setting of A-fib with RVR.  Defer formal ischemic evaluation given advanced age.  Eliquis therapy as above.  Hypertension: Home meds include amlodipine 5 daily and Lopressor 50 bid which we discontinued given initiation of diltiazem as above. proBNP elevated at 4574 on admission in the setting of A-fib, repeat today was normal, echo showed normal biventricular function with mild diastolic dysfunction, no significant valvular abnormalities.  Getting PO Lasix 40 daily for 3 doses, consider discharge on low-dose diuretic depending on therapeutic response and clinical course.  Hyperlipidemia: Lipids this admission 6/2024 showed HDL 34, LDL 84, reasonably well-controlled.  Continue home atorvastatin 20 daily.  COPD/ILD: Management per primary team and other specialists.  Prediabetes: Hemoglobin A1c 6.0 this admission. Management per primary team and other specialists.  Tobacco and alcohol abuse: > 40 pack-year history of cigarette abuse but patient quit many years ago.  He still consumes half a pint of alcohol per night, strongly encouraged moderation  Depression: Management per primary team and other specialists.  Alcohol reduction is kaur.      Robby Hall MD  [facility]  06/28/24  10:41 EDT

## 2024-06-28 NOTE — SIGNIFICANT NOTE
06/28/24 1154   OTHER   Discipline physical therapist   Rehab Time/Intention   Session Not Performed other (see comments);unable to treat, medical status change  (Per RN, PT to hold today due to respiratory status and O2 demands. PT will follow up monday 7/1)   Recommendation   PT - Next Appointment 07/01/24

## 2024-06-28 NOTE — CONSULTS
Group: [facility]         CONSULT NOTE    Patient Identification:  Jim Marvin  88 y.o.  male  1935  [id number]            Requesting physician: Dr Spears    Reason for Consultation:  ILD, hypoxic resp fx    CC: Generalized weakness, shortness of breath    History of Present Illness:  88-year-old male patient who has shortness of breath.  This is chronic.  Last night he had an episode of desaturation.  He is also on oxygen, 3 L at rest at home, 6 L during exertion.  He denies cough, fever or chills.  No hemoptysis.  He does have a diagnosis of interstitial lung disease  He was seen by Dr. Aquino from our office during his previous hospital stay.  He has a diagnosis of interstitial lung disease of unclear etiology.    Review of Systems   Constitutional:  Negative for diaphoresis and fever.   HENT:  Negative for ear pain and sore throat.    Eyes:  Negative for pain and visual disturbance.   Respiratory:  Positive for shortness of breath. Negative for cough.    Gastrointestinal:  Negative for abdominal pain and diarrhea.   Endocrine: Negative for cold intolerance and polyuria.   Genitourinary:  Negative for dysuria and hematuria.   Musculoskeletal:  Negative for joint swelling and myalgias.   Skin:  Negative for rash and wound.   Neurological:  Negative for speech difficulty and numbness.   Hematological:  Negative for adenopathy. Does not bruise/bleed easily.   Psychiatric/Behavioral:  Negative for agitation and confusion.        Past Medical History:  Past Medical History:   Diagnosis Date    COPD (chronic obstructive pulmonary disease)     Hyperlipidemia     Hypertension     Stroke        Past Surgical History:  Past Surgical History:   Procedure Laterality Date    CATARACT EXTRACTION Left 07/06/2022        Home Meds:  Medications Prior to Admission   Medication Sig Dispense Refill Last Dose    amLODIPine (NORVASC) 5 MG tablet TAKE 1 TABLET BY MOUTH DAILY 90 tablet 1     atorvastatin  "(LIPITOR) 20 MG tablet TAKE 1 TABLET BY MOUTH DAILY 90 tablet 1     ipratropium-albuterol (DUO-NEB) 0.5-2.5 mg/3 ml nebulizer Inhale the contents of 1 vial by nebulization Every 6 (Six) Hours As Needed for Wheezing for up to 30 days. 360 mL 0     metoprolol tartrate (LOPRESSOR) 50 MG tablet TAKE 1 TABLET BY MOUTH TWICE DAILY 180 tablet 1     aspirin 325 MG tablet Take 1 tablet by mouth Daily.       cholecalciferol (Vitamin D, Cholecalciferol,) 25 MCG (1000 UT) tablet Take 1 tablet by mouth Daily.       EPINEPHrine (EPIPEN) 0.3 MG/0.3ML solution auto-injector injection ADMINISTER 0.3 ML IN THE MUSCLE 1 TIME FOR 1 DOSE AS DIRECTED BY PRESCRIBER       Multiple Vitamin (MULTI VITAMIN DAILY) tablet Take 1 tablet by mouth Daily.       multivitamin with minerals tablet tablet Take 1 tablet by mouth Daily.       Omega-3 Fatty Acids (OMEGA-3 FISH OIL) 1000 MG capsule Take 1 capsule by mouth Daily.       Zinc 30 MG tablet Take 1 tablet by mouth Every Other Day.          Allergies:  Allergies   Allergen Reactions    Ace Inhibitors Other (See Comments)     Cannot remember     Lisinopril Other (See Comments)     Cannot remember       Social History:   Social History     Socioeconomic History    Marital status:    Tobacco Use    Smoking status: Former     Current packs/day: 0.00     Average packs/day: 1 pack/day for 40.0 years (40.0 ttl pk-yrs)     Types: Cigarettes     Start date:      Quit date:      Years since quittin.5    Smokeless tobacco: Never    Tobacco comments:     Quit 19 years ago    Vaping Use    Vaping status: Never Used   Substance and Sexual Activity    Alcohol use: Not Currently     Comment: 15-20 jack and diet coke a week    Drug use: Never    Sexual activity: Defer       Family History:  History reviewed. No pertinent family history.    Physical Exam:  /63 (BP Location: Right arm, Patient Position: Lying)   Pulse 98   Temp 98.8 °F (37.1 °C) (Oral)   Resp 20   Ht 177.8 cm (70\")   " Wt 88.7 kg (195 lb 8.8 oz)   SpO2 90%   BMI 28.06 kg/m²  Body mass index is 28.06 kg/m². 90% 88.7 kg (195 lb 8.8 oz)  Physical Exam  Constitutional:       General: He is not in acute distress.     Appearance: He is well-developed.   HENT:      Right Ear: External ear normal.      Left Ear: External ear normal.      Nose: Nose normal.   Eyes:      General: No scleral icterus.     Conjunctiva/sclera: Conjunctivae normal.      Pupils: Pupils are equal, round, and reactive to light.   Neck:      Thyroid: No thyromegaly.      Vascular: No JVD.   Cardiovascular:      Rate and Rhythm: Normal rate and regular rhythm.      Heart sounds: No murmur heard.     Comments: No edema  Pulmonary:      Effort: Pulmonary effort is normal.      Breath sounds: No wheezing or rales.      Comments: Some fine inspiratory crackles at the bases posteriorly otherwise diminished and clear bilaterally  Abdominal:      General: There is no distension.      Palpations: Abdomen is soft.      Comments: No liver or spleen enlargment palpable   Musculoskeletal:         General: No deformity. Normal range of motion.      Cervical back: Neck supple. No rigidity.      Comments: No deformity in all 4 extrem   Skin:     Findings: No erythema or rash.      Comments: No palpable nodules   Neurological:      General: No focal deficit present.      Mental Status: He is alert and oriented to person, place, and time.      Cranial Nerves: No cranial nerve deficit.      Motor: No weakness.   Psychiatric:         Mood and Affect: Mood normal.         Thought Content: Thought content normal.         LABS:        Results from last 7 days   Lab Units 06/28/24  0325 06/27/24  0456 06/26/24  0459 06/25/24  1815 06/25/24  0441 06/24/24  1801   MAGNESIUM mg/dL  --   --  2.2  --  2.3 1.8   SODIUM mmol/L 130* 132* 134*  --  133* 131*   POTASSIUM mmol/L 4.2 3.8 4.1   < > 3.4* 4.6   CHLORIDE mmol/L 98 99 101  --  99 94*   CO2 mmol/L 21.0* 23.1 23.0  --  25.0 22.6   BUN  mg/dL 10 7* 10  --  15 14   CREATININE mg/dL 0.66* 0.59* 0.53*  --  0.69* 0.82   GLUCOSE mg/dL 129* 101* 101*  --  79 137*   CALCIUM mg/dL 8.7 8.3* 8.0*  --  8.2* 9.0   WBC 10*3/mm3 7.67 6.58 6.95  --  7.43 10.59   HEMOGLOBIN g/dL 13.6 12.3* 12.2*  --  12.6* 13.3   PLATELETS 10*3/mm3 259 237 217  --  190 206   ALT (SGPT) U/L  --   --   --   --  36 40   AST (SGOT) U/L  --   --   --   --  20 20   PROBNP pg/mL 807.0  --   --   --   --  4,574.0*   PROCALCITONIN ng/mL 0.08  --   --   --   --  0.23    < > = values in this interval not displayed.     Lab Results   Component Value Date    TROPONINI 0.016 05/06/2024    TROPONINT 53 (C) 06/24/2024     Results from last 7 days   Lab Units 06/24/24  1938 06/24/24  1801   HSTROP T ng/L 53* 58*         Results from last 7 days   Lab Units 06/28/24  0325 06/24/24  1801   PROCALCITONIN ng/mL 0.08 0.23   LACTATE mmol/L  --  1.7     Results from last 7 days   Lab Units 06/28/24  0316   PH, ARTERIAL pH units 7.468*   PCO2, ARTERIAL mm Hg 33.3*   PO2 ART mm Hg 63.5*   O2 SATURATION ART % 93.5   FLOW RATE lpm 10.0000   MODALITY  HFNC     Results from last 7 days   Lab Units 06/24/24  1810   ADENOVIRUS DETECTION BY PCR  Not Detected   CORONAVIRUS 229E  Not Detected   CORONAVIRUS HKU1  Not Detected   CORONAVIRUS NL63  Not Detected   CORONAVIRUS OC43  Not Detected   HUMAN METAPNEUMOVIRUS  Not Detected   HUMAN RHINOVIRUS/ENTEROVIRUS  Not Detected   INFLUENZA B PCR  Not Detected   PARAINFLUENZA 1  Not Detected   PARAINFLUENZA VIRUS 2  Not Detected   PARAINFLUENZA VIRUS 3  Not Detected   PARAINFLUENZA VIRUS 4  Not Detected   BORDETELLA PERTUSSIS PCR  Not Detected   BORDETELLA PARAPERTUSSIS PCR  Not Detected   CHLAMYDOPHILA PNEUMONIAE PCR  Not Detected   MYCOPLAMA PNEUMO PCR  Not Detected   RSV, PCR  Not Detected     Results from last 7 days   Lab Units 06/24/24  1801   INR  1.18*         Lab Results   Component Value Date    TSH 3.840 06/24/2024     Estimated Creatinine Clearance: 90.1 mL/min  (A) (by C-G formula based on SCr of 0.66 mg/dL (L)).  Results from last 7 days   Lab Units 06/24/24 2057   NITRITE UA  Negative        Imaging: I personally visualized the images of CT scan of the chest with contrast showing no pulmonary embolism.  There is new groundglass opacity in the left lower lobe.  There is underlying chronic interstitial changes.      Assessment:  Atrial fibrillation with RVR  Exacerbation of interstitial lung disease  Acute on chronic respiratory failure      Recommendations:  I reviewed images of CT scan of the chest.  There is no pulmonary embolism but there is some groundglass opacity in the left lower lobe.  Unsure whether this is very early pulmonary edema or actual inflammatory changes indicating exacerbation of underlying interstitial lung disease.  He does have atrial fibrillation and elevated proBNP.  This may be the underlying driving culprit, causing some incipient heart failure.  I suggest we start with some gentle diuretics, 40 mg Lasix every morning for the next few days.  Monitor renal function labs, daily weights and intake and output.  Procalcitonin is low, and the patient does not have sputum, fever or leukocytosis.  Doubt pulmonary infection in the differential.  If he does not improve to diuretics within the next 48 to 72 hours, I suggest we start empiric steroids for approximately 10 days to see if we can quiet down the inflammation.  Currently his heart rate is in the 160s.  I suggest cardiology consultation and discontinuation of scheduled beta agonist since he is not wheezing on exam.    Total patient care time was 63 minutes    Roderick Loaiza MD  6/28/2024  11:28 EDT      Much of this encounter note is an electronic transcription/translation of spoken language to printed text using Dragon Software.

## 2024-06-28 NOTE — PROGRESS NOTES
Name: Jim Marvin ADMIT: 2024   : 1935  PCP: Antonio Finley MD    MRN: [id number] LOS: 4 days   AGE/SEX: 88 y.o. male  ROOM: [facility]     Subjective   Subjective   Chief Complaint   Patient presents with    Decreased Appetite     Depression     Worsened O2 requirements overnight and is now on 10 L high flow.  Patient is awake and alert.  He is not reporting any subjective worsening of dyspnea.  No chest pain or palpitations reported.  He is not reporting any pleurisy or cough.  No nausea vomiting diarrhea or abdominal pain reported.     Objective   Objective   Vital Signs  Temp:  [98.4 °F (36.9 °C)-99.5 °F (37.5 °C)] 98.8 °F (37.1 °C)  Heart Rate:  [] 95  Resp:  [18-20] 20  BP: ()/(54-69) 128/63  SpO2:  [85 %-94 %] 90 %  on  Flow (L/min):  [5-10] 10;   Device (Oxygen Therapy): humidified;high-flow nasal cannula  Body mass index is 28.06 kg/m².    Physical Exam  Vitals and nursing note reviewed.   Constitutional:       General: He is not in acute distress.     Appearance: He is not diaphoretic.   Cardiovascular:      Rate and Rhythm: Normal rate and regular rhythm.      Pulses: Normal pulses.   Pulmonary:      Effort: Pulmonary effort is normal.      Breath sounds: No wheezing or rales.   Abdominal:      General: There is no distension.      Palpations: Abdomen is soft.      Tenderness: There is no abdominal tenderness. There is no guarding or rebound.   Musculoskeletal:         General: No tenderness.      Right lower leg: No edema.      Left lower leg: No edema.   Skin:     General: Skin is warm and dry.   Neurological:      Mental Status: He is alert.      Cranial Nerves: No cranial nerve deficit.      Motor: Tremor present.   Psychiatric:         Mood and Affect: Mood is depressed.         Behavior: Behavior normal.       Results Review  I reviewed the patient's new clinical results.    Results from last 7 days   Lab Units 24  0325 24  0456 24  0459 24  0441  "  WBC 10*3/mm3 7.67 6.58 6.95 7.43   HEMOGLOBIN g/dL 13.6 12.3* 12.2* 12.6*   PLATELETS 10*3/mm3 259 237 217 190     Results from last 7 days   Lab Units 06/28/24  0325 06/27/24  0456 06/26/24 0459 06/25/24  1815 06/25/24  0441   SODIUM mmol/L 130* 132* 134*  --  133*   POTASSIUM mmol/L 4.2 3.8 4.1 3.9 3.4*   CHLORIDE mmol/L 98 99 101  --  99   CO2 mmol/L 21.0* 23.1 23.0  --  25.0   BUN mg/dL 10 7* 10  --  15   CREATININE mg/dL 0.66* 0.59* 0.53*  --  0.69*   GLUCOSE mg/dL 129* 101* 101*  --  79   EGFR mL/min/1.73 91.3 94.5 97.6  --  90.1     Results from last 7 days   Lab Units 06/25/24 0441 06/24/24  1801   ALBUMIN g/dL 2.8* 3.2*   BILIRUBIN mg/dL 0.9 1.5*   ALK PHOS U/L 56 64   AST (SGOT) U/L 20 20   ALT (SGPT) U/L 36 40     Results from last 7 days   Lab Units 06/28/24  0325 06/27/24 0456 06/26/24  0459 06/25/24 0441 06/24/24  1801   CALCIUM mg/dL 8.7 8.3* 8.0* 8.2* 9.0   ALBUMIN g/dL  --   --   --  2.8* 3.2*   MAGNESIUM mg/dL  --   --  2.2 2.3 1.8   PHOSPHORUS mg/dL  --   --   --  3.1  --      Results from last 7 days   Lab Units 06/28/24 0325 06/24/24  1801   PROCALCITONIN ng/mL 0.08 0.23   LACTATE mmol/L  --  1.7     No results found for: \"HGBA1C\", \"POCGLU\"    XR Chest 1 View    Result Date: 6/28/2024  No significant interval change.  This report was finalized on 6/28/2024 5:50 AM by Dr. Mya Kapoor M.D on Workstation: [id number]       I have personally reviewed all medications:  Scheduled Medications  apixaban, 5 mg, Oral, Q12H  atorvastatin, 20 mg, Oral, Daily  dilTIAZem CD, 240 mg, Oral, Q24H  folic acid, 1 mg, Oral, Daily  mirtazapine, 7.5 mg, Oral, Nightly  multivitamin, 1 tablet, Oral, Daily  sodium chloride, 10 mL, Intravenous, Q12H  thiamine, 100 mg, Oral, Daily      Infusions       Diet  Diet: Regular/House; Fluid Consistency: Thin (IDDSI 0)    I have personally reviewed:  [x]  Laboratory   []  Microbiology   [x]  Radiology   [x]  EKG/Telemetry  []  Cardiology/Vascular   []  Pathology  "   []  Records       Assessment/Plan     Active Hospital Problems    Diagnosis  POA    **Atrial fibrillation with RVR [I48.91]  Yes    ILD (interstitial lung disease) [J84.9]  Yes    Carotid artery stenosis [I65.29]  Yes    Hypoxia [R09.02]  Yes    Emphysema lung [J43.9]  Yes    Acute on chronic respiratory failure with hypoxia [J96.21]  Yes    Hypertension [I10]  Yes    Hyperlipidemia [E78.5]  Yes      Resolved Hospital Problems   No resolved problems to display.       88 y.o. male admitted with Atrial fibrillation with RVR.    A-fib with RVR: Initially required diltiazem drip.  Transitioned to oral regimen, dose increased due to tachycardia received this morning.  On Eliquis.  Cardiology evaluated, outpatient follow up planned.  ILD/COPD: Continue breathing treatments. 3 L baseline O2 with titration to 6 L with exertion.  Follows with pulmonology.  Acute on chronic hypoxic respiratory failure: Worsening of hypoxia to require high flow.  Repeated chest x-ray BNP and procalcitonin.  Check EKG.  BNP has improved and he is not having any peripheral pitting edema.  I do not hear any bronchospasm on exam currently however with his ILD history will give a dose of steroid and monitor for response.  Schedule breathing treatments.  Consult pulmonology  Depression: Remeron started. Psychiatry evaluated.  Chronic alcohol use: Complicating AFib. No acute withdrawal. Continue vitamin supplementation. Chronic tremor present.  PPX: as above  Disposition: SNF/TBD    Expected Discharge Date: 6/27/2024; Expected Discharge Time:      Salvador Spears MD  [facility]  06/28/24  10:16 EDT    Dictated portions of note using Dragon dictation software.  Copied text in this note has been reviewed by me and remains accurate as of 06/28/24

## 2024-06-29 LAB
ALBUMIN SERPL-MCNC: 3.1 G/DL (ref 3.5–5.2)
ANION GAP SERPL CALCULATED.3IONS-SCNC: 15.1 MMOL/L (ref 5–15)
BUN SERPL-MCNC: 19 MG/DL (ref 8–23)
BUN/CREAT SERPL: 26.4 (ref 7–25)
CALCIUM SPEC-SCNC: 9.8 MG/DL (ref 8.6–10.5)
CHLORIDE SERPL-SCNC: 98 MMOL/L (ref 98–107)
CO2 SERPL-SCNC: 20.9 MMOL/L (ref 22–29)
CREAT SERPL-MCNC: 0.72 MG/DL (ref 0.76–1.27)
DEPRECATED RDW RBC AUTO: 42.9 FL (ref 37–54)
EGFRCR SERPLBLD CKD-EPI 2021: 89 ML/MIN/1.73
ERYTHROCYTE [DISTWIDTH] IN BLOOD BY AUTOMATED COUNT: 12 % (ref 12.3–15.4)
GLUCOSE BLDC GLUCOMTR-MCNC: 170 MG/DL (ref 70–130)
GLUCOSE BLDC GLUCOMTR-MCNC: 216 MG/DL (ref 70–130)
GLUCOSE SERPL-MCNC: 176 MG/DL (ref 65–99)
HCT VFR BLD AUTO: 40.6 % (ref 37.5–51)
HGB BLD-MCNC: 13.6 G/DL (ref 13–17.7)
MCH RBC QN AUTO: 32.4 PG (ref 26.6–33)
MCHC RBC AUTO-ENTMCNC: 33.5 G/DL (ref 31.5–35.7)
MCV RBC AUTO: 96.7 FL (ref 79–97)
PHOSPHATE SERPL-MCNC: 3.3 MG/DL (ref 2.5–4.5)
PLATELET # BLD AUTO: 328 10*3/MM3 (ref 140–450)
PMV BLD AUTO: 9.3 FL (ref 6–12)
POTASSIUM SERPL-SCNC: 4.5 MMOL/L (ref 3.5–5.2)
PROCALCITONIN SERPL-MCNC: 0.07 NG/ML (ref 0–0.25)
QT INTERVAL: 352 MS
QTC INTERVAL: 468 MS
RBC # BLD AUTO: 4.2 10*6/MM3 (ref 4.14–5.8)
SODIUM SERPL-SCNC: 134 MMOL/L (ref 136–145)
WBC NRBC COR # BLD AUTO: 14.16 10*3/MM3 (ref 3.4–10.8)

## 2024-06-29 PROCEDURE — 25010000002 METHYLPREDNISOLONE PER 125 MG: Performed by: INTERNAL MEDICINE

## 2024-06-29 PROCEDURE — 94799 UNLISTED PULMONARY SVC/PX: CPT

## 2024-06-29 PROCEDURE — 82948 REAGENT STRIP/BLOOD GLUCOSE: CPT

## 2024-06-29 PROCEDURE — 84145 PROCALCITONIN (PCT): CPT | Performed by: INTERNAL MEDICINE

## 2024-06-29 PROCEDURE — 99232 SBSQ HOSP IP/OBS MODERATE 35: CPT | Performed by: INTERNAL MEDICINE

## 2024-06-29 PROCEDURE — 63710000001 INSULIN LISPRO (HUMAN) PER 5 UNITS: Performed by: INTERNAL MEDICINE

## 2024-06-29 PROCEDURE — 85027 COMPLETE CBC AUTOMATED: CPT | Performed by: INTERNAL MEDICINE

## 2024-06-29 PROCEDURE — 94761 N-INVAS EAR/PLS OXIMETRY MLT: CPT

## 2024-06-29 PROCEDURE — 80069 RENAL FUNCTION PANEL: CPT | Performed by: INTERNAL MEDICINE

## 2024-06-29 RX ORDER — BUDESONIDE AND FORMOTEROL FUMARATE DIHYDRATE 160; 4.5 UG/1; UG/1
2 AEROSOL RESPIRATORY (INHALATION)
Status: DISCONTINUED | OUTPATIENT
Start: 2024-06-29 | End: 2024-07-02 | Stop reason: HOSPADM

## 2024-06-29 RX ORDER — NICOTINE POLACRILEX 4 MG
15 LOZENGE BUCCAL
Status: DISCONTINUED | OUTPATIENT
Start: 2024-06-29 | End: 2024-07-02 | Stop reason: HOSPADM

## 2024-06-29 RX ORDER — METHYLPREDNISOLONE SODIUM SUCCINATE 125 MG/2ML
60 INJECTION, POWDER, LYOPHILIZED, FOR SOLUTION INTRAMUSCULAR; INTRAVENOUS EVERY 6 HOURS
Status: DISCONTINUED | OUTPATIENT
Start: 2024-06-29 | End: 2024-07-02

## 2024-06-29 RX ORDER — DEXTROSE MONOHYDRATE 25 G/50ML
25 INJECTION, SOLUTION INTRAVENOUS
Status: DISCONTINUED | OUTPATIENT
Start: 2024-06-29 | End: 2024-07-02 | Stop reason: HOSPADM

## 2024-06-29 RX ORDER — INSULIN LISPRO 100 [IU]/ML
3-14 INJECTION, SOLUTION INTRAVENOUS; SUBCUTANEOUS
Status: DISCONTINUED | OUTPATIENT
Start: 2024-06-29 | End: 2024-07-02 | Stop reason: HOSPADM

## 2024-06-29 RX ORDER — IBUPROFEN 600 MG/1
1 TABLET ORAL
Status: DISCONTINUED | OUTPATIENT
Start: 2024-06-29 | End: 2024-07-02 | Stop reason: HOSPADM

## 2024-06-29 RX ADMIN — INSULIN LISPRO 5 UNITS: 100 INJECTION, SOLUTION INTRAVENOUS; SUBCUTANEOUS at 22:00

## 2024-06-29 RX ADMIN — ATORVASTATIN CALCIUM 20 MG: 20 TABLET, FILM COATED ORAL at 08:26

## 2024-06-29 RX ADMIN — Medication 100 MG: at 08:26

## 2024-06-29 RX ADMIN — Medication 10 ML: at 08:27

## 2024-06-29 RX ADMIN — Medication 10 ML: at 15:34

## 2024-06-29 RX ADMIN — BUDESONIDE AND FORMOTEROL FUMARATE DIHYDRATE 2 PUFF: 160; 4.5 AEROSOL RESPIRATORY (INHALATION) at 19:50

## 2024-06-29 RX ADMIN — FOLIC ACID 1 MG: 1 TABLET ORAL at 08:26

## 2024-06-29 RX ADMIN — MIRTAZAPINE 7.5 MG: 15 TABLET, FILM COATED ORAL at 22:00

## 2024-06-29 RX ADMIN — APIXABAN 5 MG: 5 TABLET, FILM COATED ORAL at 08:26

## 2024-06-29 RX ADMIN — FUROSEMIDE 40 MG: 40 TABLET ORAL at 08:26

## 2024-06-29 RX ADMIN — DILTIAZEM HYDROCHLORIDE 240 MG: 240 CAPSULE, EXTENDED RELEASE ORAL at 08:26

## 2024-06-29 RX ADMIN — METHYLPREDNISOLONE SODIUM SUCCINATE 60 MG: 125 INJECTION INTRAMUSCULAR; INTRAVENOUS at 21:59

## 2024-06-29 RX ADMIN — Medication 10 ML: at 22:01

## 2024-06-29 RX ADMIN — DIGOXIN 125 MCG: 125 TABLET ORAL at 12:19

## 2024-06-29 RX ADMIN — INSULIN LISPRO 3 UNITS: 100 INJECTION, SOLUTION INTRAVENOUS; SUBCUTANEOUS at 18:21

## 2024-06-29 RX ADMIN — METHYLPREDNISOLONE SODIUM SUCCINATE 60 MG: 125 INJECTION INTRAMUSCULAR; INTRAVENOUS at 15:32

## 2024-06-29 RX ADMIN — APIXABAN 5 MG: 5 TABLET, FILM COATED ORAL at 22:00

## 2024-06-29 RX ADMIN — Medication 1 TABLET: at 08:27

## 2024-06-29 NOTE — PROGRESS NOTES
"  Daily Progress Note.   [address]  6/29/2024    Patient:  Name:  Jim Marvin  MRN:  [id number]  1935  88 y.o.  male         CC: ahrf, cpfe    Interval History:  Patient oxygen saturation on 12 L high flow nasal cannula.  Patient afebrile overnight.    Procalcitonin low  No leukocytosis  States go worse when steroids tapered off.  No prior pulmonary outpt visit. Not on BDs prior to last hospitalization.  No cp  No le swelling  + decreased appetite + depressed mood  Former smoker, quit 27 years ago        Physical Exam:  /67 (BP Location: Right arm, Patient Position: Lying)   Pulse 82   Temp 97.3 °F (36.3 °C) (Oral)   Resp 18   Ht 177.8 cm (70\")   Wt 88.7 kg (195 lb 8.8 oz)   SpO2 92%   BMI 28.06 kg/m²   Body mass index is 28.06 kg/m².    Intake/Output Summary (Last 24 hours) at 6/29/2024 0913  Last data filed at 6/29/2024 0710  Gross per 24 hour   Intake 480 ml   Output 350 ml   Net 130 ml     General appearance: Nontoxic, conversant   Eyes: anicteric sclerae, moist conjunctivae; no lidlag;    HENT: Atraumatic; oropharynx clear with moist mucous membranes    Neck: Trachea midline;  supple   Lungs: no wheeze no crackles with normal respiratory effort and no intercostal retractions  CV: RRR, no rub   Abdomen: Soft, nontender; BS+  Extremities: No peripheral edema or ext lad  Skin: WWP no diffuse visible rash  Psych: Appropriate affect, alert   Neuro: Moves all ext. CN II-XII. Speech intact.    Data Review:  Notable Labs:  Results from last 7 days   Lab Units 06/28/24  0325 06/27/24  0456 06/26/24  0459 06/25/24  0441 06/24/24  1801   WBC 10*3/mm3 7.67 6.58 6.95 7.43 10.59   HEMOGLOBIN g/dL 13.6 12.3* 12.2* 12.6* 13.3   PLATELETS 10*3/mm3 259 237 217 190 206     Results from last 7 days   Lab Units 06/28/24  0325 06/27/24  0456 06/26/24  0459 06/25/24  1815 06/25/24  0441 06/24/24  1801   SODIUM mmol/L 130* 132* 134*  --  133* 131*   POTASSIUM mmol/L 4.2 3.8 4.1 3.9 3.4* 4.6 "   CHLORIDE mmol/L 98 99 101  --  99 94*   CO2 mmol/L 21.0* 23.1 23.0  --  25.0 22.6   BUN mg/dL 10 7* 10  --  15 14   CREATININE mg/dL 0.66* 0.59* 0.53*  --  0.69* 0.82   GLUCOSE mg/dL 129* 101* 101*  --  79 137*   CALCIUM mg/dL 8.7 8.3* 8.0*  --  8.2* 9.0   MAGNESIUM mg/dL  --   --  2.2  --  2.3 1.8   PHOSPHORUS mg/dL  --   --   --   --  3.1  --    Estimated Creatinine Clearance: 90.1 mL/min (A) (by C-G formula based on SCr of 0.66 mg/dL (L)).    Results from last 7 days   Lab Units 06/29/24  0424 06/28/24  0325 06/27/24  0456 06/26/24  0459 06/25/24  0441 06/24/24  1801   AST (SGOT) U/L  --   --   --   --  20 20   ALT (SGPT) U/L  --   --   --   --  36 40   PROCALCITONIN ng/mL 0.07 0.08  --   --   --  0.23   LACTATE mmol/L  --   --   --   --   --  1.7   D DIMER QUANT MCGFEU/mL  --   --   --   --   --  1.62*   PLATELETS 10*3/mm3  --  259 237 217 190 206       Results from last 7 days   Lab Units 06/28/24  0316   PH, ARTERIAL pH units 7.468*   PCO2, ARTERIAL mm Hg 33.3*   PO2 ART mm Hg 63.5*   HCO3 ART mmol/L 24.2       Imaging:  Reviewed chest images personally from past 3 days    ASSESSMENT  /  PLAN:  Emphysema  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    Start steroids, monitor response    Wean oxygen as able    On Eliquis,     Start symbicort and spiriva in attempts to limit albuterol use to avoid afib rvr.    All issues new to me today.  Prior hospital course, labs and imaging reviewed.    Gabe pt and his family at bedside.  All questions answered.          Electronically signed by Dov Alvarado MD, 06/29/24, 9:13 AM EDT.  [facility]

## 2024-06-29 NOTE — PROGRESS NOTES
"   LOS: 5 days   Patient Care Team:  Antonio Finley MD as PCP - General (Family Medicine)  Ruben Burger Jr., MD as Consulting Physician (Urology)    Chief Complaint: AF     Interval History: Remains in SR/ST, PACs. No cardiac complaints really. Sitting up in bed eating lunch.       Objective   Vital Signs  Temp:  [97.3 °F (36.3 °C)-98.2 °F (36.8 °C)] 97.3 °F (36.3 °C)  Heart Rate:  [75-97] 91  Resp:  [18] 18  BP: (107-139)/(65-73) 129/65    Intake/Output Summary (Last 24 hours) at 6/29/2024 1411  Last data filed at 6/29/2024 1254  Gross per 24 hour   Intake 720 ml   Output --   Net 720 ml       Last Weight and Admission Weight        06/24/24 2124   Weight: 88.7 kg (195 lb 8.8 oz)     Flowsheet Rows      Flowsheet Row First Filed Value   Admission Height 177.8 cm (70\") Documented at 06/24/2024 1725   Admission Weight 90.7 kg (200 lb) Documented at 06/24/2024 1821                    Physical Exam  Constitutional:       General: He is not in acute distress.  HENT:      Nose: Nose normal.   Eyes:      Conjunctiva/sclera: Conjunctivae normal.   Cardiovascular:      Rate and Rhythm: Normal rate. Frequent   Pulmonary:      Effort: Prolonged expiration present.      Breath sounds: Examination of the right-lower field reveals rales. Examination of the left-lower field reveals rales.   Abdominal:      Palpations: Abdomen is soft.      Tenderness: There is no abdominal tenderness.   Musculoskeletal:      Right lower leg: No edema.      Left lower leg: No edema.   Skin:     Coloration: Skin is pale.   Neurological:      General: No focal deficit present.   Psychiatric:         Mood and Affect: Mood normal.         Results Review:      Results from last 7 days   Lab Units 06/29/24  0424 06/28/24  0325 06/27/24  0456   SODIUM mmol/L 134* 130* 132*   POTASSIUM mmol/L 4.5 4.2 3.8   CHLORIDE mmol/L 98 98 99   CO2 mmol/L 20.9* 21.0* 23.1   BUN mg/dL 19 10 7*   CREATININE mg/dL 0.72* 0.66* 0.59*   GLUCOSE mg/dL 176* 129* 101* "   CALCIUM mg/dL 9.8 8.7 8.3*     Results from last 7 days   Lab Units 06/24/24  1938 06/24/24  1801   HSTROP T ng/L 53* 58*     Results from last 7 days   Lab Units 06/29/24  1119 06/28/24  0325 06/27/24  0456   WBC 10*3/mm3 14.16* 7.67 6.58   HEMOGLOBIN g/dL 13.6 13.6 12.3*   HEMATOCRIT % 40.6 39.9 36.1*   PLATELETS 10*3/mm3 328 259 237     Results from last 7 days   Lab Units 06/24/24  1801   INR  1.18*   APTT seconds 26.5     Results from last 7 days   Lab Units 06/25/24  0441   CHOLESTEROL mg/dL 132     Results from last 7 days   Lab Units 06/26/24  0459   MAGNESIUM mg/dL 2.2     Results from last 7 days   Lab Units 06/25/24  0441   CHOLESTEROL mg/dL 132   TRIGLYCERIDES mg/dL 71   HDL CHOL mg/dL 34*   LDL CHOL mg/dL 84     I reviewed the patient's new clinical results.  I personally viewed and interpreted the patient's EKG/Telemetry data        Medication Review:   apixaban, 5 mg, Oral, Q12H  atorvastatin, 20 mg, Oral, Daily  budesonide-formoterol, 2 puff, Inhalation, BID - RT  digoxin, 125 mcg, Oral, Daily  dilTIAZem CD, 240 mg, Oral, Q24H  folic acid, 1 mg, Oral, Daily  furosemide, 40 mg, Oral, Daily With Breakfast  insulin lispro, 3-14 Units, Subcutaneous, 4x Daily AC & at Bedtime  methylPREDNISolone sodium succinate, 60 mg, Intravenous, Q6H  mirtazapine, 7.5 mg, Oral, Nightly  multivitamin, 1 tablet, Oral, Daily  sodium chloride, 10 mL, Intravenous, Q12H  thiamine, 100 mg, Oral, Daily  tiotropium bromide monohydrate, 2 puff, Inhalation, Daily - RT      Assessment & Plan     1. Atrial arrhythmias -- rapid AF yesterday, now with what appears to be ST with PACs, maybe even MAT at times (not at all unexpected in the setting of underlying pulmonary disease). HR today is generally 90-100s, which is acceptable. Continue current regimen of diltiazem 240mg daily and digoxin 125mcg daily. Agree w BB discontinuation given lung disease and worsening depression. Continue apixaban.     2. Elevated Tn -- equivocal  elevation, no significant delta, clinical scenario not c/w ACS. Would not recommend further eval.     3. HTN -- his BP was a bit low yesterday but it's completely normal now.     Will follow.     Joey Mcfarland MD  06/29/24  14:11 EDT

## 2024-06-29 NOTE — PLAN OF CARE
Goal Outcome Evaluation:  Plan of Care Reviewed With: patient           Outcome Evaluation: Converted back to SR during this shift. Remains on 12L NC. Dyspnea on activity although patient verbally denies. Rested well.

## 2024-06-29 NOTE — PROGRESS NOTES
Name: Jim Marvin ADMIT: 2024   : 1935  PCP: Antonio Finley MD    MRN: [id number] LOS: 5 days   AGE/SEX: 88 y.o. male  ROOM: [facility]     Subjective   Subjective   Chief Complaint   Patient presents with    Decreased Appetite     Depression     Continues with high flow. not reporting any dyspnea or air hunger.  No chest pain.  Was asking about his tachycardia and we discussed pulmonary disease and also other stressors which could lead A-fib including alcohol use.    No nausea vomiting diarrhea or abdominal pain reported.     Objective   Objective   Vital Signs  Temp:  [97.3 °F (36.3 °C)-98.2 °F (36.8 °C)] 97.3 °F (36.3 °C)  Heart Rate:  [75-97] 91  Resp:  [18] 18  BP: (107-139)/(65-73) 129/65  SpO2:  [91 %-96 %] 96 %  on  Flow (L/min):  [12] 12;   Device (Oxygen Therapy): nasal cannula  Body mass index is 28.06 kg/m².    Physical Exam  Vitals and nursing note reviewed.   Constitutional:       General: He is not in acute distress.     Appearance: He is ill-appearing. He is not diaphoretic.   Cardiovascular:      Rate and Rhythm: Normal rate and regular rhythm.      Pulses: Normal pulses.   Pulmonary:      Effort: Pulmonary effort is normal.      Breath sounds: No wheezing or rales.   Abdominal:      General: There is no distension.      Palpations: Abdomen is soft.      Tenderness: There is no abdominal tenderness. There is no guarding or rebound.   Musculoskeletal:         General: No tenderness.      Right lower leg: No edema.      Left lower leg: No edema.   Skin:     General: Skin is warm and dry.   Neurological:      Mental Status: He is alert.      Cranial Nerves: No cranial nerve deficit.      Motor: Tremor present.   Psychiatric:         Mood and Affect: Mood is depressed.         Behavior: Behavior normal.       Results Review  I reviewed the patient's new clinical results.    Results from last 7 days   Lab Units 24  1119 24  0325 24  0456 24  0459   WBC 10*3/mm3  "14.16* 7.67 6.58 6.95   HEMOGLOBIN g/dL 13.6 13.6 12.3* 12.2*   PLATELETS 10*3/mm3 328 259 237 217     Results from last 7 days   Lab Units 06/29/24 0424 06/28/24 0325 06/27/24 0456 06/26/24  0459   SODIUM mmol/L 134* 130* 132* 134*   POTASSIUM mmol/L 4.5 4.2 3.8 4.1   CHLORIDE mmol/L 98 98 99 101   CO2 mmol/L 20.9* 21.0* 23.1 23.0   BUN mg/dL 19 10 7* 10   CREATININE mg/dL 0.72* 0.66* 0.59* 0.53*   GLUCOSE mg/dL 176* 129* 101* 101*   EGFR mL/min/1.73 89.0 91.3 94.5 97.6     Results from last 7 days   Lab Units 06/29/24 0424 06/25/24 0441 06/24/24  1801   ALBUMIN g/dL 3.1* 2.8* 3.2*   BILIRUBIN mg/dL  --  0.9 1.5*   ALK PHOS U/L  --  56 64   AST (SGOT) U/L  --  20 20   ALT (SGPT) U/L  --  36 40     Results from last 7 days   Lab Units 06/29/24 0424 06/28/24 0325 06/27/24 0456 06/26/24 0459 06/25/24 0441 06/24/24  1801   CALCIUM mg/dL 9.8 8.7 8.3* 8.0* 8.2* 9.0   ALBUMIN g/dL 3.1*  --   --   --  2.8* 3.2*   MAGNESIUM mg/dL  --   --   --  2.2 2.3 1.8   PHOSPHORUS mg/dL 3.3  --   --   --  3.1  --      Results from last 7 days   Lab Units 06/29/24 0424 06/28/24 0325 06/24/24  1801   PROCALCITONIN ng/mL 0.07 0.08 0.23   LACTATE mmol/L  --   --  1.7     No results found for: \"HGBA1C\", \"POCGLU\"    XR Chest 1 View    Result Date: 6/28/2024  No significant interval change.  This report was finalized on 6/28/2024 5:50 AM by Dr. Mya Kapoor M.D on Workstation: [id number]       I have personally reviewed all medications:  Scheduled Medications  apixaban, 5 mg, Oral, Q12H  atorvastatin, 20 mg, Oral, Daily  budesonide-formoterol, 2 puff, Inhalation, BID - RT  digoxin, 125 mcg, Oral, Daily  dilTIAZem CD, 240 mg, Oral, Q24H  folic acid, 1 mg, Oral, Daily  furosemide, 40 mg, Oral, Daily With Breakfast  insulin lispro, 3-14 Units, Subcutaneous, 4x Daily AC & at Bedtime  methylPREDNISolone sodium succinate, 60 mg, Intravenous, Q6H  mirtazapine, 7.5 mg, Oral, Nightly  multivitamin, 1 tablet, Oral, Daily  sodium " chloride, 10 mL, Intravenous, Q12H  thiamine, 100 mg, Oral, Daily  tiotropium bromide monohydrate, 2 puff, Inhalation, Daily - RT      Infusions       Diet  Diet: Regular/House; Fluid Consistency: Thin (IDDSI 0)    I have personally reviewed:  [x]  Laboratory   []  Microbiology   []  Radiology   [x]  EKG/Telemetry  []  Cardiology/Vascular   []  Pathology    []  Records       Assessment/Plan     Active Hospital Problems    Diagnosis  POA    **Atrial fibrillation with RVR [I48.91]  Yes    ILD (interstitial lung disease) [J84.9]  Yes    Carotid artery stenosis [I65.29]  Yes    Hypoxia [R09.02]  Yes    Emphysema lung [J43.9]  Yes    Acute on chronic respiratory failure with hypoxia [J96.21]  Yes    Hypertension [I10]  Yes    Hyperlipidemia [E78.5]  Yes      Resolved Hospital Problems   No resolved problems to display.       88 y.o. male admitted with Atrial fibrillation with RVR.    A-fib with RVR: Initially required diltiazem drip.  Transitioned to oral regimen.  On Eliquis.  Cardiology following.  ILD/COPD: Adjustment and breathing treatments noted.  Steroids. 3 L baseline O2 with titration to 6 L with exertion.  Pulmonology following  Acute on chronic hypoxic respiratory failure: Worsening of hypoxia to require high flow.  Received additional diuretics yesterday and now transitioned to oral daily diuretic.  Continuing IV steroids today.    Depression: Remeron started. Psychiatry evaluated.  Chronic alcohol use: Complicating AFib. No acute withdrawal. Continue vitamin supplementation. Chronic tremor present.  PPX: as above  Disposition: SNF/TBD    Expected Discharge Date: 7/1/2024; Expected Discharge Time:  3:00 PM     Salvador Spears MD  [facility]  06/29/24  14:24 EDT    Dictated portions of note using Dragon dictation software.  Copied text in this note has been reviewed by me and remains accurate as of 06/29/24

## 2024-06-30 ENCOUNTER — APPOINTMENT (OUTPATIENT)
Dept: GENERAL RADIOLOGY | Facility: HOSPITAL | Age: 89
End: 2024-06-30
Payer: MEDICARE

## 2024-06-30 LAB
ALBUMIN SERPL-MCNC: 3 G/DL (ref 3.5–5.2)
ANION GAP SERPL CALCULATED.3IONS-SCNC: 10 MMOL/L (ref 5–15)
BUN SERPL-MCNC: 30 MG/DL (ref 8–23)
BUN/CREAT SERPL: 38 (ref 7–25)
CALCIUM SPEC-SCNC: 9.7 MG/DL (ref 8.6–10.5)
CHLORIDE SERPL-SCNC: 101 MMOL/L (ref 98–107)
CO2 SERPL-SCNC: 26 MMOL/L (ref 22–29)
CREAT SERPL-MCNC: 0.79 MG/DL (ref 0.76–1.27)
DEPRECATED RDW RBC AUTO: 43.3 FL (ref 37–54)
EGFRCR SERPLBLD CKD-EPI 2021: 86.5 ML/MIN/1.73
ERYTHROCYTE [DISTWIDTH] IN BLOOD BY AUTOMATED COUNT: 12.1 % (ref 12.3–15.4)
GLUCOSE BLDC GLUCOMTR-MCNC: 160 MG/DL (ref 70–130)
GLUCOSE BLDC GLUCOMTR-MCNC: 197 MG/DL (ref 70–130)
GLUCOSE BLDC GLUCOMTR-MCNC: 202 MG/DL (ref 70–130)
GLUCOSE BLDC GLUCOMTR-MCNC: 214 MG/DL (ref 70–130)
GLUCOSE SERPL-MCNC: 186 MG/DL (ref 65–99)
HCT VFR BLD AUTO: 41.2 % (ref 37.5–51)
HGB BLD-MCNC: 13.6 G/DL (ref 13–17.7)
MAGNESIUM SERPL-MCNC: 2.2 MG/DL (ref 1.6–2.4)
MCH RBC QN AUTO: 32.1 PG (ref 26.6–33)
MCHC RBC AUTO-ENTMCNC: 33 G/DL (ref 31.5–35.7)
MCV RBC AUTO: 97.2 FL (ref 79–97)
PHOSPHATE SERPL-MCNC: 3.4 MG/DL (ref 2.5–4.5)
PLATELET # BLD AUTO: 371 10*3/MM3 (ref 140–450)
PMV BLD AUTO: 9.7 FL (ref 6–12)
POTASSIUM SERPL-SCNC: 4.7 MMOL/L (ref 3.5–5.2)
RBC # BLD AUTO: 4.24 10*6/MM3 (ref 4.14–5.8)
SODIUM SERPL-SCNC: 137 MMOL/L (ref 136–145)
URATE SERPL-MCNC: 4.9 MG/DL (ref 3.4–7)
WBC NRBC COR # BLD AUTO: 16.84 10*3/MM3 (ref 3.4–10.8)

## 2024-06-30 PROCEDURE — 82948 REAGENT STRIP/BLOOD GLUCOSE: CPT

## 2024-06-30 PROCEDURE — 85027 COMPLETE CBC AUTOMATED: CPT | Performed by: INTERNAL MEDICINE

## 2024-06-30 PROCEDURE — 83735 ASSAY OF MAGNESIUM: CPT | Performed by: INTERNAL MEDICINE

## 2024-06-30 PROCEDURE — 80069 RENAL FUNCTION PANEL: CPT | Performed by: INTERNAL MEDICINE

## 2024-06-30 PROCEDURE — 94799 UNLISTED PULMONARY SVC/PX: CPT

## 2024-06-30 PROCEDURE — 71045 X-RAY EXAM CHEST 1 VIEW: CPT

## 2024-06-30 PROCEDURE — 25010000002 METHYLPREDNISOLONE PER 125 MG: Performed by: INTERNAL MEDICINE

## 2024-06-30 PROCEDURE — 94761 N-INVAS EAR/PLS OXIMETRY MLT: CPT

## 2024-06-30 PROCEDURE — 94664 DEMO&/EVAL PT USE INHALER: CPT

## 2024-06-30 PROCEDURE — 84550 ASSAY OF BLOOD/URIC ACID: CPT | Performed by: INTERNAL MEDICINE

## 2024-06-30 PROCEDURE — 99232 SBSQ HOSP IP/OBS MODERATE 35: CPT

## 2024-06-30 PROCEDURE — 63710000001 INSULIN LISPRO (HUMAN) PER 5 UNITS: Performed by: INTERNAL MEDICINE

## 2024-06-30 RX ADMIN — DIGOXIN 125 MCG: 125 TABLET ORAL at 12:31

## 2024-06-30 RX ADMIN — METHYLPREDNISOLONE SODIUM SUCCINATE 60 MG: 125 INJECTION INTRAMUSCULAR; INTRAVENOUS at 02:28

## 2024-06-30 RX ADMIN — INSULIN LISPRO 5 UNITS: 100 INJECTION, SOLUTION INTRAVENOUS; SUBCUTANEOUS at 18:16

## 2024-06-30 RX ADMIN — MIRTAZAPINE 7.5 MG: 15 TABLET, FILM COATED ORAL at 21:04

## 2024-06-30 RX ADMIN — METHYLPREDNISOLONE SODIUM SUCCINATE 60 MG: 125 INJECTION INTRAMUSCULAR; INTRAVENOUS at 15:19

## 2024-06-30 RX ADMIN — APIXABAN 5 MG: 5 TABLET, FILM COATED ORAL at 09:55

## 2024-06-30 RX ADMIN — Medication 1 TABLET: at 09:55

## 2024-06-30 RX ADMIN — FOLIC ACID 1 MG: 1 TABLET ORAL at 16:31

## 2024-06-30 RX ADMIN — Medication 100 MG: at 09:55

## 2024-06-30 RX ADMIN — DILTIAZEM HYDROCHLORIDE 240 MG: 240 CAPSULE, EXTENDED RELEASE ORAL at 09:54

## 2024-06-30 RX ADMIN — Medication 10 ML: at 21:05

## 2024-06-30 RX ADMIN — METHYLPREDNISOLONE SODIUM SUCCINATE 60 MG: 125 INJECTION INTRAMUSCULAR; INTRAVENOUS at 09:55

## 2024-06-30 RX ADMIN — Medication 10 ML: at 09:55

## 2024-06-30 RX ADMIN — INSULIN LISPRO 3 UNITS: 100 INJECTION, SOLUTION INTRAVENOUS; SUBCUTANEOUS at 09:54

## 2024-06-30 RX ADMIN — BUDESONIDE AND FORMOTEROL FUMARATE DIHYDRATE 2 PUFF: 160; 4.5 AEROSOL RESPIRATORY (INHALATION) at 06:18

## 2024-06-30 RX ADMIN — ATORVASTATIN CALCIUM 20 MG: 20 TABLET, FILM COATED ORAL at 09:55

## 2024-06-30 RX ADMIN — BUDESONIDE AND FORMOTEROL FUMARATE DIHYDRATE 2 PUFF: 160; 4.5 AEROSOL RESPIRATORY (INHALATION) at 20:26

## 2024-06-30 RX ADMIN — FUROSEMIDE 40 MG: 40 TABLET ORAL at 09:55

## 2024-06-30 RX ADMIN — METHYLPREDNISOLONE SODIUM SUCCINATE 60 MG: 125 INJECTION INTRAMUSCULAR; INTRAVENOUS at 21:04

## 2024-06-30 RX ADMIN — INSULIN LISPRO 3 UNITS: 100 INJECTION, SOLUTION INTRAVENOUS; SUBCUTANEOUS at 12:30

## 2024-06-30 RX ADMIN — TIOTROPIUM BROMIDE INHALATION SPRAY 2 PUFF: 3.12 SPRAY, METERED RESPIRATORY (INHALATION) at 06:20

## 2024-06-30 RX ADMIN — BISACODYL 5 MG: 5 TABLET, COATED ORAL at 09:54

## 2024-06-30 RX ADMIN — APIXABAN 5 MG: 5 TABLET, FILM COATED ORAL at 21:04

## 2024-06-30 RX ADMIN — INSULIN LISPRO 5 UNITS: 100 INJECTION, SOLUTION INTRAVENOUS; SUBCUTANEOUS at 21:04

## 2024-06-30 NOTE — PLAN OF CARE
Goal Outcome Evaluation:  Plan of Care Reviewed With: patient        Progress: improving  Outcome Evaluation: Admit for new onset afib w/RVR, diast HF, COPD. SR during this shift. Remains on 12L NC O2 sats >95%, started on solumedrol and ACHS accuchecks, A&Ox4, soa w/exertion. Continue plan of care

## 2024-06-30 NOTE — PROGRESS NOTES
The patient is sleeping soundly today and charting indicates no management issues.  Charting also indicates possible a.m. discharge.

## 2024-06-30 NOTE — PROGRESS NOTES
"  Daily Progress Note.   [address]  6/30/2024    Patient:  Name:  Jim Marvin  MRN:  [id number]  1935  88 y.o.  male         CC: ahrf, cpfe    Interval History:  Patient oxygen saturation on 12 L high flow nasal cannula.    Patient afebrile overnight.    Says he feels like his breathing is good no chest pain no cough no sputum production.  Awake alert no lethargy        Physical Exam:  /94 (BP Location: Right arm, Patient Position: Sitting)   Pulse 82   Temp 97.3 °F (36.3 °C) (Oral)   Resp 18   Ht 177.8 cm (70\")   Wt 88.7 kg (195 lb 8.8 oz)   SpO2 95%   BMI 28.06 kg/m²   Body mass index is 28.06 kg/m².    Intake/Output Summary (Last 24 hours) at 6/30/2024 0737  Last data filed at 6/30/2024 0711  Gross per 24 hour   Intake 960 ml   Output 500 ml   Net 460 ml   12 L high flow  General appearance: Nontoxic, conversant   Eyes: anicteric sclerae, moist conjunctivae; no lidlag;    HENT: Atraumatic; oropharynx clear with moist mucous membranes    Neck: Trachea midline;  supple large neck  Lungs: no wheeze no crackles with normal respiratory effort and no intercostal retractions  CV: Irregular regular, no rub   Abdomen: Soft, nontender; BS+  Extremities: No significant peripheral edema   Skin: WWP no diffuse visible rash  Psych: Calm affect, alert   Neuro: Moves all ext. CN II-XII. Speech intact.    Data Review:  Notable Labs:  Results from last 7 days   Lab Units 06/30/24  0543 06/29/24  1119 06/28/24  0325 06/27/24  0456 06/26/24  0459 06/25/24  0441 06/24/24  1801   WBC 10*3/mm3 16.84* 14.16* 7.67 6.58 6.95 7.43 10.59   HEMOGLOBIN g/dL 13.6 13.6 13.6 12.3* 12.2* 12.6* 13.3   PLATELETS 10*3/mm3 371 328 259 237 217 190 206     Results from last 7 days   Lab Units 06/29/24  0424 06/28/24  0325 06/27/24  0456 06/26/24  0459 06/25/24  1815 06/25/24  0441 06/24/24  1801   SODIUM mmol/L 134* 130* 132* 134*  --  133* 131*   POTASSIUM mmol/L 4.5 4.2 3.8 4.1 3.9 3.4* 4.6   CHLORIDE " mmol/L 98 98 99 101  --  99 94*   CO2 mmol/L 20.9* 21.0* 23.1 23.0  --  25.0 22.6   BUN mg/dL 19 10 7* 10  --  15 14   CREATININE mg/dL 0.72* 0.66* 0.59* 0.53*  --  0.69* 0.82   GLUCOSE mg/dL 176* 129* 101* 101*  --  79 137*   CALCIUM mg/dL 9.8 8.7 8.3* 8.0*  --  8.2* 9.0   MAGNESIUM mg/dL  --   --   --  2.2  --  2.3 1.8   PHOSPHORUS mg/dL 3.3  --   --   --   --  3.1  --    Estimated Creatinine Clearance: 82.6 mL/min (A) (by C-G formula based on SCr of 0.72 mg/dL (L)).    Results from last 7 days   Lab Units 06/30/24  0543 06/29/24  1119 06/29/24  0424 06/28/24  0325 06/26/24  0459 06/25/24  0441 06/24/24  1801   AST (SGOT) U/L  --   --   --   --   --  20 20   ALT (SGPT) U/L  --   --   --   --   --  36 40   PROCALCITONIN ng/mL  --   --  0.07 0.08  --   --  0.23   LACTATE mmol/L  --   --   --   --   --   --  1.7   D DIMER QUANT MCGFEU/mL  --   --   --   --   --   --  1.62*   PLATELETS 10*3/mm3 371 328  --  259   < > 190 206    < > = values in this interval not displayed.       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    Continue IV Solu-Medrol aggressive dosing, monitor response    Wean oxygen as able    On Eliquis,     Continue symbicort and spiriva in attempts to limit albuterol use to avoid afib  rvr.    Discussed with patient.  No family at bedside this morning.    Electronically signed by Dov Alvarado MD, 06/30/24, 8:06 AM EDT.  .

## 2024-06-30 NOTE — PROGRESS NOTES
Name: Jim Marvin ADMIT: 2024   : 1935  PCP: Antonio Finley MD    MRN: [id number] LOS: 6 days   AGE/SEX: 88 y.o. male  ROOM: [facility]     Subjective   Subjective   Chief Complaint   Patient presents with    Decreased Appetite     Depression     Continues with high flow. not reporting any dyspnea or air hunger.  No chest pain.  Was asking about his tachycardia and we discussed pulmonary disease and also other stressors which could lead A-fib including alcohol use.    No nausea vomiting diarrhea or abdominal pain reported.     Objective   Objective   Vital Signs  Temp:  [97.3 °F (36.3 °C)-97.5 °F (36.4 °C)] 97.5 °F (36.4 °C)  Heart Rate:  [] 93  Resp:  [18] 18  BP: (119-143)/(51-94) 128/67  SpO2:  [90 %-95 %] 90 %  on  Flow (L/min):  [12] 12;   Device (Oxygen Therapy): humidified;high-flow nasal cannula  Body mass index is 28.06 kg/m².    Physical Exam  Vitals and nursing note reviewed.   Constitutional:       General: He is not in acute distress.     Appearance: He is ill-appearing. He is not diaphoretic.   Cardiovascular:      Rate and Rhythm: Normal rate and regular rhythm.      Pulses: Normal pulses.   Pulmonary:      Effort: Pulmonary effort is normal.      Breath sounds: No wheezing or rales.   Abdominal:      General: There is no distension.      Palpations: Abdomen is soft.      Tenderness: There is no abdominal tenderness. There is no guarding or rebound.   Musculoskeletal:         General: No tenderness.      Right lower leg: No edema.      Left lower leg: No edema.   Skin:     General: Skin is warm and dry.   Neurological:      Mental Status: He is alert.      Cranial Nerves: No cranial nerve deficit.      Motor: Tremor present.   Psychiatric:         Mood and Affect: Mood normal.         Behavior: Behavior normal.       Results Review  I reviewed the patient's new clinical results.    Results from last 7 days   Lab Units 24  0543 24  1119 24  0325 24  4132    WBC 10*3/mm3 16.84* 14.16* 7.67 6.58   HEMOGLOBIN g/dL 13.6 13.6 13.6 12.3*   PLATELETS 10*3/mm3 371 328 259 237     Results from last 7 days   Lab Units 06/30/24 0904 06/29/24 0424 06/28/24 0325 06/27/24  0456   SODIUM mmol/L 137 134* 130* 132*   POTASSIUM mmol/L 4.7 4.5 4.2 3.8   CHLORIDE mmol/L 101 98 98 99   CO2 mmol/L 26.0 20.9* 21.0* 23.1   BUN mg/dL 30* 19 10 7*   CREATININE mg/dL 0.79 0.72* 0.66* 0.59*   GLUCOSE mg/dL 186* 176* 129* 101*   EGFR mL/min/1.73 86.5 89.0 91.3 94.5     Results from last 7 days   Lab Units 06/30/24 0904 06/29/24 0424 06/25/24 0441 06/24/24  1801   ALBUMIN g/dL 3.0* 3.1* 2.8* 3.2*   BILIRUBIN mg/dL  --   --  0.9 1.5*   ALK PHOS U/L  --   --  56 64   AST (SGOT) U/L  --   --  20 20   ALT (SGPT) U/L  --   --  36 40     Results from last 7 days   Lab Units 06/30/24 0904 06/29/24 0424 06/28/24 0325 06/27/24 0456 06/26/24 0459 06/25/24 0441 06/24/24  1801   CALCIUM mg/dL 9.7 9.8 8.7 8.3* 8.0* 8.2* 9.0   ALBUMIN g/dL 3.0* 3.1*  --   --   --  2.8* 3.2*   MAGNESIUM mg/dL 2.2  --   --   --  2.2 2.3 1.8   PHOSPHORUS mg/dL 3.4 3.3  --   --   --  3.1  --      Results from last 7 days   Lab Units 06/29/24 0424 06/28/24 0325 06/24/24  1801   PROCALCITONIN ng/mL 0.07 0.08 0.23   LACTATE mmol/L  --   --  1.7     Glucose   Date/Time Value Ref Range Status   06/30/2024 1157 197 (H) 70 - 130 mg/dL Final   06/30/2024 0801 160 (H) 70 - 130 mg/dL Final   06/29/2024 2022 216 (H) 70 - 130 mg/dL Final   06/29/2024 1722 170 (H) 70 - 130 mg/dL Final       XR Chest 1 View    Result Date: 6/30/2024  No significant change.  This report was finalized on 6/30/2024 7:24 AM by Dr. Ortiz Barber M.D on Workstation: [id number]       I have personally reviewed all medications:  Scheduled Medications  apixaban, 5 mg, Oral, Q12H  atorvastatin, 20 mg, Oral, Daily  budesonide-formoterol, 2 puff, Inhalation, BID - RT  digoxin, 125 mcg, Oral, Daily  dilTIAZem CD, 240 mg, Oral, Q24H  folic acid, 1 mg, Oral,  Daily  furosemide, 40 mg, Oral, Daily With Breakfast  insulin lispro, 3-14 Units, Subcutaneous, 4x Daily AC & at Bedtime  methylPREDNISolone sodium succinate, 60 mg, Intravenous, Q6H  mirtazapine, 7.5 mg, Oral, Nightly  multivitamin, 1 tablet, Oral, Daily  sodium chloride, 10 mL, Intravenous, Q12H  thiamine, 100 mg, Oral, Daily  tiotropium bromide monohydrate, 2 puff, Inhalation, Daily - RT      Infusions       Diet  Diet: Regular/House; Fluid Consistency: Thin (IDDSI 0)    I have personally reviewed:  [x]  Laboratory   []  Microbiology   [x]  Radiology   [x]  EKG/Telemetry  []  Cardiology/Vascular   []  Pathology    []  Records       Assessment/Plan     Active Hospital Problems    Diagnosis  POA    **Atrial fibrillation with RVR [I48.91]  Yes    ILD (interstitial lung disease) [J84.9]  Yes    Carotid artery stenosis [I65.29]  Yes    Hypoxia [R09.02]  Yes    Emphysema lung [J43.9]  Yes    Acute on chronic respiratory failure with hypoxia [J96.21]  Yes    Hypertension [I10]  Yes    Hyperlipidemia [E78.5]  Yes      Resolved Hospital Problems   No resolved problems to display.       88 y.o. male admitted with Atrial fibrillation with RVR.    A-fib with RVR: Initially required diltiazem drip.  Transitioned to oral regimen.  On Eliquis.  Cardiology following.  ILD/COPD: Continue breathing treatments and IV steroid. 3 L baseline O2 with titration to 6 L with exertion.  Pulmonology following  Acute on chronic hypoxic respiratory failure: On high flow  Depression: Remeron started. Psychiatry evaluated.  Chronic alcohol use: Complicating AFib. No acute withdrawal. Continue vitamin supplementation. Chronic tremor present.  Hyperglycemia: On steroid.  On SSI.  Monitoring requirements.  Check A1c.  PPX: as above  Disposition: SNF/TBD    Expected Discharge Date: 7/1/2024; Expected Discharge Time:  3:00 PM     Salvador Spears MD  [facility]  06/30/24  15:14 EDT    Dictated portions of note using Dragon  dictation software.  Copied text in this note has been reviewed by me and remains accurate as of 06/30/24

## 2024-06-30 NOTE — PLAN OF CARE
Goal Outcome Evaluation:  CIWA score of 3-4 ,  patient has involuntary tremors on micaela. Arm/hands . No serious withdrawal symptoms noted. Still on 12 lpm O2 on high flow. Bed alarm activated . VS stable . Continue nursing care plan.

## 2024-06-30 NOTE — PROGRESS NOTES
LOS: 6 days   Patient Care Team:  Antonio Finley MD as PCP - General (Family Medicine)  Ruben Burger Jr., MD as Consulting Physician (Urology)    Chief Complaint: follow up atrial fibrillation     Interval History: He was resting in bed. He denies chest pain or discomfort. He oxygen is being weaned. He reports his breathing is at baseline.     Vital Signs:  Temp:  [97.3 °F (36.3 °C)-97.5 °F (36.4 °C)] 97.5 °F (36.4 °C)  Heart Rate:  [] 93  Resp:  [18] 18  BP: (119-143)/(51-94) 128/67    Intake/Output Summary (Last 24 hours) at 6/30/2024 1425  Last data filed at 6/30/2024 1307  Gross per 24 hour   Intake 960 ml   Output 500 ml   Net 460 ml        Physical Exam  Vitals reviewed.   Constitutional:       General: He is not in acute distress.  HENT:      Head: Normocephalic.      Nose: Nose normal.   Eyes:      Extraocular Movements: Extraocular movements intact.      Pupils: Pupils are equal, round, and reactive to light.   Cardiovascular:      Rate and Rhythm: Normal rate and regular rhythm.      Pulses: Normal pulses.      Heart sounds: Normal heart sounds. Heart sounds not distant. No murmur heard.     No friction rub. No gallop. No S3 or S4 sounds.   Pulmonary:      Effort: Pulmonary effort is normal.      Breath sounds: Normal breath sounds.   Abdominal:      General: Abdomen is flat. Bowel sounds are normal.      Palpations: Abdomen is soft.      Tenderness: There is no abdominal tenderness.   Skin:     General: Skin is warm and dry.   Neurological:      General: No focal deficit present.      Mental Status: He is alert and oriented to person, place, and time. Mental status is at baseline.   Psychiatric:         Mood and Affect: Mood normal.         Behavior: Behavior normal.         Results Review:    Results from last 7 days   Lab Units 06/30/24  0904   SODIUM mmol/L 137   POTASSIUM mmol/L 4.7   CHLORIDE mmol/L 101   CO2 mmol/L 26.0   BUN mg/dL 30*   CREATININE mg/dL 0.79   GLUCOSE mg/dL 186*   CALCIUM  mg/dL 9.7     Results from last 7 days   Lab Units 06/24/24  1938 06/24/24  1801   HSTROP T ng/L 53* 58*     Results from last 7 days   Lab Units 06/30/24  0543   WBC 10*3/mm3 16.84*   HEMOGLOBIN g/dL 13.6   HEMATOCRIT % 41.2   PLATELETS 10*3/mm3 371     Results from last 7 days   Lab Units 06/24/24  1801   INR  1.18*   APTT seconds 26.5     Results from last 7 days   Lab Units 06/25/24  0441   CHOLESTEROL mg/dL 132     Results from last 7 days   Lab Units 06/30/24  0904   MAGNESIUM mg/dL 2.2     Results from last 7 days   Lab Units 06/25/24  0441   CHOLESTEROL mg/dL 132   TRIGLYCERIDES mg/dL 71   HDL CHOL mg/dL 34*   LDL CHOL mg/dL 84       I reviewed the patient's new clinical results.        Assessment & Plan:  Atrial fibrillation with RVR  In SR today with PACs  Continue diltiazem 240 mg daily and digoxin 125 mcg daily  Continue apixaban  Elevated troponin  No significant delta, clinical scenario is not consistent with ACS  Hypertension  Hyperlipidemia  COPD/ILD  Prediabetes  Tobacco and alcohol abuse  Depression     His heart rate is well controlled, continue current regimen. No objection to discharge from a cardiology standpoint. Will arrange follow up in our office. Please call with any questions or concerns.     Lula Lozada, KATY  06/30/24  14:25 EDT

## 2024-06-30 NOTE — PLAN OF CARE
Goal Outcome Evaluation:  Plan of Care Reviewed With: patient        Progress: improving  Outcome Evaluation: Admit for new onset afib w/RVR, diast HF, COPD. SR during this shift. O2 sats >95% weaned O2 down to 4-6L, continue solumedrol and ACHS accuchecks, A&Ox4, no soa w/ exertion noted. CIWA score 2-3, no obvious withdrawal symptoms, plan to DC tomorrow if maintains Oxygen <=8L, Continue plan of care

## 2024-07-01 LAB
ALBUMIN SERPL-MCNC: 3 G/DL (ref 3.5–5.2)
ANION GAP SERPL CALCULATED.3IONS-SCNC: 10.7 MMOL/L (ref 5–15)
BUN SERPL-MCNC: 32 MG/DL (ref 8–23)
BUN/CREAT SERPL: 43.8 (ref 7–25)
CALCIUM SPEC-SCNC: 9.6 MG/DL (ref 8.6–10.5)
CHLORIDE SERPL-SCNC: 102 MMOL/L (ref 98–107)
CO2 SERPL-SCNC: 24.3 MMOL/L (ref 22–29)
CREAT SERPL-MCNC: 0.73 MG/DL (ref 0.76–1.27)
DEPRECATED RDW RBC AUTO: 39.4 FL (ref 37–54)
EGFRCR SERPLBLD CKD-EPI 2021: 88.6 ML/MIN/1.73
ERYTHROCYTE [DISTWIDTH] IN BLOOD BY AUTOMATED COUNT: 11.7 % (ref 12.3–15.4)
GLUCOSE BLDC GLUCOMTR-MCNC: 152 MG/DL (ref 70–130)
GLUCOSE BLDC GLUCOMTR-MCNC: 156 MG/DL (ref 70–130)
GLUCOSE BLDC GLUCOMTR-MCNC: 172 MG/DL (ref 70–130)
GLUCOSE BLDC GLUCOMTR-MCNC: 177 MG/DL (ref 70–130)
GLUCOSE SERPL-MCNC: 150 MG/DL (ref 65–99)
HBA1C MFR BLD: 6.4 % (ref 4.8–5.6)
HCT VFR BLD AUTO: 38.9 % (ref 37.5–51)
HGB BLD-MCNC: 13.3 G/DL (ref 13–17.7)
MAGNESIUM SERPL-MCNC: 2.3 MG/DL (ref 1.6–2.4)
MCH RBC QN AUTO: 32.4 PG (ref 26.6–33)
MCHC RBC AUTO-ENTMCNC: 34.2 G/DL (ref 31.5–35.7)
MCV RBC AUTO: 94.6 FL (ref 79–97)
PHOSPHATE SERPL-MCNC: 3.4 MG/DL (ref 2.5–4.5)
PLATELET # BLD AUTO: 379 10*3/MM3 (ref 140–450)
PMV BLD AUTO: 8.8 FL (ref 6–12)
POTASSIUM SERPL-SCNC: 4.8 MMOL/L (ref 3.5–5.2)
RBC # BLD AUTO: 4.11 10*6/MM3 (ref 4.14–5.8)
SODIUM SERPL-SCNC: 137 MMOL/L (ref 136–145)
URATE SERPL-MCNC: 5.4 MG/DL (ref 3.4–7)
WBC NRBC COR # BLD AUTO: 12.74 10*3/MM3 (ref 3.4–10.8)

## 2024-07-01 PROCEDURE — 97530 THERAPEUTIC ACTIVITIES: CPT

## 2024-07-01 PROCEDURE — 83036 HEMOGLOBIN GLYCOSYLATED A1C: CPT | Performed by: INTERNAL MEDICINE

## 2024-07-01 PROCEDURE — 85027 COMPLETE CBC AUTOMATED: CPT | Performed by: INTERNAL MEDICINE

## 2024-07-01 PROCEDURE — 82948 REAGENT STRIP/BLOOD GLUCOSE: CPT

## 2024-07-01 PROCEDURE — 25010000002 METHYLPREDNISOLONE PER 125 MG: Performed by: INTERNAL MEDICINE

## 2024-07-01 PROCEDURE — 94799 UNLISTED PULMONARY SVC/PX: CPT

## 2024-07-01 PROCEDURE — 97535 SELF CARE MNGMENT TRAINING: CPT

## 2024-07-01 PROCEDURE — 97110 THERAPEUTIC EXERCISES: CPT

## 2024-07-01 PROCEDURE — 94664 DEMO&/EVAL PT USE INHALER: CPT

## 2024-07-01 PROCEDURE — 80069 RENAL FUNCTION PANEL: CPT | Performed by: INTERNAL MEDICINE

## 2024-07-01 PROCEDURE — 83735 ASSAY OF MAGNESIUM: CPT | Performed by: INTERNAL MEDICINE

## 2024-07-01 PROCEDURE — 94761 N-INVAS EAR/PLS OXIMETRY MLT: CPT

## 2024-07-01 PROCEDURE — 63710000001 INSULIN LISPRO (HUMAN) PER 5 UNITS: Performed by: INTERNAL MEDICINE

## 2024-07-01 PROCEDURE — 84550 ASSAY OF BLOOD/URIC ACID: CPT | Performed by: INTERNAL MEDICINE

## 2024-07-01 RX ADMIN — Medication 1 TABLET: at 08:25

## 2024-07-01 RX ADMIN — Medication 10 ML: at 08:25

## 2024-07-01 RX ADMIN — DIGOXIN 125 MCG: 125 TABLET ORAL at 12:17

## 2024-07-01 RX ADMIN — FOLIC ACID 1 MG: 1 TABLET ORAL at 08:25

## 2024-07-01 RX ADMIN — DILTIAZEM HYDROCHLORIDE 240 MG: 240 CAPSULE, EXTENDED RELEASE ORAL at 08:25

## 2024-07-01 RX ADMIN — ATORVASTATIN CALCIUM 20 MG: 20 TABLET, FILM COATED ORAL at 08:25

## 2024-07-01 RX ADMIN — BUDESONIDE AND FORMOTEROL FUMARATE DIHYDRATE 2 PUFF: 160; 4.5 AEROSOL RESPIRATORY (INHALATION) at 07:44

## 2024-07-01 RX ADMIN — METHYLPREDNISOLONE SODIUM SUCCINATE 60 MG: 125 INJECTION INTRAMUSCULAR; INTRAVENOUS at 08:25

## 2024-07-01 RX ADMIN — METHYLPREDNISOLONE SODIUM SUCCINATE 60 MG: 125 INJECTION INTRAMUSCULAR; INTRAVENOUS at 20:39

## 2024-07-01 RX ADMIN — BUDESONIDE AND FORMOTEROL FUMARATE DIHYDRATE 2 PUFF: 160; 4.5 AEROSOL RESPIRATORY (INHALATION) at 19:35

## 2024-07-01 RX ADMIN — INSULIN LISPRO 3 UNITS: 100 INJECTION, SOLUTION INTRAVENOUS; SUBCUTANEOUS at 12:17

## 2024-07-01 RX ADMIN — APIXABAN 5 MG: 5 TABLET, FILM COATED ORAL at 20:40

## 2024-07-01 RX ADMIN — INSULIN LISPRO 3 UNITS: 100 INJECTION, SOLUTION INTRAVENOUS; SUBCUTANEOUS at 08:25

## 2024-07-01 RX ADMIN — INSULIN LISPRO 3 UNITS: 100 INJECTION, SOLUTION INTRAVENOUS; SUBCUTANEOUS at 17:13

## 2024-07-01 RX ADMIN — INSULIN LISPRO 3 UNITS: 100 INJECTION, SOLUTION INTRAVENOUS; SUBCUTANEOUS at 21:30

## 2024-07-01 RX ADMIN — METHYLPREDNISOLONE SODIUM SUCCINATE 60 MG: 125 INJECTION INTRAMUSCULAR; INTRAVENOUS at 14:50

## 2024-07-01 RX ADMIN — Medication 100 MG: at 08:25

## 2024-07-01 RX ADMIN — METHYLPREDNISOLONE SODIUM SUCCINATE 60 MG: 125 INJECTION INTRAMUSCULAR; INTRAVENOUS at 01:52

## 2024-07-01 RX ADMIN — APIXABAN 5 MG: 5 TABLET, FILM COATED ORAL at 08:25

## 2024-07-01 RX ADMIN — MIRTAZAPINE 7.5 MG: 15 TABLET, FILM COATED ORAL at 20:40

## 2024-07-01 RX ADMIN — Medication 10 ML: at 20:40

## 2024-07-01 RX ADMIN — TIOTROPIUM BROMIDE INHALATION SPRAY 2 PUFF: 3.12 SPRAY, METERED RESPIRATORY (INHALATION) at 07:44

## 2024-07-01 NOTE — PROGRESS NOTES
"  Daily Progress Note.   [address]  7/1/2024    Patient:  Name:  Jim Marvin  MRN:  [id number]  1935  88 y.o.  male         CC: ahrf, cpfe    Interval History:  Patient has been weaned from 12 to 6 L high flow nasal cannula saturation 93% patient is afebrile.  No acute events overnight.    Feels breathing is improving, denies every feeling soa.  No cough sputum.  No lethargy no confusion.    Physical Exam:  /66 (BP Location: Right arm, Patient Position: Lying)   Pulse 80   Temp 97.3 °F (36.3 °C) (Oral)   Resp 16   Ht 177.8 cm (70\")   Wt 88.7 kg (195 lb 8.8 oz)   SpO2 93%   BMI 28.06 kg/m²   Body mass index is 28.06 kg/m².    Intake/Output Summary (Last 24 hours) at 7/1/2024 0801  Last data filed at 7/1/2024 0739  Gross per 24 hour   Intake 530 ml   Output --   Net 530 ml   12 L high flow --> 6ln  General appearance: Nontoxic, conversant   Eyes: anicteric sclerae, moist conjunctivae; no lidlag;    HENT: Atraumatic; oropharynx clear with moist mucous membranes    Neck: Trachea midline;  supple large neck  Lungs:diminished no wheeze no crackles with normal respiratory effort and no intercostal retractions  CV: Irregular regular, no rub   Abdomen: truncal obesity BS+  Extremities: No significant peripheral edema   Skin: WWP no diffuse visible rash  Psych: Calm affect, alert   Neuro: Moves all ext. CN II-XII. Speech intact.    Data Review:  Notable Labs:  Results from last 7 days   Lab Units 07/01/24  0448 06/30/24  0543 06/29/24  1119 06/28/24  0325 06/27/24  0456 06/26/24  0459 06/25/24  0441   WBC 10*3/mm3 12.74* 16.84* 14.16* 7.67 6.58 6.95 7.43   HEMOGLOBIN g/dL 13.3 13.6 13.6 13.6 12.3* 12.2* 12.6*   PLATELETS 10*3/mm3 379 371 328 259 237 217 190     Results from last 7 days   Lab Units 07/01/24  0448 06/30/24  0904 06/29/24  0424 06/28/24  0325 06/27/24  0456 06/26/24  0459 06/25/24  1815 06/25/24  0441 06/24/24  1801   SODIUM mmol/L 137 137 134* 130* 132* 134*  --  " 133* 131*   POTASSIUM mmol/L 4.8 4.7 4.5 4.2 3.8 4.1 3.9 3.4* 4.6   CHLORIDE mmol/L 102 101 98 98 99 101  --  99 94*   CO2 mmol/L 24.3 26.0 20.9* 21.0* 23.1 23.0  --  25.0 22.6   BUN mg/dL 32* 30* 19 10 7* 10  --  15 14   CREATININE mg/dL 0.73* 0.79 0.72* 0.66* 0.59* 0.53*  --  0.69* 0.82   GLUCOSE mg/dL 150* 186* 176* 129* 101* 101*  --  79 137*   CALCIUM mg/dL 9.6 9.7 9.8 8.7 8.3* 8.0*  --  8.2* 9.0   MAGNESIUM mg/dL 2.3 2.2  --   --   --  2.2  --  2.3 1.8   PHOSPHORUS mg/dL 3.4 3.4 3.3  --   --   --   --  3.1  --    Estimated Creatinine Clearance: 81.5 mL/min (A) (by C-G formula based on SCr of 0.73 mg/dL (L)).    Results from last 7 days   Lab Units 07/01/24  0448 06/30/24  0543 06/29/24  1119 06/29/24  0424 06/28/24  0325 06/26/24  0459 06/25/24  0441 06/24/24  1801   AST (SGOT) U/L  --   --   --   --   --   --  20 20   ALT (SGPT) U/L  --   --   --   --   --   --  36 40   PROCALCITONIN ng/mL  --   --   --  0.07 0.08  --   --  0.23   LACTATE mmol/L  --   --   --   --   --   --   --  1.7   D DIMER QUANT MCGFEU/mL  --   --   --   --   --   --   --  1.62*   PLATELETS 10*3/mm3 379 371 328  --  259   < > 190 206    < > = values in this interval not displayed.       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    Continue IV Solu-Medrol aggressive dosing, monitor response  Transition to po pred tomorrow am    Wean oxygen as able    On Eliquis,     Continue symbicort and spiriva in attempts to limit albuterol use to avoid afib rvr.    Still needing high amounts of oxygen, taper steroids tomorrow am.  No crackles on exam  Severe emphysema on ct  ?how much fluid/copd vs faint ILD at base.  Will need outpt pfts and repeat imaging    Discussed with patient.  No family at bedside this morning.     Gabe rn    Electronically signed by Dov Alvarado MD, 07/01/24, 9:16 AM EDT.
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
## 2024-07-01 NOTE — PLAN OF CARE
Problem: Adult Inpatient Plan of Care  Goal: Plan of Care Review  Outcome: Ongoing, Progressing  Flowsheets (Taken 7/1/2024 1607)  Progress: improving  Plan of Care Reviewed With: patient  Outcome Evaluation: Pt vitals stable. No c/o pain. On 5-6L O2. IV steroids continued. Pt worked with PT and OT.  Pt safety maintained

## 2024-07-01 NOTE — PROGRESS NOTES
Name: Jim Marvin ADMIT: 2024   : 1935  PCP: Antonio Finley MD    MRN: [id number] LOS: 7 days   AGE/SEX: 88 y.o. male  ROOM: [facility]     Subjective   Subjective   Chief Complaint   Patient presents with    Decreased Appetite     Depression     No chest pain SOA nausea vomiting or diarrhea reported.  He was on 6 L during exam which is improvement compared to yesterday.     Objective   Objective   Vital Signs  Temp:  [97.3 °F (36.3 °C)-97.5 °F (36.4 °C)] 97.3 °F (36.3 °C)  Heart Rate:  [76-91] 89  Resp:  [16-18] 16  BP: (137-147)/(54-70) 142/54  SpO2:  [87 %-99 %] 91 %  on  Flow (L/min):  [4-8] 6;   Device (Oxygen Therapy): high-flow nasal cannula;humidified  Body mass index is 28.06 kg/m².    Physical Exam  Vitals and nursing note reviewed.   Constitutional:       General: He is not in acute distress.     Appearance: He is ill-appearing. He is not diaphoretic.   Cardiovascular:      Rate and Rhythm: Normal rate and regular rhythm.      Pulses: Normal pulses.   Pulmonary:      Effort: Pulmonary effort is normal.      Breath sounds: No wheezing or rales.   Abdominal:      General: There is no distension.      Palpations: Abdomen is soft.      Tenderness: There is no abdominal tenderness. There is no guarding or rebound.   Musculoskeletal:         General: No tenderness.      Right lower leg: No edema.      Left lower leg: No edema.   Skin:     General: Skin is warm and dry.   Neurological:      Mental Status: He is alert. Mental status is at baseline.      Motor: Tremor present.   Psychiatric:         Mood and Affect: Mood normal.         Behavior: Behavior normal.       Results Review  I reviewed the patient's new clinical results.    Results from last 7 days   Lab Units 248 24  0543 24  1119 24  0325   WBC 10*3/mm3 12.74* 16.84* 14.16* 7.67   HEMOGLOBIN g/dL 13.3 13.6 13.6 13.6   PLATELETS 10*3/mm3 379 371 328 259     Results from last 7 days   Lab Units 24  06/30/24 0904 06/29/24 0424 06/28/24 0325   SODIUM mmol/L 137 137 134* 130*   POTASSIUM mmol/L 4.8 4.7 4.5 4.2   CHLORIDE mmol/L 102 101 98 98   CO2 mmol/L 24.3 26.0 20.9* 21.0*   BUN mg/dL 32* 30* 19 10   CREATININE mg/dL 0.73* 0.79 0.72* 0.66*   GLUCOSE mg/dL 150* 186* 176* 129*   EGFR mL/min/1.73 88.6 86.5 89.0 91.3     Results from last 7 days   Lab Units 07/01/24 0448 06/30/24 0904 06/29/24 0424 06/25/24 0441 06/24/24  1801   ALBUMIN g/dL 3.0* 3.0* 3.1* 2.8* 3.2*   BILIRUBIN mg/dL  --   --   --  0.9 1.5*   ALK PHOS U/L  --   --   --  56 64   AST (SGOT) U/L  --   --   --  20 20   ALT (SGPT) U/L  --   --   --  36 40     Results from last 7 days   Lab Units 07/01/24 0448 06/30/24 0904 06/29/24 0424 06/28/24 0325 06/27/24 0456 06/26/24 0459 06/25/24 0441   CALCIUM mg/dL 9.6 9.7 9.8 8.7   < > 8.0* 8.2*   ALBUMIN g/dL 3.0* 3.0* 3.1*  --   --   --  2.8*   MAGNESIUM mg/dL 2.3 2.2  --   --   --  2.2 2.3   PHOSPHORUS mg/dL 3.4 3.4 3.3  --   --   --  3.1    < > = values in this interval not displayed.     Results from last 7 days   Lab Units 06/29/24 0424 06/28/24 0325 06/24/24  1801   PROCALCITONIN ng/mL 0.07 0.08 0.23   LACTATE mmol/L  --   --  1.7     Hemoglobin A1C   Date/Time Value Ref Range Status   07/01/2024 0447 6.40 (H) 4.80 - 5.60 % Final     Glucose   Date/Time Value Ref Range Status   07/01/2024 1156 177 (H) 70 - 130 mg/dL Final   07/01/2024 0807 152 (H) 70 - 130 mg/dL Final   06/30/2024 2038 214 (H) 70 - 130 mg/dL Final   06/30/2024 1704 202 (H) 70 - 130 mg/dL Final   06/30/2024 1157 197 (H) 70 - 130 mg/dL Final   06/30/2024 0801 160 (H) 70 - 130 mg/dL Final   06/29/2024 2022 216 (H) 70 - 130 mg/dL Final       XR Chest 1 View    Result Date: 6/30/2024  No significant change.  This report was finalized on 6/30/2024 7:24 AM by Dr. Ortiz Barber M.D on Workstation: [id number]       I have personally reviewed all medications:  Scheduled Medications  apixaban, 5 mg, Oral, Q12H  atorvastatin,  20 mg, Oral, Daily  budesonide-formoterol, 2 puff, Inhalation, BID - RT  digoxin, 125 mcg, Oral, Daily  dilTIAZem CD, 240 mg, Oral, Q24H  folic acid, 1 mg, Oral, Daily  insulin lispro, 3-14 Units, Subcutaneous, 4x Daily AC & at Bedtime  methylPREDNISolone sodium succinate, 60 mg, Intravenous, Q6H  mirtazapine, 7.5 mg, Oral, Nightly  multivitamin, 1 tablet, Oral, Daily  sodium chloride, 10 mL, Intravenous, Q12H  thiamine, 100 mg, Oral, Daily  tiotropium bromide monohydrate, 2 puff, Inhalation, Daily - RT      Infusions       Diet  Diet: Regular/House; Fluid Consistency: Thin (IDDSI 0)    I have personally reviewed:  [x]  Laboratory   []  Microbiology   []  Radiology   [x]  EKG/Telemetry  []  Cardiology/Vascular   []  Pathology    []  Records       Assessment/Plan     Active Hospital Problems    Diagnosis  POA    **Atrial fibrillation with RVR [I48.91]  Yes    ILD (interstitial lung disease) [J84.9]  Yes    Carotid artery stenosis [I65.29]  Yes    Hypoxia [R09.02]  Yes    Emphysema lung [J43.9]  Yes    Acute on chronic respiratory failure with hypoxia [J96.21]  Yes    Hypertension [I10]  Yes    Hyperlipidemia [E78.5]  Yes      Resolved Hospital Problems   No resolved problems to display.       88 y.o. male admitted with Atrial fibrillation with RVR.    A-fib with RVR: Initially required diltiazem drip.  Transitioned to oral regimen.  On Eliquis.  Cardiology following.  ILD/COPD: Continue breathing treatments and IV steroid. 3 L baseline O2 with titration to 6 L with exertion.  Pulmonology following  Acute on chronic hypoxic respiratory failure: Required high flow this admission.  Improving oxygen requirements overnight.  On 6 L during exam.  Depression: Remeron started. Psychiatry evaluated.  Chronic alcohol use: Complicating AFib. No acute withdrawal. Continue vitamin supplementation. Chronic tremor present.  Hyperglycemia: A1c 6.4 which is increasing over the past several years of lab values.  Continue SSI with the  IV steroid.  Likely will start metformin at discharge for prediabetes and anticipated progression to diabetes.  PPX: as above  Disposition: SNF/1 to 2 days    Expected Discharge Date: 7/1/2024; Expected Discharge Time:  3:00 PM     Salvador Spears MD  [facility]  07/01/24  13:32 EDT    Dictated portions of note using Dragon dictation software.  Copied text in this note has been reviewed by me and remains accurate as of 07/01/24

## 2024-07-01 NOTE — PLAN OF CARE
Problem: Adult Inpatient Plan of Care  Goal: Plan of Care Review  Outcome: Ongoing, Progressing  Flowsheets (Taken 7/1/2024 5263)  Plan of Care Reviewed With: patient  Outcome Evaluation: Pt currently on 6L O2. SR on the monitor. No complaints of pain. Pt denies soa. IV solu-medrol continued. x1 w/ walker to bathroom. Possible d/c today to [facility] with O2 requirements being <8L this shift. Plan of care ongoing.

## 2024-07-01 NOTE — PLAN OF CARE
Goal Outcome Evaluation:  Plan of Care Reviewed With: patient        Progress: improving  Outcome Evaluation: Pt is on 6L HF O2 during session. Pt is motivated to participate. Pt participated in activites to improve endurance, functional mobility, activity tolerance, ADL engagement. Pt was happy he was able to brush his teeth standing at the sink today with the walker. He does need rest breaks between all tasks to work on breathing recovery. SPO2 drops to 85% with seated UE exercises and 80% with standing at sink. Recovery > 90% in ~20 seconds.      Anticipated Discharge Disposition (OT): skilled nursing facility

## 2024-07-01 NOTE — PLAN OF CARE
Goal Outcome Evaluation:  Plan of Care Reviewed With: patient           Outcome Evaluation: Pt participated with PT this PM, on 5L hi flow at arrival. Pt completed bed mobility sba, transfers cga and ambulated 20' within the room cga with walker. Continues to be limited by O2 demands as he required increase to 6L HF with standing tasks and recovery, O2 sats decrease to 77% and requires several mins for recovery. PT will continue to follow and progress as able. Rec SNF.      Anticipated Discharge Disposition (PT): skilled nursing facility

## 2024-07-01 NOTE — CASE MANAGEMENT/SOCIAL WORK
Continued Stay Note  [facility]     Patient Name: Jim Marvin  MRN: [id number]  Today's Date: 7/1/2024    Admit Date: 6/24/2024    Plan: [facility] pending O2 Needs less than 8L high flow   Discharge Plan       Row Name 07/01/24 1639       Plan    Plan [facility] pending O2 Needs less than 8L high flow    Plan Comments Spoke with Dov with [facility], he is able to accept back tomorrow since current oxygen needs are at 5L high flow. Need to confirm transport. Will continue to monitor for new or changing discharge needs. Yelena GARBER RN CCP                   Discharge Codes    No documentation.                 Expected Discharge Date and Time       Expected Discharge Date Expected Discharge Time    Jul 2, 2024               Yelena Brady RN

## 2024-07-01 NOTE — PROGRESS NOTES
The patient is in good spirits today and states that he is sleeping well.  He offers no other complaints when seen today.  Charting indicates probable discharge today the patient reports that he may not be discharged tomorrow.  I would recommend continued treatment with Remeron 7.5 mg nightly following discharge.  Little else to add, I will sign off.

## 2024-07-02 VITALS
BODY MASS INDEX: 28 KG/M2 | HEART RATE: 77 BPM | TEMPERATURE: 97.5 F | SYSTOLIC BLOOD PRESSURE: 146 MMHG | HEIGHT: 70 IN | WEIGHT: 195.55 LBS | OXYGEN SATURATION: 93 % | RESPIRATION RATE: 16 BRPM | DIASTOLIC BLOOD PRESSURE: 67 MMHG

## 2024-07-02 PROBLEM — R73.03 PREDIABETES: Status: ACTIVE | Noted: 2024-07-02

## 2024-07-02 LAB
ALBUMIN SERPL-MCNC: 3 G/DL (ref 3.5–5.2)
ANION GAP SERPL CALCULATED.3IONS-SCNC: 7 MMOL/L (ref 5–15)
BUN SERPL-MCNC: 28 MG/DL (ref 8–23)
BUN/CREAT SERPL: 36.8 (ref 7–25)
CALCIUM SPEC-SCNC: 9.6 MG/DL (ref 8.6–10.5)
CHLORIDE SERPL-SCNC: 102 MMOL/L (ref 98–107)
CO2 SERPL-SCNC: 28 MMOL/L (ref 22–29)
CREAT SERPL-MCNC: 0.76 MG/DL (ref 0.76–1.27)
DEPRECATED RDW RBC AUTO: 41.2 FL (ref 37–54)
EGFRCR SERPLBLD CKD-EPI 2021: 87.5 ML/MIN/1.73
ERYTHROCYTE [DISTWIDTH] IN BLOOD BY AUTOMATED COUNT: 11.8 % (ref 12.3–15.4)
GLUCOSE BLDC GLUCOMTR-MCNC: 131 MG/DL (ref 70–130)
GLUCOSE BLDC GLUCOMTR-MCNC: 164 MG/DL (ref 70–130)
GLUCOSE SERPL-MCNC: 173 MG/DL (ref 65–99)
HCT VFR BLD AUTO: 38 % (ref 37.5–51)
HGB BLD-MCNC: 12.7 G/DL (ref 13–17.7)
MAGNESIUM SERPL-MCNC: 2.5 MG/DL (ref 1.6–2.4)
MCH RBC QN AUTO: 32.2 PG (ref 26.6–33)
MCHC RBC AUTO-ENTMCNC: 33.4 G/DL (ref 31.5–35.7)
MCV RBC AUTO: 96.2 FL (ref 79–97)
PHOSPHATE SERPL-MCNC: 3.4 MG/DL (ref 2.5–4.5)
PLATELET # BLD AUTO: 363 10*3/MM3 (ref 140–450)
PMV BLD AUTO: 8.8 FL (ref 6–12)
POTASSIUM SERPL-SCNC: 5.1 MMOL/L (ref 3.5–5.2)
RBC # BLD AUTO: 3.95 10*6/MM3 (ref 4.14–5.8)
SODIUM SERPL-SCNC: 137 MMOL/L (ref 136–145)
WBC NRBC COR # BLD AUTO: 11.19 10*3/MM3 (ref 3.4–10.8)

## 2024-07-02 PROCEDURE — 83735 ASSAY OF MAGNESIUM: CPT | Performed by: INTERNAL MEDICINE

## 2024-07-02 PROCEDURE — 25010000002 METHYLPREDNISOLONE PER 125 MG: Performed by: INTERNAL MEDICINE

## 2024-07-02 PROCEDURE — 63710000001 INSULIN LISPRO (HUMAN) PER 5 UNITS: Performed by: INTERNAL MEDICINE

## 2024-07-02 PROCEDURE — 85027 COMPLETE CBC AUTOMATED: CPT | Performed by: INTERNAL MEDICINE

## 2024-07-02 PROCEDURE — 94618 PULMONARY STRESS TESTING: CPT

## 2024-07-02 PROCEDURE — 80069 RENAL FUNCTION PANEL: CPT | Performed by: INTERNAL MEDICINE

## 2024-07-02 PROCEDURE — 82948 REAGENT STRIP/BLOOD GLUCOSE: CPT

## 2024-07-02 PROCEDURE — 97530 THERAPEUTIC ACTIVITIES: CPT

## 2024-07-02 PROCEDURE — 63710000001 PREDNISONE PER 1 MG: Performed by: INTERNAL MEDICINE

## 2024-07-02 RX ORDER — BUDESONIDE AND FORMOTEROL FUMARATE DIHYDRATE 160; 4.5 UG/1; UG/1
2 AEROSOL RESPIRATORY (INHALATION)
Qty: 1 EACH | Refills: 0 | Status: SHIPPED | OUTPATIENT
Start: 2024-07-02

## 2024-07-02 RX ORDER — PREDNISONE 20 MG/1
40 TABLET ORAL
Status: DISCONTINUED | OUTPATIENT
Start: 2024-07-02 | End: 2024-07-02 | Stop reason: HOSPADM

## 2024-07-02 RX ORDER — PREDNISONE 10 MG/1
TABLET ORAL
Qty: 24 TABLET | Refills: 0 | Status: SHIPPED | OUTPATIENT
Start: 2024-07-03

## 2024-07-02 RX ORDER — MIRTAZAPINE 7.5 MG/1
7.5 TABLET, FILM COATED ORAL NIGHTLY
Qty: 30 TABLET | Refills: 0 | Status: SHIPPED | OUTPATIENT
Start: 2024-07-02

## 2024-07-02 RX ORDER — DILTIAZEM HYDROCHLORIDE 240 MG/1
240 CAPSULE, COATED, EXTENDED RELEASE ORAL DAILY
Qty: 30 CAPSULE | Refills: 0 | Status: SHIPPED | OUTPATIENT
Start: 2024-07-02

## 2024-07-02 RX ORDER — METFORMIN HYDROCHLORIDE 500 MG/1
500 TABLET, EXTENDED RELEASE ORAL
Qty: 30 TABLET | Refills: 0 | Status: SHIPPED | OUTPATIENT
Start: 2024-07-02

## 2024-07-02 RX ORDER — DIGOXIN 125 MCG
125 TABLET ORAL
Qty: 30 TABLET | Refills: 0 | Status: SHIPPED | OUTPATIENT
Start: 2024-07-03

## 2024-07-02 RX ADMIN — ATORVASTATIN CALCIUM 20 MG: 20 TABLET, FILM COATED ORAL at 08:27

## 2024-07-02 RX ADMIN — Medication 1 TABLET: at 08:26

## 2024-07-02 RX ADMIN — FOLIC ACID 1 MG: 1 TABLET ORAL at 08:26

## 2024-07-02 RX ADMIN — Medication 100 MG: at 08:26

## 2024-07-02 RX ADMIN — DILTIAZEM HYDROCHLORIDE 240 MG: 240 CAPSULE, EXTENDED RELEASE ORAL at 08:26

## 2024-07-02 RX ADMIN — DIGOXIN 125 MCG: 125 TABLET ORAL at 12:17

## 2024-07-02 RX ADMIN — APIXABAN 5 MG: 5 TABLET, FILM COATED ORAL at 08:27

## 2024-07-02 RX ADMIN — Medication 10 ML: at 08:26

## 2024-07-02 RX ADMIN — METHYLPREDNISOLONE SODIUM SUCCINATE 60 MG: 125 INJECTION INTRAMUSCULAR; INTRAVENOUS at 02:05

## 2024-07-02 RX ADMIN — PREDNISONE 40 MG: 20 TABLET ORAL at 12:23

## 2024-07-02 RX ADMIN — INSULIN LISPRO 3 UNITS: 100 INJECTION, SOLUTION INTRAVENOUS; SUBCUTANEOUS at 12:17

## 2024-07-02 NOTE — DISCHARGE SUMMARY
Date of Admission: 6/24/2024  Date of Discharge:  7/2/2024  Primary Care Physician: Antonio Finley MD     Discharge Diagnosis:  Active Hospital Problems    Diagnosis  POA    **Atrial fibrillation with RVR [I48.91]  Yes    Prediabetes [R73.03]  Yes    ILD (interstitial lung disease) [J84.9]  Yes    Carotid artery stenosis [I65.29]  Yes    Hypoxia [R09.02]  Yes    Emphysema lung [J43.9]  Yes    Acute on chronic respiratory failure with hypoxia [J96.21]  Yes    Hypertension [I10]  Yes    Hyperlipidemia [E78.5]  Yes      Resolved Hospital Problems   No resolved problems to display.       Presenting Problem/History of Present Illness from H&P:  New onset a-fib [I48.91]  Positive D dimer [R79.89]  Atrial fibrillation with rapid ventricular response [I48.91]  Atrial fibrillation with RVR [I48.91]  Hypoxemic respiratory failure, chronic [J96.11]  Generalized weakness [R53.1]  Acute congestive heart failure, unspecified heart failure type [I50.9]     This is an 88-year-old with a past medical history of COPD, interstitial lung disease, hypertension/hyperlipidemia as well as carotid artery stenosis who presented to hospital with anhedonia, poor appetite and generalized weakness, and worsening dyspnea with exertion.  He was noted to have an elevated heart rate and EMS was called.  He was brought to the hospital and found to have a heart rate as high as 140 with atrial fibrillation.  He has known history of atrial fibrillation.  EKG in the ER showed a rate of 170 with nonspecific ST and T wave changes.  Cardiology was consulted he was given both diltiazem and digoxin.  His heart rate did improve while he was in the emergency department    Hospital Course:  The patient is a 88 y.o. male who presented with new onset A-fib with RVR.  He was admitted and cardiology consulted.  He initially was placed on diltiazem drip for rate control.  He converted back to sinus rhythm and was transition to oral diltiazem.  He did have  additional elevated heart rates and had dose increased.  He was placed on Eliquis for this.  Due to his lung disease he was taken off of the beta-blockers and we are avoiding amiodarone.  He did receive some diuresis when he had worsening respiratory status.  That is improving now.  He is going to need to follow-up with cardiology in clinic.    ILD/COPD with chronic hypoxic respiratory failure.  Patient had acute on chronic hypoxic respiratory failure here.  His baseline is 3 L at rest and 6 L with exertion.  He required up to 12 L high flow here.  He was given diuretic and steroid.  It seems the steroid has had a bit more effective than the diuretic.  He has been successfully weaned back down to his home O2 requirements of 3 to 6 L.  Pulmonology is following.  He is going to have an oral steroid taper at discharge and continue with a step up and breathing treatments.  He needs to follow-up with pulmonologist in clinic and have outpatient PFTs and repeat imaging.    He had poor compliance with home medications due to depression.  Psychiatry evaluated here and he has been started on nightly Remeron.  He is tolerating that well so far.    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  He was given vitamin supplementation here.  He did not have acute withdrawal.  We discussed the importance of alcohol cessation given his cardiac disease and also the deleterious effects of chronic alcohol use.    He had hyperglycemia here . A1c was 6.4 which has been slowly increasing over the past several years.  He was given SSI while on IV steroids here.  Starting metformin at discharge for prediabetes and anticipated progression to diabetes.    Exam Today:  Constitutional:       General: He is not in acute distress.     Appearance: He is ill-appearing. He is not diaphoretic.   Cardiovascular:      Rate and Rhythm: Normal rate and regular rhythm.      Pulses: Normal pulses.   Pulmonary:      Effort: Pulmonary effort is  normal.      Breath sounds: No wheezing.   Abdominal:      General: There is no distension.      Palpations: Abdomen is soft.      Tenderness: There is no abdominal tenderness. There is no guarding or rebound.   Musculoskeletal:         General: No tenderness.      Right lower leg: No edema.      Left lower leg: No edema.   Skin:     General: Skin is warm and dry.   Neurological:      Mental Status: He is alert. Mental status is at baseline.      Motor: Tremor present.   Psychiatric:         Mood and Affect: Mood normal.         Behavior: Behavior normal.      Results:  CXR  No focal pulmonary consolidation. Borderline heart size with  pulmonary vascular congestion. Tortuous aorta.    CTA Chest  1. No acute pulmonary thromboembolus.  2. Increasing groundglass opacities noted, particularly within the left  lung. An infectious or inflammatory process is not excluded.    CXR  No significant interval change.     CXR  No significant change.     BLE Duplex   Normal bilateral lower extremity venous duplex scan.     Procedures Performed:         Consults:   Consults       Date and Time Order Name Status Description    6/28/2024 11:58 AM Inpatient Cardiology Consult      6/28/2024  8:48 AM Inpatient Pulmonology Consult Completed     6/25/2024 11:26 AM Inpatient Psychiatrist Consult Completed     6/24/2024  7:48 PM [facility] (on-call MD unless specified) Details      6/24/2024  6:44 PM Cardiology (on-call MD unless specified) Completed     6/5/2024  7:28 AM Inpatient Pulmonology Consult Completed              Discharge Disposition:  Skilled Nursing Facility (DC - External)    Discharge Medications:     Discharge Medications        New Medications        Instructions Start Date   apixaban 5 MG tablet tablet  Commonly known as: ELIQUIS   5 mg, Oral, Every 12 Hours Scheduled      budesonide-formoterol 160-4.5 MCG/ACT inhaler  Commonly known as: SYMBICORT   2 puffs, Inhalation, 2 Times Daily - RT      digoxin 125 MCG tablet  Commonly  known as: LANOXIN   125 mcg, Oral, Daily Digoxin   Start Date: July 3, 2024     dilTIAZem  MG 24 hr capsule  Commonly known as: CARDIZEM CD   240 mg, Oral, Daily      metFORMIN  MG 24 hr tablet  Commonly known as: GLUCOPHAGE-XR   500 mg, Oral, Daily With Breakfast      mirtazapine 7.5 MG tablet  Commonly known as: REMERON   7.5 mg, Oral, Nightly      predniSONE 10 MG tablet  Commonly known as: DELTASONE   Take p.o. daily: 40 mg x 3 days, then 20 mg x 3 days, then 10 mg x 3 days, then 10 mg every other day for 3 doses   Start Date: July 3, 2024            Continue These Medications        Instructions Start Date   atorvastatin 20 MG tablet  Commonly known as: LIPITOR   20 mg, Oral, Daily      cholecalciferol 25 MCG (1000 UT) tablet  Commonly known as: VITAMIN D3   1,000 Units, Oral, Daily      EPINEPHrine 0.3 MG/0.3ML solution auto-injector injection  Commonly known as: EPIPEN   ADMINISTER 0.3 ML IN THE MUSCLE 1 TIME FOR 1 DOSE AS DIRECTED BY PRESCRIBER      ipratropium-albuterol 0.5-2.5 mg/3 ml nebulizer  Commonly known as: DUO-NEB   Inhale the contents of 1 vial by nebulization Every 6 (Six) Hours As Needed for Wheezing for up to 30 days.      Multi Vitamin Daily tablet tablet  Generic drug: multivitamin   1 tablet, Oral, Daily      multivitamin with minerals tablet tablet   1 tablet, Oral, Daily      Omega-3 Fish Oil 1000 MG capsule   1 capsule, Oral, Daily      Zinc 30 MG tablet   1 tablet, Oral, Every Other Day             Stop These Medications      amLODIPine 5 MG tablet  Commonly known as: NORVASC     aspirin 325 MG tablet     metoprolol tartrate 50 MG tablet  Commonly known as: LOPRESSOR              Discharge Diet:   Diet Instructions       Diet: Cardiac Diets, Diabetic Diets; Healthy Heart (2-3 Na+); Thin (IDDSI 0); Consistent Carbohydrate      Discharge Diet:  Cardiac Diets  Diabetic Diets       Cardiac Diet: Healthy Heart (2-3 Na+)    Fluid Consistency: Thin (IDDSI 0)    Diabetic Diet:  Consistent Carbohydrate            Activity at Discharge:   Activity Instructions       Activity as Tolerated              Follow-up Appointments:   Contact information for follow-up providers       Antonio Finley MD Follow up.    Specialty: Family Medicine  Contact information:  [address]  [address]  [facility] [id number] [phone]               Gibson Aquino MD Follow up.    Specialties: Pulmonary Disease, Intensive Care  Contact information:  [address]  [address]  [facility] [address]  [phone]               Robby Hall MD Follow up.    Specialty: Cardiology  Contact information:  [address]  [address]  [facility] [address]  [phone]                       Contact information for after-discharge care       Destination       [facility] .    Service: Skilled Nursing  Contact information:  [address] [address] [phone]                     Home Medical Care       [facility] .    Service: Home Rehabilitation  Contact information:  [address] [facility]  [facility] [id number] [phone]                                   Test Results Pending at Discharge:       Salvador Spears MD  07/02/24  12:38 EDT    Time Spent on Discharge Activities: >30 minutes    Dictated portions using Dragon dictation software.

## 2024-07-02 NOTE — PROGRESS NOTES
"  Daily Progress Note.   [address]  7/2/2024    Patient:  Name:  Jim Marvin  MRN:  [id number]  1935  88 y.o.  male         CC: ahrf, cpfe    Interval History:  Weaned to 3lnc home oxygen requirement since last hospital dc.    Feels breathing is fine  Denies ever feeling soa.  No cough no sputum  No cp  Awake alert no lethargy  Annel diet no emesis no abd pain    Physical Exam:  /68 (BP Location: Right arm, Patient Position: Lying)   Pulse 79   Temp 97.3 °F (36.3 °C) (Oral)   Resp 18   Ht 177.8 cm (70\")   Wt 88.7 kg (195 lb 8.8 oz)   SpO2 90%   BMI 28.06 kg/m²   Body mass index is 28.06 kg/m².    Intake/Output Summary (Last 24 hours) at 7/2/2024 0808  Last data filed at 7/2/2024 0735  Gross per 24 hour   Intake 120 ml   Output --   Net 120 ml   12 L high flow --> 6ln-->3lnc  General appearance: Nontoxic, conversant   Eyes: anicteric sclerae, moist conjunctivae; no lidlag;    HENT: Atraumatic; oropharynx clear with moist mucous membranes    Neck: Trachea midline;  supple large neck  Lungs:diminished no wheeze no crackles with normal respiratory effort and no intercostal retractions  CV: Irregular regular, no rub   Abdomen: truncal obesity BS+  Extremities: No significant peripheral edema   Skin: WWP no diffuse visible rash  Psych: Calm affect, alert   Neuro: Moves all ext. CN II-XII. Speech intact.    Data Review:  Notable Labs:  Results from last 7 days   Lab Units 07/02/24  0407 07/01/24  0448 06/30/24  0543 06/29/24  1119 06/28/24  0325 06/27/24  0456 06/26/24  0459   WBC 10*3/mm3 11.19* 12.74* 16.84* 14.16* 7.67 6.58 6.95   HEMOGLOBIN g/dL 12.7* 13.3 13.6 13.6 13.6 12.3* 12.2*   PLATELETS 10*3/mm3 363 379 371 328 259 237 217     Results from last 7 days   Lab Units 07/02/24  0407 07/01/24  0448 06/30/24  0904 06/29/24  0424 06/28/24  0325 06/27/24  0456 06/26/24  0459   SODIUM mmol/L 137 137 137 134* 130* 132* 134*   POTASSIUM mmol/L 5.1 4.8 4.7 4.5 4.2 3.8 4.1 "   CHLORIDE mmol/L 102 102 101 98 98 99 101   CO2 mmol/L 28.0 24.3 26.0 20.9* 21.0* 23.1 23.0   BUN mg/dL 28* 32* 30* 19 10 7* 10   CREATININE mg/dL 0.76 0.73* 0.79 0.72* 0.66* 0.59* 0.53*   GLUCOSE mg/dL 173* 150* 186* 176* 129* 101* 101*   CALCIUM mg/dL 9.6 9.6 9.7 9.8 8.7 8.3* 8.0*   MAGNESIUM mg/dL 2.5* 2.3 2.2  --   --   --  2.2   PHOSPHORUS mg/dL 3.4 3.4 3.4 3.3  --   --   --    Estimated Creatinine Clearance: 78.3 mL/min (by C-G formula based on SCr of 0.76 mg/dL).    Results from last 7 days   Lab Units 07/02/24  0407 07/01/24  0448 06/30/24  0543 06/29/24  1119 06/29/24  0424 06/28/24  0325   PROCALCITONIN ng/mL  --   --   --   --  0.07 0.08   PLATELETS 10*3/mm3 363 379 371   < >  --  259    < > = values in this interval not displayed.       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    Stop  IV Solu-Medrol aggressive dosing, monitor response  Transition to po pred 40mg with below taper:  Prednisone 10mg tablets:  Take 4 tablets (40mg) for 3 days then  Take 2 tablets (20mg) for 3 days then  Take 1 tablet   (10mg) for 3 days then  Take 1 tablet every other day for 3 tablets.  Then stop.   Wean oxygen as able    On Eliquis    Continue symbicort and spiriva in attempts to limit albuterol use to  avoid afib rvr.    Oxygen improving  Walking oximetry today  No crackles on exam  Severe emphysema on ct  ?how much fluid/copd vs faint ILD at base.  Will need outpt pfts and repeat imaging    Discussed with patient.  No family at bedside this morning.            Electronically signed by Dov Alvarado MD, 07/02/24, 8:28 AM EDT.

## 2024-07-02 NOTE — CASE MANAGEMENT/SOCIAL WORK
Case Management Discharge Note      Final Note: [facility]. On O2@4LNC. Per Dov/Damian, can accept today. Transport setup with Nichelle WALKER with O2 for today at 1500. Called and notified daughter of D/C plan.    Provided Post Acute Provider List?: Yes  Post Acute Provider List: Home Health, Inpatient Rehab  Provided Post Acute Provider Quality & Resource List?: Yes  Post Acute Provider Quality and Resource List: Home Health, Inpatient Rehab  Delivered To: Patient  Method of Delivery: In person, Telephone    Selected Continued Care - Admitted Since 6/24/2024       Destination Coordination complete.      Service Provider Selected Services Address Phone Fax Patient Preferred    [facility] [address] [address] [phone] --              Durable Medical Equipment    No services have been selected for the patient.                Dialysis/Infusion    No services have been selected for the patient.                Home Medical Care Coordination complete.      Service Provider Selected Services Address Phone Fax Patient Preferred    [facility] [address], [address], [facility] [address] [phone] --              Therapy    No services have been selected for the patient.                Community Resources    No services have been selected for the patient.                Community & DME    No services have been selected for the patient.                    Selected Continued Care - Prior Encounters Includes continued care and service providers with selected services from prior encounters from 3/26/2024 to 7/2/2024      Discharged on 6/8/2024 Admission date: 6/4/2024 - Discharge disposition: Home or Self Care      Durable Medical Equipment       Service Provider Selected Services Address Phone Fax Patient Preferred    [facility] Durable Medical  Equipment [address], [facility] [address] [phone] --                          Transportation Services  W/C [facility]: [facility]    Final Discharge Disposition Code: 03 - skilled nursing facility (SNF)

## 2024-07-02 NOTE — DISCHARGE PLACEMENT REQUEST
"Javier Garcia (88 y.o. Male)       Date of Birth   1935    Social Security Number       Address   [address] [place] [address]    Home Phone   [phone]    MRN   [id number]       Spiritism   Roman Catholic    Marital Status                               Admission Date   6/24/24    Admission Type   Emergency    Admitting Provider       Attending Provider   Salvador Spears MD    Department, Room/Bed   [address], E466/1       Discharge Date       Discharge Disposition   Skilled Nursing Facility (DC - External)    Discharge Destination                                 Attending Provider: Salvador Spears MD    Allergies: Ace Inhibitors, Lisinopril    Isolation: None   Infection: None   Code Status: CPR    Ht: 177.8 cm (70\")   Wt: 88.7 kg (195 lb 8.8 oz)    Admission Cmt: None   Principal Problem: Atrial fibrillation with RVR [I48.91]                   Active Insurance as of 6/24/2024       Primary Coverage       Payor Plan Insurance Group Employer/Plan Group    MEDICARE MEDICARE A & B        Payor Plan Address Payor Plan Phone Number Payor Plan Fax Number Effective Dates    [address] [phone]  10/1/2002 - None Entered    [facility] [id number]         Subscriber Name Subscriber Birth Date Member ID       JAVIER GARCIA 1935 [id number]               Secondary Coverage       Payor Plan Insurance Group Employer/Plan Group    [employer] [facility] [facility]              [id number]       Payor Plan Address Payor Plan Phone Number Payor Plan Fax Number Effective Dates    [address] [address]   9/1/2013 - None Entered    [facility] [id number]         Subscriber Name Subscriber Birth Date Member ID       JAVIER GARCIA 1935 [id number]                     Emergency Contacts        (Rel.) Home Phone Work Phone Mobile Phone    jessica joya (Daughter) [phone] -- --                   Discharge Summary        Salvador Spears MD at 07/02/24 1238        "       Date of Admission: 6/24/2024  Date of Discharge:  7/2/2024  Primary Care Physician: Antonio Finley MD     Discharge Diagnosis:  Active Hospital Problems    Diagnosis  POA    **Atrial fibrillation with RVR [I48.91]  Yes    Prediabetes [R73.03]  Yes    ILD (interstitial lung disease) [J84.9]  Yes    Carotid artery stenosis [I65.29]  Yes    Hypoxia [R09.02]  Yes    Emphysema lung [J43.9]  Yes    Acute on chronic respiratory failure with hypoxia [J96.21]  Yes    Hypertension [I10]  Yes    Hyperlipidemia [E78.5]  Yes      Resolved Hospital Problems   No resolved problems to display.       Presenting Problem/History of Present Illness from H&P:  New onset a-fib [I48.91]  Positive D dimer [R79.89]  Atrial fibrillation with rapid ventricular response [I48.91]  Atrial fibrillation with RVR [I48.91]  Hypoxemic respiratory failure, chronic [J96.11]  Generalized weakness [R53.1]  Acute congestive heart failure, unspecified heart failure type [I50.9]     This is an 88-year-old with a past medical history of COPD, interstitial lung disease, hypertension/hyperlipidemia as well as carotid artery stenosis who presented to hospital with anhedonia, poor appetite and generalized weakness, and worsening dyspnea with exertion.  He was noted to have an elevated heart rate and EMS was called.  He was brought to the hospital and found to have a heart rate as high as 140 with atrial fibrillation.  He has known history of atrial fibrillation.  EKG in the ER showed a rate of 170 with nonspecific ST and T wave changes.  Cardiology was consulted he was given both diltiazem and digoxin.  His heart rate did improve while he was in the emergency department    Hospital Course:  The patient is a 88 y.o. male who presented with new onset A-fib with RVR.  He was admitted and cardiology consulted.  He initially was placed on diltiazem drip for rate control.  He converted back to sinus rhythm and was transition to oral diltiazem.  He did have  additional elevated heart rates and had dose increased.  He was placed on Eliquis for this.  Due to his lung disease he was taken off of the beta-blockers and we are avoiding amiodarone.  He did receive some diuresis when he had worsening respiratory status.  That is improving now.  He is going to need to follow-up with cardiology in clinic.    ILD/COPD with chronic hypoxic respiratory failure.  Patient had acute on chronic hypoxic respiratory failure here.  His baseline is 3 L at rest and 6 L with exertion.  He required up to 12 L high flow here.  He was given diuretic and steroid.  It seems the steroid has had a bit more effective than the diuretic.  He has been successfully weaned back down to his home O2 requirements of 3 to 6 L.  Pulmonology is following.  He is going to have an oral steroid taper at discharge and continue with a step up and breathing treatments.  He needs to follow-up with pulmonologist in clinic and have outpatient PFTs and repeat imaging.    He had poor compliance with home medications due to depression.  Psychiatry evaluated here and he has been started on nightly Remeron.  He is tolerating that well so far.    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  He was given vitamin supplementation here.  He did not have acute withdrawal.  We discussed the importance of alcohol cessation given his cardiac disease and also the deleterious effects of chronic alcohol use.    He had hyperglycemia here . A1c was 6.4 which has been slowly increasing over the past several years.  He was given SSI while on IV steroids here.  Starting metformin at discharge for prediabetes and anticipated progression to diabetes.    Exam Today:  Constitutional:       General: He is not in acute distress.     Appearance: He is ill-appearing. He is not diaphoretic.   Cardiovascular:      Rate and Rhythm: Normal rate and regular rhythm.      Pulses: Normal pulses.   Pulmonary:      Effort: Pulmonary effort is  normal.      Breath sounds: No wheezing.   Abdominal:      General: There is no distension.      Palpations: Abdomen is soft.      Tenderness: There is no abdominal tenderness. There is no guarding or rebound.   Musculoskeletal:         General: No tenderness.      Right lower leg: No edema.      Left lower leg: No edema.   Skin:     General: Skin is warm and dry.   Neurological:      Mental Status: He is alert. Mental status is at baseline.      Motor: Tremor present.   Psychiatric:         Mood and Affect: Mood normal.         Behavior: Behavior normal.      Results:  CXR  No focal pulmonary consolidation. Borderline heart size with  pulmonary vascular congestion. Tortuous aorta.    CTA Chest  1. No acute pulmonary thromboembolus.  2. Increasing groundglass opacities noted, particularly within the left  lung. An infectious or inflammatory process is not excluded.    CXR  No significant interval change.     CXR  No significant change.     BLE Duplex   Normal bilateral lower extremity venous duplex scan.     Procedures Performed:         Consults:   Consults       Date and Time Order Name Status Description    6/28/2024 11:58 AM Inpatient Cardiology Consult      6/28/2024  8:48 AM Inpatient Pulmonology Consult Completed     6/25/2024 11:26 AM Inpatient Psychiatrist Consult Completed     6/24/2024  7:48 PM [facility] (on-call MD unless specified) Details      6/24/2024  6:44 PM Cardiology (on-call MD unless specified) Completed     6/5/2024  7:28 AM Inpatient Pulmonology Consult Completed              Discharge Disposition:  Skilled Nursing Facility (DC - External)    Discharge Medications:     Discharge Medications        New Medications        Instructions Start Date   apixaban 5 MG tablet tablet  Commonly known as: ELIQUIS   5 mg, Oral, Every 12 Hours Scheduled      budesonide-formoterol 160-4.5 MCG/ACT inhaler  Commonly known as: SYMBICORT   2 puffs, Inhalation, 2 Times Daily - RT      digoxin 125 MCG tablet  Commonly  known as: LANOXIN   125 mcg, Oral, Daily Digoxin   Start Date: July 3, 2024     dilTIAZem  MG 24 hr capsule  Commonly known as: CARDIZEM CD   240 mg, Oral, Daily      metFORMIN  MG 24 hr tablet  Commonly known as: GLUCOPHAGE-XR   500 mg, Oral, Daily With Breakfast      mirtazapine 7.5 MG tablet  Commonly known as: REMERON   7.5 mg, Oral, Nightly      predniSONE 10 MG tablet  Commonly known as: DELTASONE   Take p.o. daily: 40 mg x 3 days, then 20 mg x 3 days, then 10 mg x 3 days, then 10 mg every other day for 3 doses   Start Date: July 3, 2024            Continue These Medications        Instructions Start Date   atorvastatin 20 MG tablet  Commonly known as: LIPITOR   20 mg, Oral, Daily      cholecalciferol 25 MCG (1000 UT) tablet  Commonly known as: VITAMIN D3   1,000 Units, Oral, Daily      EPINEPHrine 0.3 MG/0.3ML solution auto-injector injection  Commonly known as: EPIPEN   ADMINISTER 0.3 ML IN THE MUSCLE 1 TIME FOR 1 DOSE AS DIRECTED BY PRESCRIBER      ipratropium-albuterol 0.5-2.5 mg/3 ml nebulizer  Commonly known as: DUO-NEB   Inhale the contents of 1 vial by nebulization Every 6 (Six) Hours As Needed for Wheezing for up to 30 days.      Multi Vitamin Daily tablet tablet  Generic drug: multivitamin   1 tablet, Oral, Daily      multivitamin with minerals tablet tablet   1 tablet, Oral, Daily      Omega-3 Fish Oil 1000 MG capsule   1 capsule, Oral, Daily      Zinc 30 MG tablet   1 tablet, Oral, Every Other Day             Stop These Medications      amLODIPine 5 MG tablet  Commonly known as: NORVASC     aspirin 325 MG tablet     metoprolol tartrate 50 MG tablet  Commonly known as: LOPRESSOR              Discharge Diet:   Diet Instructions       Diet: Cardiac Diets, Diabetic Diets; Healthy Heart (2-3 Na+); Thin (IDDSI 0); Consistent Carbohydrate      Discharge Diet:  Cardiac Diets  Diabetic Diets       Cardiac Diet: Healthy Heart (2-3 Na+)    Fluid Consistency: Thin (IDDSI 0)    Diabetic Diet:  Consistent Carbohydrate            Activity at Discharge:   Activity Instructions       Activity as Tolerated              Follow-up Appointments:   Contact information for follow-up providers       Antonio Finley MD Follow up.    Specialty: Family Medicine  Contact information:  [address]  [address]  [facility] [address]  [phone]               Gibson Aquino MD Follow up.    Specialties: Pulmonary Disease, Intensive Care  Contact information:  [address] [facility]  [address]  [facility] [address]  [phone]               Robby Hall MD Follow up.    Specialty: Cardiology  Contact information:  [address] [address]  [address]  [facility] [address]  [phone]                       Contact information for after-discharge care       Destination       [facility] .    Service: Skilled Nursing  Contact information:  [address] [place] [address] [phone]                     Home Medical Care       [facility] .    Service: Home Rehabilitation  Contact information:  [address]  [place] [id number] [phone]                                   Test Results Pending at Discharge:       Venus Sawant MD  07/02/24  12:38 EDT    Time Spent on Discharge Activities: >30 minutes    Dictated portions using Dragon dictation software.    Electronically signed by Venus Sawant MD at 07/02/24 1245       Discharge Order (From admission, onward)       Start     Ordered    07/02/24 1234  Discharge patient  Once        Expected Discharge Date: 07/02/24   Discharge Disposition: Skilled Nursing Facility (DC - External)   Physician of Record for Attribution - Please select from Treatment Team: VENUS SAWANT [[id number]]   Review needed by CMO to determine Physician of Record: No      Question Answer Comment   Physician of Record for Attribution - Please select from Treatment Team VENUS SAWANT    Review  needed by CMO to determine Physician of Record No        07/02/24 1236

## 2024-07-02 NOTE — PLAN OF CARE
Goal Outcome Evaluation:  Plan of Care Reviewed With: patient           Outcome Evaluation: Upon entering room, pt. supine in bed, awake/alert, and agreeable to work with P.T. despite c/o mild SOA and overall fatigue.  This AM, pt. able to ambulate 30 feet, CGA x 1, with use of Rwx.  Pt. requires SBA x 1 for bed mobility and CGA x 1 for sit <-> stand transfers. BLE ther. ex. program x 10 reps completed for general strengthening. Verbal/tactile cues given for posture correction during ambulation.  Will continue to progress functional mobility as tolerated.

## 2024-07-02 NOTE — PROGRESS NOTES
Exercise Oximetry    Patient Name:Jim Marvin   MRN: [id number]   Date: 07/02/24             ROOM AIR BASELINE   SpO2% 95   Heart Rate 92   Blood Pressure 135/68     EXERCISE ON ROOM AIR SpO2% EXERCISE ON O2 @ 4 LPM SpO2%   1 MINUTE  1 MINUTE 80   2 MINUTES  2 MINUTES 91   3 MINUTES  3 MINUTES 84   4 MINUTES  4 MINUTES    5 MINUTES  5 MINUTES    6 MINUTES  6 MINUTES               Distance Walked  50 Distance Walked   Dyspnea (Claudia Scale)   Dyspnea (Claudia Scale)   Fatigue (Claudia Scale)   Fatigue (Claudia Scale)   SpO2% Post Exercise  89 SpO2% Post Exercise   HR Post Exercise  95 HR Post Exercise   Time to Recovery  3 Time to Recovery     Comments: Desaturated to 80 on room air pre walk.  Placed on 2 liters and increased to 4.  Walked approx 50 feet on 4 liters before needing to take a break, oxygen reading 84 before being seated.  Increased to 92 after rest on 4.
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
## 2024-07-02 NOTE — PLAN OF CARE
Goal Outcome Evaluation:  Plan of Care Reviewed With: patient        Progress: improving  Outcome Evaluation: Patient remains in sinus rhythm tonight. Down to 3L oxygen on nasal cannula, titrated up to walk to the bathroom. IV steroids continued. Potential discharge today.

## 2024-07-10 ENCOUNTER — PATIENT OUTREACH (OUTPATIENT)
Dept: CASE MANAGEMENT | Facility: OTHER | Age: 89
End: 2024-07-10
Payer: MEDICARE

## 2024-07-10 DIAGNOSIS — I48.91 ATRIAL FIBRILLATION WITH RVR: ICD-10-CM

## 2024-07-10 DIAGNOSIS — I10 PRIMARY HYPERTENSION: Primary | Chronic | ICD-10-CM

## 2024-07-10 NOTE — OUTREACH NOTE
AMBULATORY CASE MANAGEMENT NOTE    Names and Relationships of Patient/Support Persons: Contact: Jim Marvin; Relationship: Self -     SNF Follow-up    Spoke to the discharge planner Tory at [facility]. Tory states that patient does not have an anticipated discharge date. She states that there are no possible barriers to patient being discharged home. Tory requested [facility]'s email address so she could email a copy of patients progress notes.     Vane GOODMAN  Ambulatory Case Management    7/10/2024, 14:41 EDT

## 2024-07-15 ENCOUNTER — OFFICE VISIT (OUTPATIENT)
Dept: CARDIOLOGY | Facility: CLINIC | Age: 89
End: 2024-07-15
Payer: MEDICARE

## 2024-07-15 VITALS
DIASTOLIC BLOOD PRESSURE: 64 MMHG | SYSTOLIC BLOOD PRESSURE: 144 MMHG | WEIGHT: 195 LBS | OXYGEN SATURATION: 95 % | BODY MASS INDEX: 27.92 KG/M2 | HEIGHT: 70 IN

## 2024-07-15 DIAGNOSIS — J84.9 ILD (INTERSTITIAL LUNG DISEASE): ICD-10-CM

## 2024-07-15 DIAGNOSIS — I48.91 ATRIAL FIBRILLATION WITH RVR: Primary | ICD-10-CM

## 2024-07-15 DIAGNOSIS — E78.2 MIXED HYPERLIPIDEMIA: Chronic | ICD-10-CM

## 2024-07-15 DIAGNOSIS — I10 PRIMARY HYPERTENSION: Chronic | ICD-10-CM

## 2024-07-15 DIAGNOSIS — I65.02 VERTEBRAL ARTERY OCCLUSION, LEFT: ICD-10-CM

## 2024-07-15 DIAGNOSIS — R73.03 PREDIABETES: ICD-10-CM

## 2024-07-15 PROCEDURE — 1159F MED LIST DOCD IN RCRD: CPT | Performed by: INTERNAL MEDICINE

## 2024-07-15 PROCEDURE — 1160F RVW MEDS BY RX/DR IN RCRD: CPT | Performed by: INTERNAL MEDICINE

## 2024-07-15 PROCEDURE — 99214 OFFICE O/P EST MOD 30 MIN: CPT | Performed by: INTERNAL MEDICINE

## 2024-07-15 RX ORDER — AMLODIPINE BESYLATE 5 MG/1
2.5 TABLET ORAL DAILY
COMMUNITY
Start: 2024-07-11 | End: 2024-07-15

## 2024-07-15 RX ORDER — METOPROLOL TARTRATE 50 MG/1
50 TABLET, FILM COATED ORAL 2 TIMES DAILY
COMMUNITY
Start: 2024-07-11 | End: 2024-07-15

## 2024-07-15 NOTE — PROGRESS NOTES
CARDIOLOGY    Robby Hall MD    ENCOUNTER DATE:  07/15/2024    Jim Marvin / 88 y.o. / male        CHIEF COMPLAINT / REASON FOR OFFICE VISIT     Follow-up      HISTORY OF PRESENT ILLNESS       HPI    Jim Marvin is a 88 y.o. male with paroxysmal atrial fibrillation, COPD on O2, HTN, HLD and carotid artery stenosis who presents for hospital follow-up.      Pt was admitted last month 6/2024 for shortness of breath and was note to be hypoxic and admitted for respiratory failure secondary to interstitial lung disease/emphysema. EKG showed Afib rate of 170, Pt was given diltiazem and digoxin in ER with improvement in HR. he was subsequently admitted on diltiazem drip and started on Lovenox.  Patient was eventually discharged on diltiazem 240 daily, Lopressor 50 bid, and Eliquis 5 bid.    Today, patient has no acute complaints. He has been at a SNF since his recent discharge. Uses a walker to ambulate. Uses 3L supplement oxygen, up to 6L with activities. Denies chest pain, dyspnea, palpitation, leg edema, orthopnea, PND, dizziness, syncope, bleeding, or unexpected falls. Pt is unsure about his medication but did bring a printed copy from his SNF. Does not recall having had a heart attack or stroke in the past.  Former smoker 1-2 packs per day for 40 years and quit 27 years ago, drinks half a pint of alcohol per night, denies substance.       REVIEW OF SYSTEMS     Review of Systems   Constitutional: Negative for weight loss.   Cardiovascular:  Negative for chest pain, dyspnea on exertion, leg swelling, orthopnea, palpitations, paroxysmal nocturnal dyspnea and syncope.   Respiratory:  Negative for shortness of breath.    Hematologic/Lymphatic: Negative for bleeding problem.   Musculoskeletal:  Negative for falls.   Gastrointestinal:  Negative for hematochezia and melena.   Genitourinary:  Negative for hematuria.   Neurological:  Positive for dizziness, loss of balance and weakness. Negative for  light-headedness.   Psychiatric/Behavioral:  Negative for altered mental status.            MEDICATIONS      Outpatient Encounter Medications as of 7/15/2024   Medication Sig Dispense Refill    apixaban (ELIQUIS) 5 MG tablet tablet Take 1 tablet by mouth Every 12 (Twelve) Hours. Indications: Atrial Fibrillation 60 tablet 0    atorvastatin (LIPITOR) 20 MG tablet TAKE 1 TABLET BY MOUTH DAILY 90 tablet 1    budesonide-formoterol (SYMBICORT) 160-4.5 MCG/ACT inhaler Inhale 2 puffs 2 (Two) Times a Day. 1 each 0    cholecalciferol (Vitamin D, Cholecalciferol,) 25 MCG (1000 UT) tablet Take 1 tablet by mouth Daily.      dilTIAZem CD (CARDIZEM CD) 240 MG 24 hr capsule Take 1 capsule by mouth Daily. 30 capsule 0    EPINEPHrine (EPIPEN) 0.3 MG/0.3ML solution auto-injector injection ADMINISTER 0.3 ML IN THE MUSCLE 1 TIME FOR 1 DOSE AS DIRECTED BY PRESCRIBER      metFORMIN ER (GLUCOPHAGE-XR) 500 MG 24 hr tablet Take 1 tablet by mouth Daily With Breakfast. 30 tablet 0    mirtazapine (REMERON) 7.5 MG tablet Take 1 tablet by mouth Every Night. 30 tablet 0    Multiple Vitamin (MULTI VITAMIN DAILY) tablet Take 1 tablet by mouth Daily.      multivitamin with minerals tablet tablet Take 1 tablet by mouth Daily.      Omega-3 Fatty Acids (OMEGA-3 FISH OIL) 1000 MG capsule Take 1 capsule by mouth Daily.      predniSONE (DELTASONE) 10 MG tablet Take p.o. daily: 40 mg x 3 days, then 20 mg x 3 days, then 10 mg x 3 days, then 10 mg every other day for 3 doses 24 tablet 0    Zinc 30 MG tablet Take 1 tablet by mouth Every Other Day.      [DISCONTINUED] amLODIPine (NORVASC) 5 MG tablet Take 0.5 tablets by mouth Daily.      [DISCONTINUED] metoprolol tartrate (LOPRESSOR) 50 MG tablet Take 1 tablet by mouth 2 (Two) Times a Day.      digoxin (LANOXIN) 125 MCG tablet Take 1 tablet by mouth Daily. 30 tablet 0    ipratropium-albuterol (DUO-NEB) 0.5-2.5 mg/3 ml nebulizer Inhale the contents of 1 vial by nebulization Every 6 (Six) Hours As Needed for  "Wheezing for up to 30 days. 360 mL 0     No facility-administered encounter medications on file as of 7/15/2024.         VITAL SIGNS     Visit Vitals  /64 (BP Location: Right arm, Patient Position: Sitting, Cuff Size: Adult)   Ht 177.8 cm (70\")   Wt 88.5 kg (195 lb)   SpO2 95%   BMI 27.98 kg/m²         Wt Readings from Last 3 Encounters:   07/15/24 88.5 kg (195 lb)   06/24/24 88.7 kg (195 lb 8.8 oz)   06/08/24 94.3 kg (207 lb 14.3 oz)     Body mass index is 27.98 kg/m².      PHYSICAL EXAMINATION     Vitals and nursing note reviewed.   Constitutional:       Appearance: Not in distress. Frail. Chronically ill-appearing.   Pulmonary:      Effort: Pulmonary effort is normal.   Cardiovascular:      PMI at left midclavicular line. Normal rate. Regular rhythm.      Murmurs: There is no murmur.   Edema:     Peripheral edema absent.   Abdominal:      General: Bowel sounds are normal. There is no distension.           REVIEWED DATA     Procedures      Lab Results   Component Value Date    WBC 11.19 (H) 07/02/2024    HGB 12.7 (L) 07/02/2024    HCT 38.0 07/02/2024    MCV 96.2 07/02/2024     07/02/2024       Lab Results   Component Value Date    HGBA1C 6.40 (H) 07/01/2024       Lab Results   Component Value Date    GLUCOSE 173 (H) 07/02/2024    BUN 28 (H) 07/02/2024    CREATININE 0.76 07/02/2024    EGFR 87.5 07/02/2024    BCR 36.8 (H) 07/02/2024    K 5.1 07/02/2024    CO2 28.0 07/02/2024    CALCIUM 9.6 07/02/2024    PROTENTOTREF 7.1 05/14/2019    ALBUMIN 3.0 (L) 07/02/2024    BILITOT 0.9 06/25/2024    AST 20 06/25/2024    ALT 36 06/25/2024       Lab Results   Component Value Date    CHOL 132 06/25/2024    CHLPL 137 05/14/2019    TRIG 71 06/25/2024    HDL 34 (L) 06/25/2024    LDL 84 06/25/2024       Results for orders placed during the hospital encounter of 06/04/24    Adult Transthoracic Echo Complete W/ Cont if Necessary Per Protocol    Interpretation Summary    Left ventricular systolic function is normal. Left " ventricular ejection fraction appears to be 66 - 70%.    Left ventricular diastolic function is consistent with (grade I) impaired relaxation.    Normal right ventricular cavity size and systolic function noted.    The left atrial cavity is mildly dilated.    Mild tricuspid valve regurgitation is present.    Calculated right ventricular systolic pressure from tricuspid regurgitation is 34 mmHg.    There is no evidence of pericardial effusion        ASSESSMENT & PLAN     Diagnoses and all orders for this visit:    1. Atrial fibrillation with RVR (Primary)    2. Mixed hyperlipidemia    3. Primary hypertension    4. Vertebral artery occlusion, left    5. Prediabetes    6. ILD (interstitial lung disease)       A-fib with RVR: Recent admission for hypoxic respiratory failure and was found to be in A-fib with RVR of rate up to 170s.  Immediate trigger may be COPD exacerbation in the setting of prior tobacco abuse and ongoing alcohol abuse.  Initially required diltiazem drip which has been converted to oral diltiazem, discharged on PO diltiazem 240 daily as well as digoxin 125mcg daily.  Discontinued home metoprolol 50 bid given concomitant ILD/COPD and depression.   Defer amiodarone at this time given underlying COPD and ILD.  [id number] score 3, on therapeutic Lovenox, started on Eliquis 5 bid during recent admission, tolerating well but strongly emphasized fall precaution.  Hypertension: In office /64, elevated, HR 72.  Recent echo showed normal biventricular function with mild diastolic dysfunction, no significant valvular abnormalities.  On diltiazem as above for rate control.  Hyperlipidemia: Lipids this admission 6/2024 showed HDL 34, LDL 84, reasonably well-controlled.  Continue home atorvastatin 20 daily.  COPD/ILD: Oxygen dependent, management per PCP and other specialists.  Prediabetes: Hemoglobin A1c 6.0 last month 6/2024. Management per primary team and other specialists.  Tobacco and alcohol abuse: > 40  pack-year history of cigarette abuse but patient quit many years ago.  He still consumes half a pint of alcohol per night, strongly encouraged moderation, he has been removed from drinking while in SNF since discharge.    I spent 33 minutes caring for Jim on this date of service. This time includes time spent by me in the following activities: preparing for the visit, reviewing tests, performing a medically appropriate examination and/or evaluation, counseling and educating the patient/family/caregiver, referring and communicating with other health care professionals, and documenting information in the medical record.     Robby Hall MD. PhD. [facility]  07/15/24  14:36 EDT    Part of this note may be an electronic transcription/translation of spoken language to printed text using the Dragon dictation system.

## 2024-07-17 ENCOUNTER — PATIENT OUTREACH (OUTPATIENT)
Dept: CASE MANAGEMENT | Facility: OTHER | Age: 89
End: 2024-07-17
Payer: MEDICARE

## 2024-07-17 ENCOUNTER — READMISSION MANAGEMENT (OUTPATIENT)
Dept: CALL CENTER | Facility: HOSPITAL | Age: 89
End: 2024-07-17
Payer: MEDICARE

## 2024-07-17 DIAGNOSIS — I10 PRIMARY HYPERTENSION: Primary | ICD-10-CM

## 2024-07-17 DIAGNOSIS — I48.91 ATRIAL FIBRILLATION WITH RVR: ICD-10-CM

## 2024-07-17 NOTE — OUTREACH NOTE
Prep Survey      Flowsheet Row Responses   [facility]? Non-[facility]   Is LACE score < 7 ? Non-BH Discharge   Eligibility [facility]   Date of Admission 07/02/24   Date of Discharge 07/23/24   Discharge Disposition [facility]   Discharge diagnosis Unknown   Does the patient have one of the following disease processes/diagnoses(primary or secondary)? Other   Prep survey completed? Yes            Susan TADEO - Registered Nurse

## 2024-07-17 NOTE — OUTREACH NOTE
AMBULATORY CASE MANAGEMENT NOTE    Names and Relationships of Patient/Support Persons: Contact: Jim Marvin; Relationship: Self -     SNF Follow-up    Spoke with Tory discharge planner at . Tory states patient's anticipated discharge date in July 23. Home health with [facility] has been arranged and a new walker has been ordered. Tory states she will email [facility] patient's records.        Vane GOODMAN  Ambulatory Case Management    7/17/2024, 11:32 EDT

## 2024-07-22 ENCOUNTER — PATIENT OUTREACH (OUTPATIENT)
Dept: CASE MANAGEMENT | Facility: OTHER | Age: 89
End: 2024-07-22
Payer: MEDICARE

## 2024-07-22 ENCOUNTER — TELEPHONE (OUTPATIENT)
Dept: CASE MANAGEMENT | Facility: OTHER | Age: 89
End: 2024-07-22
Payer: MEDICARE

## 2024-07-22 DIAGNOSIS — I48.91 ATRIAL FIBRILLATION WITH RVR: ICD-10-CM

## 2024-07-22 DIAGNOSIS — I10 PRIMARY HYPERTENSION: Primary | ICD-10-CM

## 2024-07-22 NOTE — OUTREACH NOTE
AMBULATORY CASE MANAGEMENT NOTE    Names and Relationships of Patient/Support Persons: Contact: Mana Spears Discharge Planner; Relationship: Other -     SNF Follow-up    Spoke to Tory Discharge Planner at [facility]. She confirmed that patient is being discharge to home tomorrow..       Vane GOODMAN  Ambulatory Case Management    7/22/2024, 13:49 EDT

## 2024-07-24 ENCOUNTER — TRANSITIONAL CARE MANAGEMENT TELEPHONE ENCOUNTER (OUTPATIENT)
Dept: CALL CENTER | Facility: HOSPITAL | Age: 89
End: 2024-07-24
Payer: MEDICARE

## 2024-07-24 NOTE — OUTREACH NOTE
Call Center TCM Note      Flowsheet Row Responses   [facility] patient discharged from? Non-  [[facility]]   Does the patient have one of the following disease processes/diagnoses(primary or secondary)? Other   TCM attempt successful? Yes   Call start time 1214   Call end time 1216   Discharge diagnosis Afib w/RVR   Meds reviewed with patient/caregiver? Yes   Is the patient having any side effects they believe may be caused by any medication additions or changes? No   Does the patient have all medications ordered at discharge? Yes   Prescription comments Pt states all needed medications are in place at home.   Is the patient taking all medications as directed (includes completed medication regime)? Yes   Does the patient have an appointment with their PCP within 7-14 days of discharge? No appointments available   Nursing Interventions Routed TCM call to PCP office, PCP office requested to make appointment - message sent   Home health comments Pt states a HH agency called today, ordered by SNF at discharge, and will be seeing him, he is unsure of which agency.   Psychosocial issues? No   Did the patient receive a copy of their discharge instructions? Yes   Nursing interventions Reviewed instructions with patient   What is the patient's perception of their health status since discharge? Improving   Is the patient/caregiver able to teach back signs and symptoms related to disease process for when to call PCP? Yes   Is the patient/caregiver able to teach back signs and symptoms related to disease process for when to call 911? Yes   Is the patient/caregiver able to teach back the hierarchy of who to call/visit for symptoms/problems? PCP, Specialist, Home health nurse, Urgent Care, ED, 911 Yes   If the patient is a current smoker, are they able to teach back resources for cessation? Not a smoker   TCM call completed? Yes   Wrap up additional comments D/C DX: Afib w/RVR - Pt is feeling better, glad to be  home. Pt does have NP PULM MD appt tomorrow. PCP Dr Antonio Finley has no available times I could access to schedule TCM APPT by 08/06/2024 if office can please call pt to schedule.   Call end time 1215            Rhianna Minor MA    7/24/2024, 12:19 EDT

## 2024-07-26 ENCOUNTER — PATIENT OUTREACH (OUTPATIENT)
Dept: CASE MANAGEMENT | Facility: OTHER | Age: 89
End: 2024-07-26
Payer: MEDICARE

## 2024-07-26 ENCOUNTER — TELEPHONE (OUTPATIENT)
Dept: CASE MANAGEMENT | Facility: OTHER | Age: 89
End: 2024-07-26
Payer: MEDICARE

## 2024-07-26 DIAGNOSIS — I48.91 ATRIAL FIBRILLATION WITH RVR: Primary | ICD-10-CM

## 2024-07-26 DIAGNOSIS — J43.9 PULMONARY EMPHYSEMA, UNSPECIFIED EMPHYSEMA TYPE: ICD-10-CM

## 2024-07-26 NOTE — OUTREACH NOTE
AMBULATORY CASE MANAGEMENT NOTE    Names and Relationships of Patient/Support Persons: Contact: madisyn joya; Relationship: Emergency Contact -     Patient's daughter Madisyn returned [facility]'s call. Introduced self and explained role. Madisyn states patient is not doing very well. She states he has missed taking some of his medications and has also missed some meals. She states patient has been sleeping in a chair instead of going to bed. She states he has had some episodes of confusion. She states she noticed that patient's clothing smells like urine, but has not spoken to him about because he is a really private person. Madisyn states patient lives alone and she is concerned that he cannot properly care for himself. She states she doesn't want to put patient in a nursing home. She would like to get in-home help with preparing meals and making sure patient is taking his medications.       She states he saw the Pulmonologist yesterday and was told he has Emphysema and Heart Failure. She states the doctor recommended increasing patient's oxygen to eight liters, but patient refuses to follow his instructions. She states  nurse is scheduled for today, but patient told Madisyn he wasn't going to let her in because he doesn't feel like talking to her.     Madisyn states patient needs a refill of his medications. She was on the way to an appointment and had to hang up. Advise to call PCP office after she returns home regarding medication refills.    Plan:  HH  Taking meds/eating?      Vane GOODMAN  Ambulatory Case Management    7/26/2024, 11:12 EDT

## 2024-07-29 ENCOUNTER — TRANSCRIBE ORDERS (OUTPATIENT)
Dept: ADMINISTRATIVE | Facility: HOSPITAL | Age: 89
End: 2024-07-29
Payer: MEDICARE

## 2024-07-29 DIAGNOSIS — J84.9 ILD (INTERSTITIAL LUNG DISEASE): Primary | ICD-10-CM

## 2024-08-02 ENCOUNTER — PATIENT OUTREACH (OUTPATIENT)
Dept: CASE MANAGEMENT | Facility: OTHER | Age: 89
End: 2024-08-02
Payer: MEDICARE

## 2024-08-02 DIAGNOSIS — J43.9 PULMONARY EMPHYSEMA, UNSPECIFIED EMPHYSEMA TYPE: ICD-10-CM

## 2024-08-02 DIAGNOSIS — I48.91 ATRIAL FIBRILLATION WITH RVR: Primary | ICD-10-CM

## 2024-08-02 DIAGNOSIS — I10 PRIMARY HYPERTENSION: ICD-10-CM

## 2024-08-02 NOTE — OUTREACH NOTE
AMBULATORY CASE MANAGEMENT NOTE    Names and Relationships of Patient/Support Persons: Contact: madisyn joya; Relationship: Emergency Contact -     Patient Outreach    Called daughter Madisyn for an update on patient. She states patient is allowing HH nurse and PT in the house. She states she has been talking to patient about moving to an assisted living facility. She states sometimes he will agree to moving and other times he says no. Madisyn states she doesn't know what to do. She states patient is still not eating. She states he says he doesn't feel like warming anything up. She states because of his pulmonary issues he qualifies for a power wheelchair and asked how to get one. She states the pulmonologist monitored patient's  oxygen level while he was walking and it dropped below eighty. Madisyn advised to contact pulmonologist's office because patient's insurance will want a copy of the office notes, she agreed.     Madisyn states patient needs refills on some of his medications. Advised to call PCP office regarding refills. Also advised to reconcile patient's list of medications from rehab center with PCP's MA.    Plan:  Pt open to moving to assisted living      Vane GOODMAN  Ambulatory Case Management    8/2/2024, 12:27 EDT

## 2024-08-09 ENCOUNTER — OFFICE VISIT (OUTPATIENT)
Dept: FAMILY MEDICINE CLINIC | Facility: CLINIC | Age: 89
End: 2024-08-09
Payer: MEDICARE

## 2024-08-09 VITALS
DIASTOLIC BLOOD PRESSURE: 54 MMHG | HEART RATE: 47 BPM | SYSTOLIC BLOOD PRESSURE: 128 MMHG | WEIGHT: 195 LBS | OXYGEN SATURATION: 91 % | HEIGHT: 70 IN | BODY MASS INDEX: 27.92 KG/M2

## 2024-08-09 DIAGNOSIS — R53.1 WEAKNESS GENERALIZED: ICD-10-CM

## 2024-08-09 DIAGNOSIS — Z09 HOSPITAL DISCHARGE FOLLOW-UP: Primary | ICD-10-CM

## 2024-08-09 DIAGNOSIS — R25.1 TREMOR OF BOTH HANDS: Chronic | ICD-10-CM

## 2024-08-09 DIAGNOSIS — F10.90 ALCOHOL USE DISORDER: Chronic | ICD-10-CM

## 2024-08-09 DIAGNOSIS — J43.9 PULMONARY EMPHYSEMA, UNSPECIFIED EMPHYSEMA TYPE: Chronic | ICD-10-CM

## 2024-08-09 DIAGNOSIS — I48.91 ATRIAL FIBRILLATION WITH RVR: Chronic | ICD-10-CM

## 2024-08-09 DIAGNOSIS — R00.1 BRADYCARDIA: ICD-10-CM

## 2024-08-09 DIAGNOSIS — E83.41 HYPERMAGNESEMIA: ICD-10-CM

## 2024-08-09 DIAGNOSIS — J96.01 ACUTE HYPOXIC RESPIRATORY FAILURE: ICD-10-CM

## 2024-08-09 DIAGNOSIS — R73.03 PREDIABETES: ICD-10-CM

## 2024-08-09 DIAGNOSIS — I10 PRIMARY HYPERTENSION: Chronic | ICD-10-CM

## 2024-08-09 DIAGNOSIS — E78.2 MIXED HYPERLIPIDEMIA: Chronic | ICD-10-CM

## 2024-08-09 DIAGNOSIS — J84.9 ILD (INTERSTITIAL LUNG DISEASE): Chronic | ICD-10-CM

## 2024-08-09 RX ORDER — DILTIAZEM HYDROCHLORIDE 240 MG/1
240 CAPSULE, COATED, EXTENDED RELEASE ORAL DAILY
Qty: 90 CAPSULE | Refills: 1 | Status: SHIPPED | OUTPATIENT
Start: 2024-08-09

## 2024-08-09 RX ORDER — METOPROLOL TARTRATE 50 MG
50 TABLET ORAL 2 TIMES DAILY
COMMUNITY
End: 2024-08-24

## 2024-08-09 RX ORDER — DIPHENOXYLATE HYDROCHLORIDE AND ATROPINE SULFATE 2.5; .025 MG/1; MG/1
1 TABLET ORAL DAILY
COMMUNITY
Start: 2024-08-09 | End: 2025-03-09

## 2024-08-09 RX ORDER — METFORMIN HCL 500 MG
500 TABLET, EXTENDED RELEASE 24 HR ORAL
Qty: 90 TABLET | Refills: 1 | Status: SHIPPED | OUTPATIENT
Start: 2024-08-09

## 2024-08-09 RX ORDER — ATORVASTATIN CALCIUM 20 MG/1
20 TABLET, FILM COATED ORAL DAILY
Qty: 90 TABLET | Refills: 1 | Status: SHIPPED | OUTPATIENT
Start: 2024-08-09

## 2024-08-09 RX ORDER — DIGOXIN 125 MCG
125 TABLET ORAL
Qty: 90 TABLET | Refills: 1 | Status: SHIPPED | OUTPATIENT
Start: 2024-08-09

## 2024-08-09 RX ORDER — PREDNISONE 5 MG/1
5 TABLET ORAL DAILY
Status: ON HOLD | COMMUNITY
End: 2024-09-02

## 2024-08-09 NOTE — PROGRESS NOTES
Transitional Care Follow Up Visit  Subjective     Jim is a 88 y.o. male who presents for a transitional care management visit.    Within 48 business hours after discharge our office contacted him via telephone to coordinate his care and needs.      I reviewed and discussed the details of that call along with the discharge summary, hospital problems, inpatient lab results, inpatient diagnostic studies, and consultation reports with Jim.     Current outpatient and discharge medications have been reconciled for the patient.  Reviewed by: KATY Izaguirre          7/17/2024     1:18 PM   Date of TCM Phone Call   [facility]   Date of Admission 7/2/2024   Date of Discharge 7/23/2024   Discharge Disposition Home-Health Care [facility]       Risk for Readmission (LACE) No data recorded    History of Present Illness     Course During Hospital Stay The following information was reviewed by: KATY Izaguirre on 08/09/2024:     Presenting Problem/History of Present Illness from H&P:  New onset a-fib [I48.91]  Positive D dimer [R79.89]  Atrial fibrillation with rapid ventricular response [I48.91]  Atrial fibrillation with RVR [I48.91]  Hypoxemic respiratory failure, chronic [J96.11]  Generalized weakness [R53.1]  Acute congestive heart failure, unspecified heart failure type [I50.9]                 This is an 88-year-old with a past medical history of COPD, interstitial lung disease, hypertension/hyperlipidemia as well as carotid artery stenosis who presented to hospital with anhedonia, poor appetite and generalized weakness, and worsening dyspnea with exertion.  He was noted to have an elevated heart rate and EMS was called.  He was brought to the hospital and found to have a heart rate as high as 140 with atrial fibrillation.  He has known history of atrial fibrillation.  EKG in the ER showed a rate of 170 with nonspecific ST and T wave changes.  Cardiology was consulted he was  given both diltiazem and digoxin.  His heart rate did improve while he was in the emergency department     Hospital Course:  The patient is a 88 y.o. male who presented with new onset A-fib with RVR.  He was admitted and cardiology consulted.  He initially was placed on diltiazem drip for rate control.  He converted back to sinus rhythm and was transition to oral diltiazem.  He did have additional elevated heart rates and had dose increased.  He was placed on Eliquis for this.  Due to his lung disease he was taken off of the beta-blockers and we are avoiding amiodarone.  He did receive some diuresis when he had worsening respiratory status.  That is improving now.  He is going to need to follow-up with cardiology in clinic.     ILD/COPD with chronic hypoxic respiratory failure.  Patient had acute on chronic hypoxic respiratory failure here.  His baseline is 3 L at rest and 6 L with exertion.  He required up to 12 L high flow here.  He was given diuretic and steroid.  It seems the steroid has had a bit more effective than the diuretic.  He has been successfully weaned back down to his home O2 requirements of 3 to 6 L.  Pulmonology is following.  He is going to have an oral steroid taper at discharge and continue with a step up and breathing treatments.  He needs to follow-up with pulmonologist in clinic and have outpatient PFTs and repeat imaging.     He had poor compliance with home medications due to depression.  Psychiatry evaluated here and he has been started on nightly Remeron.  He is tolerating that well so far.     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  He was given vitamin supplementation here.  He did not have acute withdrawal.  We discussed the importance of alcohol cessation given his cardiac disease and also the deleterious effects of chronic alcohol use.     He had hyperglycemia here . A1c was 6.4 which has been slowly increasing over the past several years.  He was given SSI while  "on IV steroids here.  Starting metformin at discharge for prediabetes and anticipated progression to diabetes.    Copied text on this note has been reviewed and revised by me on 8/9/24..      Today, he is currently on Oxygen at 3lpm nasal cannula.  He reports breathing comfortably, he denies having CP, SOB, wheezing, heart palpiations, light headedness, dizziness or feeling of syncope.  He continues to drink ETOH 3 drinks per day.  Patient refused to take Remeron so has not taken it since hospital discharge.     The following portions of the patient's history were reviewed and updated as appropriate: allergies, current medications, past family history, past medical history, past social history, past surgical history, and problem list.     Vitals:    08/09/24 1532   BP: 128/54   BP Location: Right arm   Patient Position: Sitting   Cuff Size: Adult   Pulse: (!) 47   SpO2: 91%   Weight: 88.5 kg (195 lb)   Height: 177.8 cm (70\")       Vitals:    08/09/24 1532   BP: 128/54   Pulse: (!) 47   SpO2: 91%      ECG 12 Lead    (Results Pending)        Review of Systems   Constitutional:  Negative for chills and fever.   Respiratory:  Negative for shortness of breath and wheezing.    Cardiovascular:  Negative for chest pain and palpitations.   Neurological:  Negative for dizziness, syncope and light-headedness.       Objective   Physical Exam  Vitals and nursing note reviewed.   Constitutional:       General: He is not in acute distress.     Appearance: Normal appearance. He is not diaphoretic.      Comments: Chronically ill appearance   HENT:      Head: Normocephalic and atraumatic.   Eyes:      General: No scleral icterus.     Conjunctiva/sclera: Conjunctivae normal.   Cardiovascular:      Rate and Rhythm: Regular rhythm. Bradycardia present.      Heart sounds: Normal heart sounds.   Pulmonary:      Effort: Pulmonary effort is normal. No respiratory distress.      Breath sounds: Rales present. No wheezing or rhonchi.      " Comments: Diminished breath sounds in bilateral lobes; fine rales in BLL.  On continuous Oxygen at 3lpm during office visit.  Skin:     General: Skin is warm.   Neurological:      Mental Status: He is alert.      Motor: Weakness and tremor present.      Gait: Gait is intact.      Comments: Bilateral essential hand tremor (Rt > Lt), stated has had all his life; lower extremity weakness - ambulates with two canes.   Psychiatric:         Mood and Affect: Mood normal.       ECG 12 Lead    Date/Time: 8/9/2024 4:39 PM  Performed by: Devika Harris APRN    Authorized by: Devika Harris APRN  Comparison: compared with previous ECG from 6/28/2024  Comparison to previous ECG: Change from previous ECG that showed:  Sinus tachycardia  Atrial premature complexes  Minimal ST depression, anterior leads    Rhythm: sinus bradycardia  Rate: bradycardic  BPM: 54  Clinical impression comment: Borderline EKG       Assessment & Plan     Diagnoses and all orders for this visit:    1. Hospital discharge follow-up (Primary)  Comments:  Patient presented to [facility] with new onset A-fib with RVR.and acute on chronic hypoxic respiratory failure.    2. Atrial fibrillation with RVR  Assessment & Plan:  Uncontrolled; Bradycardia, HR 47; Patient asymptomatic.  Was taking Metoprolol Tartrate 50mg BID in addition to Diltiazem and Digoxin.  Advised patient to STOP Metoprolol Tartrate per hospital discharge note and low heart rate.  Continue Diltiazem and Digoxin.  Continue Eliquis.  Followed by Dr. Le, Cardiology.      Orders:  -     apixaban (ELIQUIS) 5 MG tablet tablet; Take 1 tablet by mouth Every 12 (Twelve) Hours. Indications: Atrial Fibrillation  Dispense: 180 tablet; Refill: 1  -     digoxin (LANOXIN) 125 MCG tablet; Take 1 tablet by mouth Daily.  Dispense: 90 tablet; Refill: 1  -     dilTIAZem CD (CARDIZEM CD) 240 MG 24 hr capsule; Take 1 capsule by mouth Daily.  Dispense: 90 capsule; Refill: 1  -     CBC &  Differential  -     Comprehensive Metabolic Panel  -     Digoxin Level    3. Primary hypertension  Assessment & Plan:  Hypertension is controlled with current treatment.  Continue current treatment plan.  Decrease table salt and foods high in salt.  Increase activity daily.  Continue current treatment plan.  Blood pressure will be re-assessed in 1 month.        4. Mixed hyperlipidemia  Assessment & Plan:  Lipid abnormalities are controlled on lab from 6/2024.  Taking Atorvastatin 20mg nightly.  Encouraged lifestyle changes.  Continue current treatment plan.  Will re-assess lipids today.      Orders:  -     atorvastatin (LIPITOR) 20 MG tablet; Take 1 tablet by mouth Daily.  Dispense: 90 tablet; Refill: 1  -     Lipid Panel    5. ILD (interstitial lung disease)  Assessment & Plan:  Stable on Oxygen 3-6L per nasal cannula.  Followed by Dr. Gibson Aquino, Pulmonology.      6. Pulmonary emphysema, unspecified emphysema type  Assessment & Plan:  Stable on Symbicort BID and DuoNeb Q6H for wheezing.  Continue O2 at 3-6L nasal cannula.  Followed by Dr. Aquino, Pulmonology.        7. Acute hypoxic respiratory failure  Assessment & Plan:  Followed by Dr. Gibson Aquino, Pulmonology.      8. Bradycardia  Assessment & Plan:  Asymptomatic; HR 47 to 54, O2Sat 91% on 3LPM via nasal cannula, no acute distress.  Order: EKG  Will continue to monitor.     Orders:  -     ECG 12 Lead    9. Tremor of both hands  Assessment & Plan:  Bilateral essential hand tremors - patient reports has had tremors all of his life.  Will continue to monitor.      10. Alcohol use disorder  Assessment & Plan:  Uncontrolled.  Patient admits drinks 3 ETOH drinks per day.  Discussed importance of cutting back on ETOH intake.  Will continue to monitor.      11. Hypermagnesemia  Assessment & Plan:  Uncontrolled, Mag 2.5 on prior lab from 7/2/2024.  Lab:  Mag, CMP  Will continue to monitor.    Orders:  -     Comprehensive Metabolic Panel  -     Magnesium    12.  Prediabetes  Assessment & Plan:  Stable.  A1c 6.4 on prior lab from 7/1/24.  Minimize sugar/carb/ETOH intake.  Increase activity daily as tolerated.  Continue Metformin 500mg daily with meal.  Will continue to monitor.    Orders:  -     metFORMIN ER (GLUCOPHAGE-XR) 500 MG 24 hr tablet; Take 1 tablet by mouth Daily With Breakfast.  Dispense: 90 tablet; Refill: 1  -     Comprehensive Metabolic Panel    13. Weakness generalized  Assessment & Plan:  Stable.  Generalized weakness - ambulates with 2 canes, refuses to use rolling walker.  Will continue to monitor.        Follow Up     Return in about 1 month (around 9/9/2024) for Next scheduled follow up Hospital Visit A-Fib COPD/ILD.    I spent 39 minutes caring for Jim on this date of service. This time includes time spent by me in the following activities:preparing for the visit, reviewing tests, obtaining and/or reviewing a separately obtained history, performing a medically appropriate examination and/or evaluation , counseling and educating the patient/family/caregiver, ordering medications, tests, or procedures, documenting information in the medical record, and independently interpreting results and communicating that information with the patient/family/caregiver

## 2024-08-17 LAB
ALBUMIN SERPL-MCNC: 3.9 G/DL (ref 3.5–5.2)
ALBUMIN/GLOB SERPL: 1.7 G/DL
ALP SERPL-CCNC: 59 U/L (ref 39–117)
ALT SERPL-CCNC: 15 U/L (ref 1–41)
AST SERPL-CCNC: 17 U/L (ref 1–40)
BASOPHILS # BLD AUTO: 0.19 10*3/MM3 (ref 0–0.2)
BASOPHILS NFR BLD AUTO: 1.8 % (ref 0–1.5)
BILIRUB SERPL-MCNC: 0.5 MG/DL (ref 0–1.2)
BUN SERPL-MCNC: 11 MG/DL (ref 8–23)
BUN/CREAT SERPL: 16.9 (ref 7–25)
CALCIUM SERPL-MCNC: 9.6 MG/DL (ref 8.6–10.5)
CHLORIDE SERPL-SCNC: 102 MMOL/L (ref 98–107)
CHOLEST SERPL-MCNC: 163 MG/DL (ref 0–200)
CO2 SERPL-SCNC: 27 MMOL/L (ref 22–29)
CREAT SERPL-MCNC: 0.65 MG/DL (ref 0.76–1.27)
DIGOXIN SERPL-MCNC: 0.9 NG/ML (ref 0.6–1.2)
EGFRCR SERPLBLD CKD-EPI 2021: 91.8 ML/MIN/1.73
EOSINOPHIL # BLD AUTO: 0.2 10*3/MM3 (ref 0–0.4)
EOSINOPHIL NFR BLD AUTO: 1.9 % (ref 0.3–6.2)
ERYTHROCYTE [DISTWIDTH] IN BLOOD BY AUTOMATED COUNT: 13.8 % (ref 12.3–15.4)
GLOBULIN SER CALC-MCNC: 2.3 GM/DL
GLUCOSE SERPL-MCNC: 107 MG/DL (ref 65–99)
HCT VFR BLD AUTO: 37.8 % (ref 37.5–51)
HDLC SERPL-MCNC: 61 MG/DL (ref 40–60)
HGB BLD-MCNC: 12.3 G/DL (ref 13–17.7)
IMM GRANULOCYTES # BLD AUTO: 0.45 10*3/MM3 (ref 0–0.05)
IMM GRANULOCYTES NFR BLD AUTO: 4.2 % (ref 0–0.5)
LDLC SERPL CALC-MCNC: 78 MG/DL (ref 0–100)
LYMPHOCYTES # BLD AUTO: 0.68 10*3/MM3 (ref 0.7–3.1)
LYMPHOCYTES NFR BLD AUTO: 6.3 % (ref 19.6–45.3)
MAGNESIUM SERPL-MCNC: 1.9 MG/DL (ref 1.6–2.4)
MCH RBC QN AUTO: 32.5 PG (ref 26.6–33)
MCHC RBC AUTO-ENTMCNC: 32.5 G/DL (ref 31.5–35.7)
MCV RBC AUTO: 99.7 FL (ref 79–97)
MONOCYTES # BLD AUTO: 1.14 10*3/MM3 (ref 0.1–0.9)
MONOCYTES NFR BLD AUTO: 10.6 % (ref 5–12)
NEUTROPHILS # BLD AUTO: 8.13 10*3/MM3 (ref 1.7–7)
NEUTROPHILS NFR BLD AUTO: 75.2 % (ref 42.7–76)
NRBC BLD AUTO-RTO: 0 /100 WBC (ref 0–0.2)
PLATELET # BLD AUTO: 160 10*3/MM3 (ref 140–450)
POTASSIUM SERPL-SCNC: 4.2 MMOL/L (ref 3.5–5.2)
PROT SERPL-MCNC: 6.2 G/DL (ref 6–8.5)
RBC # BLD AUTO: 3.79 10*6/MM3 (ref 4.14–5.8)
SODIUM SERPL-SCNC: 140 MMOL/L (ref 136–145)
TRIGL SERPL-MCNC: 141 MG/DL (ref 0–150)
VLDLC SERPL CALC-MCNC: 24 MG/DL (ref 5–40)
WBC # BLD AUTO: 10.79 10*3/MM3 (ref 3.4–10.8)

## 2024-08-24 PROBLEM — R25.1 TREMOR OF BOTH HANDS: Status: ACTIVE | Noted: 2024-08-24

## 2024-08-24 PROBLEM — I50.9 HEART FAILURE, UNSPECIFIED: Status: ACTIVE | Noted: 2024-08-24

## 2024-08-24 PROBLEM — R53.1 WEAKNESS GENERALIZED: Status: ACTIVE | Noted: 2024-08-24

## 2024-08-24 PROBLEM — F10.90 ALCOHOL USE DISORDER: Status: ACTIVE | Noted: 2024-08-24

## 2024-08-25 NOTE — ASSESSMENT & PLAN NOTE
Uncontrolled; Bradycardia, HR 47; Patient asymptomatic.  Was taking Metoprolol Tartrate 50mg BID in addition to Diltiazem and Digoxin.  Advised patient to STOP Metoprolol Tartrate per hospital discharge note and low heart rate.  Continue Diltiazem and Digoxin.  Continue Eliquis.  Followed by Dr. Le, Cardiology.

## 2024-08-25 NOTE — ASSESSMENT & PLAN NOTE
Stable.  A1c 6.4 on prior lab from 7/1/24.  Minimize sugar/carb/ETOH intake.  Increase activity daily as tolerated.  Continue Metformin 500mg daily with meal.  Will continue to monitor.

## 2024-08-25 NOTE — ASSESSMENT & PLAN NOTE
Uncontrolled.  Patient admits drinks 3 ETOH drinks per day.  Discussed importance of cutting back on ETOH intake.  Will continue to monitor.

## 2024-08-25 NOTE — ASSESSMENT & PLAN NOTE
Bilateral essential hand tremors - patient reports has had tremors all of his life.  Will continue to monitor.

## 2024-08-25 NOTE — ASSESSMENT & PLAN NOTE
Stable.  Generalized weakness - ambulates with 2 canes, refuses to use rolling walker.  Will continue to monitor.

## 2024-08-25 NOTE — ASSESSMENT & PLAN NOTE
Stable on Symbicort BID and DuoNeb Q6H for wheezing.  Continue O2 at 3-6L nasal cannula.  Followed by Dr. Aquino, Pulmonology.

## 2024-08-25 NOTE — ASSESSMENT & PLAN NOTE
Hypertension is controlled with current treatment.  Continue current treatment plan.  Decrease table salt and foods high in salt.  Increase activity daily.  Continue current treatment plan.  Blood pressure will be re-assessed in 1 month.

## 2024-08-25 NOTE — ASSESSMENT & PLAN NOTE
Lipid abnormalities are controlled on lab from 6/2024.  Taking Atorvastatin 20mg nightly.  Encouraged lifestyle changes.  Continue current treatment plan.  Will re-assess lipids today.

## 2024-08-30 ENCOUNTER — APPOINTMENT (OUTPATIENT)
Dept: CARDIOLOGY | Facility: HOSPITAL | Age: 89
End: 2024-08-30
Payer: MEDICARE

## 2024-08-30 ENCOUNTER — TELEPHONE (OUTPATIENT)
Dept: FAMILY MEDICINE CLINIC | Facility: CLINIC | Age: 89
End: 2024-08-30

## 2024-08-30 ENCOUNTER — APPOINTMENT (OUTPATIENT)
Dept: GENERAL RADIOLOGY | Facility: HOSPITAL | Age: 89
End: 2024-08-30
Payer: MEDICARE

## 2024-08-30 ENCOUNTER — HOSPITAL ENCOUNTER (INPATIENT)
Facility: HOSPITAL | Age: 89
LOS: 2 days | Discharge: HOME | End: 2024-09-02
Attending: EMERGENCY MEDICINE | Admitting: HOSPITALIST
Payer: MEDICARE

## 2024-08-30 DIAGNOSIS — L97.519 ULCER OF RIGHT FOOT, UNSPECIFIED ULCER STAGE: ICD-10-CM

## 2024-08-30 DIAGNOSIS — L03.115 CELLULITIS OF RIGHT FOOT: Primary | ICD-10-CM

## 2024-08-30 PROBLEM — L97.419 DIABETIC ULCER OF RIGHT MIDFOOT: Status: ACTIVE | Noted: 2024-08-30

## 2024-08-30 PROBLEM — E83.41 HYPERMAGNESEMIA: Status: ACTIVE | Noted: 2024-08-30

## 2024-08-30 PROBLEM — I48.91 ATRIAL FIBRILLATION: Status: ACTIVE | Noted: 2024-08-30

## 2024-08-30 PROBLEM — E11.621 DIABETIC ULCER OF RIGHT MIDFOOT: Status: ACTIVE | Noted: 2024-08-30

## 2024-08-30 PROBLEM — R00.1 BRADYCARDIA: Status: ACTIVE | Noted: 2024-08-30

## 2024-08-30 LAB
ALBUMIN SERPL-MCNC: 3.6 G/DL (ref 3.5–5.2)
ALBUMIN/GLOB SERPL: 1.3 G/DL
ALP SERPL-CCNC: 65 U/L (ref 39–117)
ALT SERPL W P-5'-P-CCNC: 13 U/L (ref 1–41)
ANION GAP SERPL CALCULATED.3IONS-SCNC: 10.7 MMOL/L (ref 5–15)
AST SERPL-CCNC: 17 U/L (ref 1–40)
BASOPHILS # BLD AUTO: 0.14 10*3/MM3 (ref 0–0.2)
BASOPHILS NFR BLD AUTO: 1.4 % (ref 0–1.5)
BH CV LOWER VASCULAR LEFT COMMON FEMORAL AUGMENT: NORMAL
BH CV LOWER VASCULAR LEFT COMMON FEMORAL COMPETENT: NORMAL
BH CV LOWER VASCULAR LEFT COMMON FEMORAL COMPRESS: NORMAL
BH CV LOWER VASCULAR LEFT COMMON FEMORAL PHASIC: NORMAL
BH CV LOWER VASCULAR LEFT COMMON FEMORAL SPONT: NORMAL
BH CV LOWER VASCULAR RIGHT COMMON FEMORAL AUGMENT: NORMAL
BH CV LOWER VASCULAR RIGHT COMMON FEMORAL COMPETENT: NORMAL
BH CV LOWER VASCULAR RIGHT COMMON FEMORAL COMPRESS: NORMAL
BH CV LOWER VASCULAR RIGHT COMMON FEMORAL PHASIC: NORMAL
BH CV LOWER VASCULAR RIGHT COMMON FEMORAL SPONT: NORMAL
BH CV LOWER VASCULAR RIGHT DISTAL FEMORAL COMPRESS: NORMAL
BH CV LOWER VASCULAR RIGHT GASTRONEMIUS COMPRESS: NORMAL
BH CV LOWER VASCULAR RIGHT GREATER SAPH AK COMPRESS: NORMAL
BH CV LOWER VASCULAR RIGHT GREATER SAPH BK COMPRESS: NORMAL
BH CV LOWER VASCULAR RIGHT LESSER SAPH COMPRESS: NORMAL
BH CV LOWER VASCULAR RIGHT MID FEMORAL AUGMENT: NORMAL
BH CV LOWER VASCULAR RIGHT MID FEMORAL COMPETENT: NORMAL
BH CV LOWER VASCULAR RIGHT MID FEMORAL COMPRESS: NORMAL
BH CV LOWER VASCULAR RIGHT MID FEMORAL PHASIC: NORMAL
BH CV LOWER VASCULAR RIGHT MID FEMORAL SPONT: NORMAL
BH CV LOWER VASCULAR RIGHT PERONEAL COMPRESS: NORMAL
BH CV LOWER VASCULAR RIGHT POPLITEAL AUGMENT: NORMAL
BH CV LOWER VASCULAR RIGHT POPLITEAL COMPETENT: NORMAL
BH CV LOWER VASCULAR RIGHT POPLITEAL COMPRESS: NORMAL
BH CV LOWER VASCULAR RIGHT POPLITEAL PHASIC: NORMAL
BH CV LOWER VASCULAR RIGHT POPLITEAL SPONT: NORMAL
BH CV LOWER VASCULAR RIGHT POSTERIOR TIBIAL COMPRESS: NORMAL
BH CV LOWER VASCULAR RIGHT PROFUNDA FEMORAL COMPRESS: NORMAL
BH CV LOWER VASCULAR RIGHT PROXIMAL FEMORAL COMPRESS: NORMAL
BH CV LOWER VASCULAR RIGHT SAPHENOFEMORAL JUNCTION COMPRESS: NORMAL
BILIRUB SERPL-MCNC: 0.5 MG/DL (ref 0–1.2)
BUN SERPL-MCNC: 6 MG/DL (ref 8–23)
BUN/CREAT SERPL: 8.3 (ref 7–25)
CALCIUM SPEC-SCNC: 9.2 MG/DL (ref 8.6–10.5)
CHLORIDE SERPL-SCNC: 105 MMOL/L (ref 98–107)
CO2 SERPL-SCNC: 25.3 MMOL/L (ref 22–29)
CREAT SERPL-MCNC: 0.72 MG/DL (ref 0.76–1.27)
D-LACTATE SERPL-SCNC: 2.5 MMOL/L (ref 0.5–2)
DEPRECATED RDW RBC AUTO: 52.1 FL (ref 37–54)
EGFRCR SERPLBLD CKD-EPI 2021: 89 ML/MIN/1.73
EOSINOPHIL # BLD AUTO: 0.29 10*3/MM3 (ref 0–0.4)
EOSINOPHIL NFR BLD AUTO: 3 % (ref 0.3–6.2)
ERYTHROCYTE [DISTWIDTH] IN BLOOD BY AUTOMATED COUNT: 14.2 % (ref 12.3–15.4)
GLOBULIN UR ELPH-MCNC: 2.8 GM/DL
GLUCOSE BLDC GLUCOMTR-MCNC: 122 MG/DL (ref 70–130)
GLUCOSE SERPL-MCNC: 147 MG/DL (ref 65–99)
HCT VFR BLD AUTO: 37.4 % (ref 37.5–51)
HGB BLD-MCNC: 12.2 G/DL (ref 13–17.7)
IMM GRANULOCYTES # BLD AUTO: 0.26 10*3/MM3 (ref 0–0.05)
IMM GRANULOCYTES NFR BLD AUTO: 2.7 % (ref 0–0.5)
LYMPHOCYTES # BLD AUTO: 0.77 10*3/MM3 (ref 0.7–3.1)
LYMPHOCYTES NFR BLD AUTO: 7.9 % (ref 19.6–45.3)
MCH RBC QN AUTO: 32.5 PG (ref 26.6–33)
MCHC RBC AUTO-ENTMCNC: 32.6 G/DL (ref 31.5–35.7)
MCV RBC AUTO: 99.7 FL (ref 79–97)
MONOCYTES # BLD AUTO: 1.01 10*3/MM3 (ref 0.1–0.9)
MONOCYTES NFR BLD AUTO: 10.3 % (ref 5–12)
MRSA DNA SPEC QL NAA+PROBE: ABNORMAL
NEUTROPHILS NFR BLD AUTO: 7.33 10*3/MM3 (ref 1.7–7)
NEUTROPHILS NFR BLD AUTO: 74.7 % (ref 42.7–76)
NRBC BLD AUTO-RTO: 0 /100 WBC (ref 0–0.2)
PLATELET # BLD AUTO: 249 10*3/MM3 (ref 140–450)
PMV BLD AUTO: 8.8 FL (ref 6–12)
POTASSIUM SERPL-SCNC: 3.9 MMOL/L (ref 3.5–5.2)
PROT SERPL-MCNC: 6.4 G/DL (ref 6–8.5)
RBC # BLD AUTO: 3.75 10*6/MM3 (ref 4.14–5.8)
SODIUM SERPL-SCNC: 141 MMOL/L (ref 136–145)
WBC NRBC COR # BLD AUTO: 9.8 10*3/MM3 (ref 3.4–10.8)

## 2024-08-30 PROCEDURE — 83605 ASSAY OF LACTIC ACID: CPT | Performed by: EMERGENCY MEDICINE

## 2024-08-30 PROCEDURE — G0378 HOSPITAL OBSERVATION PER HR: HCPCS

## 2024-08-30 PROCEDURE — 93971 EXTREMITY STUDY: CPT

## 2024-08-30 PROCEDURE — 25010000002 LORAZEPAM PER 2 MG: Performed by: INTERNAL MEDICINE

## 2024-08-30 PROCEDURE — 87641 MR-STAPH DNA AMP PROBE: CPT | Performed by: INTERNAL MEDICINE

## 2024-08-30 PROCEDURE — 87070 CULTURE OTHR SPECIMN AEROBIC: CPT | Performed by: INTERNAL MEDICINE

## 2024-08-30 PROCEDURE — 25010000002 THIAMINE PER 100 MG: Performed by: INTERNAL MEDICINE

## 2024-08-30 PROCEDURE — 85025 COMPLETE CBC W/AUTO DIFF WBC: CPT | Performed by: EMERGENCY MEDICINE

## 2024-08-30 PROCEDURE — 25810000003 SODIUM CHLORIDE 0.9 % SOLUTION: Performed by: INTERNAL MEDICINE

## 2024-08-30 PROCEDURE — 25010000002 VANCOMYCIN 10 G RECONSTITUTED SOLUTION: Performed by: INTERNAL MEDICINE

## 2024-08-30 PROCEDURE — 36415 COLL VENOUS BLD VENIPUNCTURE: CPT | Performed by: EMERGENCY MEDICINE

## 2024-08-30 PROCEDURE — 80053 COMPREHEN METABOLIC PANEL: CPT | Performed by: EMERGENCY MEDICINE

## 2024-08-30 PROCEDURE — 94640 AIRWAY INHALATION TREATMENT: CPT

## 2024-08-30 PROCEDURE — 82948 REAGENT STRIP/BLOOD GLUCOSE: CPT

## 2024-08-30 PROCEDURE — 87147 CULTURE TYPE IMMUNOLOGIC: CPT | Performed by: INTERNAL MEDICINE

## 2024-08-30 PROCEDURE — 87205 SMEAR GRAM STAIN: CPT | Performed by: INTERNAL MEDICINE

## 2024-08-30 PROCEDURE — 87186 SC STD MICRODIL/AGAR DIL: CPT | Performed by: INTERNAL MEDICINE

## 2024-08-30 PROCEDURE — 94799 UNLISTED PULMONARY SVC/PX: CPT

## 2024-08-30 PROCEDURE — 25010000002 CEFEPIME PER 500 MG: Performed by: INTERNAL MEDICINE

## 2024-08-30 PROCEDURE — 87040 BLOOD CULTURE FOR BACTERIA: CPT | Performed by: EMERGENCY MEDICINE

## 2024-08-30 PROCEDURE — 93971 EXTREMITY STUDY: CPT | Performed by: SURGERY

## 2024-08-30 PROCEDURE — 99285 EMERGENCY DEPT VISIT HI MDM: CPT

## 2024-08-30 PROCEDURE — 73630 X-RAY EXAM OF FOOT: CPT

## 2024-08-30 RX ORDER — LORAZEPAM 1 MG/1
1 TABLET ORAL DAILY
Status: DISCONTINUED | OUTPATIENT
Start: 2024-09-03 | End: 2024-08-31

## 2024-08-30 RX ORDER — NITROGLYCERIN 0.4 MG/1
0.4 TABLET SUBLINGUAL
Status: DISCONTINUED | OUTPATIENT
Start: 2024-08-30 | End: 2024-09-02 | Stop reason: HOSPADM

## 2024-08-30 RX ORDER — ACETAMINOPHEN 650 MG/1
650 SUPPOSITORY RECTAL EVERY 4 HOURS PRN
Status: DISCONTINUED | OUTPATIENT
Start: 2024-08-30 | End: 2024-08-31

## 2024-08-30 RX ORDER — DIPHENOXYLATE HYDROCHLORIDE AND ATROPINE SULFATE 2.5; .025 MG/1; MG/1
1 TABLET ORAL DAILY
Status: DISCONTINUED | OUTPATIENT
Start: 2024-08-30 | End: 2024-09-02 | Stop reason: HOSPADM

## 2024-08-30 RX ORDER — ONDANSETRON 2 MG/ML
4 INJECTION INTRAMUSCULAR; INTRAVENOUS EVERY 6 HOURS PRN
Status: DISCONTINUED | OUTPATIENT
Start: 2024-08-30 | End: 2024-09-02 | Stop reason: HOSPADM

## 2024-08-30 RX ORDER — CHOLECALCIFEROL (VITAMIN D3) 25 MCG
1000 TABLET ORAL DAILY
Status: DISCONTINUED | OUTPATIENT
Start: 2024-08-30 | End: 2024-09-02 | Stop reason: HOSPADM

## 2024-08-30 RX ORDER — LORAZEPAM 2 MG/ML
2 INJECTION INTRAMUSCULAR
Status: DISCONTINUED | OUTPATIENT
Start: 2024-08-30 | End: 2024-09-02 | Stop reason: HOSPADM

## 2024-08-30 RX ORDER — LORAZEPAM 1 MG/1
1 TABLET ORAL
Status: DISCONTINUED | OUTPATIENT
Start: 2024-08-30 | End: 2024-09-02 | Stop reason: HOSPADM

## 2024-08-30 RX ORDER — LORAZEPAM 1 MG/1
1 TABLET ORAL EVERY 6 HOURS
Status: DISCONTINUED | OUTPATIENT
Start: 2024-08-31 | End: 2024-08-31

## 2024-08-30 RX ORDER — LORAZEPAM 1 MG/1
2 TABLET ORAL
Status: DISCONTINUED | OUTPATIENT
Start: 2024-08-30 | End: 2024-09-02 | Stop reason: HOSPADM

## 2024-08-30 RX ORDER — METFORMIN HCL 500 MG
500 TABLET, EXTENDED RELEASE 24 HR ORAL
Status: DISCONTINUED | OUTPATIENT
Start: 2024-08-31 | End: 2024-08-31

## 2024-08-30 RX ORDER — SODIUM CHLORIDE 0.9 % (FLUSH) 0.9 %
10 SYRINGE (ML) INJECTION EVERY 12 HOURS SCHEDULED
Status: DISCONTINUED | OUTPATIENT
Start: 2024-08-30 | End: 2024-09-02 | Stop reason: HOSPADM

## 2024-08-30 RX ORDER — ATORVASTATIN CALCIUM 20 MG/1
20 TABLET, FILM COATED ORAL DAILY
Status: DISCONTINUED | OUTPATIENT
Start: 2024-08-30 | End: 2024-09-02 | Stop reason: HOSPADM

## 2024-08-30 RX ORDER — SODIUM CHLORIDE 9 MG/ML
40 INJECTION, SOLUTION INTRAVENOUS AS NEEDED
Status: DISCONTINUED | OUTPATIENT
Start: 2024-08-30 | End: 2024-09-02 | Stop reason: HOSPADM

## 2024-08-30 RX ORDER — LORAZEPAM 1 MG/1
2 TABLET ORAL EVERY 6 HOURS
Status: DISCONTINUED | OUTPATIENT
Start: 2024-08-30 | End: 2024-08-31

## 2024-08-30 RX ORDER — ACETAMINOPHEN 325 MG/1
650 TABLET ORAL EVERY 4 HOURS PRN
Status: DISCONTINUED | OUTPATIENT
Start: 2024-08-30 | End: 2024-09-02 | Stop reason: HOSPADM

## 2024-08-30 RX ORDER — LORAZEPAM 2 MG/ML
1 INJECTION INTRAMUSCULAR
Status: DISCONTINUED | OUTPATIENT
Start: 2024-08-30 | End: 2024-09-02 | Stop reason: HOSPADM

## 2024-08-30 RX ORDER — SODIUM CHLORIDE 9 MG/ML
75 INJECTION, SOLUTION INTRAVENOUS CONTINUOUS
Status: DISCONTINUED | OUTPATIENT
Start: 2024-08-30 | End: 2024-09-01

## 2024-08-30 RX ORDER — INSULIN LISPRO 100 [IU]/ML
2-7 INJECTION, SOLUTION INTRAVENOUS; SUBCUTANEOUS
Status: DISCONTINUED | OUTPATIENT
Start: 2024-08-30 | End: 2024-09-02 | Stop reason: HOSPADM

## 2024-08-30 RX ORDER — DEXTROSE MONOHYDRATE 25 G/50ML
25 INJECTION, SOLUTION INTRAVENOUS
Status: DISCONTINUED | OUTPATIENT
Start: 2024-08-30 | End: 2024-09-02 | Stop reason: HOSPADM

## 2024-08-30 RX ORDER — DIGOXIN 125 MCG
125 TABLET ORAL
Status: DISCONTINUED | OUTPATIENT
Start: 2024-08-31 | End: 2024-09-02 | Stop reason: HOSPADM

## 2024-08-30 RX ORDER — SODIUM CHLORIDE 0.9 % (FLUSH) 0.9 %
10 SYRINGE (ML) INJECTION AS NEEDED
Status: DISCONTINUED | OUTPATIENT
Start: 2024-08-30 | End: 2024-09-02 | Stop reason: HOSPADM

## 2024-08-30 RX ORDER — ENOXAPARIN SODIUM 100 MG/ML
40 INJECTION SUBCUTANEOUS DAILY
Status: DISCONTINUED | OUTPATIENT
Start: 2024-08-31 | End: 2024-09-01

## 2024-08-30 RX ORDER — IBUPROFEN 600 MG/1
1 TABLET ORAL
Status: DISCONTINUED | OUTPATIENT
Start: 2024-08-30 | End: 2024-09-02 | Stop reason: HOSPADM

## 2024-08-30 RX ORDER — ACETAMINOPHEN 160 MG/5ML
650 SOLUTION ORAL EVERY 4 HOURS PRN
Status: DISCONTINUED | OUTPATIENT
Start: 2024-08-30 | End: 2024-08-31

## 2024-08-30 RX ORDER — BUDESONIDE AND FORMOTEROL FUMARATE DIHYDRATE 160; 4.5 UG/1; UG/1
2 AEROSOL RESPIRATORY (INHALATION)
Status: DISCONTINUED | OUTPATIENT
Start: 2024-08-30 | End: 2024-09-02 | Stop reason: HOSPADM

## 2024-08-30 RX ORDER — FOLIC ACID 1 MG/1
1 TABLET ORAL DAILY
Status: DISCONTINUED | OUTPATIENT
Start: 2024-08-31 | End: 2024-09-02 | Stop reason: HOSPADM

## 2024-08-30 RX ORDER — DILTIAZEM HYDROCHLORIDE 240 MG/1
240 CAPSULE, COATED, EXTENDED RELEASE ORAL DAILY
Status: DISCONTINUED | OUTPATIENT
Start: 2024-08-30 | End: 2024-09-02 | Stop reason: HOSPADM

## 2024-08-30 RX ORDER — THIAMINE HYDROCHLORIDE 100 MG/ML
200 INJECTION, SOLUTION INTRAMUSCULAR; INTRAVENOUS EVERY 8 HOURS SCHEDULED
Status: DISCONTINUED | OUTPATIENT
Start: 2024-08-30 | End: 2024-09-02 | Stop reason: HOSPADM

## 2024-08-30 RX ORDER — NICOTINE POLACRILEX 4 MG
15 LOZENGE BUCCAL
Status: DISCONTINUED | OUTPATIENT
Start: 2024-08-30 | End: 2024-09-02 | Stop reason: HOSPADM

## 2024-08-30 RX ORDER — LORAZEPAM 1 MG/1
1 TABLET ORAL EVERY 12 HOURS SCHEDULED
Status: DISCONTINUED | OUTPATIENT
Start: 2024-09-01 | End: 2024-08-31

## 2024-08-30 RX ADMIN — Medication 1 TABLET: at 20:45

## 2024-08-30 RX ADMIN — LORAZEPAM 2 MG: 1 TABLET ORAL at 21:44

## 2024-08-30 RX ADMIN — Medication 1000 UNITS: at 20:45

## 2024-08-30 RX ADMIN — VANCOMYCIN HYDROCHLORIDE 1750 MG: 10 INJECTION, POWDER, LYOPHILIZED, FOR SOLUTION INTRAVENOUS at 17:51

## 2024-08-30 RX ADMIN — THIAMINE HYDROCHLORIDE 200 MG: 100 INJECTION, SOLUTION INTRAMUSCULAR; INTRAVENOUS at 23:15

## 2024-08-30 RX ADMIN — BUDESONIDE AND FORMOTEROL FUMARATE DIHYDRATE 2 PUFF: 160; 4.5 AEROSOL RESPIRATORY (INHALATION) at 19:16

## 2024-08-30 RX ADMIN — CEFEPIME 1000 MG: 1 INJECTION, POWDER, FOR SOLUTION INTRAMUSCULAR; INTRAVENOUS at 20:47

## 2024-08-30 RX ADMIN — LORAZEPAM 2 MG: 2 INJECTION INTRAMUSCULAR; INTRAVENOUS at 23:15

## 2024-08-30 RX ADMIN — DILTIAZEM HYDROCHLORIDE 240 MG: 240 CAPSULE, EXTENDED RELEASE ORAL at 20:47

## 2024-08-30 RX ADMIN — SODIUM CHLORIDE 100 ML/HR: 9 INJECTION, SOLUTION INTRAVENOUS at 17:51

## 2024-08-30 NOTE — ED NOTES
Pt has pitting edema and redness in right foot since tues.  Nki.  No pain    Pt is on 3L o2 at baseline

## 2024-08-30 NOTE — PLAN OF CARE
Problem: Adult Inpatient Plan of Care  Goal: Plan of Care Review  Outcome: Ongoing, Progressing  Goal: Patient-Specific Goal (Individualized)  Outcome: Ongoing, Progressing  Goal: Absence of Hospital-Acquired Illness or Injury  Outcome: Ongoing, Progressing  Intervention: Identify and Manage Fall Risk  Recent Flowsheet Documentation  Taken 8/30/2024 1800 by Radha Olivera RN  Safety Promotion/Fall Prevention:   activity supervised   assistive device/personal items within reach   clutter free environment maintained   fall prevention program maintained   nonskid shoes/slippers when out of bed   room organization consistent   safety round/check completed  Taken 8/30/2024 1709 by Radha Olivera RN  Safety Promotion/Fall Prevention:   activity supervised   assistive device/personal items within reach   clutter free environment maintained   fall prevention program maintained   nonskid shoes/slippers when out of bed   room organization consistent   safety round/check completed  Intervention: Prevent Skin Injury  Recent Flowsheet Documentation  Taken 8/30/2024 1800 by Radha Olivera RN  Body Position: position changed independently  Taken 8/30/2024 1709 by Radha Olivera RN  Body Position: position changed independently  Intervention: Prevent and Manage VTE (Venous Thromboembolism) Risk  Recent Flowsheet Documentation  Taken 8/30/2024 1800 by Radha Olivera RN  Activity Management: activity encouraged  Taken 8/30/2024 1709 by Radha Olivera RN  Activity Management: activity encouraged  Intervention: Prevent Infection  Recent Flowsheet Documentation  Taken 8/30/2024 1800 by Radha Olivera RN  Infection Prevention:   hand hygiene promoted   personal protective equipment utilized   rest/sleep promoted  Taken 8/30/2024 1709 by Radha Olivera RN  Infection Prevention: hand hygiene promoted  Goal: Optimal Comfort and Wellbeing  Outcome: Ongoing, Progressing  Intervention: Provide Person-Centered Care  Recent Flowsheet  Documentation  Taken 8/30/2024 1709 by Radha Olivera, RN  Trust Relationship/Rapport:   care explained   choices provided   reassurance provided   thoughts/feelings acknowledged  Goal: Readiness for Transition of Care  Outcome: Ongoing, Progressing  Intervention: Mutually Develop Transition Plan  Recent Flowsheet Documentation  Taken 8/30/2024 1722 by Radha Olivera, RN  Transportation Anticipated: family or friend will provide  Patient/Family Anticipated Services at Transition:      respiratory services   home health care  Patient/Family Anticipates Transition to: home  Taken 8/30/2024 1718 by Radha Olivera, RN  Equipment Currently Used at Home:   oxygen   walker, rolling   cane, quad tip   Goal Outcome Evaluation:
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
## 2024-08-30 NOTE — NURSING NOTE
Nursing report ED to floor  Jim BEATTY Yon  88 y.o.  male    HPI :  HPI (Adult)  Stated Reason for Visit: foot swelling and redness  History Obtained From: patient    Chief Complaint  Chief Complaint   Patient presents with    Foot Swelling       Admitting doctor:   Rodríguez Araujo MD    Admitting diagnosis:   The primary encounter diagnosis was Cellulitis of right foot. A diagnosis of Ulcer of right foot, unspecified ulcer stage was also pertinent to this visit.    Code status:   Current Code Status       Date Active Code Status Order ID Comments User Context       Prior            Allergies:   Ace inhibitors and Lisinopril    Isolation:   No active isolations    Intake and Output  No intake or output data in the 24 hours ending 08/30/24 1521    Weight:   There were no vitals filed for this visit.    Most recent vitals:   Vitals:    08/30/24 1227 08/30/24 1429   BP: 130/62 118/56   Pulse: 110 87   Resp: 18    Temp: 97.8 °F (36.6 °C)    TempSrc: Tympanic    SpO2: 96% 95%       Active LDAs/IV Access:   Lines, Drains & Airways       Active LDAs       Name Placement date Placement time Site Days    Peripheral IV 06/24/24 1802 Left Antecubital 06/24/24  1802  Antecubital  66    Peripheral IV 08/30/24 1304 Posterior;Right Hand 08/30/24  1304  Hand  less than 1                    Labs (abnormal labs have a star):   Labs Reviewed   COMPREHENSIVE METABOLIC PANEL - Abnormal; Notable for the following components:       Result Value    Glucose 147 (*)     BUN 6 (*)     Creatinine 0.72 (*)     All other components within normal limits    Narrative:     GFR Normal >60  Chronic Kidney Disease <60  Kidney Failure <15    The GFR formula is only valid for adults with stable renal function between ages 18 and 70.   LACTIC ACID, PLASMA - Abnormal; Notable for the following components:    Lactate 2.5 (*)     All other components within normal limits   CBC WITH AUTO DIFFERENTIAL - Abnormal; Notable for the following components:    RBC  3.75 (*)     Hemoglobin 12.2 (*)     Hematocrit 37.4 (*)     MCV 99.7 (*)     Lymphocyte % 7.9 (*)     Immature Grans % 2.7 (*)     Neutrophils, Absolute 7.33 (*)     Monocytes, Absolute 1.01 (*)     Immature Grans, Absolute 0.26 (*)     All other components within normal limits   BLOOD CULTURE   BLOOD CULTURE   LACTIC ACID, REFLEX   CBC AND DIFFERENTIAL    Narrative:     The following orders were created for panel order CBC & Differential.  Procedure                               Abnormality         Status                     ---------                               -----------         ------                     CBC Auto Differential[[id number]]        Abnormal            Final result                 Please view results for these tests on the individual orders.       EKG:   No orders to display       Meds given in ED:   Medications   piperacillin-tazobactam (ZOSYN) 3.375 g IVPB in 100 mL NS MBP (CD) (has no administration in time range)   vancomycin 1750 mg/500 mL 0.9% NS IVPB ([facility]) (has no administration in time range)       Imaging results:  XR Foot 3+ View Right    Result Date: 2024   1. Soft tissue swelling and suspected soft tissue wound at the lateral and plantar distal forefoot, without radiographic evidence of acute fracture, dislocation, or osteomyelitis. 2. 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 3. Mild multifocal DJD at the midfoot.  This report was finalized on 2024 1:39 PM by Chris Quiles MD on Workstation: [id number]       Ambulatory status:   - br    Social issues:   Social History     Socioeconomic History    Marital status:    Tobacco Use    Smoking status: Former     Current packs/day: 0.00     Average packs/day: 1 pack/day for 40.0 years (40.0 ttl pk-yrs)     Types: Cigarettes     Start date:      Quit date:      Years since quittin.6     Passive exposure: Never    Smokeless tobacco: Never    Tobacco comments:      Quit 19 years ago    Vaping Use    Vaping status: Never Used   Substance and Sexual Activity    Alcohol use: Not Currently     Comment: 15-20 francisca and diet coke a week    Drug use: Never    Sexual activity: Not Currently     Partners: Female       Peripheral Neurovascular  Peripheral Neurovascular (Adult)  Peripheral Neurovascular WDL: .WDL except (swelling noted to right foot.)    Neuro Cognitive  Neuro Cognitive (Adult)  Cognitive/Neuro/Behavioral WDL: .WDL except (Noted tremors in BUE.)    Learning  Learning Assessment (Adult)  Learning Readiness and Ability: no barriers identified    Respiratory  Respiratory WDL  Respiratory WDL: .WDL except (on continous O2)    Abdominal Pain       Pain Assessments  Pain (Adult)  (0-10) Pain Rating: Rest: 0    NIH Stroke Scale       Pee Camarena RN  08/30/24 15:21 EDT

## 2024-08-30 NOTE — H&P
Internal medicine history and physical  INTERNAL MEDICINE   [facility]       Patient Identification:  Name: Jim Marvin  Age: 88 y.o.  Sex: male  :  1935  MRN: [id number]                   Primary Care Physician: Devika Harris APRN                               Date of admission:2024    Chief Complaint: Swelling and redness and wound on the right foot since Tuesday.    History of Present Illness:   Patient is a 88-year-old male who lives by himself and has history of COPD hypertension dyslipidemia and prior stroke as well as history of alcohol use with last drink couple days ago, history of diabetes neuropathy and atrial fibrillation noted to his right foot becoming swollen and red since Tuesday.  Patient was not aware of any wound on his right foot.  Because of the persistent swelling and redness without any associated pain he eventually called his daughter who after discussing with his primary care provider called ambulance and sent the patient to the emergency room.  Evaluation in the emergency room revealed ulcerated lesion on the lateral aspect of the right foot with associated cellulitis.  Patient claims compliance with his medications including blood thinner.  Workup in the emergency room included a venous Doppler of the right lower extremity which did not show any acute blood clot and plain x-ray of the right foot showed degenerative changes and soft tissue swelling with ulceration on the lateral plantar distal foot without any evidence of osteomyelitis.  During my conversation patient appeared tremulous and when asked question about drinking he denies any alcohol use directly but he admits to 2 to 3 cans of drink daily.  His last drink was couple days ago.    Past Medical History:  Past Medical History:   Diagnosis Date    COPD (chronic obstructive pulmonary disease)     Hyperlipidemia     Hypertension     Stroke      Past Surgical History:  Past Surgical History:    Procedure Laterality Date    CATARACT EXTRACTION Left 07/06/2022      Home Meds:  (Not in a hospital admission)    Current Meds:     Current Facility-Administered Medications:     piperacillin-tazobactam (ZOSYN) 3.375 g IVPB in 100 mL NS MBP (CD), 3.375 g, Intravenous, Once, Yann Ellis MD    vancomycin 1750 mg/500 mL 0.9% NS IVPB ([facility]), 20 mg/kg, Intravenous, Once, Yann Ellis MD    Current Outpatient Medications:     apixaban (ELIQUIS) 5 MG tablet tablet, Take 1 tablet by mouth Every 12 (Twelve) Hours. Indications: Atrial Fibrillation, Disp: 180 tablet, Rfl: 1    atorvastatin (LIPITOR) 20 MG tablet, Take 1 tablet by mouth Daily., Disp: 90 tablet, Rfl: 1    budesonide-formoterol (SYMBICORT) 160-4.5 MCG/ACT inhaler, Inhale 2 puffs 2 (Two) Times a Day., Disp: 1 each, Rfl: 0    cholecalciferol (Vitamin D, Cholecalciferol,) 25 MCG (1000 UT) tablet, Take 1 tablet by mouth Daily., Disp: , Rfl:     digoxin (LANOXIN) 125 MCG tablet, Take 1 tablet by mouth Daily., Disp: 90 tablet, Rfl: 1    dilTIAZem CD (CARDIZEM CD) 240 MG 24 hr capsule, Take 1 capsule by mouth Daily., Disp: 90 capsule, Rfl: 1    EPINEPHrine (EPIPEN) 0.3 MG/0.3ML solution auto-injector injection, ADMINISTER 0.3 ML IN THE MUSCLE 1 TIME FOR 1 DOSE AS DIRECTED BY PRESCRIBER, Disp: , Rfl:     ipratropium-albuterol (DUO-NEB) 0.5-2.5 mg/3 ml nebulizer, Inhale the contents of 1 vial by nebulization Every 6 (Six) Hours As Needed for Wheezing for up to 30 days., Disp: 360 mL, Rfl: 0    metFORMIN ER (GLUCOPHAGE-XR) 500 MG 24 hr tablet, Take 1 tablet by mouth Daily With Breakfast., Disp: 90 tablet, Rfl: 1    multivitamin tablet tablet, Take 1 tablet by mouth Daily., Disp: , Rfl:     predniSONE (DELTASONE) 5 MG tablet, Take 1 tablet by mouth Daily. Ordered by Ivy Estes; told not to stop w/o calling his office., Disp: , Rfl:   Allergies:  Allergies   Allergen Reactions    Ace Inhibitors Other (See Comments)     Cannot remember      Lisinopril Other (See Comments)     Cannot remember     Social History:   Social History     Tobacco Use    Smoking status: Former     Current packs/day: 0.00     Average packs/day: 1 pack/day for 40.0 years (40.0 ttl pk-yrs)     Types: Cigarettes     Start date:      Quit date:      Years since quittin.6     Passive exposure: Never    Smokeless tobacco: Never    Tobacco comments:     Quit 19 years ago    Substance Use Topics    Alcohol use: Not Currently     Comment: 15-20 francisca and diet coke a week      Family History:  History reviewed. No pertinent family history.       Review of Systems  See history of present illness and past medical history.  As described in history of present illness patient denies any fever and chills or injury to his foot.      Vitals:   /56   Pulse 87   Temp 97.8 °F (36.6 °C) (Tympanic)   Resp 18   SpO2 95%   I/O: No intake or output data in the 24 hours ending 24 1558  Exam:  Patient is examined using the personal protective equipment as per guidelines from infection control for this particular patient as enacted.  Hand washing was performed before and after patient interaction.  General Appearance:  Alert cooperative tremulous male but does not appear toxic or septic.   Head:    Normocephalic, without obvious abnormality, atraumatic   Eyes:    PERRL, conjunctiva/corneas clear, EOM's intact, both eyes   Ears:    Normal external ear canals, both ears   Nose: No nasal drainage noted   Throat: No acute lesion noted   Neck: Supple and no adenopathy   Back:     Symmetric, no curvature, ROM normal, no CVA tenderness   Lungs:     Clear to auscultation bilaterally, respirations unlabored   Chest Wall:    No tenderness or deformity    Heart:  S1-S2 irregular   Abdomen:   Soft nontender   Extremities:                Skin: No rash noted   Neurologic: Grossly nonfocal gait not tested       Data Review:      I reviewed the patient's new clinical results.  Results from last  7 days   Lab Units 08/30/24  1255   WBC 10*3/mm3 9.80   HEMOGLOBIN g/dL 12.2*   PLATELETS 10*3/mm3 249     Results from last 7 days   Lab Units 08/30/24  1255   SODIUM mmol/L 141   POTASSIUM mmol/L 3.9   CHLORIDE mmol/L 105   CO2 mmol/L 25.3   BUN mg/dL 6*   CREATININE mg/dL 0.72*   CALCIUM mg/dL 9.2   GLUCOSE mg/dL 147*     XR Foot 3+ View Right    Result Date: 8/30/2024   1. Soft tissue swelling and suspected soft tissue wound at the lateral and plantar distal forefoot, without radiographic evidence of acute fracture, dislocation, or osteomyelitis. 2. 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 3. Mild multifocal DJD at the midfoot.  This report was finalized on 8/30/2024 1:39 PM by Chris Quiles MD on Workstation: [id number]     Microbiology Results (last 10 days)       Procedure Component Value - Date/Time    Wound Culture - Wound, Foot, Right [[id number]] Collected: 08/30/24 1822    Lab Status: Preliminary result Specimen: Wound from Foot, Right Updated: 08/30/24 2044     Gram Stain Rare (1+) WBCs seen      Few (2+) Gram positive cocci    MRSA Screen, PCR (Inpatient) - Swab, Nares [[id number]]  (Abnormal) Collected: 08/30/24 1822    Lab Status: Final result Specimen: Swab from Nares Updated: 08/30/24 1953     MRSA PCR MRSA Detected    Narrative:      The negative predictive value of this diagnostic test is high and should only be used to consider de-escalating anti-MRSA therapy. A positive result may indicate colonization with MRSA and must be correlated clinically.          Telemetry Scan   Final Result      Telemetry Scan   Final Result      Telemetry Scan   Final Result          Assessment:  Active Hospital Problems    Diagnosis  POA    **Cellulitis of right foot [L03.115]  Yes    Diabetic ulcer of right midfoot [E11.621, L97.419]  Unknown    Atrial fibrillation [I48.91]  Unknown    Alcohol use disorder [F10.90]  Yes    Prediabetes [R73.03]  Yes    ILD  (interstitial lung disease) [J84.9]  Yes    Emphysema lung [J43.9]  Yes    History of cerebellar stroke [Z86.73]  Not Applicable    Hypertension [I10]  Yes   MRSA colonization    Medical decision making/care plan: See admitting orders  Right foot cellulitis with diabetic ulcer-plan is to admit the patient continue with cefepime and vancomycin, arrange for MRI of the right foot if recommended by podiatry service and get ankle-brachial index to assess the circulatory status and podiatry consultation.  Follow-up on blood culture and fluid culture.  Patient is leery of getting MRI done.  History of emphysema and interstitial lung disease on chronic steroid use on follow-up with Dr. Rush-plan is to continue with his current steroid dose and monitor closely for any relative adrenal insufficiency and provide him with continuous pulse ox monitoring.  Continue with as needed nebulizer treatment.  History of atrial fibrillation and prior stroke-continue his current regimen but hold his anticoagulation therapy in case if podiatry service decide intervention and debridement.  Restart anticoagulation therapy if no surgery is planned.  Diabetes mellitus/prediabetes-patient has hemoglobin A1c has ranged from 5.64 in 2021-6.4 in July 2024.  Patient is currently on metformin which we will continue while closely monitoring for hypoglycemia.  Provide him with Accu-Cheks and sliding scale coverage and watch for hypoglycemia.  History of alcohol use-provide him with CIWA protocol continue with vitamin replacement with folic acid and thiamine.  Rodríguez Araujo MD   8/30/2024  15:58 EDT    Parts of this note may be an electronic transcription/translation of spoken language to printed text using the Dragon dictation system.

## 2024-08-30 NOTE — ASSESSMENT & PLAN NOTE
Asymptomatic; HR 47 to 54, O2Sat 91% on 3LPM via nasal cannula, no acute distress.  Order: EKG  Will continue to monitor.

## 2024-08-30 NOTE — TELEPHONE ENCOUNTER
Caller: jessica joya    Relationship to patient: Emergency Contact    Best call back number: [phone]    Chief complaint: RIGHT FOOT SWOLLEN AND RED    Patient directed to call 911 or go to their nearest emergency room.     Patient verbalized understanding: [x] Yes  [] No  If no, why?    Additional notes:PATIENT DAUGHTER ON [facility] WILL BE TAKING PATIENT TO THE NEAREST EMERGENCY ROOM TO HAVE THE FOOT EVALUATED.

## 2024-08-31 ENCOUNTER — APPOINTMENT (OUTPATIENT)
Dept: CARDIOLOGY | Facility: HOSPITAL | Age: 89
End: 2024-08-31
Payer: MEDICARE

## 2024-08-31 ENCOUNTER — APPOINTMENT (OUTPATIENT)
Dept: MRI IMAGING | Facility: HOSPITAL | Age: 89
End: 2024-08-31
Payer: MEDICARE

## 2024-08-31 PROBLEM — B95.62 MRSA CELLULITIS OF RIGHT FOOT: Status: ACTIVE | Noted: 2024-08-30

## 2024-08-31 PROBLEM — J96.11 CHRONIC RESPIRATORY FAILURE WITH HYPOXIA: Status: ACTIVE | Noted: 2024-08-31

## 2024-08-31 PROBLEM — L03.115 CELLULITIS OF RIGHT FOOT: Status: ACTIVE | Noted: 2024-08-31

## 2024-08-31 PROBLEM — F10.239 ALCOHOL DEPENDENCE WITH WITHDRAWAL: Status: ACTIVE | Noted: 2024-08-24

## 2024-08-31 PROBLEM — L02.611 ABSCESS OF FIFTH TOE OF RIGHT FOOT: Status: ACTIVE | Noted: 2024-08-31

## 2024-08-31 PROBLEM — Z22.322 MRSA CARRIER: Status: ACTIVE | Noted: 2024-08-31

## 2024-08-31 PROBLEM — I48.0 PAROXYSMAL ATRIAL FIBRILLATION: Status: ACTIVE | Noted: 2024-08-30

## 2024-08-31 LAB
ALBUMIN SERPL-MCNC: 3.3 G/DL (ref 3.5–5.2)
ALBUMIN/GLOB SERPL: 1.3 G/DL
ALP SERPL-CCNC: 60 U/L (ref 39–117)
ALT SERPL W P-5'-P-CCNC: 12 U/L (ref 1–41)
ANION GAP SERPL CALCULATED.3IONS-SCNC: 10.7 MMOL/L (ref 5–15)
AST SERPL-CCNC: 17 U/L (ref 1–40)
BASOPHILS # BLD AUTO: 0.11 10*3/MM3 (ref 0–0.2)
BASOPHILS NFR BLD AUTO: 1.4 % (ref 0–1.5)
BH CV LOWER ARTERIAL LEFT ABI RATIO: NORMAL
BH CV LOWER ARTERIAL LEFT DORSALIS PEDIS SYS MAX: 189
BH CV LOWER ARTERIAL LEFT GREAT TOE SYS MAX: 114
BH CV LOWER ARTERIAL LEFT POST TIBIAL SYS MAX: NORMAL
BH CV LOWER ARTERIAL LEFT TBI RATIO: 0.71
BH CV LOWER ARTERIAL RIGHT ABI RATIO: NORMAL
BH CV LOWER ARTERIAL RIGHT DORSALIS PEDIS SYS MAX: 193
BH CV LOWER ARTERIAL RIGHT GREAT TOE SYS MAX: 93
BH CV LOWER ARTERIAL RIGHT POST TIBIAL SYS MAX: NORMAL
BH CV LOWER ARTERIAL RIGHT TBI RATIO: 0.58
BILIRUB SERPL-MCNC: 0.6 MG/DL (ref 0–1.2)
BUN SERPL-MCNC: 6 MG/DL (ref 8–23)
BUN/CREAT SERPL: 10.9 (ref 7–25)
CALCIUM SPEC-SCNC: 8.6 MG/DL (ref 8.6–10.5)
CHLORIDE SERPL-SCNC: 102 MMOL/L (ref 98–107)
CO2 SERPL-SCNC: 24.3 MMOL/L (ref 22–29)
CREAT SERPL-MCNC: 0.55 MG/DL (ref 0.76–1.27)
CRP SERPL-MCNC: 1.92 MG/DL (ref 0–0.5)
D-LACTATE SERPL-SCNC: 1.1 MMOL/L (ref 0.5–2)
D-LACTATE SERPL-SCNC: 1.4 MMOL/L (ref 0.5–2)
DEPRECATED RDW RBC AUTO: 49.3 FL (ref 37–54)
EGFRCR SERPLBLD CKD-EPI 2021: 96.5 ML/MIN/1.73
EOSINOPHIL # BLD AUTO: 0.33 10*3/MM3 (ref 0–0.4)
EOSINOPHIL NFR BLD AUTO: 4.3 % (ref 0.3–6.2)
ERYTHROCYTE [DISTWIDTH] IN BLOOD BY AUTOMATED COUNT: 13.6 % (ref 12.3–15.4)
ERYTHROCYTE [SEDIMENTATION RATE] IN BLOOD: 33 MM/HR (ref 0–20)
GLOBULIN UR ELPH-MCNC: 2.5 GM/DL
GLUCOSE BLDC GLUCOMTR-MCNC: 110 MG/DL (ref 70–130)
GLUCOSE BLDC GLUCOMTR-MCNC: 122 MG/DL (ref 70–130)
GLUCOSE BLDC GLUCOMTR-MCNC: 154 MG/DL (ref 70–130)
GLUCOSE BLDC GLUCOMTR-MCNC: 165 MG/DL (ref 70–130)
GLUCOSE SERPL-MCNC: 112 MG/DL (ref 65–99)
HBA1C MFR BLD: 5.1 % (ref 4.8–5.6)
HCT VFR BLD AUTO: 35.4 % (ref 37.5–51)
HGB BLD-MCNC: 12 G/DL (ref 13–17.7)
IMM GRANULOCYTES # BLD AUTO: 0.16 10*3/MM3 (ref 0–0.05)
IMM GRANULOCYTES NFR BLD AUTO: 2.1 % (ref 0–0.5)
LYMPHOCYTES # BLD AUTO: 0.74 10*3/MM3 (ref 0.7–3.1)
LYMPHOCYTES NFR BLD AUTO: 9.7 % (ref 19.6–45.3)
MCH RBC QN AUTO: 33.7 PG (ref 26.6–33)
MCHC RBC AUTO-ENTMCNC: 33.9 G/DL (ref 31.5–35.7)
MCV RBC AUTO: 99.4 FL (ref 79–97)
MONOCYTES # BLD AUTO: 0.96 10*3/MM3 (ref 0.1–0.9)
MONOCYTES NFR BLD AUTO: 12.5 % (ref 5–12)
NEUTROPHILS NFR BLD AUTO: 5.36 10*3/MM3 (ref 1.7–7)
NEUTROPHILS NFR BLD AUTO: 70 % (ref 42.7–76)
NRBC BLD AUTO-RTO: 0 /100 WBC (ref 0–0.2)
PLATELET # BLD AUTO: 207 10*3/MM3 (ref 140–450)
PMV BLD AUTO: 9.2 FL (ref 6–12)
POTASSIUM SERPL-SCNC: 3.7 MMOL/L (ref 3.5–5.2)
PROCALCITONIN SERPL-MCNC: 0.05 NG/ML (ref 0–0.25)
PROT SERPL-MCNC: 5.8 G/DL (ref 6–8.5)
RBC # BLD AUTO: 3.56 10*6/MM3 (ref 4.14–5.8)
SODIUM SERPL-SCNC: 137 MMOL/L (ref 136–145)
UPPER ARTERIAL LEFT ARM BRACHIAL SYS MAX: 160
UPPER ARTERIAL RIGHT ARM BRACHIAL SYS MAX: 161
WBC NRBC COR # BLD AUTO: 7.66 10*3/MM3 (ref 3.4–10.8)

## 2024-08-31 PROCEDURE — 0 GADOBENATE DIMEGLUMINE 529 MG/ML SOLUTION: Performed by: HOSPITALIST

## 2024-08-31 PROCEDURE — 94799 UNLISTED PULMONARY SVC/PX: CPT

## 2024-08-31 PROCEDURE — 93922 UPR/L XTREMITY ART 2 LEVELS: CPT

## 2024-08-31 PROCEDURE — 93922 UPR/L XTREMITY ART 2 LEVELS: CPT | Performed by: SURGERY

## 2024-08-31 PROCEDURE — 85025 COMPLETE CBC W/AUTO DIFF WBC: CPT | Performed by: INTERNAL MEDICINE

## 2024-08-31 PROCEDURE — 63710000001 PREDNISONE PER 5 MG: Performed by: INTERNAL MEDICINE

## 2024-08-31 PROCEDURE — 25010000002 VANCOMYCIN 1 G RECONSTITUTED SOLUTION 1 EACH VIAL: Performed by: INTERNAL MEDICINE

## 2024-08-31 PROCEDURE — 25010000002 ENOXAPARIN PER 10 MG: Performed by: INTERNAL MEDICINE

## 2024-08-31 PROCEDURE — 94760 N-INVAS EAR/PLS OXIMETRY 1: CPT

## 2024-08-31 PROCEDURE — 25010000002 CEFEPIME PER 500 MG: Performed by: INTERNAL MEDICINE

## 2024-08-31 PROCEDURE — 83605 ASSAY OF LACTIC ACID: CPT | Performed by: INTERNAL MEDICINE

## 2024-08-31 PROCEDURE — 99223 1ST HOSP IP/OBS HIGH 75: CPT | Performed by: INTERNAL MEDICINE

## 2024-08-31 PROCEDURE — 83036 HEMOGLOBIN GLYCOSYLATED A1C: CPT | Performed by: INTERNAL MEDICINE

## 2024-08-31 PROCEDURE — 97165 OT EVAL LOW COMPLEX 30 MIN: CPT

## 2024-08-31 PROCEDURE — 63710000001 INSULIN LISPRO (HUMAN) PER 5 UNITS: Performed by: INTERNAL MEDICINE

## 2024-08-31 PROCEDURE — 97535 SELF CARE MNGMENT TRAINING: CPT

## 2024-08-31 PROCEDURE — 73720 MRI LWR EXTREMITY W/O&W/DYE: CPT

## 2024-08-31 PROCEDURE — 25810000003 SODIUM CHLORIDE 0.9 % SOLUTION 250 ML FLEX CONT: Performed by: INTERNAL MEDICINE

## 2024-08-31 PROCEDURE — 25010000002 VANCOMYCIN 750 MG RECONSTITUTED SOLUTION 1 EACH VIAL: Performed by: INTERNAL MEDICINE

## 2024-08-31 PROCEDURE — 85652 RBC SED RATE AUTOMATED: CPT | Performed by: PODIATRIST

## 2024-08-31 PROCEDURE — A9577 INJ MULTIHANCE: HCPCS | Performed by: HOSPITALIST

## 2024-08-31 PROCEDURE — 25010000002 LORAZEPAM PER 2 MG: Performed by: INTERNAL MEDICINE

## 2024-08-31 PROCEDURE — 86140 C-REACTIVE PROTEIN: CPT | Performed by: PODIATRIST

## 2024-08-31 PROCEDURE — 25010000002 THIAMINE PER 100 MG: Performed by: INTERNAL MEDICINE

## 2024-08-31 PROCEDURE — 94664 DEMO&/EVAL PT USE INHALER: CPT

## 2024-08-31 PROCEDURE — 82948 REAGENT STRIP/BLOOD GLUCOSE: CPT

## 2024-08-31 PROCEDURE — 94761 N-INVAS EAR/PLS OXIMETRY MLT: CPT

## 2024-08-31 PROCEDURE — 84145 PROCALCITONIN (PCT): CPT | Performed by: INTERNAL MEDICINE

## 2024-08-31 PROCEDURE — 80053 COMPREHEN METABOLIC PANEL: CPT | Performed by: INTERNAL MEDICINE

## 2024-08-31 RX ORDER — PREDNISONE 5 MG/1
5 TABLET ORAL
Status: DISCONTINUED | OUTPATIENT
Start: 2024-08-31 | End: 2024-09-02 | Stop reason: HOSPADM

## 2024-08-31 RX ORDER — ALBUTEROL SULFATE 0.63 MG/3ML
0.63 SOLUTION RESPIRATORY (INHALATION) EVERY 6 HOURS PRN
Status: DISCONTINUED | OUTPATIENT
Start: 2024-08-31 | End: 2024-09-02 | Stop reason: HOSPADM

## 2024-08-31 RX ADMIN — CEFEPIME 1000 MG: 1 INJECTION, POWDER, FOR SOLUTION INTRAMUSCULAR; INTRAVENOUS at 01:17

## 2024-08-31 RX ADMIN — THIAMINE HYDROCHLORIDE 200 MG: 100 INJECTION, SOLUTION INTRAMUSCULAR; INTRAVENOUS at 14:45

## 2024-08-31 RX ADMIN — LORAZEPAM 2 MG: 1 TABLET ORAL at 10:40

## 2024-08-31 RX ADMIN — THIAMINE HYDROCHLORIDE 200 MG: 100 INJECTION, SOLUTION INTRAMUSCULAR; INTRAVENOUS at 22:00

## 2024-08-31 RX ADMIN — LORAZEPAM 2 MG: 2 INJECTION INTRAMUSCULAR; INTRAVENOUS at 06:52

## 2024-08-31 RX ADMIN — LORAZEPAM 2 MG: 1 TABLET ORAL at 03:46

## 2024-08-31 RX ADMIN — GADOBENATE DIMEGLUMINE 18 ML: 529 INJECTION, SOLUTION INTRAVENOUS at 11:31

## 2024-08-31 RX ADMIN — Medication 10 ML: at 09:30

## 2024-08-31 RX ADMIN — INSULIN LISPRO 2 UNITS: 100 INJECTION, SOLUTION INTRAVENOUS; SUBCUTANEOUS at 17:32

## 2024-08-31 RX ADMIN — Medication 10 ML: at 20:51

## 2024-08-31 RX ADMIN — Medication 1 TABLET: at 09:30

## 2024-08-31 RX ADMIN — SODIUM CHLORIDE 1000 MG: 9 INJECTION, SOLUTION INTRAVENOUS at 18:34

## 2024-08-31 RX ADMIN — ENOXAPARIN SODIUM 40 MG: 100 INJECTION SUBCUTANEOUS at 09:28

## 2024-08-31 RX ADMIN — THIAMINE HYDROCHLORIDE 200 MG: 100 INJECTION, SOLUTION INTRAMUSCULAR; INTRAVENOUS at 05:02

## 2024-08-31 RX ADMIN — ATORVASTATIN CALCIUM 20 MG: 20 TABLET, FILM COATED ORAL at 09:30

## 2024-08-31 RX ADMIN — FOLIC ACID 1 MG: 1 TABLET ORAL at 09:30

## 2024-08-31 RX ADMIN — VANCOMYCIN HYDROCHLORIDE 750 MG: 750 INJECTION, POWDER, LYOPHILIZED, FOR SOLUTION INTRAVENOUS at 05:02

## 2024-08-31 RX ADMIN — BUDESONIDE AND FORMOTEROL FUMARATE DIHYDRATE 2 PUFF: 160; 4.5 AEROSOL RESPIRATORY (INHALATION) at 06:54

## 2024-08-31 RX ADMIN — INSULIN LISPRO 2 UNITS: 100 INJECTION, SOLUTION INTRAVENOUS; SUBCUTANEOUS at 20:51

## 2024-08-31 RX ADMIN — Medication 1000 UNITS: at 09:30

## 2024-08-31 RX ADMIN — PREDNISONE 5 MG: 5 TABLET ORAL at 14:44

## 2024-08-31 RX ADMIN — DIGOXIN 125 MCG: 125 TABLET ORAL at 12:30

## 2024-08-31 NOTE — SIGNIFICANT NOTE
08/31/24 1622   OTHER   Discipline physical therapist   Rehab Time/Intention   Session Not Performed patient unavailable for evaluation  (Pt off the floor this PM, will attempt f/u tomorrow for PT evaluation)   Recommendation   PT - Next Appointment 09/01/24

## 2024-08-31 NOTE — PLAN OF CARE
Problem: Adult Inpatient Plan of Care  Goal: Plan of Care Review  Outcome: Ongoing, Progressing  Flowsheets (Taken 8/31/2024 0147)  Plan of Care Reviewed With: patient     Problem: Adult Inpatient Plan of Care  Goal: Optimal Comfort and Wellbeing  Outcome: Ongoing, Progressing     Problem: Diabetes Comorbidity  Goal: Blood Glucose Level Within Targeted Range  Outcome: Ongoing, Progressing     Problem: Hypertension Comorbidity  Goal: Blood Pressure in Desired Range  Outcome: Ongoing, Progressing   Goal Outcome Evaluation:  Plan of Care Reviewed With: patient      Patient educated on treatment and goals of care, including, but not limited to, fall prevention and pressure injury prevention. Discussed details of hospitalization and plan. All questions answered.

## 2024-08-31 NOTE — CONSULTS
Podiatry Consult Note      Patient: Jim Marvin Admit Date: 2024    Age: 88 y.o.   PCP: Devika Harris APRN    MRN: [id number]  Room: 02/        Subjective     Chief Complaint     Chief Complaint   Patient presents with    Foot Swelling        HPI     This is AN 88-year-old male who presents with worsening redness and swelling to the right lower extremity.  Patient is a poor historian, I asked how long his ulcers been present and he is unaware.  He is following and out of sleep and is unsure if he has had treatment for this ulcer in the past.  Since being admitted he has received empiric IV antibiotics.  X-rays performed which did not show any signs of cortical erosion consistent with osteomyelitis.    Past Medical History     Past Medical History:   Diagnosis Date    COPD (chronic obstructive pulmonary disease)     Hyperlipidemia     Hypertension     Stroke         Past Surgical History:   Procedure Laterality Date    CATARACT EXTRACTION Left 2022        Allergies   Allergen Reactions    Ace Inhibitors Other (See Comments)     Cannot remember     Lisinopril Other (See Comments)     Cannot remember        Social History     Tobacco Use   Smoking Status Former    Current packs/day: 0.00    Average packs/day: 1 pack/day for 40.0 years (40.0 ttl pk-yrs)    Types: Cigarettes    Start date:     Quit date:     Years since quittin.6    Passive exposure: Never   Smokeless Tobacco Never   Tobacco Comments    Quit 19 years ago         Objective   Physical Exam    Vitals:    24 0731   BP: 160/78   Pulse: 78   Resp: 20   Temp: 97.9 °F (36.6 °C)   SpO2: 95%        Dermatology: Right foot plantar fifth metatarsal head ulceration.  There is positive probe to capsule.  Mild drainage noted.  No malodor.  Cellulitis extends to the dorsal midfoot.  No streaking up the leg.    Vascular: DP PT pulses are palpable    Neurological: Light touch and protective sensation are  absent    Musckuloskeletal: Out of 5 muscle strength all compartments of the lower extremity    Labs     Lab Results   Component Value Date    HGBA1C 5.10 08/31/2024    POCGLU 110 08/31/2024        CBC:      Lab 08/31/24  0500 08/30/24  1255   WBC 7.66 9.80   HEMOGLOBIN 12.0* 12.2*   HEMATOCRIT 35.4* 37.4*   PLATELETS 207 249   NEUTROS ABS 5.36 7.33*   IMMATURE GRANS (ABS) 0.16* 0.26*   LYMPHS ABS 0.74 0.77   MONOS ABS 0.96* 1.01*   EOS ABS 0.33 0.29   MCV 99.4* 99.7*          Results for orders placed or performed during the hospital encounter of 09/22/21   Blood Culture - Blood, Arm, Left    Specimen: Arm, Left; Blood   Result Value Ref Range    Blood Culture No growth at 5 days         Duplex Venous Lower Extremity - RIGHT    Normal right lower extremity venous duplex scan.  XR Foot 3+ View Right  Narrative: XR FOOT 3+ VW RIGHT-     DATE OF EXAM: 8/30/2024 1:35 PM     INDICATION: Right foot swelling.     COMPARISON: None available.     TECHNIQUE: 3 views of the right foot were obtained.     FINDINGS:  Normal bone mineralization. No evidence of acute fracture or  dislocation. No lytic or destructive osseous changes definitive for  osteomyelitis. Small plantar calcaneal enthesophyte. Mild pes cavus.  Mild multifocal DJD at the midfoot at the midfoot is fairly well  aligned. Mild hallux valgus and bunion formation with moderate to severe  DJD at the great toe MTP joint. Diffuse soft tissue swelling, greatest  at the lateral and distal forefoot at the level of the fifth metatarsal  head, with a suspected soft tissue wound at the lateral and plantar  distal forefoot.     Impression:    1. Soft tissue swelling and suspected soft tissue wound at the lateral  and plantar distal forefoot, without radiographic evidence of acute  fracture, dislocation, or osteomyelitis.  2. Mild hallux valgus and bunion formation with moderate to severe DJD  at the great toe MTP joint.  3. Mild multifocal DJD at the midfoot.     This report  was finalized on 8/30/2024 1:39 PM by Chris Quiles MD on  Workstation: [id number]          Assessment/Plan     88-year-old male with cellulitis, diabetic foot ulcer with exposed capsule, plantar fifth metatarsal head right foot    -Patient examined and evaluated by myself  - WBC is within normal limits, will order ESR and CRP  - Continue antibiotics per primary.  I drew lines around the demarcation of the cellulitis  - A1c appears to suggest the patient's diabetes is controlled.  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

## 2024-08-31 NOTE — SIGNIFICANT NOTE
"CIWA protocol initiated by Dr. Araujo last night after informing this RN that he drinks up to 3 fifths of Dar Wei nightly. Pt reports \"few days\" without a drink. Patient increasingly becoming agitated, threatening violence, grabbing at staff, beginning to see and hear hallucinations, sweating. Most recent CIWA 18. Patient receiving lorazepam per protocol and orders.   "

## 2024-08-31 NOTE — CONSULTS
Referring Provider: Dr. Aldana      Subjective   History of present illness: 88-year-old with a history of hypertension, COPD, alcohol dependence, and hyperlipidemia admitted with about 5 days of right foot swelling.  He does not have any sensation in that foot but had noticed it to be red and swollen.  He denies any constitutional symptoms of infection such as fever, chills, night sweats.    He had an ulcer over the fifth metatarsal but unsure how long that has been present.  MRI showed no deep infection but did show some phlegmonous changes.  Wound culture grew MRSA.    Past Medical History:   Diagnosis Date    COPD (chronic obstructive pulmonary disease)     Hyperlipidemia     Hypertension     Stroke        Past Surgical History:   Procedure Laterality Date    CATARACT EXTRACTION Left 07/06/2022           Allergies   Allergen Reactions    Ace Inhibitors Other (See Comments)     Cannot remember     Lisinopril Other (See Comments)     Cannot remember       Physical Exam:   Vital Signs   Temp:  [97.3 °F (36.3 °C)-98.1 °F (36.7 °C)] 97.3 °F (36.3 °C)  Heart Rate:  [78-92] 81  Resp:  [18-22] 20  BP: (118-164)/(56-80) 164/68    GENERAL: Awake and alert, in no acute distress.   HEENT: Oropharynx is clear. Hearing is grossly normal.   EYES: . No conjunctival injection. No lid lag.   LUNGS:normal respiratory effort.   SKIN: Right fifth metatarsal ulcer.  Scant purulence.  Erythema and swelling appear improved.  PSYCHIATRIC: Appropriate mood, affect, insight, and judgment.     Results Review:  White count 7.6  Creatinine 0.55  Liver function test normal  Hemoglobin A1c 5  Blood cultures no growth to date  MRSA PCR positive  8/30 right foot wound culture 4+ MRSA  MRI of the right foot with fifth metatarsal head ulcer on the right with phlegmonous changes but no compelling evidence of osteomyelitis  Duplex venography no lower extremity DVT on the right  Plain films of the right foot independently interpreted with soft tissue  swelling of the lateral foot    A/p  MRSA cellulitis: No clear evidence of deep infection.  Continue vancomycin for AUC of 400-600.  Check vancomycin level ahead of a.m. dose tomorrow.  Anticipate switching over to p.o. antibiotics once sensitivities return.      Thank you for this consult.  We will continue to follow along and tailor antibiotics as the patient's clinical course evolves.
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
## 2024-08-31 NOTE — PROGRESS NOTES
Name: Jim Marvin ADMIT: 2024   : 1935  PCP: Devika Harris APRN    MRN: [id number] LOS: 0 days   AGE/SEX: 88 y.o. male  ROOM: [facility]     Subjective   Subjective   Sleeping but awakens easily.  Seems a little confused.  Denies pain in his foot.       Objective   Objective   Vital Signs  Temp:  [97.3 °F (36.3 °C)-98.1 °F (36.7 °C)] 97.9 °F (36.6 °C)  Heart Rate:  [] 78  Resp:  [18-22] 20  BP: (118-161)/(56-80) 160/78  SpO2:  [90 %-98 %] 95 %  on  Flow (L/min):  [3-4] 4;   Device (Oxygen Therapy): nasal cannula  Body mass index is 27.99 kg/m².  Physical Exam  Vitals and nursing note reviewed.   Constitutional:       General: He is not in acute distress.     Appearance: He is ill-appearing.   Cardiovascular:      Rate and Rhythm: Normal rate and regular rhythm.   Pulmonary:      Effort: Pulmonary effort is normal.      Breath sounds: Normal breath sounds.   Abdominal:      General: Bowel sounds are normal.      Palpations: Abdomen is soft.      Tenderness: There is no abdominal tenderness.   Musculoskeletal:         General: No swelling.   Skin:     General: Skin is warm and dry.      Findings: Erythema (Cellulitis present right dorsal midfoot.  Minimal drainage present.) present.   Psychiatric:         Cognition and Memory: Cognition is impaired.         Results Review:       I reviewed the patient's new clinical results.  Results from last 7 days   Lab Units 24  0500 24  1255   WBC 10*3/mm3 7.66 9.80   HEMOGLOBIN g/dL 12.0* 12.2*   PLATELETS 10*3/mm3 207 249     Results from last 7 days   Lab Units 24  0500 24  1255   SODIUM mmol/L 137 141   POTASSIUM mmol/L 3.7 3.9   CHLORIDE mmol/L 102 105   CO2 mmol/L 24.3 25.3   BUN mg/dL 6* 6*   CREATININE mg/dL 0.55* 0.72*   GLUCOSE mg/dL 112* 147*   EGFR mL/min/1.73 96.5 89.0   ALBUMIN g/dL 3.3* 3.6   BILIRUBIN mg/dL 0.6 0.5   ALK PHOS U/L 60 65   AST (SGOT) U/L 17 17   ALT (SGPT) U/L 12 13     Results from last 7 days   Lab  Units 08/31/24  0500 08/30/24  1255   CALCIUM mg/dL 8.6 9.2   ALBUMIN g/dL 3.3* 3.6       Results from last 7 days   Lab Units 08/31/24  0919 08/31/24  0500 08/30/24  2359 08/30/24  1255   PROCALCITONIN ng/mL  --  0.05  --   --    LACTATE mmol/L  --  1.4 1.1 2.5*   SED RATE mm/hr 33*  --   --   --    CRP mg/dL 1.92*  --   --   --      Results from last 7 days   Lab Units 08/30/24  1822   WOUNDCX  Heavy growth (4+) Staphylococcus aureus, MRSA*   MRSAPCR  MRSA Detected*     Hemoglobin A1C   Date/Time Value Ref Range Status   08/31/2024 0500 5.10 4.80 - 5.60 % Final     Glucose   Date/Time Value Ref Range Status   08/31/2024 1211 122 70 - 130 mg/dL Final   08/31/2024 0633 110 70 - 130 mg/dL Final   08/30/2024 2027 122 70 - 130 mg/dL Final     MRI Foot Right With & Without Contrast  Narrative: MRI RIGHT FOREFOOT WITH AND WITHOUT CONTRAST        HISTORY: Lateral forefoot infection with ulcer.     TECHNIQUE:  MRI includes coronal, short axis T1, T1 fat-sat, T2 fat-sat,  axial T1, STIR, sagittal PD fat-saturated sequences.  Contrast was  administered IV followed by axial, coronal, sagittal T1 fat-saturated  sequences.     COMPARISON: Right foot x-rays 08/30/2024.     FINDINGS: There is a lateral forefoot soft tissue ulcer that is centered  plantar to the lateral aspect of the head of the 5th metatarsal. This  ulcer communicates with a nonenhancing tract/collection that contains  thin fluid signal wrapping about the plantar and lateral aspect of the  5th metatarsal head. This is consistent with a thin abscess or phlegmon  and measures approximately 4 mm in thickness, though extends up to 3 cm  transverse dimension by 2 cm in height. This thin collection of  nonenhancing material contacts the joint capsule at the 5th metatarsal  head and 5th MTP joint placing patient at risk for intra-articular  extension or infection, though there is no cortical loss and there is  preservation of normal T1 fat signal without evidence to  suggest  osteomyelitis.     Midfoot alignment appears within normal limits. There is generalized  midfoot and forefoot muscular atrophy. Mild hallux valgus deformity is  present and there are chronic arthritic changes of the 1st MTP joint.  There is diffuse edema of the subcutaneous fat about the midfoot and  forefoot with soft tissue swelling greatest dorsally consistent with  cellulitis.      Impression: 1. Soft tissue wound/ulcer centered plantar to the 5th metatarsal head  communicates with a thin abscess or area of phlegmonous change that  wraps about the plantar and lateral aspect of the 5th metatarsal head  and 5th MTP joint. This surrounds the joint capsule placing patient at  risk for intra-articular extension or infection, though there is no  compelling evidence for osteomyelitis.   2. There is generalized soft tissue swelling particularly involving the  dorsal midfoot and forefoot, consistent with cellulitis.         I have personally reviewed all medications:  Scheduled Medications  [Held by provider] apixaban, 5 mg, Oral, Q12H  atorvastatin, 20 mg, Oral, Daily  budesonide-formoterol, 2 puff, Inhalation, BID - RT  cefepime, 1,000 mg, Intravenous, Q8H  cholecalciferol, 1,000 Units, Oral, Daily  digoxin, 125 mcg, Oral, Daily  dilTIAZem CD, 240 mg, Oral, Daily  enoxaparin, 40 mg, Subcutaneous, Daily  folic acid, 1 mg, Oral, Daily  insulin lispro, 2-7 Units, Subcutaneous, 4x Daily AC & at Bedtime  LORazepam, 2 mg, Oral, Q6H   Followed by  LORazepam, 1 mg, Oral, Q6H   Followed by  [START ON 9/1/2024] LORazepam, 1 mg, Oral, Q12H   Followed by  [START ON 9/3/2024] LORazepam, 1 mg, Oral, Daily  metFORMIN ER, 500 mg, Oral, Daily With Breakfast  multivitamin, 1 tablet, Oral, Daily  sodium chloride, 10 mL, Intravenous, Q12H  thiamine (B-1) IV, 200 mg, Intravenous, Q8H   Followed by  [START ON 9/5/2024] thiamine, 100 mg, Oral, Daily  vancomycin, 1,000 mg, Intravenous, Q12H    Infusions  Pharmacy to dose vancomycin,    sodium chloride, 100 mL/hr, Last Rate: 100 mL/hr (08/31/24 1040)    Diet  NPO Diet NPO Type: Sips with Meds, Ice Chips    I have personally reviewed:  [x]  Laboratory   [x]  Microbiology   [x]  Radiology   [x]  EKG/Telemetry  [x]  Cardiology/Vascular   []  Pathology    []  Records  CIWA (last 3 days)        Date/Time CIWA-Ar Score    08/31/24 1000 2     08/31/24 0800 2     08/31/24 0700 7     08/31/24 0600 18     08/31/24 0300 12     08/31/24 0200 3     08/30/24 2357 4     08/30/24 2300 11     08/30/24 2137 7     08/30/24 2100 11                      Assessment/Plan     Active Hospital Problems    Diagnosis  POA    **Cellulitis of right foot [L03.115]  Yes    MRSA carrier [Z22.322]  Not Applicable    Chronic respiratory failure with hypoxia [J96.11]  Yes    Diabetic ulcer of right midfoot [E11.621, L97.419]  Yes    Paroxysmal atrial fibrillation [I48.0]  Yes    Alcohol dependence with withdrawal [F10.239]  Yes    Prediabetes [R73.03]  Yes    ILD (interstitial lung disease) [J84.9]  Yes    Emphysema lung [J43.9]  Yes    History of cerebellar stroke [Z86.73]  Not Applicable    Hypertension [I10]  Yes      Resolved Hospital Problems   No resolved problems to display.       88 y.o. male with Cellulitis of right foot.    Culture from foot wound growing MRSA.  Will consult infectious disease to help manage.  Continue vancomycin.  Discontinue cefepime.  Seen by podiatry.  MRI shows area of thin abscess or phlegmonous change that wraps around plantar and lateral aspect fifth metatarsal head and fifth MTP joint.    He has interstitial lung disease and chronic respiratory failure.  High risk for decompensation given infection and alcohol withdrawal.  Receiving lorazepam per CIWA protocol.  Will discontinue scheduled lorazepam in favor of symptom triggered therapy only for now.  Continue vitamin replacement therapy.  Discussed with nursing about maintaining saturations 88 to 92% only.    Prescribed prednisone 5 mg daily by  "pulmonology.  Not marked as \"taking\" on admission medication list.  Will have nursing clarify.  Restart prednisone at 5 mg daily in the meantime.    History of atrial fibrillation and prior stroke.  Eliquis held.  Currently on prophylactic dose Lovenox.  Given presence of abscess he may require surgical intervention and will continue current dose Lovenox for now.    A1c 5.1%.  Discontinue metformin.  Low-dose SSI only for now.      Lovenox 40 mg SC daily for DVT prophylaxis.  Full code.  Discussed with patient and nursing staff.  Anticipate discharge home with HH vs SNU facility timing yet to be determined.  Expected discharge date/ time has not been documented.      Federico Aldana MD  [facility]  08/31/24  12:15 EDT    ADDENDUM  Reviewed MRI results with podiatry.  No plans for OR today will allow diet.    Electronically signed by Federico Aldana MD, 08/31/24, 1:12 PM EDT.    "

## 2024-08-31 NOTE — CONSULTS
CONSULT NOTE    Patient Identification:  Jim Marvin  88 y.o.  male  1935  [id number]            Requesting physician: Dr Federico Aldana    Reason for Consultation:  ild, chronic hypoxemic resp failure emphysema    CC: swelling redness on foot    History of Present Illness:  Patient is a 88-year-old with a previous medical history of COPD/pulm fibrosis/chronic hypoxemic respiratory failure hypertension dyslipidemia prior stroke and alcohol abuse who presented to the emergency room with swelling and redness in his right foot.  This was progressive and came into the ER for evaluation.  Patient was admitted and started on treatment per the hospitalist service.  Apparently he was tremulous and has been started on treatment for alcohol withdrawal.  We were asked to see him for management of his pulmonary disease.  Unfortunately patient is unable to give any reliable history fairly sedated after getting as needed medications alcohol withdrawal.      Review of Systems:  Unable to obtain accurate ROS as patient is unable to answer questions due to patient lethargy    Past Medical History:   Diagnosis Date    COPD (chronic obstructive pulmonary disease)     Hyperlipidemia     Hypertension     Stroke        Past Surgical History:   Procedure Laterality Date    CATARACT EXTRACTION Left 07/06/2022        Medications Prior to Admission   Medication Sig Dispense Refill Last Dose    apixaban (ELIQUIS) 5 MG tablet tablet Take 1 tablet by mouth Every 12 (Twelve) Hours. Indications: Atrial Fibrillation 180 tablet 1 8/29/2024    atorvastatin (LIPITOR) 20 MG tablet Take 1 tablet by mouth Daily. 90 tablet 1 8/30/2024    budesonide-formoterol (SYMBICORT) 160-4.5 MCG/ACT inhaler Inhale 2 puffs 2 (Two) Times a Day. 1 each 0 8/29/2024    cholecalciferol (Vitamin D, Cholecalciferol,) 25 MCG (1000 UT) tablet Take 1 tablet by mouth Daily.   8/29/2024    digoxin (LANOXIN) 125 MCG tablet Take 1 tablet by mouth Daily. 90 tablet  "1 2024    dilTIAZem CD (CARDIZEM CD) 240 MG 24 hr capsule Take 1 capsule by mouth Daily. 90 capsule 1 2024    metFORMIN ER (GLUCOPHAGE-XR) 500 MG 24 hr tablet Take 1 tablet by mouth Daily With Breakfast. 90 tablet 1 2024    multivitamin tablet tablet Take 1 tablet by mouth Daily.   2024    EPINEPHrine (EPIPEN) 0.3 MG/0.3ML solution auto-injector injection ADMINISTER 0.3 ML IN THE MUSCLE 1 TIME FOR 1 DOSE AS DIRECTED BY PRESCRIBER       ipratropium-albuterol (DUO-NEB) 0.5-2.5 mg/3 ml nebulizer Inhale the contents of 1 vial by nebulization Every 6 (Six) Hours As Needed for Wheezing for up to 30 days. 360 mL 0     predniSONE (DELTASONE) 5 MG tablet Take 1 tablet by mouth Daily. Ordered by Dr. Dov Alvarado, [facility]; told not to stop w/o calling his office.          Allergies   Allergen Reactions    Ace Inhibitors Other (See Comments)     Cannot remember     Lisinopril Other (See Comments)     Cannot remember       Social History     Socioeconomic History    Marital status:    Tobacco Use    Smoking status: Former     Current packs/day: 0.00     Average packs/day: 1 pack/day for 40.0 years (40.0 ttl pk-yrs)     Types: Cigarettes     Start date:      Quit date:      Years since quittin.6     Passive exposure: Never    Smokeless tobacco: Never    Tobacco comments:     Quit 19 years ago    Vaping Use    Vaping status: Never Used   Substance and Sexual Activity    Alcohol use: Not Currently     Comment: 15-20 jack and diet coke a week    Drug use: Never    Sexual activity: Not Currently     Partners: Female       History reviewed. No pertinent family history.    Physical Exam:  /68 (BP Location: Left arm, Patient Position: Lying)   Pulse 81   Temp 97.3 °F (36.3 °C) (Oral)   Resp 20   Ht 177.8 cm (70\")   Wt 88.5 kg (195 lb 1.7 oz)   SpO2 97%   BMI 27.99 kg/m²   Body mass index is 27.99 kg/m².   General appearance: Lethargic, not  HENT: Atraumatic; oropharynx clear with moist " mucous membranes and no mucosal ulcerations; normal hard and soft palate  Neck: Trachea midline; supple  Lungs: Very diminished airflow no focal crackles wheeze, with normal respiratory effort and no intercostal retractions  CV: RRR, no rub  Abdomen: Soft, nontender; no masses or HSM  Extremities ulcer right lower extremity  Skin: As above  Neuro/psych lethargic will stir when prompted however will not awaken answer questions    LABS:  Results from last 7 days   Lab Units 08/31/24  0500 08/30/24  1255   WBC 10*3/mm3 7.66 9.80   HEMOGLOBIN g/dL 12.0* 12.2*   PLATELETS 10*3/mm3 207 249     Results from last 7 days   Lab Units 08/31/24  0500 08/30/24  1255   SODIUM mmol/L 137 141   POTASSIUM mmol/L 3.7 3.9   CHLORIDE mmol/L 102 105   CO2 mmol/L 24.3 25.3   BUN mg/dL 6* 6*   CREATININE mg/dL 0.55* 0.72*   GLUCOSE mg/dL 112* 147*   CALCIUM mg/dL 8.6 9.2   Estimated Creatinine Clearance: 108 mL/min (A) (by C-G formula based on SCr of 0.55 mg/dL (L)).    Imaging: I personally visualized the images of scans/x-rays performed within last 3 days.  Imaging Results (Most Recent)       Procedure Component Value Units Date/Time    MRI Foot Right With & Without Contrast [[id number]] Collected: 08/31/24 1207     Updated: 08/31/24 1227    Narrative:      MRI RIGHT FOREFOOT WITH AND WITHOUT CONTRAST        HISTORY: Lateral forefoot infection with ulcer.     TECHNIQUE:  MRI includes coronal, short axis T1, T1 fat-sat, T2 fat-sat,  axial T1, STIR, sagittal PD fat-saturated sequences.  Contrast was  administered IV followed by axial, coronal, sagittal T1 fat-saturated  sequences.     COMPARISON: Right foot x-rays 08/30/2024.     FINDINGS: There is a lateral forefoot soft tissue ulcer that is centered  plantar to the lateral aspect of the head of the 5th metatarsal. This  ulcer communicates with a nonenhancing tract/collection that contains  thin fluid signal wrapping about the plantar and lateral aspect of the  5th metatarsal head. This  is consistent with a thin abscess or phlegmon  and measures approximately 4 mm in thickness, though extends up to 3 cm  transverse dimension by 2 cm in height. This thin collection of  nonenhancing material contacts the joint capsule at the 5th metatarsal  head and 5th MTP joint placing patient at risk for intra-articular  extension or infection, though there is no cortical loss and there is  preservation of normal T1 fat signal without evidence to suggest  osteomyelitis.     Midfoot alignment appears within normal limits. There is generalized  midfoot and forefoot muscular atrophy. Mild hallux valgus deformity is  present and there are chronic arthritic changes of the 1st MTP joint.  There is diffuse edema of the subcutaneous fat about the midfoot and  forefoot with soft tissue swelling greatest dorsally consistent with  cellulitis.        Impression:      1. Soft tissue wound/ulcer centered plantar to the 5th metatarsal head  communicates with a thin abscess or area of phlegmonous change that  wraps about the plantar and lateral aspect of the 5th metatarsal head  and 5th MTP joint. This surrounds the joint capsule placing patient at  risk for intra-articular extension or infection, though there is no  compelling evidence for osteomyelitis.   2. There is generalized soft tissue swelling particularly involving the  dorsal midfoot and forefoot, consistent with cellulitis.     This report was finalized on 8/31/2024 12:24 PM by Gabino Haider M.D  on Workstation: [id number]       XR Foot 3+ View Right [[id number]] Collected: 08/30/24 1337     Updated: 08/30/24 1342    Narrative:      XR FOOT 3+ VW RIGHT-     DATE OF EXAM: 8/30/2024 1:35 PM     INDICATION: Right foot swelling.     COMPARISON: None available.     TECHNIQUE: 3 views of the right foot were obtained.     FINDINGS:  Normal bone mineralization. No evidence of acute fracture or  dislocation. No lytic or destructive osseous changes definitive  for  osteomyelitis. Small plantar calcaneal enthesophyte. Mild pes cavus.  Mild multifocal DJD at the midfoot at the midfoot is fairly well  aligned. Mild hallux valgus and bunion formation with moderate to severe  DJD at the great toe MTP joint. Diffuse soft tissue swelling, greatest  at the lateral and distal forefoot at the level of the fifth metatarsal  head, with a suspected soft tissue wound at the lateral and plantar  distal forefoot.       Impression:         1. Soft tissue swelling and suspected soft tissue wound at the lateral  and plantar distal forefoot, without radiographic evidence of acute  fracture, dislocation, or osteomyelitis.  2. Mild hallux valgus and bunion formation with moderate to severe DJD  at the great toe MTP joint.  3. Mild multifocal DJD at the midfoot.     This report was finalized on 8/30/2024 1:39 PM by Chris Quiles MD on  Workstation: [id number]               Assessment / Recommendations:  CPFE  History of occupational exposures  Former smoker  Chronic hypoxemic respiratory failure  Alcohol abuse with withdrawal  MRSA cellulitis per ID  Foot ulcer per podiatry  Paroxysmal atrial fibrillation  Prior stroke    From pulmonary perspective restart prednisone 5 mg  Will need continued outpatient follow-up  Continue oxygen supplementation    Continue Symbicort  DuoNebs okay  No signs of acute exacerbation      Treatment of withdrawal per primary service        Dov Alvarado MD  [facility]  08/31/24  13:57 EDT

## 2024-08-31 NOTE — PLAN OF CARE
Goal Outcome Evaluation:  Plan of Care Reviewed With: patient        Progress: improving  Outcome Evaluation: Patient presents for inpatient occupational therapy evaluation.  Patient was recently diagnosed with cellulitis of his right foot and admitted for treatment.  Patient was oriented x 3 but appeared lethargic, required increased processing time and still appeared confused.  He followed one-step instructions greater than 75% of the time.  Patient is dependent with toileting and lower body dressing and he demonstrates decreased static standing balance.  I recommend inpatient occupational therapy to address decreased independence with ADLs and transfers.      Anticipated Discharge Disposition (OT): sub acute care setting

## 2024-08-31 NOTE — PROGRESS NOTES
"[facility] Clinical Pharmacy Services: Vancomycin Pharmacokinetic Initial Consult Note    Jim Marvin is a 88 y.o. male who is on day 1 of pharmacy to dose vancomycin.    Indication: Skin and Soft Tissue  Consulting Provider: Van  Planned Duration of Therapy: 7 days  Loading Dose Ordered or Given: 1750 mg on 8/30 at 1751  MRSA PCR performed: 8/30; Result: positive  Culture/Source:   8/30 BloodCx - pending x2  8/30 WoundCx - pending  Target: -600 mg/L.hr   Pertinent Vanc Dosing History: n/a  Other Antimicrobials: Cefepime    Vitals/Labs  Ht: 177.8 cm (70\"); Wt:    Temp Readings from Last 1 Encounters:   08/30/24 97.3 °F (36.3 °C) (Oral)    Estimated Creatinine Clearance: 82.5 mL/min (A) (by C-G formula based on SCr of 0.72 mg/dL (L)).       Results from last 7 days   Lab Units 08/30/24  1255   CREATININE mg/dL 0.72*   WBC 10*3/mm3 9.80     Assessment/Plan:    Vancomycin Dose:   750 mg IV every  12  hours  Predictive AUC level for the dose ordered is 411 mg/L.hr, which is within the target of 400-600 mg/L.hr  No vanc levels ordered at this time - will defer to AM clinical       Pharmacy will follow patient's kidney function and will adjust doses and obtain levels as necessary. Thank you for involving pharmacy in this patient's care. Please contact pharmacy with any questions or concerns.                           Michelle Sims, [facility]  Clinical Pharmacist    "

## 2024-08-31 NOTE — PROGRESS NOTES
"[facility] Clinical Pharmacy Services: Vancomycin Monitoring Note    Jim Marvin is a 88 y.o. male who is on day 2/7 of pharmacy to dose vancomycin for Skin and Soft Tissue.    Previous Vancomycin Dose: 750mg IV every 12 hours  Updated Cultures and Sensitivities: Blood Culture: Pending                                                               Wound Culture Rt foot: MRSA                                                               MRSA nares: Positive  Vitals/Labs  Ht: 177.8 cm (70\"); Wt: 88.5 kg (195 lb 1.7 oz)   Temp Readings from Last 1 Encounters:   08/31/24 97.9 °F (36.6 °C) (Oral)     Estimated Creatinine Clearance: 108 mL/min (A) (by C-G formula based on SCr of 0.55 mg/dL (L)).     Results from last 7 days   Lab Units 08/31/24  0500 08/30/24  1255   CREATININE mg/dL 0.55* 0.72*   WBC 10*3/mm3 7.66 9.80     Assessment/Plan    Current Vancomycin Dose: 750mg IV every 12 hours; provides a predicted  mg/L.hr  which is below target of 400-600.  Will increase vancomycin to 1000mg IV every 12 hours, which provides a predicted AUC of 445 mg/L.hr  Next Level Date and Time: Vanc Trough on 9/1 at 0500  We will continue to monitor patient changes and renal function     Thank you for involving pharmacy in this patient's care. Please contact pharmacy with any questions or concerns.       Steffen Roy [facility]  Clinical Pharmacist  "

## 2024-08-31 NOTE — PLAN OF CARE
Goal Outcome Evaluation:   AxOx3, on O2 titrated 1-6lpm to keep O2 @ 88-92%, assist x2, on CIWA protocol, continues on IV ABT and fluids. Did MRI today, Pulmo and ID consulted. Needs attended, will continue to monitor.Started on a diet,strict NPO post midnight.@6:50pm Patient for transfer to [facility], report given to GEGE Miranda.Family informed

## 2024-09-01 LAB
ANION GAP SERPL CALCULATED.3IONS-SCNC: 10 MMOL/L (ref 5–15)
BACTERIA SPEC AEROBE CULT: ABNORMAL
BUN SERPL-MCNC: 4 MG/DL (ref 8–23)
BUN/CREAT SERPL: 8.2 (ref 7–25)
CALCIUM SPEC-SCNC: 8.9 MG/DL (ref 8.6–10.5)
CHLORIDE SERPL-SCNC: 104 MMOL/L (ref 98–107)
CO2 SERPL-SCNC: 24 MMOL/L (ref 22–29)
CREAT SERPL-MCNC: 0.49 MG/DL (ref 0.76–1.27)
DEPRECATED RDW RBC AUTO: 49 FL (ref 37–54)
EGFRCR SERPLBLD CKD-EPI 2021: 99.9 ML/MIN/1.73
ERYTHROCYTE [DISTWIDTH] IN BLOOD BY AUTOMATED COUNT: 13.6 % (ref 12.3–15.4)
GLUCOSE BLDC GLUCOMTR-MCNC: 113 MG/DL (ref 70–130)
GLUCOSE BLDC GLUCOMTR-MCNC: 124 MG/DL (ref 70–130)
GLUCOSE BLDC GLUCOMTR-MCNC: 129 MG/DL (ref 70–130)
GLUCOSE BLDC GLUCOMTR-MCNC: 92 MG/DL (ref 70–130)
GLUCOSE SERPL-MCNC: 98 MG/DL (ref 65–99)
GRAM STN SPEC: ABNORMAL
GRAM STN SPEC: ABNORMAL
HCT VFR BLD AUTO: 38.4 % (ref 37.5–51)
HGB BLD-MCNC: 13.1 G/DL (ref 13–17.7)
MAGNESIUM SERPL-MCNC: 2.3 MG/DL (ref 1.6–2.4)
MCH RBC QN AUTO: 33.6 PG (ref 26.6–33)
MCHC RBC AUTO-ENTMCNC: 34.1 G/DL (ref 31.5–35.7)
MCV RBC AUTO: 98.5 FL (ref 79–97)
PLATELET # BLD AUTO: 216 10*3/MM3 (ref 140–450)
PMV BLD AUTO: 9 FL (ref 6–12)
POTASSIUM SERPL-SCNC: 3.8 MMOL/L (ref 3.5–5.2)
RBC # BLD AUTO: 3.9 10*6/MM3 (ref 4.14–5.8)
SODIUM SERPL-SCNC: 138 MMOL/L (ref 136–145)
VANCOMYCIN TROUGH SERPL-MCNC: 9.8 MCG/ML (ref 5–20)
WBC NRBC COR # BLD AUTO: 7.99 10*3/MM3 (ref 3.4–10.8)

## 2024-09-01 PROCEDURE — 82948 REAGENT STRIP/BLOOD GLUCOSE: CPT

## 2024-09-01 PROCEDURE — 25010000002 THIAMINE HCL 200 MG/2ML SOLUTION: Performed by: INTERNAL MEDICINE

## 2024-09-01 PROCEDURE — 83735 ASSAY OF MAGNESIUM: CPT | Performed by: HOSPITALIST

## 2024-09-01 PROCEDURE — 99232 SBSQ HOSP IP/OBS MODERATE 35: CPT | Performed by: INTERNAL MEDICINE

## 2024-09-01 PROCEDURE — 25810000003 SODIUM CHLORIDE 0.9 % SOLUTION 250 ML FLEX CONT: Performed by: INTERNAL MEDICINE

## 2024-09-01 PROCEDURE — 94799 UNLISTED PULMONARY SVC/PX: CPT

## 2024-09-01 PROCEDURE — 25010000002 THIAMINE PER 100 MG: Performed by: INTERNAL MEDICINE

## 2024-09-01 PROCEDURE — 80202 ASSAY OF VANCOMYCIN: CPT | Performed by: INTERNAL MEDICINE

## 2024-09-01 PROCEDURE — 94664 DEMO&/EVAL PT USE INHALER: CPT

## 2024-09-01 PROCEDURE — 25010000002 VANCOMYCIN HCL 1.25 G RECONSTITUTED SOLUTION 1 EACH VIAL: Performed by: INTERNAL MEDICINE

## 2024-09-01 PROCEDURE — 25010000002 VANCOMYCIN 1 G RECONSTITUTED SOLUTION 1 EACH VIAL: Performed by: INTERNAL MEDICINE

## 2024-09-01 PROCEDURE — 80048 BASIC METABOLIC PNL TOTAL CA: CPT | Performed by: HOSPITALIST

## 2024-09-01 PROCEDURE — 97162 PT EVAL MOD COMPLEX 30 MIN: CPT

## 2024-09-01 PROCEDURE — 63710000001 PREDNISONE PER 5 MG: Performed by: INTERNAL MEDICINE

## 2024-09-01 PROCEDURE — 25010000002 ENOXAPARIN PER 10 MG: Performed by: INTERNAL MEDICINE

## 2024-09-01 PROCEDURE — 85027 COMPLETE CBC AUTOMATED: CPT | Performed by: HOSPITALIST

## 2024-09-01 PROCEDURE — 97530 THERAPEUTIC ACTIVITIES: CPT

## 2024-09-01 RX ADMIN — BUDESONIDE AND FORMOTEROL FUMARATE DIHYDRATE 2 PUFF: 160; 4.5 AEROSOL RESPIRATORY (INHALATION) at 11:01

## 2024-09-01 RX ADMIN — FOLIC ACID 1 MG: 1 TABLET ORAL at 10:02

## 2024-09-01 RX ADMIN — THIAMINE HYDROCHLORIDE 200 MG: 100 INJECTION, SOLUTION INTRAMUSCULAR; INTRAVENOUS at 15:07

## 2024-09-01 RX ADMIN — ENOXAPARIN SODIUM 40 MG: 100 INJECTION SUBCUTANEOUS at 10:03

## 2024-09-01 RX ADMIN — DILTIAZEM HYDROCHLORIDE 240 MG: 240 CAPSULE, EXTENDED RELEASE ORAL at 10:02

## 2024-09-01 RX ADMIN — BUDESONIDE AND FORMOTEROL FUMARATE DIHYDRATE 2 PUFF: 160; 4.5 AEROSOL RESPIRATORY (INHALATION) at 19:27

## 2024-09-01 RX ADMIN — THIAMINE HYDROCHLORIDE 200 MG: 100 INJECTION, SOLUTION INTRAMUSCULAR; INTRAVENOUS at 06:35

## 2024-09-01 RX ADMIN — PREDNISONE 5 MG: 5 TABLET ORAL at 10:02

## 2024-09-01 RX ADMIN — ATORVASTATIN CALCIUM 20 MG: 20 TABLET, FILM COATED ORAL at 10:02

## 2024-09-01 RX ADMIN — VANCOMYCIN HYDROCHLORIDE 1250 MG: 1.25 INJECTION, POWDER, LYOPHILIZED, FOR SOLUTION INTRAVENOUS at 18:15

## 2024-09-01 RX ADMIN — Medication 1000 UNITS: at 10:02

## 2024-09-01 RX ADMIN — THIAMINE HYDROCHLORIDE 200 MG: 100 INJECTION, SOLUTION INTRAMUSCULAR; INTRAVENOUS at 21:13

## 2024-09-01 RX ADMIN — DIGOXIN 125 MCG: 125 TABLET ORAL at 11:58

## 2024-09-01 RX ADMIN — Medication 1 TABLET: at 10:02

## 2024-09-01 RX ADMIN — SODIUM CHLORIDE 1000 MG: 9 INJECTION, SOLUTION INTRAVENOUS at 06:51

## 2024-09-01 RX ADMIN — APIXABAN 5 MG: 5 TABLET, FILM COATED ORAL at 21:13

## 2024-09-01 RX ADMIN — Medication 10 ML: at 21:17

## 2024-09-01 RX ADMIN — Medication 10 ML: at 10:04

## 2024-09-01 NOTE — PROGRESS NOTES
Name: Jim Marvin ADMIT: 2024   : 1935  PCP: Devika Harrsi APRN    MRN: [id number] LOS: 1 days   AGE/SEX: 88 y.o. male  ROOM: [facility]     Subjective   Subjective   Sitting up in chair.  Not having much pain.  Seems better oriented today.       Objective   Objective   Vital Signs  Temp:  [97.5 °F (36.4 °C)-98 °F (36.7 °C)] 97.9 °F (36.6 °C)  Heart Rate:  [88-96] 96  Resp:  [19-20] 20  BP: (129-180)/(58-80) 129/58  SpO2:  [88 %-95 %] 95 %  on  Flow (L/min):  [4] 4;   Device (Oxygen Therapy): nasal cannula  Body mass index is 27.99 kg/m².  Physical Exam  Vitals and nursing note reviewed.   Constitutional:       General: He is not in acute distress.  Cardiovascular:      Rate and Rhythm: Normal rate and regular rhythm.   Pulmonary:      Effort: Pulmonary effort is normal.      Breath sounds: Normal breath sounds.   Abdominal:      General: Bowel sounds are normal.      Palpations: Abdomen is soft.      Tenderness: There is no abdominal tenderness.   Musculoskeletal:         General: No swelling.   Skin:     General: Skin is warm and dry.      Findings: Erythema (Cellulitis present right dorsal midfoot.  Minimal drainage present.) present.         Results Review:       I reviewed the patient's new clinical results.  Results from last 7 days   Lab Units 24  0532 24  0500 24  1255   WBC 10*3/mm3 7.99 7.66 9.80   HEMOGLOBIN g/dL 13.1 12.0* 12.2*   PLATELETS 10*3/mm3 216 207 249     Results from last 7 days   Lab Units 24  0532 24  0500 24  1255   SODIUM mmol/L 138 137 141   POTASSIUM mmol/L 3.8 3.7 3.9   CHLORIDE mmol/L 104 102 105   CO2 mmol/L 24.0 24.3 25.3   BUN mg/dL 4* 6* 6*   CREATININE mg/dL 0.49* 0.55* 0.72*   GLUCOSE mg/dL 98 112* 147*   EGFR mL/min/1.73 99.9 96.5 89.0   ALBUMIN g/dL  --  3.3* 3.6   BILIRUBIN mg/dL  --  0.6 0.5   ALK PHOS U/L  --  60 65   AST (SGOT) U/L  --  17 17   ALT (SGPT) U/L  --  12 13     Results from last 7 days   Lab Units  09/01/24  0532 08/31/24  0500 08/30/24  1255   CALCIUM mg/dL 8.9 8.6 9.2   ALBUMIN g/dL  --  3.3* 3.6   MAGNESIUM mg/dL 2.3  --   --        Results from last 7 days   Lab Units 08/31/24  0919 08/31/24  0500 08/30/24  2359 08/30/24  1255   PROCALCITONIN ng/mL  --  0.05  --   --    LACTATE mmol/L  --  1.4 1.1 2.5*   SED RATE mm/hr 33*  --   --   --    CRP mg/dL 1.92*  --   --   --      Results from last 7 days   Lab Units 08/30/24  1822 08/30/24  1257   BLOODCX   --  No growth at 24 hours  No growth at 24 hours   WOUNDCX  Heavy growth (4+) Staphylococcus aureus, MRSA*  --    MRSAPCR  MRSA Detected*  --      Hemoglobin A1C   Date/Time Value Ref Range Status   08/31/2024 0500 5.10 4.80 - 5.60 % Final     Glucose   Date/Time Value Ref Range Status   09/01/2024 1111 113 70 - 130 mg/dL Final   09/01/2024 0608 92 70 - 130 mg/dL Final   08/31/2024 2013 165 (H) 70 - 130 mg/dL Final   08/31/2024 1727 154 (H) 70 - 130 mg/dL Final   08/31/2024 1211 122 70 - 130 mg/dL Final   08/31/2024 0633 110 70 - 130 mg/dL Final   08/30/2024 2027 122 70 - 130 mg/dL Final     Doppler Ankle Brachial Index Single Level CAR    Right Conclusion: The right ELDA is unable to be assessed due to vessel   incompressibility.  Waveforms indicate mild disease.  Mild digital   insufficiency.    Left Conclusion: The left ELDA is unable to be assessed due to vessel   incompressibility.  Waveforms indicate normal arterial flow without   occlusive disease.  Normal digital pressures.  MRI Foot Right With & Without Contrast  Narrative: MRI RIGHT FOREFOOT WITH AND WITHOUT CONTRAST        HISTORY: Lateral forefoot infection with ulcer.     TECHNIQUE:  MRI includes coronal, short axis T1, T1 fat-sat, T2 fat-sat,  axial T1, STIR, sagittal PD fat-saturated sequences.  Contrast was  administered IV followed by axial, coronal, sagittal T1 fat-saturated  sequences.     COMPARISON: Right foot x-rays 08/30/2024.     FINDINGS: There is a lateral forefoot soft tissue ulcer  that is centered  plantar to the lateral aspect of the head of the 5th metatarsal. This  ulcer communicates with a nonenhancing tract/collection that contains  thin fluid signal wrapping about the plantar and lateral aspect of the  5th metatarsal head. This is consistent with a thin abscess or phlegmon  and measures approximately 4 mm in thickness, though extends up to 3 cm  transverse dimension by 2 cm in height. This thin collection of  nonenhancing material contacts the joint capsule at the 5th metatarsal  head and 5th MTP joint placing patient at risk for intra-articular  extension or infection, though there is no cortical loss and there is  preservation of normal T1 fat signal without evidence to suggest  osteomyelitis.     Midfoot alignment appears within normal limits. There is generalized  midfoot and forefoot muscular atrophy. Mild hallux valgus deformity is  present and there are chronic arthritic changes of the 1st MTP joint.  There is diffuse edema of the subcutaneous fat about the midfoot and  forefoot with soft tissue swelling greatest dorsally consistent with  cellulitis.      Impression: 1. Soft tissue wound/ulcer centered plantar to the 5th metatarsal head  communicates with a thin abscess or area of phlegmonous change that  wraps about the plantar and lateral aspect of the 5th metatarsal head  and 5th MTP joint. This surrounds the joint capsule placing patient at  risk for intra-articular extension or infection, though there is no  compelling evidence for osteomyelitis.   2. There is generalized soft tissue swelling particularly involving the  dorsal midfoot and forefoot, consistent with cellulitis.     This report was finalized on 8/31/2024 12:24 PM by Gabino Haider M.D  on Workstation: [id number]         I have personally reviewed all medications:  Scheduled Medications  [Held by provider] apixaban, 5 mg, Oral, Q12H  atorvastatin, 20 mg, Oral, Daily  budesonide-formoterol, 2 puff, Inhalation,  BID - RT  cholecalciferol, 1,000 Units, Oral, Daily  digoxin, 125 mcg, Oral, Daily  dilTIAZem CD, 240 mg, Oral, Daily  enoxaparin, 40 mg, Subcutaneous, Daily  folic acid, 1 mg, Oral, Daily  insulin lispro, 2-7 Units, Subcutaneous, 4x Daily AC & at Bedtime  multivitamin, 1 tablet, Oral, Daily  predniSONE, 5 mg, Oral, Daily With Breakfast  sodium chloride, 10 mL, Intravenous, Q12H  thiamine (B-1) IV, 200 mg, Intravenous, Q8H   Followed by  [START ON 9/5/2024] thiamine, 100 mg, Oral, Daily  vancomycin, 1,250 mg, Intravenous, Q12H    Infusions  Pharmacy to dose vancomycin,   sodium chloride, 75 mL/hr, Last Rate: 75 mL/hr (08/31/24 1445)    Diet  Diet: Regular/House, Diabetic; Consistent Carbohydrate; Fluid Consistency: Thin (IDDSI 0)    I have personally reviewed:  [x]  Laboratory   [x]  Microbiology   [x]  Radiology   [x]  EKG/Telemetry  [x]  Cardiology/Vascular   []  Pathology    []  Records  CIWA (last 3 days)        Date/Time CIWA-Ar Score    08/31/24 2030 3     08/31/24 1230 2     08/31/24 1000 2     08/31/24 0800 2     08/31/24 0700 7     08/31/24 0600 18     08/31/24 0300 12     08/31/24 0200 3     08/30/24 2357 4     08/30/24 2300 11     08/30/24 2137 7     08/30/24 2100 11                      Assessment/Plan     Active Hospital Problems    Diagnosis  POA    **MRSA cellulitis of right foot [L03.115, B95.62]  Yes    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       88 y.o. male with MRSA cellulitis of right foot.    Appreciate ID assistance with management of antibiotic.  Continue IV vancomycin for now.  Per nursing podiatry has cleared him for diet with no planned surgical intervention today.  Follow-up culture results.    Respiratory status seems stable.  CIWA score quite elevated yesterday but mental status seems much better today.  Patient reports last drink was probably on Thursday but denies prior history of alcohol withdrawal.  Reports that he drinks about every day but typically 1-2 drinks only.  Continue CIWA protocol for now but Ativan only as needed.     Maintain saturations 88 to 92% only.  Continue prednisone 5 mg daily  BS relatively stable, continue current SSI  Discussed with podiatry who is okay with resuming Eliquis.      Eliquis (home med) for DVT prophylaxis.    Full code.  Discussed with patient, nursing staff, and consulting provider.  Anticipate discharge home with HH vs SNU facility timing yet to be determined.  Expected discharge date/ time has not been documented.      Federico Aldana MD  [facility]  09/01/24  12:39 EDT

## 2024-09-01 NOTE — PROGRESS NOTES
"  Daily Progress Note.   [address]  9/1/2024    Patient:  Name:  Jim Marvin  MRN:  [id number]  1935  88 y.o.  male         Requesting physician: Dr Federico Aldana     Reason for Consultation:  ild, chronic hypoxemic resp failure emphysema     CC: swelling redness on foot     History of Present Illness:  Patient is a 88-year-old with a previous medical history of COPD/pulm fibrosis/chronic hypoxemic respiratory failure hypertension dyslipidemia prior stroke and alcohol abuse who presented to the emergency room with swelling and redness in his right foot.  This was progressive and came into the ER for evaluation.  Patient was admitted and started on treatment per the hospitalist service.  Apparently he was tremulous and has been started on treatment for alcohol withdrawal.  We were asked to see him for management of his pulmonary disease.  Unfortunately patient is unable to give any reliable history fairly sedated after getting as needed medications alcohol withdrawal.       Interval History:  More awake today says he wants portable oxygen concentrator denies any worsening cough shortness of breath no chest pain no palpitations he has been taking his prednisone 5 mg.        Physical Exam:  /58 (BP Location: Left arm, Patient Position: Lying)   Pulse 88   Temp 97.9 °F (36.6 °C) (Oral)   Resp 19   Ht 177.8 cm (70\")   Wt 88.5 kg (195 lb 1.7 oz)   SpO2 92%   BMI 27.99 kg/m²   Body mass index is 27.99 kg/m².    Intake/Output Summary (Last 24 hours) at 9/1/2024 0957  Last data filed at 8/31/2024 2300  Gross per 24 hour   Intake --   Output 1700 ml   Net -1700 ml     General appearance: Awake alert sitting in chair conversant  HENT: Atraumatic; oropharynx clear with moist mucous membranes and no mucosal ulcerations; normal hard and soft palate  Neck: Trachea midline; supple  Lungs: Very diminished airflow no crackles no wheeze, with normal respiratory effort and no intercostal " retractions  CV: RRR, no rub  Abdomen: Soft, nontender; no masses or HSM  Extremities ulcer right lower extremity  Skin: As above  Neuro/psych awake alert calm tremor positive speech intact       Data Review:  Notable Labs:  Results from last 7 days   Lab Units 09/01/24  0532 08/31/24  0500 08/30/24  1255   WBC 10*3/mm3 7.99 7.66 9.80   HEMOGLOBIN g/dL 13.1 12.0* 12.2*   PLATELETS 10*3/mm3 216 207 249     Results from last 7 days   Lab Units 09/01/24  0532 08/31/24  0500 08/30/24  1255   SODIUM mmol/L 138 137 141   POTASSIUM mmol/L 3.8 3.7 3.9   CHLORIDE mmol/L 104 102 105   CO2 mmol/L 24.0 24.3 25.3   BUN mg/dL 4* 6* 6*   CREATININE mg/dL 0.49* 0.55* 0.72*   GLUCOSE mg/dL 98 112* 147*   CALCIUM mg/dL 8.9 8.6 9.2   MAGNESIUM mg/dL 2.3  --   --    Estimated Creatinine Clearance: 121.2 mL/min (A) (by C-G formula based on SCr of 0.49 mg/dL (L)).    Results from last 7 days   Lab Units 09/01/24  0532 08/31/24  0919 08/31/24  0500 08/30/24  2359 08/30/24  1255   AST (SGOT) U/L  --   --  17  --  17   ALT (SGPT) U/L  --   --  12  --  13   PROCALCITONIN ng/mL  --   --  0.05  --   --    LACTATE mmol/L  --   --  1.4 1.1 2.5*   CRP mg/dL  --  1.92*  --   --   --    PLATELETS 10*3/mm3 216  --  207  --  249             Imaging:  Reviewed chest images personally from past 3 days  Assessment / Recommendations:  CPFE  History of occupational exposures  Former smoker  Chronic hypoxemic respiratory failure  Alcohol abuse with withdrawal  MRSA cellulitis per ID  Foot ulcer per podiatry  Paroxysmal atrial fibrillation  Prior stroke     From pulmonary perspective:   continue prednisone 5 mg  Will need continued outpatient follow-up as planned  Continue oxygen supplementation he can call his [employer] company to request alternative portable oxygen alternatives-poc     Continue Symbicort  DuoNebs okay  No signs of acute exacerbation        Treatment of infection and withdrawal per primary service    Electronically signed by Dov Alvarado,  MD, 09/01/24, 9:57 AM EDT.  [facility]
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
## 2024-09-01 NOTE — PROGRESS NOTES
ID note for cellulitis  S: no pain. Swelling improved. af    Physical Exam:   Vital Signs   Temp:  [97.3 °F (36.3 °C)-98 °F (36.7 °C)] 97.9 °F (36.6 °C)  Heart Rate:  [81-88] 88  Resp:  [19-20] 19  BP: (129-180)/(58-80) 129/58    GENERAL: Awake and alert, in no acute distress.   HEENT: Oropharynx is clear. Hearing is grossly normal.   EYES: . No conjunctival injection. No lid lag.   LUNGS:normal respiratory effort.   SKIN: Right fifth metatarsal ulcer.  No  purulence.  Erythema and swelling appear improved.  PSYCHIATRIC: Appropriate mood, affect, insight, and judgment.     Results Review:  White count 7.99  Creatinine 0.49  Glc   Blood cultures no growth to date  MRSA PCR positive  8/30 right foot wound culture 4+ MRSA    A/p  MRSA cellulitis:   DM2, cont glycemic control efforts to prevent/control infectious complications.    No clear evidence of deep infection.  Continue vancomycin for AUC of 400-600.  Can dc on doxycyline 100mg po q12 thru 9/5 when okay with others

## 2024-09-01 NOTE — SIGNIFICANT NOTE
"   09/01/24 1715   Peripheral IV 09/01/24 1714 Anterior;Left;Upper Arm   Placement date: If unknown, DO NOT use \"Add Comment\" note/Placement time: If unknown, DO NOT use \"Add Comment\" note: 09/01/24 1714   Hand Hygiene Completed: Yes  Size (Gauge): 22 G  Orientation: Anterior;Left;Upper  Location: Arm  Site Prep: Chlorhexi...   Site Assessment Clean;Dry   Dressing Type Transparent   Line Status Blood return noted   Dressing Status Clean;Dry;Intact   Phlebitis 0-->no symptoms     Ultrasound Inserted IV Site:yahir    Catheter Length:2.5in    Diameter:0.3cm    Depth:1cm      Vascular Access Score=5  1) Palpable / Visible / Dis  2) Palpable / Viasible / Not Distended  3) Easily Palpable / Not Visibile  4) Poorly Palpable / Visible  5) Poorly / Nonpalpable / NV  "

## 2024-09-01 NOTE — PLAN OF CARE
Goal Outcome Evaluation:           Progress: improving          VSS on 3 liters O2.  Abx infusing as ordered.  No acute complaints from pt at this time.

## 2024-09-01 NOTE — PROGRESS NOTES
Podiatry Progress Note      Patient: Jim Marvin Admit Date: 2024    Age: 88 y.o.   PCP: Devika Harris APRN    MRN: [id number]  Room: [facility]        Subjective     Chief Complaint     Chief Complaint   Patient presents with    Foot Swelling        HPI     Patient is resting in bed.  Patient does not recall my visit yesterday, however today is much more oriented and answering questions.  Patient states his redness and swelling has improved to his right foot.    Past Medical History     Past Medical History:   Diagnosis Date    COPD (chronic obstructive pulmonary disease)     Hyperlipidemia     Hypertension     Stroke         Past Surgical History:   Procedure Laterality Date    CATARACT EXTRACTION Left 2022        Allergies   Allergen Reactions    Ace Inhibitors Other (See Comments)     Cannot remember     Lisinopril Other (See Comments)     Cannot remember        Social History     Tobacco Use   Smoking Status Former    Current packs/day: 0.00    Average packs/day: 1 pack/day for 40.0 years (40.0 ttl pk-yrs)    Types: Cigarettes    Start date:     Quit date:     Years since quittin.6    Passive exposure: Never   Smokeless Tobacco Never   Tobacco Comments    Quit 19 years ago         Objective   Physical Exam    Vitals:    24 1256   BP: 130/66   Pulse: 103   Resp: 19   Temp: 98.2 °F (36.8 °C)   SpO2:         Dermatology: Right foot plantar fifth metatarsal head ulceration.  There is positive probe to capsule.    Erythema and edema to the right foot is much improved.    Vascular: DP PT pulses are palpable     Neurological: Light touch and protective sensation are absent     Musckuloskeletal: Out of 5 muscle strength all compartments of the lower extremity    Labs     Lab Results   Component Value Date    HGBA1C 5.10 2024    POCGLU 124 2024    SEDRATE 33 (H) 2024        CBC:      Lab 24  0532 24  0500 24  1255   WBC 7.99 7.66 9.80    HEMOGLOBIN 13.1 12.0* 12.2*   HEMATOCRIT 38.4 35.4* 37.4*   PLATELETS 216 207 249   NEUTROS ABS  --  5.36 7.33*   IMMATURE GRANS (ABS)  --  0.16* 0.26*   LYMPHS ABS  --  0.74 0.77   MONOS ABS  --  0.96* 1.01*   EOS ABS  --  0.33 0.29   MCV 98.5* 99.4* 99.7*          Results for orders placed or performed during the hospital encounter of 08/30/24   Blood Culture - Blood, Arm, Right    Specimen: Arm, Right; Blood   Result Value Ref Range    Blood Culture No growth at 2 days         Doppler Ankle Brachial Index Single Level CAR    Right Conclusion: The right ELDA is unable to be assessed due to vessel   incompressibility.  Waveforms indicate mild disease.  Mild digital   insufficiency.    Left Conclusion: The left ELDA is unable to be assessed due to vessel   incompressibility.  Waveforms indicate normal arterial flow without   occlusive disease.  Normal digital pressures.  MRI Foot Right With & Without Contrast  Narrative: MRI RIGHT FOREFOOT WITH AND WITHOUT CONTRAST        HISTORY: Lateral forefoot infection with ulcer.     TECHNIQUE:  MRI includes coronal, short axis T1, T1 fat-sat, T2 fat-sat,  axial T1, STIR, sagittal PD fat-saturated sequences.  Contrast was  administered IV followed by axial, coronal, sagittal T1 fat-saturated  sequences.     COMPARISON: Right foot x-rays 08/30/2024.     FINDINGS: There is a lateral forefoot soft tissue ulcer that is centered  plantar to the lateral aspect of the head of the 5th metatarsal. This  ulcer communicates with a nonenhancing tract/collection that contains  thin fluid signal wrapping about the plantar and lateral aspect of the  5th metatarsal head. This is consistent with a thin abscess or phlegmon  and measures approximately 4 mm in thickness, though extends up to 3 cm  transverse dimension by 2 cm in height. This thin collection of  nonenhancing material contacts the joint capsule at the 5th metatarsal  head and 5th MTP joint placing patient at risk for  intra-articular  extension or infection, though there is no cortical loss and there is  preservation of normal T1 fat signal without evidence to suggest  osteomyelitis.     Midfoot alignment appears within normal limits. There is generalized  midfoot and forefoot muscular atrophy. Mild hallux valgus deformity is  present and there are chronic arthritic changes of the 1st MTP joint.  There is diffuse edema of the subcutaneous fat about the midfoot and  forefoot with soft tissue swelling greatest dorsally consistent with  cellulitis.      Impression: 1. Soft tissue wound/ulcer centered plantar to the 5th metatarsal head  communicates with a thin abscess or area of phlegmonous change that  wraps about the plantar and lateral aspect of the 5th metatarsal head  and 5th MTP joint. This surrounds the joint capsule placing patient at  risk for intra-articular extension or infection, though there is no  compelling evidence for osteomyelitis.   2. There is generalized soft tissue swelling particularly involving the  dorsal midfoot and forefoot, consistent with cellulitis.     This report was finalized on 8/31/2024 12:24 PM by Gabino Haider M.D  on Workstation: [id number]          Assessment/Plan     88-year-old male with diabetic foot ulcer right foot, cellulitis    -Patient examined and evaluated by myself  - MRI reviewed.  I do not think the abscess finding is anything that is walled off and concern for worsening.  Today I did probe around and did get some tracking that goes plantar and laterally which is consistent with this area.  No purulence was expressed at the time.  -Continue antibiotics per infectious disease  - Ordered postop shoe for patient to be weightbearing as tolerated.  Patient will need offloading to shoe wear and an outpatient basis  - At this time, no need for surgical intervention from podiatry standpoint.  I will continue to follow-up patient is admitted to the hospital.  He will need closely  monitored outpatient follow-up      Daniel Pedraza DPM  Office: [phone]

## 2024-09-01 NOTE — PLAN OF CARE
Goal Outcome Evaluation:  Plan of Care Reviewed With: patient              Patient is an 88 y.o. male admitted to [facility] for cellulitis of R foot on 8/30/2024. PMHx includes alcohol abuse, COPD, HTN, CVA. Patient is ind at baseline without use of AD and lives alone. Pt with B UE tremors noted today and reports he feels more confused than normal. Today, patient performed bed mobility with SBA, required SBA for transfers, and ambulated 60ft and 15ftx2 using rwx requiring CGA. Motor control, strength, activity tolerance deficits noted. Patient may benefit from skilled PT services to address functional deficits and improve level of independence prior to discharge. Anticipate home with assist and HHPT upon DC.        Anticipated Discharge Disposition (PT): home with home health, home with assist

## 2024-09-01 NOTE — PROGRESS NOTES
[facility] Clinical Pharmacy Services: Vancomycin Level Monitoring Note    Jim Marvin is a 88 y.o. male who is on day 3/7 of pharmacy to dose vancomycin for Skin and Soft Tissue.    Estimated Creatinine Clearance: 121.2 mL/min (A) (by C-G formula based on SCr of 0.49 mg/dL (L)).    Current Vanc Dose: 1000mg IV every 12 hours  Results from last 7 days   Lab Units 09/01/24  0532   VANCOMYCIN TR mcg/mL 9.80       Current Vancomycin Dose: 1000mg IV every 12 hours; provides a predicted  mg/L.hr  which is below target of 400-600.  Will increase vancomycin to 1250mg IV every 12 hours, which provides a predicted AUC of 467 mg/L.hr  Next Level Date and Time: Will defer ordering any additional levels at this time until ID evaluates because note on 8/31 mentioned switching to PO antibiotics once sensitivities return  We will continue to monitor patient changes and renal function     Steffen Roy [facility]  Clinical Pharmacist

## 2024-09-02 ENCOUNTER — READMISSION MANAGEMENT (OUTPATIENT)
Dept: CALL CENTER | Facility: HOSPITAL | Age: 89
End: 2024-09-02
Payer: MEDICARE

## 2024-09-02 VITALS
DIASTOLIC BLOOD PRESSURE: 78 MMHG | HEIGHT: 70 IN | SYSTOLIC BLOOD PRESSURE: 169 MMHG | BODY MASS INDEX: 27.49 KG/M2 | RESPIRATION RATE: 18 BRPM | TEMPERATURE: 97.8 F | OXYGEN SATURATION: 91 % | HEART RATE: 112 BPM | WEIGHT: 192.02 LBS

## 2024-09-02 PROBLEM — Z78.9 ALCOHOL USE: Status: ACTIVE | Noted: 2024-08-24

## 2024-09-02 LAB
ANION GAP SERPL CALCULATED.3IONS-SCNC: 10 MMOL/L (ref 5–15)
BUN SERPL-MCNC: 7 MG/DL (ref 8–23)
BUN/CREAT SERPL: 11.9 (ref 7–25)
CALCIUM SPEC-SCNC: 8.8 MG/DL (ref 8.6–10.5)
CHLORIDE SERPL-SCNC: 104 MMOL/L (ref 98–107)
CO2 SERPL-SCNC: 24 MMOL/L (ref 22–29)
CREAT SERPL-MCNC: 0.59 MG/DL (ref 0.76–1.27)
DEPRECATED RDW RBC AUTO: 48.9 FL (ref 37–54)
EGFRCR SERPLBLD CKD-EPI 2021: 94.5 ML/MIN/1.73
ERYTHROCYTE [DISTWIDTH] IN BLOOD BY AUTOMATED COUNT: 13.4 % (ref 12.3–15.4)
GLUCOSE BLDC GLUCOMTR-MCNC: 100 MG/DL (ref 70–130)
GLUCOSE BLDC GLUCOMTR-MCNC: 120 MG/DL (ref 70–130)
GLUCOSE SERPL-MCNC: 105 MG/DL (ref 65–99)
HCT VFR BLD AUTO: 36.1 % (ref 37.5–51)
HGB BLD-MCNC: 12 G/DL (ref 13–17.7)
MCH RBC QN AUTO: 32.6 PG (ref 26.6–33)
MCHC RBC AUTO-ENTMCNC: 33.2 G/DL (ref 31.5–35.7)
MCV RBC AUTO: 98.1 FL (ref 79–97)
PLATELET # BLD AUTO: 248 10*3/MM3 (ref 140–450)
PMV BLD AUTO: 8.5 FL (ref 6–12)
POTASSIUM SERPL-SCNC: 3.8 MMOL/L (ref 3.5–5.2)
RBC # BLD AUTO: 3.68 10*6/MM3 (ref 4.14–5.8)
SODIUM SERPL-SCNC: 138 MMOL/L (ref 136–145)
WBC NRBC COR # BLD AUTO: 7.45 10*3/MM3 (ref 3.4–10.8)

## 2024-09-02 PROCEDURE — 94799 UNLISTED PULMONARY SVC/PX: CPT

## 2024-09-02 PROCEDURE — 94761 N-INVAS EAR/PLS OXIMETRY MLT: CPT

## 2024-09-02 PROCEDURE — 94664 DEMO&/EVAL PT USE INHALER: CPT

## 2024-09-02 PROCEDURE — 99232 SBSQ HOSP IP/OBS MODERATE 35: CPT | Performed by: INTERNAL MEDICINE

## 2024-09-02 PROCEDURE — 63710000001 PREDNISONE PER 5 MG: Performed by: INTERNAL MEDICINE

## 2024-09-02 PROCEDURE — 25010000002 THIAMINE HCL 200 MG/2ML SOLUTION: Performed by: INTERNAL MEDICINE

## 2024-09-02 PROCEDURE — 25010000002 VANCOMYCIN HCL 1.25 G RECONSTITUTED SOLUTION 1 EACH VIAL: Performed by: INTERNAL MEDICINE

## 2024-09-02 PROCEDURE — 82948 REAGENT STRIP/BLOOD GLUCOSE: CPT

## 2024-09-02 PROCEDURE — 25810000003 SODIUM CHLORIDE 0.9 % SOLUTION 250 ML FLEX CONT: Performed by: INTERNAL MEDICINE

## 2024-09-02 PROCEDURE — 80048 BASIC METABOLIC PNL TOTAL CA: CPT | Performed by: HOSPITALIST

## 2024-09-02 PROCEDURE — 85027 COMPLETE CBC AUTOMATED: CPT | Performed by: HOSPITALIST

## 2024-09-02 RX ORDER — DOXYCYCLINE 100 MG/1
100 CAPSULE ORAL EVERY 12 HOURS SCHEDULED
Qty: 7 CAPSULE | Refills: 0 | Status: SHIPPED | OUTPATIENT
Start: 2024-09-02 | End: 2024-09-06

## 2024-09-02 RX ORDER — PREDNISONE 5 MG/1
5 TABLET ORAL DAILY
Qty: 30 TABLET | Refills: 0 | Status: SHIPPED | OUTPATIENT
Start: 2024-09-02

## 2024-09-02 RX ORDER — DOXYCYCLINE 100 MG/1
100 CAPSULE ORAL EVERY 12 HOURS SCHEDULED
Status: DISCONTINUED | OUTPATIENT
Start: 2024-09-02 | End: 2024-09-02 | Stop reason: HOSPADM

## 2024-09-02 RX ADMIN — Medication 1 TABLET: at 09:28

## 2024-09-02 RX ADMIN — BUDESONIDE AND FORMOTEROL FUMARATE DIHYDRATE 2 PUFF: 160; 4.5 AEROSOL RESPIRATORY (INHALATION) at 07:10

## 2024-09-02 RX ADMIN — DIGOXIN 125 MCG: 125 TABLET ORAL at 11:26

## 2024-09-02 RX ADMIN — FOLIC ACID 1 MG: 1 TABLET ORAL at 09:29

## 2024-09-02 RX ADMIN — VANCOMYCIN HYDROCHLORIDE 1250 MG: 1.25 INJECTION, POWDER, LYOPHILIZED, FOR SOLUTION INTRAVENOUS at 05:57

## 2024-09-02 RX ADMIN — APIXABAN 5 MG: 5 TABLET, FILM COATED ORAL at 09:28

## 2024-09-02 RX ADMIN — DILTIAZEM HYDROCHLORIDE 240 MG: 240 CAPSULE, EXTENDED RELEASE ORAL at 09:28

## 2024-09-02 RX ADMIN — ATORVASTATIN CALCIUM 20 MG: 20 TABLET, FILM COATED ORAL at 09:28

## 2024-09-02 RX ADMIN — THIAMINE HYDROCHLORIDE 200 MG: 100 INJECTION, SOLUTION INTRAMUSCULAR; INTRAVENOUS at 05:57

## 2024-09-02 RX ADMIN — Medication 1000 UNITS: at 09:28

## 2024-09-02 RX ADMIN — PREDNISONE 5 MG: 5 TABLET ORAL at 09:29

## 2024-09-02 RX ADMIN — DOXYCYCLINE 100 MG: 100 CAPSULE ORAL at 11:26

## 2024-09-02 RX ADMIN — Medication 10 ML: at 09:29

## 2024-09-02 NOTE — PROGRESS NOTES
"  Daily Progress Note.   [address]  9/2/2024    Patient:  Name:  Jim Marvin  MRN:  [id number]  1935  88 y.o.  male         Requesting physician: Dr Federico Aldana     Reason for Consultation:  ild, chronic hypoxemic resp failure emphysema     CC: swelling redness on foot     History of Present Illness:  Patient is a 88-year-old with a previous medical history of COPD/pulm fibrosis/chronic hypoxemic respiratory failure hypertension dyslipidemia prior stroke and alcohol abuse who presented to the emergency room with swelling and redness in his right foot.  This was progressive and came into the ER for evaluation.  Patient was admitted and started on treatment per the hospitalist service.  Apparently he was tremulous and has been started on treatment for alcohol withdrawal.  We were asked to see him for management of his pulmonary disease.  Unfortunately patient is unable to give any reliable history fairly sedated after getting as needed medications alcohol withdrawal.       Interval History:  Awake alert some baseline tremor  Says his breathing is good no worsening shortness of breath cough sputum production  No worsening lethargy and confusion tolerating a diet no emesis      Physical Exam:  /77 (BP Location: Right arm, Patient Position: Lying)   Pulse 89   Temp 97.8 °F (36.6 °C) (Oral)   Resp 20   Ht 177.8 cm (70\")   Wt 87.1 kg (192 lb 0.3 oz)   SpO2 94%   BMI 27.55 kg/m²   Body mass index is 27.55 kg/m².    Intake/Output Summary (Last 24 hours) at 9/2/2024 0904  Last data filed at 9/1/2024 2055  Gross per 24 hour   Intake 210 ml   Output --   Net 210 ml     General appearance: Awake alert laying in bed conversant  HENT: Atraumatic; oropharynx clear with moist mucous membranes and no mucosal ulcerations; normal hard and soft palate  Neck: Trachea midline; supple  Lungs: Very diminished breath sounds no crackles no wheeze, with calm respiratory effort and no intercostal " retractions  CV: RRR, no rub  Abdomen: Soft, nontender; no masses or HSM  Extremities ulcer right lower extremity  Skin: As above  Neuro/psych awake alert calm tremor in upper extremities   speech intact       Data Review:  Notable Labs:  Results from last 7 days   Lab Units 09/02/24 0425 09/01/24  0532 08/31/24  0500 08/30/24  1255   WBC 10*3/mm3 7.45 7.99 7.66 9.80   HEMOGLOBIN g/dL 12.0* 13.1 12.0* 12.2*   PLATELETS 10*3/mm3 248 216 207 249     Results from last 7 days   Lab Units 09/02/24 0425 09/01/24  0532 08/31/24  0500 08/30/24  1255   SODIUM mmol/L 138 138 137 141   POTASSIUM mmol/L 3.8 3.8 3.7 3.9   CHLORIDE mmol/L 104 104 102 105   CO2 mmol/L 24.0 24.0 24.3 25.3   BUN mg/dL 7* 4* 6* 6*   CREATININE mg/dL 0.59* 0.49* 0.55* 0.72*   GLUCOSE mg/dL 105* 98 112* 147*   CALCIUM mg/dL 8.8 8.9 8.6 9.2   MAGNESIUM mg/dL  --  2.3  --   --    Estimated Creatinine Clearance: 110.7 mL/min (A) (by C-G formula based on SCr of 0.59 mg/dL (L)).    Results from last 7 days   Lab Units 09/02/24 0425 09/01/24  0532 08/31/24  0919 08/31/24  0500 08/30/24  2359 08/30/24  1255   AST (SGOT) U/L  --   --   --  17  --  17   ALT (SGPT) U/L  --   --   --  12  --  13   PROCALCITONIN ng/mL  --   --   --  0.05  --   --    LACTATE mmol/L  --   --   --  1.4 1.1 2.5*   CRP mg/dL  --   --  1.92*  --   --   --    PLATELETS 10*3/mm3 248 216  --  207  --  249             Imaging:  Reviewed chest images personally from past 3 days  Assessment / Recommendations:  CPFE  History of occupational exposures  Former smoker  Chronic hypoxemic respiratory failure  Alcohol abuse with withdrawal  MRSA cellulitis per ID  Foot ulcer per podiatry  Paroxysmal atrial fibrillation  Prior stroke     From pulmonary perspective:  Continue prednisone 5 mg  Will need continued outpatient follow-up as planned  Continue oxygen supplementation he can call his DME company to request alternative portable oxygen alternatives-poc     Continue Symbicort  Myrna hidalgo  No  signs of acute exacerbation        Treatment of infection and withdrawal per primary service    Electronically signed by Dov Alvarado MD, 09/02/24, 9:05 AM EDT.

## 2024-09-02 NOTE — DISCHARGE SUMMARY
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        Hospital Course     Mr. Marvin is a 88 y.o. male with a history of prior MRSA infection, diabetes, paroxysmal A-fib, interstitial lung disease on chronic oxygen therapy, HTN and prior stroke who presented to [facility] initially complaining of redness and swelling right foot.  Please see the admitting history and physical for further details.  He was found to have diabetic foot ulcer with surrounding cellulitis plantar aspect right 5th metatarsal head and was admitted to the hospital for further evaluation and treatment.  He was started on empiric antibiotics and seen by infectious disease as well as podiatry.  MRI of the foot was performed with results outlined below.  Cultures did grow MRSA.  Podiatry did a little local exploration but did not feel he had abscess or evidence of osteomyelitis requiring more extensive surgery.  ID has switched him from vancomycin today to doxycycline through .  He will follow-up with podiatry as outpatient.    Shortly after admission there  was concern for possible alcohol withdrawal.  He had elevated CIWA scores but has otherwise been very stable and at baseline mental state.  Last drink of alcohol was at least 5 days ago.  He denies any prior history of alcohol withdrawal.  At this point it seems alcohol withdrawal has been ruled out.    He was seen by pulmonology due to his significant underlying lung disease.  Initial concern for decompensation given suspected alcohol withdrawal but as stated this has been ruled out he needs done very well from a respiratory perspective.  He is supposed be taking prednisone 5 mg daily at home.  No change in home inhalants.      Day of Discharge     Subjective:  No new complaints.  Feels well enough to go home.    Physical Exam:  Temp:  [97.8 °F (36.6 °C)-98.2 °F (36.8 °C)] 97.8 °F (36.6 °C)  Heart Rate:  [] 89  Resp:  [18-20] 20  BP: (129-169)/(66-84) 129/77  Body mass index is 27.55 kg/m².  Physical Exam  Vitals and nursing note reviewed.   Constitutional:       General: He is not in acute distress.  Cardiovascular:      Rate and Rhythm: Normal rate and regular rhythm.   Pulmonary:      Effort: Pulmonary effort is normal.      Breath sounds: Normal breath sounds.   Abdominal:      General: Bowel sounds are normal.      Palpations: Abdomen is soft.      Tenderness: There is no abdominal tenderness.   Musculoskeletal:         General: No swelling.   Skin:     General: Skin is warm and dry.      Findings: Erythema (Cellulitis present right dorsal midfoot.  Minimal drainage present.) present.         Consultants     Consult Orders (all) (From admission, onward)       Start     Ordered    08/31/24 1220  Inpatient Pulmonology Consult  Once        Specialty:  Pulmonary Disease  Provider:  Gibson Aquino MD    08/31/24 1220    08/31/24 1219  Inpatient Infectious Diseases Consult  Once        Specialty:  Infectious Diseases  Provider:  Guero Kern MD    08/31/24 1220    08/30/24 2138  Inpatient  Podiatry Consult  Once        Specialty:  Podiatry  Provider:  Daniel Pedraza DPM    08/30/24 2137 08/30/24 1724  Inpatient Case Management  Consult  Once        Provider:  (Not yet assigned)    08/30/24 1724                  Procedures     Imaging Results (All)       Procedure Component Value Units Date/Time    MRI Foot Right With & Without Contrast [[id number]] Collected: 08/31/24 1207     Updated: 08/31/24 1227    Narrative:      MRI RIGHT FOREFOOT WITH AND WITHOUT CONTRAST        HISTORY: Lateral forefoot infection with ulcer.     TECHNIQUE:  MRI includes coronal, short axis T1, T1 fat-sat, T2 fat-sat,  axial T1, STIR, sagittal PD fat-saturated sequences.  Contrast was  administered IV followed by axial, coronal, sagittal T1 fat-saturated  sequences.     COMPARISON: Right foot x-rays 08/30/2024.     FINDINGS: There is a lateral forefoot soft tissue ulcer that is centered  plantar to the lateral aspect of the head of the 5th metatarsal. This  ulcer communicates with a nonenhancing tract/collection that contains  thin fluid signal wrapping about the plantar and lateral aspect of the  5th metatarsal head. This is consistent with a thin abscess or phlegmon  and measures approximately 4 mm in thickness, though extends up to 3 cm  transverse dimension by 2 cm in height. This thin collection of  nonenhancing material contacts the joint capsule at the 5th metatarsal  head and 5th MTP joint placing patient at risk for intra-articular  extension or infection, though there is no cortical loss and there is  preservation of normal T1 fat signal without evidence to suggest  osteomyelitis.     Midfoot alignment appears within normal limits. There is generalized  midfoot and forefoot muscular atrophy. Mild hallux valgus deformity is  present and there are chronic arthritic changes of the 1st MTP joint.  There is diffuse edema of the subcutaneous fat about the midfoot and  forefoot with soft tissue swelling  greatest dorsally consistent with  cellulitis.        Impression:      1. Soft tissue wound/ulcer centered plantar to the 5th metatarsal head  communicates with a thin abscess or area of phlegmonous change that  wraps about the plantar and lateral aspect of the 5th metatarsal head  and 5th MTP joint. This surrounds the joint capsule placing patient at  risk for intra-articular extension or infection, though there is no  compelling evidence for osteomyelitis.   2. There is generalized soft tissue swelling particularly involving the  dorsal midfoot and forefoot, consistent with cellulitis.     This report was finalized on 8/31/2024 12:24 PM by Gabino Haider M.D  on Workstation: [id number]       XR Foot 3+ View Right [[id number]] Collected: 08/30/24 1337     Updated: 08/30/24 1342    Narrative:      XR FOOT 3+ VW RIGHT-     DATE OF EXAM: 8/30/2024 1:35 PM     INDICATION: Right foot swelling.     COMPARISON: None available.     TECHNIQUE: 3 views of the right foot were obtained.     FINDINGS:  Normal bone mineralization. No evidence of acute fracture or  dislocation. No lytic or destructive osseous changes definitive for  osteomyelitis. Small plantar calcaneal enthesophyte. Mild pes cavus.  Mild multifocal DJD at the midfoot at the midfoot is fairly well  aligned. Mild hallux valgus and bunion formation with moderate to severe  DJD at the great toe MTP joint. Diffuse soft tissue swelling, greatest  at the lateral and distal forefoot at the level of the fifth metatarsal  head, with a suspected soft tissue wound at the lateral and plantar  distal forefoot.       Impression:         1. Soft tissue swelling and suspected soft tissue wound at the lateral  and plantar distal forefoot, without radiographic evidence of acute  fracture, dislocation, or osteomyelitis.  2. Mild hallux valgus and bunion formation with moderate to severe DJD  at the great toe MTP joint.  3. Mild multifocal DJD at the midfoot.     This report was  "finalized on 8/30/2024 1:39 PM by Chris Quiles MD on  Workstation: [id number]             Results for orders placed during the hospital encounter of 08/30/24    Doppler Ankle Brachial Index Single Level CAR    Interpretation Summary    Right Conclusion: The right ELDA is unable to be assessed due to vessel incompressibility.  Waveforms indicate mild disease.  Mild digital insufficiency.    Left Conclusion: The left ELDA is unable to be assessed due to vessel incompressibility.  Waveforms indicate normal arterial flow without occlusive disease.  Normal digital pressures.      Pertinent Labs     Results from last 7 days   Lab Units 09/02/24 0425 09/01/24 0532 08/31/24  0500 08/30/24  1255   WBC 10*3/mm3 7.45 7.99 7.66 9.80   HEMOGLOBIN g/dL 12.0* 13.1 12.0* 12.2*   PLATELETS 10*3/mm3 248 216 207 249     Results from last 7 days   Lab Units 09/02/24 0425 09/01/24 0532 08/31/24  0500 08/30/24  1255   SODIUM mmol/L 138 138 137 141   POTASSIUM mmol/L 3.8 3.8 3.7 3.9   CHLORIDE mmol/L 104 104 102 105   CO2 mmol/L 24.0 24.0 24.3 25.3   BUN mg/dL 7* 4* 6* 6*   CREATININE mg/dL 0.59* 0.49* 0.55* 0.72*   GLUCOSE mg/dL 105* 98 112* 147*   EGFR mL/min/1.73 94.5 99.9 96.5 89.0     Results from last 7 days   Lab Units 08/31/24  0500 08/30/24  1255   ALBUMIN g/dL 3.3* 3.6   BILIRUBIN mg/dL 0.6 0.5   ALK PHOS U/L 60 65   AST (SGOT) U/L 17 17   ALT (SGPT) U/L 12 13     Results from last 7 days   Lab Units 09/02/24 0425 09/01/24 0532 08/31/24  0500 08/30/24  1255   CALCIUM mg/dL 8.8 8.9 8.6 9.2   ALBUMIN g/dL  --   --  3.3* 3.6   MAGNESIUM mg/dL  --  2.3  --   --                Invalid input(s): \"LDLCALC\"  Results from last 7 days   Lab Units 08/30/24  1822 08/30/24  1257   BLOODCX   --  No growth at 2 days  No growth at 2 days   WOUNDCX  Heavy growth (4+) Staphylococcus aureus, MRSA*  --    MRSAPCR  MRSA Detected*  --          Test Results Pending at Discharge     Pending Labs       Order Current Status    Blood Culture - " Blood, Arm, Left Preliminary result    Blood Culture - Blood, Arm, Right Preliminary result            Discharge Details        Discharge Medications        New Medications        Instructions Start Date   doxycycline 100 MG capsule  Commonly known as: MONODOX   100 mg, Oral, Every 12 Hours Scheduled             Continue These Medications        Instructions Start Date   apixaban 5 MG tablet tablet  Commonly known as: ELIQUIS   5 mg, Oral, Every 12 Hours Scheduled      atorvastatin 20 MG tablet  Commonly known as: LIPITOR   20 mg, Oral, Daily      budesonide-formoterol 160-4.5 MCG/ACT inhaler  Commonly known as: SYMBICORT   2 puffs, Inhalation, 2 Times Daily - RT      cholecalciferol 25 MCG (1000 UT) tablet  Commonly known as: VITAMIN D3   1,000 Units, Oral, Daily      digoxin 125 MCG tablet  Commonly known as: LANOXIN   125 mcg, Oral, Daily Digoxin      dilTIAZem  MG 24 hr capsule  Commonly known as: CARDIZEM CD   240 mg, Oral, Daily      EPINEPHrine 0.3 MG/0.3ML solution auto-injector injection  Commonly known as: EPIPEN   ADMINISTER 0.3 ML IN THE MUSCLE 1 TIME FOR 1 DOSE AS DIRECTED BY PRESCRIBER      ipratropium-albuterol 0.5-2.5 mg/3 ml nebulizer  Commonly known as: DUO-NEB   Inhale the contents of 1 vial by nebulization Every 6 (Six) Hours As Needed for Wheezing for up to 30 days.      metFORMIN  MG 24 hr tablet  Commonly known as: GLUCOPHAGE-XR   500 mg, Oral, Daily With Breakfast      multivitamin tablet tablet   1 tablet, Oral, Daily      predniSONE 5 MG tablet  Commonly known as: DELTASONE   5 mg, Oral, Daily, Ordered by Dr. Dov Alvarado, [facility]; told not to stop w/o calling his office.               Allergies   Allergen Reactions    Ace Inhibitors Other (See Comments)     Cannot remember     Lisinopril Other (See Comments)     Cannot remember       Discharge Disposition:  Home or Self Care      Discharge Diet:  Diet Order   Procedures    Diet: Regular/House, Diabetic; Consistent Carbohydrate;  Fluid Consistency: Thin (IDDSI 0)       Discharge Activity:   Activity Instructions       Activity as Tolerated              CODE STATUS:    Code Status and Medical Interventions: CPR (Attempt to Resuscitate); Full Support   Ordered at: 08/30/24 1603     Code Status (Patient has no pulse and is not breathing):    CPR (Attempt to Resuscitate)     Medical Interventions (Patient has pulse or is breathing):    Full Support       Future Appointments   Date Time Provider Department Center   9/13/2024  1:00 PM [facility]   1/20/2025  1:00 PM Robby Hall MD [facility]     Additional Instructions for the Follow-ups that You Need to Schedule       Discharge Follow-up with PCP   As directed       Currently Documented PCP:    Devika Harris APRN    PCP Phone Number:    [phone]     Follow Up Details: 1 to 2 weeks (or sooner if problems)               Follow-up Information       Devika Harris APRN .    Specialties: Nurse Practitioner, Internal Medicine, Family Medicine  Why: 1 to 2 weeks (or sooner if problems)  Contact information:  [address] [place], [place] [id number]  [phone]                             Additional Instructions for the Follow-ups that You Need to Schedule       Discharge Follow-up with PCP   As directed       Currently Documented PCP:    Devika Harris APRN    PCP Phone Number:    [phone]     Follow Up Details: 1 to 2 weeks (or sooner if problems)            Time Spent on Discharge:  Greater than 30 minutes      Federico Aldana MD  [facility]  09/02/24  11:31 EDT

## 2024-09-02 NOTE — OUTREACH NOTE
Prep Survey      Flowsheet Row Responses   [facility] patient discharged from? [place]   Is LACE score < 7 ? No   Eligibility [facility]   Date of Admission 08/30/24   Date of Discharge 09/02/24   Discharge Disposition Home or Self Care   Discharge diagnosis MRSA cellulitis of right foot   Does the patient have one of the following disease processes/diagnoses(primary or secondary)? Other   Does the patient have Home health ordered? No   Is there a DME ordered? No   Prep survey completed? Yes            Susie LONGORIA - Registered Nurse

## 2024-09-02 NOTE — PROGRESS NOTES
ID note for cellulitis  S: no pain. Swelling improved. af    Physical Exam:   Vital Signs   Temp:  [97.8 °F (36.6 °C)-98.2 °F (36.8 °C)] 97.8 °F (36.6 °C)  Heart Rate:  [] 89  Resp:  [18-20] 20  BP: (129-169)/(66-84) 129/77    GENERAL: Awake and alert, in no acute distress.   HEENT: Oropharynx is clear. Hearing is grossly normal.   EYES: . No conjunctival injection. No lid lag.   LUNGS:normal respiratory effort.   SKIN: Right fifth metatarsal ulcer.  No  purulence.  Erythema and swelling basically resolved.  PSYCHIATRIC: Appropriate mood, affect, insight, and judgment.     Results Review:  White count 7.45  Creatinine 0.59  Glc     Blood cultures no growth to date  MRSA PCR positive  8/30 right foot wound culture 4+ MRSA    A/p  MRSA cellulitis:   DM2, cont glycemic control efforts to prevent/control infectious complications.    No clear evidence of deep infection.  Dc vancomycin.  Will switch to doxycyline 100mg po q12 thru 9/5  No objection to dc when okay with others

## 2024-09-03 ENCOUNTER — TRANSITIONAL CARE MANAGEMENT TELEPHONE ENCOUNTER (OUTPATIENT)
Dept: CALL CENTER | Facility: HOSPITAL | Age: 89
End: 2024-09-03
Payer: MEDICARE

## 2024-09-03 NOTE — OUTREACH NOTE
Call Center TCM Note      Flowsheet Row Responses   [facility] patient discharged from? [place]   Does the patient have one of the following disease processes/diagnoses(primary or secondary)? Other   TCM attempt successful? No   Unsuccessful attempts Attempt 2            Rhianna Minor MA    9/3/2024, 16:00 EDT

## 2024-09-03 NOTE — OUTREACH NOTE
Call Center TCM Note      Flowsheet Row Responses   [facility] patient discharged from? [place]   Does the patient have one of the following disease processes/diagnoses(primary or secondary)? Other   TCM attempt successful? No   Unsuccessful attempts Attempt 1            Rhianna Minor MA    9/3/2024, 10:08 EDT

## 2024-09-03 NOTE — CASE MANAGEMENT/SOCIAL WORK
Case Management Discharge Note      Final Note: Home. Orders reviewed no further d/c orders.         Selected Continued Care - Discharged on 9/2/2024 Admission date: 8/30/2024 - Discharge disposition: Home or Self Care      Destination    No services have been selected for the patient.                Durable Medical Equipment    No services have been selected for the patient.                Dialysis/Infusion    No services have been selected for the patient.                Home Medical Care    No services have been selected for the patient.                Therapy    No services have been selected for the patient.                Community Resources    No services have been selected for the patient.                Community & DME    No services have been selected for the patient.                    Selected Continued Care - Prior Encounters Includes continued care and service providers with selected services from prior encounters from 6/1/2024 to 9/2/2024      Discharged on 7/2/2024 Admission date: 6/24/2024 - Discharge disposition: Skilled Nursing Facility (DC - External)      Destination       Service Provider Selected Services Address Phone Fax Patient Preferred    [facility] [address] [id number] [phone] --              Home Medical Care       Service Provider Selected Services Address Phone Fax Patient Preferred    [facility] [address], [address] [id number] [phone] --                      Discharged on 6/8/2024 Admission date: 6/4/2024 - Discharge disposition: Home or Self Care      Durable Medical Equipment       Service Provider Selected Services Address Phone Fax Patient Preferred    [facility] [address] [address] [phone] --                                Final Discharge Disposition Code: 01 - home or self-care

## 2024-09-04 ENCOUNTER — TRANSITIONAL CARE MANAGEMENT TELEPHONE ENCOUNTER (OUTPATIENT)
Dept: CALL CENTER | Facility: HOSPITAL | Age: 89
End: 2024-09-04
Payer: MEDICARE

## 2024-09-04 LAB
BACTERIA SPEC AEROBE CULT: NORMAL
BACTERIA SPEC AEROBE CULT: NORMAL

## 2024-09-04 NOTE — OUTREACH NOTE
Call Center TCM Note      Flowsheet Row Responses   [facility] patient discharged from? [place]   Does the patient have one of the following disease processes/diagnoses(primary or secondary)? Other   TCM attempt successful? No   Unsuccessful attempts Attempt 3  [Madisyn on verbal release - no answer]            Olinda Mg RN    9/4/2024, 11:14 EDT

## 2024-09-13 ENCOUNTER — OFFICE VISIT (OUTPATIENT)
Dept: FAMILY MEDICINE CLINIC | Facility: CLINIC | Age: 89
End: 2024-09-13
Payer: MEDICARE

## 2024-09-13 VITALS
HEART RATE: 87 BPM | BODY MASS INDEX: 28.15 KG/M2 | OXYGEN SATURATION: 92 % | WEIGHT: 196.6 LBS | TEMPERATURE: 97.8 F | DIASTOLIC BLOOD PRESSURE: 78 MMHG | SYSTOLIC BLOOD PRESSURE: 124 MMHG | HEIGHT: 70 IN

## 2024-09-13 DIAGNOSIS — J84.9 ILD (INTERSTITIAL LUNG DISEASE): Chronic | ICD-10-CM

## 2024-09-13 DIAGNOSIS — B95.62 MRSA CELLULITIS OF RIGHT FOOT: ICD-10-CM

## 2024-09-13 DIAGNOSIS — R25.1 TREMOR OF BOTH HANDS: Chronic | ICD-10-CM

## 2024-09-13 DIAGNOSIS — Z09 HOSPITAL DISCHARGE FOLLOW-UP: Primary | ICD-10-CM

## 2024-09-13 DIAGNOSIS — L03.115 MRSA CELLULITIS OF RIGHT FOOT: ICD-10-CM

## 2024-09-13 DIAGNOSIS — F10.90 ALCOHOL USE DISORDER: Chronic | ICD-10-CM

## 2024-09-13 DIAGNOSIS — L98.9 LESION OF SKIN OF SCALP: ICD-10-CM

## 2024-09-13 PROCEDURE — 99495 TRANSJ CARE MGMT MOD F2F 14D: CPT | Performed by: NURSE PRACTITIONER

## 2024-09-13 PROCEDURE — 1111F DSCHRG MED/CURRENT MED MERGE: CPT | Performed by: NURSE PRACTITIONER

## 2024-09-13 PROCEDURE — 1126F AMNT PAIN NOTED NONE PRSNT: CPT | Performed by: NURSE PRACTITIONER

## 2024-09-13 NOTE — PROGRESS NOTES
Transitional Care Follow Up Visit  Subjective     Jim is a 88 y.o. male who presents with his daughter, Madisyn, for a transitional care management visit.    Within 48 business hours after discharge our office contacted him via telephone to coordinate his care and needs.      I reviewed and discussed the details of that call along with the discharge summary, hospital problems, inpatient lab results, inpatient diagnostic studies, and consultation reports with Jim.     Current outpatient and discharge medications have been reconciled for the patient.    Reviewed by: KATY Izaguirre          9/2/2024     4:44 PM   Date of TCM Phone Call   [facility]   Date of Admission 8/30/2024   Date of Discharge 9/2/2024   Discharge Disposition Home or Self Care       Risk for Readmission (LACE) Score: 14 (9/2/2024  6:00 AM)      History of Present Illness     Course During Hospital Stay The following information was reviewed by: KATY Izaguirre on 09/13/2024:     Mr. Marvin is a 88 y.o. male with a history of prior MRSA infection, diabetes, paroxysmal A-fib, interstitial lung disease on chronic oxygen therapy, HTN and prior stroke who presented to [facility] initially complaining of redness and swelling right foot.  Please see the admitting history and physical for further details.  He was found to have diabetic foot ulcer with surrounding cellulitis plantar aspect right 5th metatarsal head and was admitted to the hospital for further evaluation and treatment.  He was started on empiric antibiotics and seen by infectious disease as well as podiatry.  MRI of the foot was performed with results outlined below.  Cultures did grow MRSA.  Podiatry did a little local exploration but did not feel he had abscess or evidence of osteomyelitis requiring more extensive surgery.  ID has switched him from vancomycin today to doxycycline through 9/5.  He will follow-up with podiatry as  outpatient.     Shortly after admission there was concern for possible alcohol withdrawal.  He had elevated CIWA scores but has otherwise been very stable and at baseline mental state.  Last drink of alcohol was at least 5 days ago.  He denies any prior history of alcohol withdrawal.  At this point it seems alcohol withdrawal has been ruled out.     He was seen by pulmonology due to his significant underlying lung disease.  Initial concern for decompensation given suspected alcohol withdrawal but as stated this has been ruled out he needs done very well from a respiratory perspective.  He is supposed be taking prednisone 5 mg daily at home.  No change in home inhalants.    Copied text on this note has been reviewed and revised by me on 9/13/24.    Cellulitis Right Foot - patient was sent home from hospital on Doxycycline and instructed to take through 9/5/24, and has finished medication as prescribed.  He and daughter, Madisyn, report significant improvement with wound swelling and redness since hospitalization.  When he steps on foot wrong, he feels like a stinging pain.  He denies stinging pain in wound all the time.      Follow-up with Podiatry - He has never seen Podiatry in the past.  As of this date he has not connected with outpatient podiatry.  Will order referral to Podiatry for evaluation and treatment of wound.  Patient has requested in-home Podiatry care due to transportation issue as patient does not drive.    ETOH Withdrawal  - he admits he drinks Dar Wei and Diet Coke about 3-4 glasses every day.   He has not tried to cut back on drinking and has no desire to stop/cut back on ETOH intake.  He declined assistance with ETOH abuse.      Lung Disease - he arrived with large Oxygen tank on continuous 3lpm nasal cannula.  He confirmed he is taking Prednisone 5mg daily and all inhalers listed on medication list.  Advised patient to continue Prednisone, Symbicort and Duo Nebs as originally prescribed.  He  "has a CT Chest coming up on 9/28/24 and has follow-up appointment after CT to see Dr. Alvarado.  Followed by Dr. Alvarado, Pulmonology.  Patient denies any respiratory complaints today.    Patient has appointment to establish care with new PCP [facility] and first appointment is on 10/2/24.  Daughter indicated patient does not drive and she is having a hard time transporting him to all of his appointments.  Daughter wants a PCP that will see patient in his home instead of office setting.    Reviewed:  Basic Metabolic Panel (09/02/2024 04:25)  CBC (No Diff) (09/02/2024 04:25)  POC Glucose Once (09/02/2024 06:05)  POC Glucose Once (09/02/2024 10:46)  XR Foot 3+ View Right (08/30/2024 13:36)  Duplex Venous Lower Extremity - RIGHT (08/30/2024 16:09)  MRI Foot Right With & Without Contrast (08/31/2024 11:37)  Doppler Ankle Brachial Index Single Level CAR (08/31/2024 16:08)     The following portions of the patient's history were reviewed and updated as appropriate: allergies, current medications, past family history, past medical history, past social history, past surgical history, and problem list.     Vitals:    09/13/24 1254 09/13/24 1318   BP: 124/78  Comment: patient was shaking and i really couldnt get a good reading    BP Location: Left arm    Patient Position: Sitting    Cuff Size: Adult    Pulse: 90 87   Temp:  97.8 °F (36.6 °C)   TempSrc:  Oral   SpO2: (!) 89% 92%  Comment: re-checked; On Oxygen 3lpm nasal cannula   Weight: 89.2 kg (196 lb 9.6 oz)    Height: 177.8 cm (70\")        Vitals:    09/13/24 1318   BP:    Pulse: 87   Temp: 97.8 °F (36.6 °C)   SpO2: 92%        Review of Systems   Constitutional:  Negative for chills and fever.   Cardiovascular:  Positive for leg swelling. Negative for chest pain and palpitations.   Skin:  Positive for wound.        Ulcer bottom of right foot with swelling and intermittent stinging.   Neurological:  Positive for tremors.        Has had tremors for years "       Objective   Physical Exam  Vitals and nursing note reviewed.   Constitutional:       General: He is not in acute distress.     Appearance: Normal appearance. He is not toxic-appearing or diaphoretic.      Comments: Chronically ill appearing.   HENT:      Head: Normocephalic and atraumatic.   Cardiovascular:      Rate and Rhythm: Normal rate and regular rhythm.      Heart sounds: Normal heart sounds. No murmur heard.  Pulmonary:      Effort: Pulmonary effort is normal. No respiratory distress.      Breath sounds: No wheezing, rhonchi or rales.      Comments: Diminished breath sounds throughout bilateral lungs  Musculoskeletal:      Right lower leg: Edema present.        Feet:       Comments:      Skin:     General: Skin is warm.      Findings: Lesion present. No erythema.      Comments: Lesion on top of scalp approx 7mm light brown and dark brown papule with irregular border.   Neurological:      Mental Status: He is alert.      Motor: Tremor present.      Comments: Bilateral hand resting and essential tremors   Psychiatric:         Mood and Affect: Mood normal.         Behavior: Behavior normal.           Assessment & Plan     Diagnoses and all orders for this visit:    1. Hospital discharge follow-up (Primary)  Comments:  Hospital follow-up on MRSA Cellulitis on Right Foot.    2. MRSA cellulitis of right foot  Assessment & Plan:  Stable with slight swelling and mild erythema with c/o intermittent stinging pain.  Discussed starting Bactrim but due to ETOH abuse and not willing to stop ETOH use not given.  Will just check CBC today.  Lab:  CBC  Referral to Podiatry for eval/treat.     Orders:  -     Ambulatory Referral to Podiatry  -     CBC & Differential    3. Alcohol use disorder  Assessment & Plan:  Stable; He admits he drinks Dar Wei and Diet Coke about 3-4 glasses every day.   He has not tried to cut back on drinking and has no desire to stop/cut back on ETOH intake.  He declined assistance with ETOH  abuse.  Will continue to monitor.    Orders:  -     CBC & Differential  -     Folate  -     Vitamin B12    4. Tremor of both hands  Assessment & Plan:  Uncontrolled bilateral resting/essential hand tremors.  Will continue to monitor.      5. ILD (interstitial lung disease)  Assessment & Plan:  Stable on Oxygen 3lpm per nasal cannula.  Followed by Dr. Dov Alvarado, Pulmonology.      6. Lesion of skin of scalp  Assessment & Plan:  Lesion on top of scalp.  Recommended referral to Dermatology and patient declined.            Follow Up     Return in about 3 months (around 12/13/2024) for Next scheduled follow up Chronic Care; Patient has appoint on 10/2/2024 with [facility].    I spent 30 minutes caring for Jim on this date of service. This time includes time spent by me in the following activities:preparing for the visit, reviewing tests, obtaining and/or reviewing a separately obtained history, performing a medically appropriate examination and/or evaluation , counseling and educating the patient/family/caregiver, ordering medications, tests, or procedures, referring and communicating with other health care professionals , documenting information in the medical record, and independently interpreting results and communicating that information with the patient/family/caregiver

## 2024-09-14 ENCOUNTER — TELEPHONE (OUTPATIENT)
Dept: FAMILY MEDICINE CLINIC | Facility: CLINIC | Age: 89
End: 2024-09-14
Payer: MEDICARE

## 2024-09-14 ENCOUNTER — APPOINTMENT (OUTPATIENT)
Dept: GENERAL RADIOLOGY | Facility: HOSPITAL | Age: 89
End: 2024-09-14
Payer: MEDICARE

## 2024-09-14 ENCOUNTER — HOSPITAL ENCOUNTER (EMERGENCY)
Facility: HOSPITAL | Age: 89
Discharge: HOME | End: 2024-09-14
Attending: EMERGENCY MEDICINE
Payer: MEDICARE

## 2024-09-14 VITALS
OXYGEN SATURATION: 95 % | WEIGHT: 196 LBS | BODY MASS INDEX: 28.06 KG/M2 | TEMPERATURE: 97.5 F | DIASTOLIC BLOOD PRESSURE: 83 MMHG | HEART RATE: 83 BPM | SYSTOLIC BLOOD PRESSURE: 133 MMHG | HEIGHT: 70 IN | RESPIRATION RATE: 20 BRPM

## 2024-09-14 DIAGNOSIS — M79.671 RIGHT FOOT PAIN: Primary | ICD-10-CM

## 2024-09-14 DIAGNOSIS — J98.9 CHRONIC RESPIRATORY DISEASE: ICD-10-CM

## 2024-09-14 PROBLEM — F10.90 ALCOHOL USE DISORDER: Status: ACTIVE | Noted: 2024-09-14

## 2024-09-14 PROBLEM — L98.9 LESION OF SKIN OF SCALP: Status: ACTIVE | Noted: 2024-09-14

## 2024-09-14 PROBLEM — F10.10 ALCOHOL ABUSE: Status: ACTIVE | Noted: 2024-09-14

## 2024-09-14 LAB
ALBUMIN SERPL-MCNC: 3.9 G/DL (ref 3.5–5.2)
ALBUMIN/GLOB SERPL: 1.4 G/DL
ALP SERPL-CCNC: 58 U/L (ref 39–117)
ALT SERPL W P-5'-P-CCNC: 16 U/L (ref 1–41)
ANION GAP SERPL CALCULATED.3IONS-SCNC: 10.5 MMOL/L (ref 5–15)
AST SERPL-CCNC: 17 U/L (ref 1–40)
BASOPHILS # BLD AUTO: 0.17 10*3/MM3 (ref 0–0.2)
BASOPHILS # BLD AUTO: 0.17 10*3/MM3 (ref 0–0.2)
BASOPHILS NFR BLD AUTO: 1.5 % (ref 0–1.5)
BASOPHILS NFR BLD AUTO: 2 % (ref 0–1.5)
BILIRUB SERPL-MCNC: 0.5 MG/DL (ref 0–1.2)
BUN SERPL-MCNC: 10 MG/DL (ref 8–23)
BUN/CREAT SERPL: 15.9 (ref 7–25)
CALCIUM SPEC-SCNC: 9.2 MG/DL (ref 8.6–10.5)
CHLORIDE SERPL-SCNC: 104 MMOL/L (ref 98–107)
CO2 SERPL-SCNC: 26.5 MMOL/L (ref 22–29)
CREAT SERPL-MCNC: 0.63 MG/DL (ref 0.76–1.27)
CRP SERPL-MCNC: 0.95 MG/DL (ref 0–0.5)
D-LACTATE SERPL-SCNC: 1.8 MMOL/L (ref 0.5–2)
DEPRECATED RDW RBC AUTO: 47.1 FL (ref 37–54)
EGFRCR SERPLBLD CKD-EPI 2021: 92.6 ML/MIN/1.73
EOSINOPHIL # BLD AUTO: 0.04 10*3/MM3 (ref 0–0.4)
EOSINOPHIL # BLD AUTO: 0.19 10*3/MM3 (ref 0–0.4)
EOSINOPHIL NFR BLD AUTO: 0.3 % (ref 0.3–6.2)
EOSINOPHIL NFR BLD AUTO: 2.2 % (ref 0.3–6.2)
ERYTHROCYTE [DISTWIDTH] IN BLOOD BY AUTOMATED COUNT: 13.1 % (ref 12.3–15.4)
ERYTHROCYTE [DISTWIDTH] IN BLOOD BY AUTOMATED COUNT: 13.2 % (ref 12.3–15.4)
ERYTHROCYTE [SEDIMENTATION RATE] IN BLOOD: 16 MM/HR (ref 0–20)
FOLATE SERPL-MCNC: 16.8 NG/ML (ref 4.78–24.2)
GLOBULIN UR ELPH-MCNC: 2.7 GM/DL
GLUCOSE SERPL-MCNC: 118 MG/DL (ref 65–99)
HCT VFR BLD AUTO: 37.5 % (ref 37.5–51)
HCT VFR BLD AUTO: 38.9 % (ref 37.5–51)
HGB BLD-MCNC: 12.4 G/DL (ref 13–17.7)
HGB BLD-MCNC: 12.8 G/DL (ref 13–17.7)
IMM GRANULOCYTES # BLD AUTO: 0.23 10*3/MM3 (ref 0–0.05)
IMM GRANULOCYTES # BLD AUTO: 0.24 10*3/MM3 (ref 0–0.05)
IMM GRANULOCYTES NFR BLD AUTO: 2.1 % (ref 0–0.5)
IMM GRANULOCYTES NFR BLD AUTO: 2.7 % (ref 0–0.5)
LYMPHOCYTES # BLD AUTO: 0.72 10*3/MM3 (ref 0.7–3.1)
LYMPHOCYTES # BLD AUTO: 1.14 10*3/MM3 (ref 0.7–3.1)
LYMPHOCYTES NFR BLD AUTO: 13.3 % (ref 19.6–45.3)
LYMPHOCYTES NFR BLD AUTO: 6.2 % (ref 19.6–45.3)
MCH RBC QN AUTO: 32.6 PG (ref 26.6–33)
MCH RBC QN AUTO: 33 PG (ref 26.6–33)
MCHC RBC AUTO-ENTMCNC: 32.9 G/DL (ref 31.5–35.7)
MCHC RBC AUTO-ENTMCNC: 33.1 G/DL (ref 31.5–35.7)
MCV RBC AUTO: 100.3 FL (ref 79–97)
MCV RBC AUTO: 98.7 FL (ref 79–97)
MONOCYTES # BLD AUTO: 0.95 10*3/MM3 (ref 0.1–0.9)
MONOCYTES # BLD AUTO: 1.05 10*3/MM3 (ref 0.1–0.9)
MONOCYTES NFR BLD AUTO: 11.1 % (ref 5–12)
MONOCYTES NFR BLD AUTO: 9 % (ref 5–12)
NEUTROPHILS # BLD AUTO: 9.47 10*3/MM3 (ref 1.7–7)
NEUTROPHILS NFR BLD AUTO: 5.9 10*3/MM3 (ref 1.7–7)
NEUTROPHILS NFR BLD AUTO: 68.7 % (ref 42.7–76)
NEUTROPHILS NFR BLD AUTO: 80.9 % (ref 42.7–76)
NRBC BLD AUTO-RTO: 0 /100 WBC (ref 0–0.2)
NRBC BLD AUTO-RTO: 0 /100 WBC (ref 0–0.2)
PLATELET # BLD AUTO: 110 10*3/MM3 (ref 140–450)
PLATELET # BLD AUTO: 251 10*3/MM3 (ref 140–450)
PMV BLD AUTO: 8.7 FL (ref 6–12)
POTASSIUM SERPL-SCNC: 3.9 MMOL/L (ref 3.5–5.2)
PROCALCITONIN SERPL-MCNC: 0.05 NG/ML (ref 0–0.25)
PROT SERPL-MCNC: 6.6 G/DL (ref 6–8.5)
RBC # BLD AUTO: 3.8 10*6/MM3 (ref 4.14–5.8)
RBC # BLD AUTO: 3.88 10*6/MM3 (ref 4.14–5.8)
SODIUM SERPL-SCNC: 141 MMOL/L (ref 136–145)
VIT B12 SERPL-MCNC: 359 PG/ML (ref 211–946)
WBC # BLD AUTO: 11.69 10*3/MM3 (ref 3.4–10.8)
WBC NRBC COR # BLD AUTO: 8.58 10*3/MM3 (ref 3.4–10.8)

## 2024-09-14 PROCEDURE — 85025 COMPLETE CBC W/AUTO DIFF WBC: CPT | Performed by: EMERGENCY MEDICINE

## 2024-09-14 PROCEDURE — 85652 RBC SED RATE AUTOMATED: CPT | Performed by: EMERGENCY MEDICINE

## 2024-09-14 PROCEDURE — 99283 EMERGENCY DEPT VISIT LOW MDM: CPT

## 2024-09-14 PROCEDURE — 84145 PROCALCITONIN (PCT): CPT | Performed by: EMERGENCY MEDICINE

## 2024-09-14 PROCEDURE — 86140 C-REACTIVE PROTEIN: CPT | Performed by: EMERGENCY MEDICINE

## 2024-09-14 PROCEDURE — 80053 COMPREHEN METABOLIC PANEL: CPT | Performed by: EMERGENCY MEDICINE

## 2024-09-14 PROCEDURE — 73630 X-RAY EXAM OF FOOT: CPT

## 2024-09-14 PROCEDURE — 83605 ASSAY OF LACTIC ACID: CPT | Performed by: EMERGENCY MEDICINE

## 2024-09-14 RX ORDER — SODIUM CHLORIDE 0.9 % (FLUSH) 0.9 %
10 SYRINGE (ML) INJECTION AS NEEDED
Status: DISCONTINUED | OUTPATIENT
Start: 2024-09-14 | End: 2024-09-14 | Stop reason: HOSPADM
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
## 2024-09-14 NOTE — ED NOTES
Ultrasound Inserted IV     Site: Left Forearm     Catheter Length (in): 18G 1.88    Diameter(cm): 1.2    Depth(cm): 1.1    Vascular Access Score=    4) Vein is poorly palpable and/or poorly visible.      Angiocath visualized and advanced in the venous lumen. Flush visualized superiorly to insertion site in venous lumen. Blood return noted and collected during insertion. No signs of phlebitis noted. Standard IV dressing placed and secured.

## 2024-09-14 NOTE — ASSESSMENT & PLAN NOTE
Stable; He admits he drinks Dar Wei and Diet Coke about 3-4 glasses every day.   He has not tried to cut back on drinking and has no desire to stop/cut back on ETOH intake.  He declined assistance with ETOH abuse.  Will continue to monitor.

## 2024-09-14 NOTE — ED NOTES
Pt to ED from  home via [facility]. EMS called for abnormal labs. Pt told by MD that his WBC was elevated and to come in to the ED. Pt has known MRSA of wound on right foot.

## 2024-09-14 NOTE — TELEPHONE ENCOUNTER
Called daughter, Madisyn, and informed her of all lab results as follows:    WBC significantly elevated compared to prior lab, indicating worsening MRSA infection.  Highly recommend patient go to Emergency Room for evaluation and treatment and she agreed.    Vitamin B12 is low end of normal range.  Recommend he continue Multivitamin daily and Vitamin B12 1000mcg daily.    Folate is within normal range.

## 2024-09-14 NOTE — ASSESSMENT & PLAN NOTE
Stable with slight swelling and mild erythema with c/o intermittent stinging pain.  Discussed starting Bactrim but due to ETOH abuse and not willing to stop ETOH use not given.  Will just check CBC today.  Lab:  CBC  Referral to Podiatry for eval/treat.

## 2024-09-14 NOTE — DISCHARGE INSTRUCTIONS
Please return to the emergency room for any worsening pain, swelling, redness, fevers, weakness or any other concerns.

## 2024-09-28 ENCOUNTER — HOSPITAL ENCOUNTER (OUTPATIENT)
Facility: HOSPITAL | Age: 89
Discharge: HOME | End: 2024-09-28
Payer: MEDICARE

## 2024-09-28 DIAGNOSIS — J84.9 ILD (INTERSTITIAL LUNG DISEASE): ICD-10-CM

## 2024-09-28 PROCEDURE — 71250 CT THORAX DX C-: CPT

## 2024-10-09 RX ORDER — METOPROLOL TARTRATE 50 MG
50 TABLET ORAL 2 TIMES DAILY
Qty: 180 TABLET | Refills: 1 | OUTPATIENT
Start: 2024-10-09

## 2025-01-17 ENCOUNTER — OFFICE VISIT (OUTPATIENT)
Dept: CARDIOLOGY | Facility: CLINIC | Age: 89
End: 2025-01-17
Payer: MEDICARE

## 2025-01-17 VITALS
HEART RATE: 84 BPM | BODY MASS INDEX: 28.37 KG/M2 | DIASTOLIC BLOOD PRESSURE: 74 MMHG | HEIGHT: 70 IN | WEIGHT: 198.2 LBS | SYSTOLIC BLOOD PRESSURE: 132 MMHG | OXYGEN SATURATION: 95 %

## 2025-01-17 DIAGNOSIS — I10 PRIMARY HYPERTENSION: Chronic | ICD-10-CM

## 2025-01-17 DIAGNOSIS — R73.03 PREDIABETES: ICD-10-CM

## 2025-01-17 DIAGNOSIS — I65.29 STENOSIS OF CAROTID ARTERY, UNSPECIFIED LATERALITY: Chronic | ICD-10-CM

## 2025-01-17 DIAGNOSIS — E78.2 MIXED HYPERLIPIDEMIA: Chronic | ICD-10-CM

## 2025-01-17 DIAGNOSIS — I48.91 ATRIAL FIBRILLATION WITH RVR: Primary | ICD-10-CM

## 2025-01-17 DIAGNOSIS — I65.02 VERTEBRAL ARTERY OCCLUSION, LEFT: ICD-10-CM

## 2025-01-17 DIAGNOSIS — I48.0 PAROXYSMAL ATRIAL FIBRILLATION: ICD-10-CM

## 2025-01-17 DIAGNOSIS — J84.9 ILD (INTERSTITIAL LUNG DISEASE): ICD-10-CM

## 2025-01-17 DIAGNOSIS — L03.115 CELLULITIS OF RIGHT FOOT: ICD-10-CM

## 2025-01-17 DIAGNOSIS — J43.9 PULMONARY EMPHYSEMA, UNSPECIFIED EMPHYSEMA TYPE: ICD-10-CM

## 2025-01-17 NOTE — PROGRESS NOTES
"      CARDIOLOGY    Robby Hall MD    ENCOUNTER DATE:  01/17/25    iJm Marvin / 89 y.o. / male        CHIEF COMPLAINT / REASON FOR OFFICE VISIT     Atrial fibrillation with RVR      HISTORY OF PRESENT ILLNESS       HPI    Jim Marvin is a 89 y.o. male with paroxysmal atrial fibrillation, COPD on O2, HTN, HLD and carotid artery stenosis who presents for clinic follow-up.      Summary of cardiovascular history:  - 6/2024: Hospitalized for shortness of breath and was note to be hypoxic and admitted for respiratory failure secondary to interstitial lung disease/emphysema. EKG showed Afib rate of 170, Pt was given diltiazem and digoxin in ER with improvement in HR. Patient was eventually discharged on diltiazem 240 daily, Lopressor 50 bid, and Eliquis 5 bid.  - 7/2024: Last clinic follow-up, overall doing okay.    Accompanied by daughter Iris and great grandson at the visit today who contributed to medical history. Today, patient has no acute complaints.  He has been back home since July and lives alone. He was receiving home care but per daughter, patient \"ran them off\". Daughter checks on the patient once per week. Ambulates without any assistance at this time. Does some home exercise with home elliptical and light weight lifting. Uses 3L supplement oxygen (6L with activity). Endorses stable exertional dyspnea. R>L ankle swelling in the morning, not new. Denies chest pain, palpitation, orthopnea, PND, dizziness, syncope, bleeding, or unexpected falls. Pt is unsure about his medication but did bring a printed copy from his SNF. Does not recall having had a heart attack or stroke in the past.  Former smoker 1-2 packs per day for 40 years and quit 28 years ago. Still drinks 5-6 oz of whiskey mixed with coke, denies substance.     REVIEW OF SYSTEMS     Review of Systems   Constitutional: Negative for weight loss.   Cardiovascular:  Positive for dyspnea on exertion. Negative for chest pain, leg swelling, " orthopnea, palpitations, paroxysmal nocturnal dyspnea and syncope.   Respiratory:  Positive for shortness of breath.    Hematologic/Lymphatic: Negative for bleeding problem.   Musculoskeletal:  Negative for falls.   Gastrointestinal:  Negative for hematochezia and melena.   Genitourinary:  Negative for hematuria.   Neurological:  Positive for dizziness, loss of balance and weakness. Negative for light-headedness.   Psychiatric/Behavioral:  Negative for altered mental status.            MEDICATIONS      Outpatient Encounter Medications as of 1/17/2025   Medication Sig Dispense Refill    apixaban (ELIQUIS) 5 MG tablet tablet Take 1 tablet by mouth Every 12 (Twelve) Hours. Indications: Atrial Fibrillation 180 tablet 1    atorvastatin (LIPITOR) 20 MG tablet Take 1 tablet by mouth Daily. 90 tablet 1    budesonide-formoterol (SYMBICORT) 160-4.5 MCG/ACT inhaler Inhale 2 puffs 2 (Two) Times a Day. 1 each 0    digoxin (LANOXIN) 125 MCG tablet Take 1 tablet by mouth Daily. 90 tablet 1    dilTIAZem CD (CARDIZEM CD) 240 MG 24 hr capsule Take 1 capsule by mouth Daily. 90 capsule 1    EPINEPHrine (EPIPEN) 0.3 MG/0.3ML solution auto-injector injection ADMINISTER 0.3 ML IN THE MUSCLE 1 TIME FOR 1 DOSE AS DIRECTED BY PRESCRIBER      metFORMIN ER (GLUCOPHAGE-XR) 500 MG 24 hr tablet Take 1 tablet by mouth Daily With Breakfast. 90 tablet 1    multivitamin tablet tablet Take 1 tablet by mouth Daily.      O2 (OXYGEN) 3 Liter DAILY (route: Oxygen)      ipratropium-albuterol (DUO-NEB) 0.5-2.5 mg/3 ml nebulizer Inhale the contents of 1 vial by nebulization Every 6 (Six) Hours As Needed for Wheezing for up to 30 days. 360 mL 0    [DISCONTINUED] cholecalciferol (Vitamin D, Cholecalciferol,) 25 MCG (1000 UT) tablet Take 1 tablet by mouth Daily.      [DISCONTINUED] predniSONE (DELTASONE) 5 MG tablet Take 1 tablet by mouth Daily. Ordered by Ivy Estes; told not to stop w/o calling his office. 30 tablet 0     No  "facility-administered encounter medications on file as of 1/17/2025.         VITAL SIGNS     Visit Vitals  /74   Pulse 84   Ht 177.8 cm (70\")   Wt 89.9 kg (198 lb 3.2 oz)   SpO2 95%   BMI 28.44 kg/m²           Wt Readings from Last 3 Encounters:   01/17/25 89.9 kg (198 lb 3.2 oz)   09/14/24 88.9 kg (196 lb)   09/13/24 89.2 kg (196 lb 9.6 oz)     Body mass index is 28.44 kg/m².      PHYSICAL EXAMINATION     Vitals and nursing note reviewed.   Constitutional:       Appearance: Not in distress. Frail. Chronically ill-appearing.   Pulmonary:      Effort: Pulmonary effort is normal.      Breath sounds: Decreased air movement present. Examination of the right-upper field reveals decreased breath sounds and wheezing. Examination of the left-upper field reveals decreased breath sounds. Examination of the right-middle field reveals decreased breath sounds. Examination of the left-middle field reveals decreased breath sounds. Examination of the right-lower field reveals decreased breath sounds. Examination of the left-lower field reveals decreased breath sounds. Wheezing present.   Cardiovascular:      PMI at left midclavicular line. Normal rate. Regular rhythm.      Murmurs: There is no murmur.   Edema:     Peripheral edema absent.   Abdominal:      General: Bowel sounds are normal. There is no distension.           REVIEWED DATA     Procedures      Lab Results   Component Value Date    WBC 8.58 09/14/2024    HGB 12.4 (L) 09/14/2024    HCT 37.5 09/14/2024    MCV 98.7 (H) 09/14/2024     09/14/2024       Lab Results   Component Value Date    HGBA1C 5.10 08/31/2024       Lab Results   Component Value Date    GLUCOSE 118 (H) 09/14/2024    BUN 10 09/14/2024    CREATININE 0.63 (L) 09/14/2024    EGFRRESULT 91.8 08/16/2024    EGFR 92.6 09/14/2024    BCR 15.9 09/14/2024    K 3.9 09/14/2024    CO2 26.5 09/14/2024    CALCIUM 9.2 09/14/2024    PROTENTOTREF 6.2 08/16/2024    ALBUMIN 3.9 09/14/2024    BILITOT 0.5 09/14/2024 "    AST 17 09/14/2024    ALT 16 09/14/2024       Lab Results   Component Value Date    CHOL 132 06/25/2024    CHLPL 163 08/16/2024    TRIG 141 08/16/2024    HDL 61 (H) 08/16/2024    LDL 78 08/16/2024       Results for orders placed during the hospital encounter of 06/04/24    Adult Transthoracic Echo Complete W/ Cont if Necessary Per Protocol    Interpretation Summary    Left ventricular systolic function is normal. Left ventricular ejection fraction appears to be 66 - 70%.    Left ventricular diastolic function is consistent with (grade I) impaired relaxation.    Normal right ventricular cavity size and systolic function noted.    The left atrial cavity is mildly dilated.    Mild tricuspid valve regurgitation is present.    Calculated right ventricular systolic pressure from tricuspid regurgitation is 34 mmHg.    There is no evidence of pericardial effusion        ASSESSMENT & PLAN     Diagnoses and all orders for this visit:    1. Atrial fibrillation with RVR (Primary)    2. Stenosis of carotid artery, unspecified laterality    3. Primary hypertension    4. Mixed hyperlipidemia    5. Paroxysmal atrial fibrillation    6. Vertebral artery occlusion, left    7. Prediabetes    8. ILD (interstitial lung disease)    9. Cellulitis of right foot    10. Pulmonary emphysema, unspecified emphysema type       A-fib with RVR: Hospitalized last year 6/2024 for hypoxic respiratory failure and found to be in A-fib with RVR of rate up to 170s.  Immediate trigger may be COPD exacerbation in the setting of prior tobacco abuse and ongoing alcohol abuse.  On diltiazem 240 daily and digoxin 125 daily for rate control, although daughter will call us back to verify the dose.  No longer on beta-blocker given concomitant ILD/COPD and depression.   Defer amiodarone at this time given underlying COPD and ILD and adequate rate control with patient being largely asymptomatic.  [id number] score 3,on Eliquis 5 bid during recent admission,  tolerating well but strongly emphasized fall precaution.  Hypertension: In office /74, borderline elevated, HR 84.  Recent echo showed normal biventricular function with mild diastolic dysfunction, no significant valvular abnormalities.  On diltiazem as above for rate control, defer escalation given frailty and fall risk.  Hyperlipidemia: Lipids this admission 6/2024 showed HDL 34, LDL 84, reasonably well-controlled.  Continue home atorvastatin 20 daily.  COPD/ILD: Oxygen dependent, management per PCP and other specialists.  Prediabetes: Hemoglobin A1c 6.0 last year 6/2024. Management per primary team and other specialists.  Tobacco and alcohol abuse: > 40 pack-year history of cigarette abuse but patient quit many years ago.  He still consumes whiskey every night and does not appear interested in cutting down nor appears to understand the risk and the implication.  Per daughter, patient plainly will not stop drinking.    I spent 36 minutes caring for Jim on this date of service. This time includes time spent by me in the following activities: preparing for the visit, reviewing tests, performing a medically appropriate examination and/or evaluation, counseling and educating the patient/family/caregiver, referring and communicating with other health care professionals, and documenting information in the medical record.     Additionally, I am providing longitudinal care which includes continuously being a focal point for all of the patient's health care services and ongoing medical care related to A-fib as documented above.    Robby Hall MD. PhD. [facility]  01/17/25  13:49 EST    Part of this note may be an electronic transcription/translation of spoken language to printed text using the Dragon dictation system.

## 2025-03-16 ENCOUNTER — APPOINTMENT (OUTPATIENT)
Dept: GENERAL RADIOLOGY | Facility: HOSPITAL | Age: 89
End: 2025-03-16
Payer: MEDICARE

## 2025-03-16 ENCOUNTER — HOSPITAL ENCOUNTER (INPATIENT)
Facility: HOSPITAL | Age: 89
LOS: 2 days | Discharge: HOME | End: 2025-03-18
Attending: EMERGENCY MEDICINE | Admitting: STUDENT IN AN ORGANIZED HEALTH CARE EDUCATION/TRAINING PROGRAM
Payer: MEDICARE

## 2025-03-16 DIAGNOSIS — E11.628 DIABETIC INFECTION OF RIGHT FOOT: Primary | ICD-10-CM

## 2025-03-16 DIAGNOSIS — L08.9 DIABETIC INFECTION OF RIGHT FOOT: Primary | ICD-10-CM

## 2025-03-16 DIAGNOSIS — D68.9 COAGULOPATHY: ICD-10-CM

## 2025-03-16 DIAGNOSIS — J96.11 CHRONIC RESPIRATORY FAILURE WITH HYPOXIA: ICD-10-CM

## 2025-03-16 DIAGNOSIS — I48.20 CHRONIC ATRIAL FIBRILLATION: ICD-10-CM

## 2025-03-16 LAB
ALBUMIN SERPL-MCNC: 3.8 G/DL (ref 3.5–5.2)
ALBUMIN/GLOB SERPL: 1.2 G/DL
ALP SERPL-CCNC: 68 U/L (ref 39–117)
ALT SERPL W P-5'-P-CCNC: 26 U/L (ref 1–41)
ANION GAP SERPL CALCULATED.3IONS-SCNC: 15.3 MMOL/L (ref 5–15)
AST SERPL-CCNC: 28 U/L (ref 1–40)
BASOPHILS # BLD AUTO: 0.19 10*3/MM3 (ref 0–0.2)
BASOPHILS NFR BLD AUTO: 1.8 % (ref 0–1.5)
BILIRUB SERPL-MCNC: 0.5 MG/DL (ref 0–1.2)
BUN SERPL-MCNC: 12 MG/DL (ref 8–23)
BUN/CREAT SERPL: 14.6 (ref 7–25)
CALCIUM SPEC-SCNC: 9.1 MG/DL (ref 8.6–10.5)
CHLORIDE SERPL-SCNC: 104 MMOL/L (ref 98–107)
CO2 SERPL-SCNC: 19.7 MMOL/L (ref 22–29)
CREAT SERPL-MCNC: 0.82 MG/DL (ref 0.76–1.27)
D-LACTATE SERPL-SCNC: 2 MMOL/L (ref 0.5–2)
DEPRECATED RDW RBC AUTO: 45.5 FL (ref 37–54)
DIGOXIN SERPL-MCNC: 0.7 NG/ML (ref 0.6–1.2)
EGFRCR SERPLBLD CKD-EPI 2021: 85 ML/MIN/1.73
EOSINOPHIL # BLD AUTO: 0.96 10*3/MM3 (ref 0–0.4)
EOSINOPHIL NFR BLD AUTO: 9.2 % (ref 0.3–6.2)
ERYTHROCYTE [DISTWIDTH] IN BLOOD BY AUTOMATED COUNT: 12.1 % (ref 12.3–15.4)
ERYTHROCYTE [SEDIMENTATION RATE] IN BLOOD: 9 MM/HR (ref 0–20)
GLOBULIN UR ELPH-MCNC: 3.1 GM/DL
GLUCOSE SERPL-MCNC: 83 MG/DL (ref 65–99)
HBA1C MFR BLD: 5.4 % (ref 4.8–5.6)
HCT VFR BLD AUTO: 41.6 % (ref 37.5–51)
HGB BLD-MCNC: 14 G/DL (ref 13–17.7)
IMM GRANULOCYTES # BLD AUTO: 0.11 10*3/MM3 (ref 0–0.05)
IMM GRANULOCYTES NFR BLD AUTO: 1.1 % (ref 0–0.5)
LYMPHOCYTES # BLD AUTO: 1.24 10*3/MM3 (ref 0.7–3.1)
LYMPHOCYTES NFR BLD AUTO: 11.8 % (ref 19.6–45.3)
MCH RBC QN AUTO: 33.9 PG (ref 26.6–33)
MCHC RBC AUTO-ENTMCNC: 33.7 G/DL (ref 31.5–35.7)
MCV RBC AUTO: 100.7 FL (ref 79–97)
MONOCYTES # BLD AUTO: 1.53 10*3/MM3 (ref 0.1–0.9)
MONOCYTES NFR BLD AUTO: 14.6 % (ref 5–12)
NEUTROPHILS NFR BLD AUTO: 6.44 10*3/MM3 (ref 1.7–7)
NEUTROPHILS NFR BLD AUTO: 61.5 % (ref 42.7–76)
NRBC BLD AUTO-RTO: 0 /100 WBC (ref 0–0.2)
PLATELET # BLD AUTO: 207 10*3/MM3 (ref 140–450)
PMV BLD AUTO: 8.6 FL (ref 6–12)
POTASSIUM SERPL-SCNC: 4.3 MMOL/L (ref 3.5–5.2)
PROCALCITONIN SERPL-MCNC: 0.03 NG/ML (ref 0–0.25)
PROT SERPL-MCNC: 6.9 G/DL (ref 6–8.5)
RBC # BLD AUTO: 4.13 10*6/MM3 (ref 4.14–5.8)
SODIUM SERPL-SCNC: 139 MMOL/L (ref 136–145)
WBC NRBC COR # BLD AUTO: 10.47 10*3/MM3 (ref 3.4–10.8)

## 2025-03-16 PROCEDURE — 85025 COMPLETE CBC W/AUTO DIFF WBC: CPT | Performed by: EMERGENCY MEDICINE

## 2025-03-16 PROCEDURE — 25010000002 VANCOMYCIN 10 G RECONSTITUTED SOLUTION: Performed by: EMERGENCY MEDICINE

## 2025-03-16 PROCEDURE — 80053 COMPREHEN METABOLIC PANEL: CPT | Performed by: EMERGENCY MEDICINE

## 2025-03-16 PROCEDURE — 84145 PROCALCITONIN (PCT): CPT | Performed by: EMERGENCY MEDICINE

## 2025-03-16 PROCEDURE — 87040 BLOOD CULTURE FOR BACTERIA: CPT | Performed by: EMERGENCY MEDICINE

## 2025-03-16 PROCEDURE — 80162 ASSAY OF DIGOXIN TOTAL: CPT | Performed by: EMERGENCY MEDICINE

## 2025-03-16 PROCEDURE — 83605 ASSAY OF LACTIC ACID: CPT | Performed by: EMERGENCY MEDICINE

## 2025-03-16 PROCEDURE — 25810000003 SODIUM CHLORIDE 0.9 % SOLUTION: Performed by: EMERGENCY MEDICINE

## 2025-03-16 PROCEDURE — 25010000002 PIPERACILLIN SOD-TAZOBACTAM PER 1 G: Performed by: EMERGENCY MEDICINE

## 2025-03-16 PROCEDURE — 73630 X-RAY EXAM OF FOOT: CPT

## 2025-03-16 PROCEDURE — 83036 HEMOGLOBIN GLYCOSYLATED A1C: CPT | Performed by: STUDENT IN AN ORGANIZED HEALTH CARE EDUCATION/TRAINING PROGRAM

## 2025-03-16 PROCEDURE — 85652 RBC SED RATE AUTOMATED: CPT | Performed by: EMERGENCY MEDICINE

## 2025-03-16 PROCEDURE — 99285 EMERGENCY DEPT VISIT HI MDM: CPT

## 2025-03-16 RX ORDER — BISACODYL 5 MG/1
5 TABLET, DELAYED RELEASE ORAL DAILY PRN
Status: DISCONTINUED | OUTPATIENT
Start: 2025-03-16 | End: 2025-03-18 | Stop reason: HOSPADM

## 2025-03-16 RX ORDER — AMOXICILLIN 250 MG
2 CAPSULE ORAL 2 TIMES DAILY PRN
Status: DISCONTINUED | OUTPATIENT
Start: 2025-03-16 | End: 2025-03-18 | Stop reason: HOSPADM

## 2025-03-16 RX ORDER — NICOTINE POLACRILEX 4 MG
15 LOZENGE BUCCAL
Status: DISCONTINUED | OUTPATIENT
Start: 2025-03-16 | End: 2025-03-18 | Stop reason: HOSPADM

## 2025-03-16 RX ORDER — ENOXAPARIN SODIUM 100 MG/ML
40 INJECTION SUBCUTANEOUS DAILY
Status: DISCONTINUED | OUTPATIENT
Start: 2025-03-17 | End: 2025-03-16

## 2025-03-16 RX ORDER — SODIUM CHLORIDE 0.9 % (FLUSH) 0.9 %
10 SYRINGE (ML) INJECTION AS NEEDED
Status: DISCONTINUED | OUTPATIENT
Start: 2025-03-16 | End: 2025-03-18 | Stop reason: HOSPADM

## 2025-03-16 RX ORDER — FOLIC ACID 1 MG/1
1 TABLET ORAL DAILY
Status: DISCONTINUED | OUTPATIENT
Start: 2025-03-17 | End: 2025-03-18 | Stop reason: HOSPADM

## 2025-03-16 RX ORDER — VANCOMYCIN/0.9 % SOD CHLORIDE 1.5G/250ML
1500 PLASTIC BAG, INJECTION (ML) INTRAVENOUS EVERY 24 HOURS
Status: DISCONTINUED | OUTPATIENT
Start: 2025-03-17 | End: 2025-03-17

## 2025-03-16 RX ORDER — DEXTROSE MONOHYDRATE 25 G/50ML
25 INJECTION, SOLUTION INTRAVENOUS
Status: DISCONTINUED | OUTPATIENT
Start: 2025-03-16 | End: 2025-03-18 | Stop reason: HOSPADM

## 2025-03-16 RX ORDER — SODIUM CHLORIDE 9 MG/ML
40 INJECTION, SOLUTION INTRAVENOUS AS NEEDED
Status: DISCONTINUED | OUTPATIENT
Start: 2025-03-16 | End: 2025-03-18 | Stop reason: HOSPADM

## 2025-03-16 RX ORDER — SODIUM CHLORIDE 0.9 % (FLUSH) 0.9 %
10 SYRINGE (ML) INJECTION EVERY 12 HOURS SCHEDULED
Status: DISCONTINUED | OUTPATIENT
Start: 2025-03-16 | End: 2025-03-18 | Stop reason: HOSPADM

## 2025-03-16 RX ORDER — IBUPROFEN 600 MG/1
1 TABLET ORAL
Status: DISCONTINUED | OUTPATIENT
Start: 2025-03-16 | End: 2025-03-18 | Stop reason: HOSPADM

## 2025-03-16 RX ORDER — DIGOXIN 125 MCG
125 TABLET ORAL
Status: DISCONTINUED | OUTPATIENT
Start: 2025-03-17 | End: 2025-03-18 | Stop reason: HOSPADM

## 2025-03-16 RX ORDER — LORAZEPAM 1 MG/1
2 TABLET ORAL
Status: DISCONTINUED | OUTPATIENT
Start: 2025-03-16 | End: 2025-03-18 | Stop reason: HOSPADM

## 2025-03-16 RX ORDER — LORAZEPAM 2 MG/ML
2 INJECTION INTRAMUSCULAR
Status: DISCONTINUED | OUTPATIENT
Start: 2025-03-16 | End: 2025-03-18 | Stop reason: HOSPADM

## 2025-03-16 RX ORDER — VANCOMYCIN 2 GRAM/500 ML IN 0.9 % SODIUM CHLORIDE INTRAVENOUS
20 ONCE
Status: COMPLETED | OUTPATIENT
Start: 2025-03-16 | End: 2025-03-16

## 2025-03-16 RX ORDER — POLYETHYLENE GLYCOL 3350 17 G/17G
17 POWDER, FOR SOLUTION ORAL DAILY PRN
Status: DISCONTINUED | OUTPATIENT
Start: 2025-03-16 | End: 2025-03-18 | Stop reason: HOSPADM

## 2025-03-16 RX ORDER — ONDANSETRON 2 MG/ML
4 INJECTION INTRAMUSCULAR; INTRAVENOUS EVERY 6 HOURS PRN
Status: DISCONTINUED | OUTPATIENT
Start: 2025-03-16 | End: 2025-03-18 | Stop reason: HOSPADM

## 2025-03-16 RX ORDER — ATORVASTATIN CALCIUM 20 MG/1
20 TABLET, FILM COATED ORAL DAILY
Status: DISCONTINUED | OUTPATIENT
Start: 2025-03-17 | End: 2025-03-18 | Stop reason: HOSPADM

## 2025-03-16 RX ORDER — LORAZEPAM 2 MG/ML
1 INJECTION INTRAMUSCULAR
Status: DISCONTINUED | OUTPATIENT
Start: 2025-03-16 | End: 2025-03-18 | Stop reason: HOSPADM

## 2025-03-16 RX ORDER — MULTIPLE VITAMINS W/ MINERALS TAB 9MG-400MCG
1 TAB ORAL DAILY
Status: DISCONTINUED | OUTPATIENT
Start: 2025-03-17 | End: 2025-03-18 | Stop reason: HOSPADM

## 2025-03-16 RX ORDER — LORAZEPAM 1 MG/1
1 TABLET ORAL
Status: DISCONTINUED | OUTPATIENT
Start: 2025-03-16 | End: 2025-03-18 | Stop reason: HOSPADM

## 2025-03-16 RX ORDER — BISACODYL 10 MG
10 SUPPOSITORY, RECTAL RECTAL DAILY PRN
Status: DISCONTINUED | OUTPATIENT
Start: 2025-03-16 | End: 2025-03-18 | Stop reason: HOSPADM

## 2025-03-16 RX ORDER — DILTIAZEM HYDROCHLORIDE 120 MG/1
240 CAPSULE, COATED, EXTENDED RELEASE ORAL DAILY
Status: DISCONTINUED | OUTPATIENT
Start: 2025-03-17 | End: 2025-03-18 | Stop reason: HOSPADM

## 2025-03-16 RX ORDER — BUDESONIDE AND FORMOTEROL FUMARATE DIHYDRATE 160; 4.5 UG/1; UG/1
2 AEROSOL RESPIRATORY (INHALATION)
Status: DISCONTINUED | OUTPATIENT
Start: 2025-03-17 | End: 2025-03-18 | Stop reason: HOSPADM

## 2025-03-16 RX ORDER — IPRATROPIUM BROMIDE AND ALBUTEROL SULFATE 2.5; .5 MG/3ML; MG/3ML
3 SOLUTION RESPIRATORY (INHALATION) EVERY 6 HOURS PRN
Status: DISCONTINUED | OUTPATIENT
Start: 2025-03-16 | End: 2025-03-18 | Stop reason: HOSPADM

## 2025-03-16 RX ORDER — INSULIN LISPRO 100 [IU]/ML
2-7 INJECTION, SOLUTION INTRAVENOUS; SUBCUTANEOUS
Status: DISCONTINUED | OUTPATIENT
Start: 2025-03-17 | End: 2025-03-18 | Stop reason: HOSPADM

## 2025-03-16 RX ORDER — THIAMINE HYDROCHLORIDE 100 MG/ML
200 INJECTION, SOLUTION INTRAMUSCULAR; INTRAVENOUS EVERY 8 HOURS SCHEDULED
Status: DISCONTINUED | OUTPATIENT
Start: 2025-03-17 | End: 2025-03-18 | Stop reason: HOSPADM

## 2025-03-16 RX ADMIN — Medication 10 ML: at 23:38

## 2025-03-16 RX ADMIN — PIPERACILLIN AND TAZOBACTAM 3.38 G: 3; .375 INJECTION, POWDER, FOR SOLUTION INTRAVENOUS at 19:54

## 2025-03-16 RX ADMIN — SODIUM CHLORIDE 2000 MG: 9 INJECTION, SOLUTION INTRAVENOUS at 21:41
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
## 2025-03-16 NOTE — ED TRIAGE NOTES
Patient to ED via EMS from home c/o right foot redness and swelling x 1 month. Patient has open sore on the bottom of the same foot. Patient has been seen by home health. Patient does not have feeling in the right foot that is normal for him.

## 2025-03-17 ENCOUNTER — APPOINTMENT (OUTPATIENT)
Dept: MRI IMAGING | Facility: HOSPITAL | Age: 89
End: 2025-03-17
Payer: MEDICARE

## 2025-03-17 ENCOUNTER — APPOINTMENT (OUTPATIENT)
Dept: CARDIOLOGY | Facility: HOSPITAL | Age: 89
End: 2025-03-17
Payer: MEDICARE

## 2025-03-17 LAB
ANION GAP SERPL CALCULATED.3IONS-SCNC: 12 MMOL/L (ref 5–15)
BASOPHILS # BLD AUTO: 0.15 10*3/MM3 (ref 0–0.2)
BASOPHILS NFR BLD AUTO: 1.6 % (ref 0–1.5)
BH CV LOWER VASCULAR LEFT COMMON FEMORAL AUGMENT: NORMAL
BH CV LOWER VASCULAR LEFT COMMON FEMORAL COMPETENT: NORMAL
BH CV LOWER VASCULAR LEFT COMMON FEMORAL COMPRESS: NORMAL
BH CV LOWER VASCULAR LEFT COMMON FEMORAL PHASIC: NORMAL
BH CV LOWER VASCULAR LEFT COMMON FEMORAL SPONT: NORMAL
BH CV LOWER VASCULAR RIGHT COMMON FEMORAL AUGMENT: NORMAL
BH CV LOWER VASCULAR RIGHT COMMON FEMORAL COMPETENT: NORMAL
BH CV LOWER VASCULAR RIGHT COMMON FEMORAL COMPRESS: NORMAL
BH CV LOWER VASCULAR RIGHT COMMON FEMORAL PHASIC: NORMAL
BH CV LOWER VASCULAR RIGHT COMMON FEMORAL SPONT: NORMAL
BH CV LOWER VASCULAR RIGHT DISTAL FEMORAL COMPRESS: NORMAL
BH CV LOWER VASCULAR RIGHT GASTRONEMIUS COMPRESS: NORMAL
BH CV LOWER VASCULAR RIGHT GREATER SAPH AK COMPRESS: NORMAL
BH CV LOWER VASCULAR RIGHT GREATER SAPH BK COMPRESS: NORMAL
BH CV LOWER VASCULAR RIGHT LESSER SAPH COMPRESS: NORMAL
BH CV LOWER VASCULAR RIGHT MID FEMORAL AUGMENT: NORMAL
BH CV LOWER VASCULAR RIGHT MID FEMORAL COMPETENT: NORMAL
BH CV LOWER VASCULAR RIGHT MID FEMORAL COMPRESS: NORMAL
BH CV LOWER VASCULAR RIGHT MID FEMORAL PHASIC: NORMAL
BH CV LOWER VASCULAR RIGHT MID FEMORAL SPONT: NORMAL
BH CV LOWER VASCULAR RIGHT PERONEAL COMPRESS: NORMAL
BH CV LOWER VASCULAR RIGHT POPLITEAL AUGMENT: NORMAL
BH CV LOWER VASCULAR RIGHT POPLITEAL COMPETENT: NORMAL
BH CV LOWER VASCULAR RIGHT POPLITEAL COMPRESS: NORMAL
BH CV LOWER VASCULAR RIGHT POPLITEAL PHASIC: NORMAL
BH CV LOWER VASCULAR RIGHT POPLITEAL SPONT: NORMAL
BH CV LOWER VASCULAR RIGHT POSTERIOR TIBIAL COMPRESS: NORMAL
BH CV LOWER VASCULAR RIGHT PROFUNDA FEMORAL COMPRESS: NORMAL
BH CV LOWER VASCULAR RIGHT PROXIMAL FEMORAL COMPRESS: NORMAL
BH CV LOWER VASCULAR RIGHT SAPHENOFEMORAL JUNCTION COMPRESS: NORMAL
BUN SERPL-MCNC: 10 MG/DL (ref 8–23)
BUN/CREAT SERPL: 13.7 (ref 7–25)
CALCIUM SPEC-SCNC: 8.5 MG/DL (ref 8.6–10.5)
CHLORIDE SERPL-SCNC: 105 MMOL/L (ref 98–107)
CO2 SERPL-SCNC: 23 MMOL/L (ref 22–29)
CREAT SERPL-MCNC: 0.73 MG/DL (ref 0.76–1.27)
DEPRECATED RDW RBC AUTO: 45.2 FL (ref 37–54)
EGFRCR SERPLBLD CKD-EPI 2021: 88.1 ML/MIN/1.73
EOSINOPHIL # BLD AUTO: 1.05 10*3/MM3 (ref 0–0.4)
EOSINOPHIL NFR BLD AUTO: 11.3 % (ref 0.3–6.2)
ERYTHROCYTE [DISTWIDTH] IN BLOOD BY AUTOMATED COUNT: 12.2 % (ref 12.3–15.4)
GLUCOSE BLDC GLUCOMTR-MCNC: 107 MG/DL (ref 70–130)
GLUCOSE BLDC GLUCOMTR-MCNC: 109 MG/DL (ref 70–130)
GLUCOSE BLDC GLUCOMTR-MCNC: 97 MG/DL (ref 70–130)
GLUCOSE SERPL-MCNC: 93 MG/DL (ref 65–99)
HCT VFR BLD AUTO: 39.3 % (ref 37.5–51)
HGB BLD-MCNC: 13.1 G/DL (ref 13–17.7)
IMM GRANULOCYTES # BLD AUTO: 0.1 10*3/MM3 (ref 0–0.05)
IMM GRANULOCYTES NFR BLD AUTO: 1.1 % (ref 0–0.5)
LYMPHOCYTES # BLD AUTO: 0.98 10*3/MM3 (ref 0.7–3.1)
LYMPHOCYTES NFR BLD AUTO: 10.6 % (ref 19.6–45.3)
MCH RBC QN AUTO: 33.2 PG (ref 26.6–33)
MCHC RBC AUTO-ENTMCNC: 33.3 G/DL (ref 31.5–35.7)
MCV RBC AUTO: 99.7 FL (ref 79–97)
MONOCYTES # BLD AUTO: 1.19 10*3/MM3 (ref 0.1–0.9)
MONOCYTES NFR BLD AUTO: 12.8 % (ref 5–12)
NEUTROPHILS NFR BLD AUTO: 5.8 10*3/MM3 (ref 1.7–7)
NEUTROPHILS NFR BLD AUTO: 62.6 % (ref 42.7–76)
NRBC BLD AUTO-RTO: 0 /100 WBC (ref 0–0.2)
PLATELET # BLD AUTO: 167 10*3/MM3 (ref 140–450)
PMV BLD AUTO: 9.9 FL (ref 6–12)
POTASSIUM SERPL-SCNC: 4 MMOL/L (ref 3.5–5.2)
RBC # BLD AUTO: 3.94 10*6/MM3 (ref 4.14–5.8)
SODIUM SERPL-SCNC: 140 MMOL/L (ref 136–145)
WBC NRBC COR # BLD AUTO: 9.27 10*3/MM3 (ref 3.4–10.8)

## 2025-03-17 PROCEDURE — 93971 EXTREMITY STUDY: CPT | Performed by: SURGERY

## 2025-03-17 PROCEDURE — 94799 UNLISTED PULMONARY SVC/PX: CPT

## 2025-03-17 PROCEDURE — A9577 INJ MULTIHANCE: HCPCS | Performed by: STUDENT IN AN ORGANIZED HEALTH CARE EDUCATION/TRAINING PROGRAM

## 2025-03-17 PROCEDURE — 94761 N-INVAS EAR/PLS OXIMETRY MLT: CPT

## 2025-03-17 PROCEDURE — 73720 MRI LWR EXTREMITY W/O&W/DYE: CPT

## 2025-03-17 PROCEDURE — 25010000002 VANCOMYCIN 1 G RECONSTITUTED SOLUTION 1 EACH VIAL: Performed by: STUDENT IN AN ORGANIZED HEALTH CARE EDUCATION/TRAINING PROGRAM

## 2025-03-17 PROCEDURE — 25010000002 CEFTRIAXONE PER 250 MG: Performed by: INTERNAL MEDICINE

## 2025-03-17 PROCEDURE — 85025 COMPLETE CBC W/AUTO DIFF WBC: CPT | Performed by: STUDENT IN AN ORGANIZED HEALTH CARE EDUCATION/TRAINING PROGRAM

## 2025-03-17 PROCEDURE — 25510000002 GADOBENATE DIMEGLUMINE 529 MG/ML SOLUTION: Performed by: STUDENT IN AN ORGANIZED HEALTH CARE EDUCATION/TRAINING PROGRAM

## 2025-03-17 PROCEDURE — 25010000002 THIAMINE PER 100 MG: Performed by: STUDENT IN AN ORGANIZED HEALTH CARE EDUCATION/TRAINING PROGRAM

## 2025-03-17 PROCEDURE — 93971 EXTREMITY STUDY: CPT

## 2025-03-17 PROCEDURE — 99223 1ST HOSP IP/OBS HIGH 75: CPT | Performed by: INTERNAL MEDICINE

## 2025-03-17 PROCEDURE — 80048 BASIC METABOLIC PNL TOTAL CA: CPT | Performed by: STUDENT IN AN ORGANIZED HEALTH CARE EDUCATION/TRAINING PROGRAM

## 2025-03-17 PROCEDURE — 25010000002 PIPERACILLIN SOD-TAZOBACTAM PER 1 G: Performed by: STUDENT IN AN ORGANIZED HEALTH CARE EDUCATION/TRAINING PROGRAM

## 2025-03-17 PROCEDURE — 36415 COLL VENOUS BLD VENIPUNCTURE: CPT | Performed by: STUDENT IN AN ORGANIZED HEALTH CARE EDUCATION/TRAINING PROGRAM

## 2025-03-17 PROCEDURE — G0545 PR INHERENT VISIT TO INPT: HCPCS | Performed by: INTERNAL MEDICINE

## 2025-03-17 PROCEDURE — 94640 AIRWAY INHALATION TREATMENT: CPT

## 2025-03-17 PROCEDURE — 82948 REAGENT STRIP/BLOOD GLUCOSE: CPT

## 2025-03-17 PROCEDURE — 25810000003 SODIUM CHLORIDE 0.9 % SOLUTION 250 ML FLEX CONT: Performed by: STUDENT IN AN ORGANIZED HEALTH CARE EDUCATION/TRAINING PROGRAM

## 2025-03-17 RX ORDER — FLUTICASONE FUROATE, UMECLIDINIUM BROMIDE AND VILANTEROL TRIFENATATE 200; 62.5; 25 UG/1; UG/1; UG/1
1 POWDER RESPIRATORY (INHALATION)
COMMUNITY

## 2025-03-17 RX ADMIN — DIGOXIN 125 MCG: 0.12 TABLET ORAL at 11:38

## 2025-03-17 RX ADMIN — THIAMINE HYDROCHLORIDE 200 MG: 100 INJECTION, SOLUTION INTRAMUSCULAR; INTRAVENOUS at 00:06

## 2025-03-17 RX ADMIN — PIPERACILLIN AND TAZOBACTAM 3.38 G: 3; .375 INJECTION, POWDER, FOR SOLUTION INTRAVENOUS at 03:21

## 2025-03-17 RX ADMIN — Medication 10 ML: at 21:01

## 2025-03-17 RX ADMIN — APIXABAN 5 MG: 5 TABLET, FILM COATED ORAL at 21:00

## 2025-03-17 RX ADMIN — THIAMINE HYDROCHLORIDE 200 MG: 100 INJECTION, SOLUTION INTRAMUSCULAR; INTRAVENOUS at 16:17

## 2025-03-17 RX ADMIN — GADOBENATE DIMEGLUMINE 18 ML: 529 INJECTION, SOLUTION INTRAVENOUS at 02:46

## 2025-03-17 RX ADMIN — CEFTRIAXONE 2000 MG: 2 INJECTION, POWDER, FOR SOLUTION INTRAMUSCULAR; INTRAVENOUS at 16:18

## 2025-03-17 RX ADMIN — SODIUM CHLORIDE 1000 MG: 9 INJECTION, SOLUTION INTRAVENOUS at 09:19

## 2025-03-17 RX ADMIN — APIXABAN 5 MG: 5 TABLET, FILM COATED ORAL at 00:06

## 2025-03-17 RX ADMIN — SODIUM CHLORIDE 1000 MG: 9 INJECTION, SOLUTION INTRAVENOUS at 21:01

## 2025-03-17 RX ADMIN — APIXABAN 5 MG: 5 TABLET, FILM COATED ORAL at 09:28

## 2025-03-17 RX ADMIN — BUDESONIDE AND FORMOTEROL FUMARATE DIHYDRATE 2 PUFF: 160; 4.5 AEROSOL RESPIRATORY (INHALATION) at 19:19

## 2025-03-17 RX ADMIN — THIAMINE HYDROCHLORIDE 200 MG: 100 INJECTION, SOLUTION INTRAMUSCULAR; INTRAVENOUS at 09:19

## 2025-03-17 RX ADMIN — Medication 10 ML: at 09:02

## 2025-03-17 RX ADMIN — ATORVASTATIN CALCIUM 20 MG: 20 TABLET, FILM COATED ORAL at 09:19

## 2025-03-17 RX ADMIN — BUDESONIDE AND FORMOTEROL FUMARATE DIHYDRATE 2 PUFF: 160; 4.5 AEROSOL RESPIRATORY (INHALATION) at 07:01

## 2025-03-17 RX ADMIN — DILTIAZEM HYDROCHLORIDE 240 MG: 120 CAPSULE, EXTENDED RELEASE ORAL at 09:19

## 2025-03-17 RX ADMIN — FOLIC ACID 1 MG: 1 TABLET ORAL at 09:19

## 2025-03-17 RX ADMIN — PIPERACILLIN AND TAZOBACTAM 3.38 G: 3; .375 INJECTION, POWDER, FOR SOLUTION INTRAVENOUS at 11:38

## 2025-03-17 RX ADMIN — Medication 1 TABLET: at 09:19

## 2025-03-17 NOTE — CONSULTS
"[facility] consult d/t ETOH. Spoke w/ primary RN, Sona, who reports pt lives at home alone and is independent. Pt in room alone upon entry. Introduced self and role. Pt immediately declined assessment and denied that he has a problem w/ ETOH. Stated \"I don't wake up in the morning and need a drink like other men I know.\" Pt unable to identify any ways that ETOH is impacting his health or life and believes that his ETOH use is being made into \"something it isn't.\" Denies interest in tx resources or f/u. Encouraged pt to request for [facility] to return if he changes his mind. Updated primary RN. Access will sign off.   "

## 2025-03-17 NOTE — PLAN OF CARE
Goal Outcome Evaluation:                pt aox4, vss, no c/o pain, wound care orders in chart, CIWA score is essentially 0-- pt reports tremors is his baseline, doppler was normal

## 2025-03-17 NOTE — PLAN OF CARE
Goal Outcome Evaluation:      Patient admitted last night.Alert and oriented.Denies pain or SOA.IV ABX given per MAR.MRI of R foot completed.Doppler of RLE pending.Tremors at baseline.CIWA of 3.Access consult.Podiatry.Plan of care ongoing

## 2025-03-17 NOTE — H&P
Patient Name:  Jim Marvin  YOB: 1935  MRN:  [id number]  Admit Date:  3/16/2025  Patient Care Team:  Provider, No Known as PCP - Ruben Jack Jr., MD as Consulting Physician (Urology)      Subjective   History Present Illness     Chief Complaint   Patient presents with    Wound Check    Foot Swelling       Mr. Marvin is a 89 y.o. male with a history of type 2 diabetes mellitus, paroxysmal atrial fibrillation, interstitial lung disease on chronic oxygen therapy, hypertension, and previous CVA that presents to [facility] complaining of redness and swelling in his right foot.  The patient was admitted in August/September 2024 for this same diabetic foot wound.  He reports that the wound never fully healed.  At that time, cultures grew MRSA.  MRI did not show osteomyelitis.  He was treated empirically with vancomycin and transition to doxycycline at discharge.  Patient is unsure when the swelling in his lower extremities started to worsen.  Due to his significant neuropathy, he does not have any sensation in his feet at baseline.  No fevers that he knows of.  Patient is tremulous on exam but states that he has had a resting tremor for over 40 years.  He denies chest pain or shortness of breath.  In the ER, patient with pretty significant erythema and swelling of his right lower extremity.  Given this, he is being admitted to [facility] for further management.    Personal History     Past Medical History:   Diagnosis Date    COPD (chronic obstructive pulmonary disease)     Hyperlipidemia     Hypertension     Stroke      Past Surgical History:   Procedure Laterality Date    CATARACT EXTRACTION Left 07/06/2022     History reviewed. No pertinent family history.  Social History     Tobacco Use    Smoking status: Former     Current packs/day: 0.00     Average packs/day: 1 pack/day for 40.0 years (40.0 ttl pk-yrs)     Types: Cigarettes     Start date: 1956     Quit date: 1996      Years since quittin.2     Passive exposure: Past    Smokeless tobacco: Never    Tobacco comments:     Quit 19 years ago    Vaping Use    Vaping status: Never Used   Substance Use Topics    Alcohol use: Not Currently     Comment: 15-20 jack and diet coke a week    Drug use: Never     No current facility-administered medications on file prior to encounter.     Current Outpatient Medications on File Prior to Encounter   Medication Sig Dispense Refill    apixaban (ELIQUIS) 5 MG tablet tablet Take 1 tablet by mouth Every 12 (Twelve) Hours. Indications: Atrial Fibrillation 180 tablet 1    atorvastatin (LIPITOR) 20 MG tablet Take 1 tablet by mouth Daily. 90 tablet 1    budesonide-formoterol (SYMBICORT) 160-4.5 MCG/ACT inhaler Inhale 2 puffs 2 (Two) Times a Day. 1 each 0    digoxin (LANOXIN) 125 MCG tablet Take 1 tablet by mouth Daily. 90 tablet 1    dilTIAZem CD (CARDIZEM CD) 240 MG 24 hr capsule Take 1 capsule by mouth Daily. 90 capsule 1    EPINEPHrine (EPIPEN) 0.3 MG/0.3ML solution auto-injector injection ADMINISTER 0.3 ML IN THE MUSCLE 1 TIME FOR 1 DOSE AS DIRECTED BY PRESCRIBER      ipratropium-albuterol (DUO-NEB) 0.5-2.5 mg/3 ml nebulizer Inhale the contents of 1 vial by nebulization Every 6 (Six) Hours As Needed for Wheezing for up to 30 days. 360 mL 0    metFORMIN ER (GLUCOPHAGE-XR) 500 MG 24 hr tablet Take 1 tablet by mouth Daily With Breakfast. 90 tablet 1    O2 (OXYGEN) 3 Liter DAILY (route: Oxygen)       Allergies   Allergen Reactions    Ace Inhibitors Other (See Comments)     Cannot remember     Lisinopril Other (See Comments)     Cannot remember       Objective    Objective     Vital Signs  Temp:  [96.4 °F (35.8 °C)-97.1 °F (36.2 °C)] 97.1 °F (36.2 °C)  Heart Rate:  [66-74] 66  Resp:  [16-18] 16  BP: (140-168)/(80-87) 165/83  SpO2:  [93 %-95 %] 95 %  on  Flow (L/min) (Oxygen Therapy):  [4] 4;   Device (Oxygen Therapy): nasal cannula  Body mass index is 29.67 kg/m².    Physical Exam  General: Alert, no  acute distress.  Sitting up in bed.  Chronically ill-appearing.  ENT: No conjunctival injection or scleral icterus. Moist mucous membranes.  Neuro: Eyes open and moving in all directions, strength normal in all extremities, face symmetric, no focal deficits.   Lungs: Clear to auscultation bilaterally. No wheeze or crackles. No distress.   Heart: RRR, no murmurs.   Abdomen: Obese.  Soft, non-tender, non-distended. Normal bowel sounds.  Ext: Right lower extremity with erythema and warmth in the calf and foot, edema noted.  Circular lesion on plantar surface of right foot at base of fifth metatarsal head, no active drainage noted.  Photograph in chart.  Skin: Dry skin on bilateral lower extremities.  IV site without swelling or erythema.     Lab Results (last 24 hours)       Procedure Component Value Units Date/Time    Blood Culture - Blood, Hand, Right [[id number]] Collected: 03/16/25 1832    Specimen: Blood from Hand, Right Updated: 03/16/25 1837    Blood Culture - Blood, Arm, Left [[id number]] Collected: 03/16/25 1832    Specimen: Blood from Arm, Left Updated: 03/16/25 2140    CBC & Differential [[id number]]  (Abnormal) Collected: 03/16/25 1849    Specimen: Blood Updated: 03/16/25 1858    Narrative:      The following orders were created for panel order CBC & Differential.  Procedure                               Abnormality         Status                     ---------                               -----------         ------                     CBC Auto Differential[[id number]]        Abnormal            Final result                 Please view results for these tests on the individual orders.    Comprehensive Metabolic Panel [[id number]]  (Abnormal) Collected: 03/16/25 1849    Specimen: Blood Updated: 03/16/25 1920     Glucose 83 mg/dL      BUN 12 mg/dL      Creatinine 0.82 mg/dL      Sodium 139 mmol/L      Potassium 4.3 mmol/L      Comment: Slight hemolysis detected by analyzer. Result may be falsely elevated.         Chloride 104 mmol/L      CO2 19.7 mmol/L      Calcium 9.1 mg/dL      Total Protein 6.9 g/dL      Albumin 3.8 g/dL      ALT (SGPT) 26 U/L      AST (SGOT) 28 U/L      Alkaline Phosphatase 68 U/L      Total Bilirubin 0.5 mg/dL      Globulin 3.1 gm/dL      A/G Ratio 1.2 g/dL      BUN/Creatinine Ratio 14.6     Anion Gap 15.3 mmol/L      eGFR 85.0 mL/min/1.73     Narrative:      GFR Categories in Chronic Kidney Disease (CKD)      GFR Category          GFR (mL/min/1.73)    Interpretation  G1                     90 or greater         Normal or high (1)  G2                      60-89                Mild decrease (1)  G3a                   45-59                Mild to moderate decrease  G3b                   30-44                Moderate to severe decrease  G4                    15-29                Severe decrease  G5                    14 or less           Kidney failure          (1)In the absence of evidence of kidney disease, neither GFR category G1 or G2 fulfill the criteria for CKD.    eGFR calculation 2021 CKD-EPI creatinine equation, which does not include race as a factor    Sedimentation Rate [[id number]]  (Normal) Collected: 03/16/25 1849    Specimen: Blood Updated: 03/16/25 1903     Sed Rate 9 mm/hr     CBC Auto Differential [[id number]]  (Abnormal) Collected: 03/16/25 1849    Specimen: Blood Updated: 03/16/25 1858     WBC 10.47 10*3/mm3      RBC 4.13 10*6/mm3      Hemoglobin 14.0 g/dL      Hematocrit 41.6 %      .7 fL      MCH 33.9 pg      MCHC 33.7 g/dL      RDW 12.1 %      RDW-SD 45.5 fl      MPV 8.6 fL      Platelets 207 10*3/mm3      Neutrophil % 61.5 %      Lymphocyte % 11.8 %      Monocyte % 14.6 %      Eosinophil % 9.2 %      Basophil % 1.8 %      Immature Grans % 1.1 %      Neutrophils, Absolute 6.44 10*3/mm3      Lymphocytes, Absolute 1.24 10*3/mm3      Monocytes, Absolute 1.53 10*3/mm3      Eosinophils, Absolute 0.96 10*3/mm3      Basophils, Absolute 0.19 10*3/mm3      Immature Grans, Absolute 0.11  "10*3/mm3      nRBC 0.0 /100 WBC     Digoxin Level [[id number]]  (Normal) Collected: 03/16/25 1849    Specimen: Blood Updated: 03/16/25 1941     Digoxin 0.70 ng/mL     Procalcitonin [[id number]]  (Normal) Collected: 03/16/25 1849    Specimen: Blood Updated: 03/16/25 1946     Procalcitonin 0.03 ng/mL     Narrative:      As a Marker for Sepsis (Non-Neonates):    1. <0.5 ng/mL represents a low risk of severe sepsis and/or septic shock.  2. >2 ng/mL represents a high risk of severe sepsis and/or septic shock.    As a Marker for Lower Respiratory Tract Infections that require antibiotic therapy:    PCT on Admission    Antibiotic Therapy       6-12 Hrs later    >0.5                Strongly Recommended  >0.25 - <0.5        Recommended   0.1 - 0.25          Discouraged              Remeasure/reassess PCT  <0.1                Strongly Discouraged     Remeasure/reassess PCT    As 28 day mortality risk marker: \"Change in Procalcitonin Result\" (>80% or <=80%) if Day 0 (or Day 1) and Day 4 values are available. Refer to http://[email or web address]-[email or web address]    Change in PCT <=80%  A decrease of PCT levels below or equal to 80% defines a positive change in PCT test result representing a higher risk for 28-day all-cause mortality of patients diagnosed with severe sepsis for septic shock.    Change in PCT >80%  A decrease of PCT levels of more than 80% defines a negative change in PCT result representing a lower risk for 28-day all-cause mortality of patients diagnosed with severe sepsis or septic shock.       Lactic Acid, Plasma [[id number]]  (Normal) Collected: 03/16/25 1953    Specimen: Blood Updated: 03/16/25 2023     Lactate 2.0 mmol/L             Imaging Results (Last 24 Hours)       Procedure Component Value Units Date/Time    XR Foot 3+ View Right [[id number]] Collected: 03/16/25 1928     Updated: 03/16/25 1935    Narrative:      3 VIEWS RIGHT FOOT     HISTORY: Foot ulcer     COMPARISON: September 14, 2024.      FINDINGS:  No " acute fracture or subluxation of the right foot is seen. There is an  ulceration noted along the lateral aspect of the little toe metatarsal  head. No underlying aggressive osseous abnormalities are seen. There are  some degenerative changes, most significant at the tarsal metatarsal  joint of the great toe.          Impression:      Soft tissue ulceration seen along the lateral aspect of the foot at the  level of the metatarsal head of the little toe. No underlying aggressive  osseous abnormality is seen.     This report was finalized on 3/16/2025 7:32 PM by Dr. Mya Kapoor M.D on Workstation: [id number]               Results for orders placed during the hospital encounter of 06/04/24    Adult Transthoracic Echo Complete W/ Cont if Necessary Per Protocol    Interpretation Summary    Left ventricular systolic function is normal. Left ventricular ejection fraction appears to be 66 - 70%.    Left ventricular diastolic function is consistent with (grade I) impaired relaxation.    Normal right ventricular cavity size and systolic function noted.    The left atrial cavity is mildly dilated.    Mild tricuspid valve regurgitation is present.    Calculated right ventricular systolic pressure from tricuspid regurgitation is 34 mmHg.    There is no evidence of pericardial effusion    No orders to display     Assessment/Plan   Assessment & Plan   Active Hospital Problems    Diagnosis  POA    **Diabetic foot infection [E11.628, L08.9]  Yes    Alcohol use disorder [F10.90]  Yes    Chronic respiratory failure with hypoxia [J96.11]  Yes    Paroxysmal atrial fibrillation [I48.0]  Yes    Tremor of both hands [R25.1]  Yes    ILD (interstitial lung disease) [J84.9]  Yes    History of cerebellar stroke [Z86.73]  Not Applicable    Hypertension [I10]  Yes      Resolved Hospital Problems   No resolved problems to display.       89 y.o. male admitted with Diabetic foot infection.    Diabetic foot wound  -XR right foot with soft  tissue ulceration, no obvious osseous abnormality  -Patient admitted in August/September 2024 for the same.  MRI at that time not consistent with osteomyelitis.  Treated with vancomycin initially and transition to doxycycline.  -Blood cultures pending.  With no active drainage, wound culture likely not high yield.  -Vancomycin and Zosyn.  Wound culture previously growing MRSA, but with the apparent worsening and erythema/swelling of lower extremity, suspect polymicrobial infection.  Will cover broadly at this time.  -Check duplex right lower extremity  -Check MRI right foot, do have concern for potential osteomyelitis with worsening swelling  -Consult podiatry    Type 2 diabetes mellitus  -Most recent hemoglobin A1c normal at 5.10  -Hold home metformin  -Check hemoglobin A1c  -Start SSI, titrate insulin as needed based on requirements    Hypertension  Paroxysmal atrial fibrillation  -Home meds: Eliquis, digoxin, diltiazem  -Digoxin level normal  -Resume home meds    Alcohol use disorder with concern for withdrawal  -Patient has baseline tremor, so scoring may be difficult  -Somewhat inconsistent with reported intake but endorses at least 2 mixed drinks at night  -Start CIWA protocol with Ativan as needed for withdrawal  -Access consult    Metabolic acidosis  -Bicarbonate mildly low at 19.7, anion gap 15.3  -Lactic acid normal  -No signs of hypoperfusion on exam.  Will monitor clinically for now.  Repeat BMP with morning labs.  Further workup if acidosis worsens.    Interstitial lung disease  Chronic hypoxic respiratory failure  -Resume home inhalers  -Chronically on 3 L nasal cannula, continue      Eliquis for DVT prophylaxis  Full code.  Daughter is healthcare surrogate.  Discussed with patient and ED provider.  Current and home medication list reviewed      Yumi Linder MD  [facility]  03/16/25  23:11 EDT

## 2025-03-17 NOTE — CONSULTS
Referring Provider: Yumi Linder MD      Subjective   History of present illness: Very nice 88-year-old with a history of hypertension, diabetes mellitus type 2 COPD and alcohol dependence, neuropathy and hyperlipidemia we are asked to see for diabetic foot infection.  He is known to me from prior admission in August/September 2024 when he had an MRSA cellulitis of the right foot.  MRI showed no bony infection he was treated with IV vancomycin and discharged on doxycycline.  Readmitted to [facility] on 3/16/2024 with worsening appearance of the wound and increasing erythema.  He was started empirically on vancomycin and Zosyn.  ID and podiatry were asked to evaluate.  Plain films were negative and MRI was therefore obtained showing no evidence for underlying abscess or osteomyelitis but poor vascular his soft tissue and ongoing wound with generalized forefoot cellulitis.  Patient says that he really came to the hospital because his daughter was concerned about the appearance of the wound.  He thinks it may have been doing unwell for some time now but really cannot characterize it.  He denies any fever.  No drainage.  No recent antibiotic use.    Physical Exam:   Vital Signs   Temp:  [96.4 °F (35.8 °C)-97.3 °F (36.3 °C)] 97.2 °F (36.2 °C)  Heart Rate:  [66-80] 80  Resp:  [16-22] 20  BP: (140-169)/(52-87) 142/52    GENERAL: Awake and alert, in no acute distress.   HEENT: Oropharynx is clear. Hearing is grossly normal.   EYES: . No conjunctival injection. No lid lag.   LUNGS:normal respiratory effort.   SKIN: Left fifth plantar ulceration with approximately dime sized and central areas of wound necrosis  PSYCHIATRIC: Appropriate mood, affect, insight, and judgment.     Results Review:  White count 9.27, hemoglobin 13, platelet 167  Creatinine 0.73  Liver function test normal  Blood cultures pending  MRI of the right foot:: As per HPI  Plain films of the right foot independently interpreted: No obvious  osseous abnormalities    A/p  Left foot diabetic foot infection  Diabetes mellitus type 2, continue glycemic control efforts to prevent/control infectious complications  Infection control: Contact isolation for history of MRSA    Fortunately the MRI was negative for deep infection.  He does have some necrosis around the edge of the wound and some surrounding erythema but the underlying tissues look viable.  Given history of MRSA, I agree with vancomycin as dosed for an AUC of 400-600.  We will check vancomycin trough tomorrow and adjust as needed.  I will discontinue the Zosyn in favor of ceftriaxone 2 g IV every 24 hours.  Follow-up podiatry recommendations.  Suspect he will need oral antibiotics at discharge and ongoing wound care after discharge.           Thank you for this consult.  We will continue to follow along and tailor antibiotics as the patient's clinical course evolves.                                                              The above decisions regarding antimicrobials incorporates elements of engaging in complex medical decision-making associated with antimicrobial prescribing including considerations such as antimicrobial resistance patterns, emergence of new variants/strains, recent antibiotic exposure, interactions/complications from comorbidities including concurrent infections, public health considerations to minimize development of antimicrobial resistance, and emerging and re-emerging infections.

## 2025-03-17 NOTE — CONSULTS
Podiatry Consult Note      Patient: Jim Marvin Admit Date: 2025    Age: 89 y.o.   PCP: Antonio Finley MD    MRN: [id number]  Room: [facility]/        Subjective     Chief Complaint     Chief Complaint   Patient presents with    Wound Check    Foot Swelling        HPI      this is an 89-year-old male who presented to hospital with concern for worsening redness to the right lateral foot ulceration.  Patient states been present for several months.  I did see him several months ago during his admission and an MRI did not show osteomyelitis or an abscess.  He has continued conservative therapy.  He did have someone come to his home to address the wound but this was several months ago.  No other complaints at this time.    Past Medical History     Past Medical History:   Diagnosis Date    COPD (chronic obstructive pulmonary disease)     Hyperlipidemia     Hypertension     Stroke         Past Surgical History:   Procedure Laterality Date    CATARACT EXTRACTION Left 2022        Allergies   Allergen Reactions    Ace Inhibitors Other (See Comments)     Cannot remember     Lisinopril Other (See Comments)     Cannot remember        Social History     Tobacco Use   Smoking Status Former    Current packs/day: 0.00    Average packs/day: 1 pack/day for 40.0 years (40.0 ttl pk-yrs)    Types: Cigarettes    Start date:     Quit date:     Years since quittin.2    Passive exposure: Past   Smokeless Tobacco Never   Tobacco Comments    Quit 19 years ago         Objective   Physical Exam    Vitals:    25 0702   BP: 142/52   Pulse: 80   Resp:    Temp: 97.2 °F (36.2 °C)   SpO2: 94%        Dermatology:   Full-thickness ulceration to the plantar aspect of the fifth metatarsal head that measures 1.0 cm x 1.0 cm.  There is a hyperkeratotic rim.  There is a granular layer base.  No purulence, mild erythema, mild edema present.  there is scaliness all the way up to the level of the knee    Vascular:   DP and PT  pulses are  faintlypalpable    Neurological: light touch and protective sensation are absent      Labs     Lab Results   Component Value Date    HGBA1C 5.40 03/16/2025    POCGLU 107 03/17/2025    SEDRATE 9 03/16/2025        CBC:      Lab 03/17/25  0544 03/16/25  1849   WBC 9.27 10.47   HEMOGLOBIN 13.1 14.0   HEMATOCRIT 39.3 41.6   PLATELETS 167 207   NEUTROS ABS 5.80 6.44   IMMATURE GRANS (ABS) 0.10* 0.11*   LYMPHS ABS 0.98 1.24   MONOS ABS 1.19* 1.53*   EOS ABS 1.05* 0.96*   MCV 99.7* 100.7*          Results for orders placed or performed during the hospital encounter of 08/30/24   Blood Culture - Blood, Arm, Right    Specimen: Arm, Right; Blood   Result Value Ref Range    Blood Culture No growth at 5 days         Duplex Venous Lower Extremity - Right CAR    Normal right lower extremity venous duplex scan.  MRI Foot Right With & Without Contrast  Narrative: MRI RIGHT FOREFOOT WITH AND WITHOUT CONTRAST     HISTORY: Diabetic foot wound. Rash. Evaluate for osteomyelitis.     TECHNIQUE:  MRI includes coronal, short axis T1, T1 fat-sat, T2 fat-sat,  axial T1, STIR, sagittal PD fat-saturated sequences.  Contrast was  administered IV followed by axial, coronal, sagittal T1 fat-saturated  sequences.     COMPARISON: Right foot x-rays 03/16/2025, MRI right foot 08/31/2024.     FINDINGS: Soft tissue wound/ulcer plantar and lateral to the head of the  fifth metatarsal. There is diminished enhancement of the plantar lateral  forefoot soft tissues surrounding this ulcer associated with poorly  vascularized tissue. There is no evidence for abscess or drainable  collection. Bone marrow signal within the head of the fifth metatarsal  and fifth proximal phalanx is within normal limits and there is no  evidence for osteomyelitis.     There is generalized edematous subcutaneous fat involving the midfoot  and forefoot consistent with cellulitis. Muscular edema and atrophy are  present consistent with neuropathy.     Midfoot alignment  appears within normal limits. No fracture. Chronic  arthritic changes first MTP joint with reactive bone marrow edema  lateral hallux sesamoid.     Impression: 1. Soft tissue wound/ulcer plantar to the fifth MTP joint and  fifth  metatarsal head with underlying poorly vascularized soft tissue along  the plantar lateral forefoot. No evidence for underlying abscess or  osteomyelitis.  2. Generalized forefoot cellulitis. Muscular edema and atrophy  attributed to chronic neuropathy.  3. Chronic arthritic changes first MTP joint with reactive marrow edema  in the lateral hallux sesamoid.     This report was finalized on 3/17/2025 8:26 AM by Gabino Haider M.D  on Workstation: [id number]          Assessment/Plan      89-year-old male with pressure ulcer to the plantar aspect of the fifth metatarsal head of the right foot    -patient examined and evaluated by myself  - Agree with MRI findings, no concern for deep infection or osteomyelitis  - Patient should be doing Betadine wet-to-dry bandage daily  - This is a pressure point and this needs to be offloaded with his shoes or postop shoe.  Patient is at risk for this worsening if he puts continued excessive weight through this area  - No intervention needed from my standpoint, patient can follow-up with wound care center postoperatively      Daniel Pedraza DPM  [facility]  Office: [phone]

## 2025-03-17 NOTE — PROGRESS NOTES
"[facility] Clinical Pharmacy Services: Vancomycin Pharmacokinetic Initial Consult Note    Jim Marvin is a 89 y.o. male who is on day 1 of pharmacy to dose vancomycin.    Indication: Skin and Soft Tissue  Consulting Provider: Dr. Linder  Planned Duration of Therapy: 7 days  Loading Dose Ordered or Given: 2000 mg on 3/16 at 2141  Culture/Source:   3/16 blood culture in process  Target: -600 mg/L.hr   Other Antimicrobials: Zosyn 3.375 g iv every 8 hours    Vitals/Labs  Ht: 177.8 cm (70\"); Wt: 89.1 kg (196 lb 6.9 oz)  Temp Readings from Last 1 Encounters:   03/16/25 97.3 °F (36.3 °C) (Oral)    Estimated Creatinine Clearance: 71.3 mL/min (by C-G formula based on SCr of 0.82 mg/dL).     Results from last 7 days   Lab Units 03/16/25  1849   CREATININE mg/dL 0.82   WBC 10*3/mm3 10.47     Assessment/Plan:    Vancomycin Dose:   1500 mg IV every  24  hours  Predictive AUC level for the dose ordered is 456 mg/L.hr, which is within the target of 400-600 mg/L.hr  Vanc Trough has been ordered for 3/18 at 0930     Pharmacy will follow patient's kidney function and will adjust doses and obtain levels as necessary. Thank you for involving pharmacy in this patient's care. Please contact pharmacy with any questions or concerns.                           Jim Young III, [facility]  Clinical Pharmacist    "

## 2025-03-17 NOTE — PROGRESS NOTES
"[facility] Clinical Pharmacy Services: Vancomycin Monitoring Note    Jim Marvin is a 89 y.o. male who is on day 2/7 of pharmacy to dose vancomycin for Skin and Soft Tissue.    Previous Vancomycin Dose:   Loading dose: 2000 mg at 2141 3/16  Updated Cultures and Sensitivities:   -3/16 blood (2 sets)  pending      Vitals/Labs  Ht: 177.8 cm (70\"); Wt: 89.1 kg (196 lb 6.9 oz)   Temp Readings from Last 1 Encounters:   03/17/25 97.2 °F (36.2 °C) (Oral)     Estimated Creatinine Clearance: 80.1 mL/min (A) (by C-G formula based on SCr of 0.73 mg/dL (L)).       Results from last 7 days   Lab Units 03/17/25  0544 03/16/25  1849   CREATININE mg/dL 0.73* 0.82   WBC 10*3/mm3 9.27 10.47     Assessment/Plan    Current Vancomycin Dose: 1500 mg IV every  24  hours; provides a predicted  mg/L.hr , lower AUC prediction is due to lower scr this am. Since this maintenance regimen has not started yet, will increase to 1000 mg iv q12, starting at 1000 today to provide a predicted AUC of 546 mg/L.hr.  Next Level Date and Time: Vanc Trough on 3/18 at 0930  We will continue to monitor patient changes and renal function     Thank you for involving pharmacy in this patient's care. Please contact pharmacy with any questions or concerns.       Manish Murillo, [facility]  Clinical Pharmacist          "

## 2025-03-17 NOTE — ED NOTES
Nursing report ED to floor  Jim BEATTY Yon  89 y.o.  male    HPI :  HPI  Stated Reason for Visit: wound check, foot swelling  History Obtained From: EMS, patient    Chief Complaint  Chief Complaint   Patient presents with    Wound Check    Foot Swelling       Admitting doctor:   Yumi Linder MD    Admitting diagnosis:   The primary encounter diagnosis was Diabetic infection of right foot. Diagnoses of Chronic respiratory failure with hypoxia, Chronic atrial fibrillation, and Coagulopathy: Eliquis induced were also pertinent to this visit.    Code status:   Current Code Status       Date Active Code Status Order ID Comments User Context       Prior            Allergies:   Ace inhibitors and Lisinopril    Isolation:   Contact    Intake and Output  No intake or output data in the 24 hours ending 03/16/25 2152    Weight:       03/16/25 2019   Weight: 93.8 kg (206 lb 12.7 oz)       Most recent vitals:   Vitals:    03/16/25 1752 03/16/25 2019 03/16/25 2038 03/16/25 2101   BP: 143/81 140/80 157/85 168/84   BP Location:  Right arm  Right arm   Patient Position:  Sitting  Sitting   Pulse: 74 70  68   Resp: 18 16  16   Temp: 96.4 °F (35.8 °C) 97 °F (36.1 °C)  97.1 °F (36.2 °C)   TempSrc:  Tympanic  Tympanic   SpO2: 95% 95%  93%   Weight:  93.8 kg (206 lb 12.7 oz)         Active LDAs/IV Access:   Lines, Drains & Airways       Active LDAs       Name Placement date Placement time Site Days    Peripheral IV 03/16/25 1852 Left;Posterior Hand 03/16/25 1852  Hand  less than 1                    Labs (abnormal labs have a star):   Labs Reviewed   COMPREHENSIVE METABOLIC PANEL - Abnormal; Notable for the following components:       Result Value    CO2 19.7 (*)     Anion Gap 15.3 (*)     All other components within normal limits    Narrative:     GFR Categories in Chronic Kidney Disease (CKD)      GFR Category          GFR (mL/min/1.73)    Interpretation  G1                     90 or greater         Normal or high (1)  G2           "            60-89                Mild decrease (1)  G3a                   45-59                Mild to moderate decrease  G3b                   30-44                Moderate to severe decrease  G4                    15-29                Severe decrease  G5                    14 or less           Kidney failure          (1)In the absence of evidence of kidney disease, neither GFR category G1 or G2 fulfill the criteria for CKD.    eGFR calculation 2021 CKD-EPI creatinine equation, which does not include race as a factor   CBC WITH AUTO DIFFERENTIAL - Abnormal; Notable for the following components:    RBC 4.13 (*)     .7 (*)     MCH 33.9 (*)     RDW 12.1 (*)     Lymphocyte % 11.8 (*)     Monocyte % 14.6 (*)     Eosinophil % 9.2 (*)     Basophil % 1.8 (*)     Immature Grans % 1.1 (*)     Monocytes, Absolute 1.53 (*)     Eosinophils, Absolute 0.96 (*)     Immature Grans, Absolute 0.11 (*)     All other components within normal limits   SEDIMENTATION RATE - Normal   DIGOXIN LEVEL - Normal   LACTIC ACID, PLASMA - Normal   PROCALCITONIN - Normal    Narrative:     As a Marker for Sepsis (Non-Neonates):    1. <0.5 ng/mL represents a low risk of severe sepsis and/or septic shock.  2. >2 ng/mL represents a high risk of severe sepsis and/or septic shock.    As a Marker for Lower Respiratory Tract Infections that require antibiotic therapy:    PCT on Admission    Antibiotic Therapy       6-12 Hrs later    >0.5                Strongly Recommended  >0.25 - <0.5        Recommended   0.1 - 0.25          Discouraged              Remeasure/reassess PCT  <0.1                Strongly Discouraged     Remeasure/reassess PCT    As 28 day mortality risk marker: \"Change in Procalcitonin Result\" (>80% or <=80%) if Day 0 (or Day 1) and Day 4 values are available. Refer to http://www.[employer]-pct-calculator.com    Change in PCT <=80%  A decrease of PCT levels below or equal to 80% defines a positive change in PCT test result representing a " higher risk for 28-day all-cause mortality of patients diagnosed with severe sepsis for septic shock.    Change in PCT >80%  A decrease of PCT levels of more than 80% defines a negative change in PCT result representing a lower risk for 28-day all-cause mortality of patients diagnosed with severe sepsis or septic shock.      BLOOD CULTURE   BLOOD CULTURE   CBC AND DIFFERENTIAL    Narrative:     The following orders were created for panel order CBC & Differential.  Procedure                               Abnormality         Status                     ---------                               -----------         ------                     CBC Auto Differential[[id number]]        Abnormal            Final result                 Please view results for these tests on the individual orders.       EKG:   No orders to display       Meds given in ED:   Medications   vancomycin IVPB 2000 mg in 0.9% Sodium Chloride 500 mL (2,000 mg Intravenous New Bag 3/16/25 2141)   piperacillin-tazobactam (ZOSYN) 3.375 g IVPB in 100 mL NS MBP (CD) (0 g Intravenous Stopped 3/16/25 2035)       Imaging results:  XR Foot 3+ View Right  Result Date: 3/16/2025  Soft tissue ulceration seen along the lateral aspect of the foot at the level of the metatarsal head of the little toe. No underlying aggressive osseous abnormality is seen.  This report was finalized on 3/16/2025 7:32 PM by Dr. Mya Kapoor M.D on Workstation: [id number]        Ambulatory status:   - up with assistance    Social issues:   Social History     Socioeconomic History    Marital status:    Tobacco Use    Smoking status: Former     Current packs/day: 0.00     Average packs/day: 1 pack/day for 40.0 years (40.0 ttl pk-yrs)     Types: Cigarettes     Start date:      Quit date:      Years since quittin.2     Passive exposure: Past    Smokeless tobacco: Never    Tobacco comments:     Quit 19 years ago    Vaping Use    Vaping status: Never Used   Substance and  Sexual Activity    Alcohol use: Not Currently     Comment: 15-20 francisca and diet coke a week    Drug use: Never    Sexual activity: Not Currently     Partners: Female       Peripheral Neurovascular  Peripheral Neurovascular (Adult)  Peripheral Neurovascular WDL: WDL    Neuro Cognitive  Neuro Cognitive (Adult)  Cognitive/Neuro/Behavioral WDL: WDL    Learning  Learning Assessment  Learning Readiness and Ability: no barriers identified    Respiratory  Respiratory WDL  Respiratory WDL: WDL    Abdominal Pain  Safety Interventions  Safety Precautions/Falls Reduction: assistive device/personal items within reach, fall reduction program maintained, nonskid shoes/slippers when out of bed, family at bedside    Pain Assessments  Pain (Adult)  (0-10) Pain Rating: Rest: 0  (0-10) Pain Rating: Activity: 0    NIH Stroke Scale       Radha Mercado RN  03/16/25 21:52 EDT

## 2025-03-17 NOTE — CASE MANAGEMENT/SOCIAL WORK
Discharge Planning Assessment  [facility]     Patient Name: Jim Marvin  MRN: [id number]  Today's Date: 3/17/2025    Admit Date: 3/16/2025    Plan: Plan home with family.  NAMRATA Wolf RN   Discharge Needs Assessment       Row Name 03/17/25 0944       Living Environment    People in Home alone    Current Living Arrangements home    Potentially Unsafe Housing Conditions none    In the past 12 months has the electric, gas, oil, or water company threatened to shut off services in your home? No    Primary Care Provided by self    Provides Primary Care For no one    Family Caregiver if Needed child(linda), adult    Family Caregiver Names Daughter  ( Madisyn Mcgarry [phone])    Quality of Family Relationships helpful;involved;supportive    Able to Return to Prior Arrangements yes    Living Arrangement Comments Pt lives alone in a single story house.       Resource/Environmental Concerns    Resource/Environmental Concerns none    Transportation Concerns none       Transportation Needs    In the past 12 months, has lack of transportation kept you from medical appointments or from getting medications? no    In the past 12 months, has lack of transportation kept you from meetings, work, or from getting things needed for daily living? No       Food Insecurity    Within the past 12 months, you worried that your food would run out before you got the money to buy more. Never true    Within the past 12 months, the food you bought just didn't last and you didn't have money to get more. Never true       Transition Planning    Patient/Family Anticipates Transition to home    Patient/Family Anticipated Services at Transition none    Transportation Anticipated family or friend will provide       Discharge Needs Assessment    Readmission Within the Last 30 Days no previous admission in last 30 days    Equipment Currently Used at Home bath bench;nebulizer;oxygen;pulse ox;walker, rolling    Concerns to be Addressed no discharge  needs identified;denies needs/concerns at this time    Anticipated Changes Related to Illness none    Equipment Needed After Discharge bath bench;nebulizer;oxygen;pulse ox;walker, rolling                   Discharge Plan       Row Name 03/17/25 0946       Plan    Plan Plan home with family.  NAMRATA Wolf RN    Patient/Family in Agreement with Plan yes    Plan Comments FACE SHEET VERIFIED/ IM LETTER SIGNED.  Spoke with pt at bedside. Pt's PCP is Dr. Antonio Finley.  Pt lives alone in a single story house. Pt is independent with ADLs.  Pt is on home O2 provided by [place].  Pt also has a bath bench, nebulizer, pulse ox, and rolling walker for home use if needed.  Pt gets his prescriptions at [facility].  Pt does not have issues affording medications. Pt is not current with . Pt has been in [facility] for skilled care.  Pt denies any discharge needs at present. Pt's daughter will assist pt at home if needed and will transport pt home.  Pt states his plan is to return home.  NAMRATA Wolf RN                    Expected Discharge Date and Time       Expected Discharge Date Expected Discharge Time    Mar 21, 2025            Demographic Summary       Row Name 03/17/25 0943       General Information    Admission Type inpatient    Arrived From emergency department    Required Notices Provided Important Message from Medicare    Referral Source admission list    Reason for Consult discharge planning    Preferred Language English                   Functional Status       Row Name 03/17/25 0943       Functional Status    Usual Activity Tolerance moderate    Current Activity Tolerance moderate       Functional Status, IADL    Medications independent    Meal Preparation independent    Housekeeping independent    Laundry independent    Shopping independent       Mental Status    General Appearance WDL WDL                   Psychosocial    No documentation.                  Abuse/Neglect    No documentation.                   Legal    No documentation.                  Substance Abuse    No documentation.                  Patient Forms    No documentation.                     Aileen Wolf RN

## 2025-03-17 NOTE — NURSING NOTE
"Reason for Visit: [facility] Team consult for right foot, left arm.     Reviewed chart and assessed patient's wounds. Podiatry has been consulted for the right foot. Await their plan. Can update wound orders as needed. May consider betadine or iodoform gel. Wound has a large callus around it with only 0.3x0.3cm visible. Hygiene is an issue- there is sock residue around the callus. Patient not currently performing any wound care at home.     LUE- there is an irregular, firm, raised wound noted with a very irregular center- overgrowth of tissue with a small hole in the center. Patient states that he has shown his PCP. He states, \"He wanted me to go to a dermatologist. Someone mentioned to me about a spider bite. I remember I was sitting outside one day in the sun and I felt a sting to my arm. I thought it was a bee sting but there were not bees. I also never saw a spider.\" Patient reports this event was possibly 8 months ago. The wound scabs but never heals. With the irregularities of this wound, I recommended that the patient see dermatology for a biopsy. The area is concerning for possibly a cancerous lesion. I spoke with Dr Bajwa about the area. Patient states \"I have a lot of things wrong right now, I have to do one thing at a time.\"  Recommend to cleanse with NS. Place a piece of xeroform gauze over the wound and secure with a dressing. Change every other day.   Spoke with RN.       03/17/25 1101   Wound 03/16/25 2348 Right posterior foot Diabetic Ulcer   Placement Date/Time: 03/16/25 2348   Present on Original Admission: Yes  Side: Right  Orientation: posterior  Location: foot  Primary Wound Type: Diabetic Ulcer   Dressing Appearance open to air   Base moist;pink   Periwound dry  (hard, firm, callus)   Edges callused   Drainage Amount none   Care, Wound   (await plan from podiatry consult)   Wound 03/16/25 2353 Left proximal arm   Placement Date/Time: 03/16/25 2353   Present on Original Admission: Yes  Side: Left  " Orientation: proximal  Location: arm   Dressing Appearance intact   Base moist;pink;red   Periwound maroon/purple;pink   Periwound Temperature warm   Periwound Skin Turgor firm   Edges irregular   Drainage Characteristics/Odor serosanguineous   Drainage Amount scant   Care, Wound cleansed with;sterile normal saline   Dressing Care dressing changed;non-adherent;silicone border foam     Treatment Plan/Recommendations: As above. Can update foot plan of care after podiatry evals, if needed.    Wound Team Follow up Plan: WOC team will sign off at this time. Please reconsult if needed.

## 2025-03-17 NOTE — PROGRESS NOTES
11/30/17-  Called Lamonte.  Asked how her dad's appt went. She said he was referred to neurologist; appt made for 12/28. Possibly Parkinson's. Also had US of stomach; fatty liver.    We talked about Lamonte's brother helping out her mom and dad right now; he is not working so has time to go and help them. He might be interested in being hired as their homemaker, Aburto will ask him.  Lamonte said she and her siblings had a meeting and they decided to be more involved in their parent's lives.      We discussed assisted living and Lamonte said [place] people have a strong opinion about this; will not do this. She said her dad is wearing his hearing aides now and then. She said his paranoia and suspiciousness of people continues and told him if he could hear he might not be so paranoid.     Lamonte said her mother has given up on going to adult day care. She is tired of fighting him, she said.    Corrie ROTHMAN, [facility] Care Coordinator   Telephone: [phone]  Fax: [phone]    12/6/17-  Aburto called. She said her brother is going back to [place] in a couple of weeks and will be gone a couple of weeks. She thinks finding a HM for her parents while he is away would be fine so I will start looking. I will also call her with a couple of [employer] companies so he can start the application process.     Aburto also asked me to send a Health Care Directive (which I thought [facility] [employer] already did) for her mom. I asked [facility] to resend Psychiatric.    Corrie ROTHMAN, [facility] Care Coordinator   Telephone: [phone]  Fax: [phone]    12/11/17-  Sent Ha a list of home care agencies.     Corrie ROTHMAN, [facility] Care Coordinator   Telephone: [phone]  Fax: [phone]   [facility] Clinical Pharmacy Services: Piperacillin-Tazobactam Consult    Pt Name: Jim Marvin   : 1935    Indication: Skin and Soft Tissue    Relevant clinical data and objective history reviewed:    Past Medical History:   Diagnosis Date    COPD (chronic obstructive pulmonary disease)     Hyperlipidemia     Hypertension     Stroke      Creatinine   Date Value Ref Range Status   2025 0.82 0.76 - 1.27 mg/dL Final   2024 0.63 (L) 0.76 - 1.27 mg/dL Final   2024 0.59 (L) 0.76 - 1.27 mg/dL Final   2021 0.90 0.60 - 1.30 mg/dL Final     Comment:     Serial Number: [id number]:  [id number]   10/05/2019 0.7 0.7 - 1.5 mg/dL Final   10/04/2019 0.7 0.7 - 1.5 mg/dL Final   10/03/2019 0.8 0.7 - 1.5 mg/dL Final     BUN   Date Value Ref Range Status   2025 12 8 - 23 mg/dL Final   10/05/2019 13 7 - 20 mg/dL Final     Estimated Creatinine Clearance: 73 mL/min (by C-G formula based on SCr of 0.82 mg/dL).    Lab Results   Component Value Date    WBC 10.47 2025     Temp Readings from Last 3 Encounters:   25 97.1 °F (36.2 °C) (Tympanic)   24 97.5 °F (36.4 °C)   24 97.8 °F (36.6 °C) (Oral)      Assessment/Plan  Estimated CrCl >20 mL/min at this time; BMI 29.67 kg/m2  Will start piperacillin-tazobactam 3.375 g IV every 8 hours     Pharmacy will continue to follow daily while on piperacillin-tazobactam and adjust as needed. Thank you for this consult.    Jim Young III [facility]  Clinical Pharmacist

## 2025-03-17 NOTE — CASE MANAGEMENT/SOCIAL WORK
Continued Stay Note  [facility]     Patient Name: Jim Marvin  MRN: [id number]  Today's Date: 3/17/2025    Admit Date: 3/16/2025    Plan: Plan home with family.  NAMRATA Wolf RN   Discharge Plan       Row Name 03/17/25 0946       Plan    Plan Plan home with family.  NAMRATA Wolf RN    Patient/Family in Agreement with Plan yes    Plan Comments FACE SHEET VERIFIED/ IM LETTER SIGNED.  Spoke with pt at bedside. Pt's PCP is Dr. Antonio Finley.  Pt lives alone in a single story house. Pt is independent with ADLs.  Pt is on home O2 provided by [employer].  Pt also has a bath bench, nebulizer, pulse ox, and rolling walker for home use if needed.  Pt gets his prescriptions at [facility].  Pt does not have issues affording medications. Pt is not current with . Pt has been in [facility] for skilled care.  Pt denies any discharge needs at present. Pt's daughter will assist pt at home if needed and will transport pt home.  Pt states his plan is to return home.  NAMRATA Wolf RN                   Discharge Codes    No documentation.                 Expected Discharge Date and Time       Expected Discharge Date Expected Discharge Time    Mar 21, 2025               Aileen Wolf RN

## 2025-03-17 NOTE — PROGRESS NOTES
Name: Jim Marvin ADMIT: 3/16/2025   : 1935  PCP: Antonio Finley MD    MRN: [id number] LOS: 1 days   AGE/SEX: 89 y.o. male  ROOM: [facility]     Subjective   Subjective   No new complaints. Not much pain in his foot. No chest pain. Breathing is at baseline.     Objective   Objective   Vital Signs  Temp:  [96.4 °F (35.8 °C)-97.3 °F (36.3 °C)] 97.2 °F (36.2 °C)  Heart Rate:  [66-80] 80  Resp:  [16-22] 20  BP: (140-169)/(52-87) 142/52  SpO2:  [93 %-98 %] 94 %  on  Flow (L/min) (Oxygen Therapy):  [3-4] 3;   Device (Oxygen Therapy): room air;nasal cannula;simple face mask  Body mass index is 28.18 kg/m².    Physical Exam  Constitutional:       General: He is not in acute distress.     Appearance: He is not toxic-appearing.   HENT:      Head: Normocephalic and atraumatic.   Cardiovascular:      Rate and Rhythm: Normal rate and regular rhythm.   Pulmonary:      Effort: Pulmonary effort is normal. No respiratory distress.      Breath sounds: Normal breath sounds.   Abdominal:      General: Bowel sounds are normal.      Palpations: Abdomen is soft.      Tenderness: There is no abdominal tenderness.   Musculoskeletal:         General: No swelling.   Skin:     General: Skin is warm and dry.      Comments: Right lower leg with dry scaling skin. Mild erythema. Circular lesion without drainage drainage. Looks similar to pictures yesterday   Neurological:      General: No focal deficit present.      Mental Status: He is alert.   Psychiatric:         Mood and Affect: Mood normal.         Behavior: Behavior normal.     Results Review  I reviewed the patient's new clinical results.  Results from last 7 days   Lab Units 25  0544 25  1849   WBC 10*3/mm3 9.27 10.47   HEMOGLOBIN g/dL 13.1 14.0   PLATELETS 10*3/mm3 167 207     Results from last 7 days   Lab Units 25  0544 25  1849   SODIUM mmol/L 140 139   POTASSIUM mmol/L 4.0 4.3   CHLORIDE mmol/L 105 104   CO2 mmol/L 23.0 19.7*   BUN mg/dL 10 12    CREATININE mg/dL 0.73* 0.82   GLUCOSE mg/dL 93 83     Lab Results   Component Value Date    ANIONGAP 12.0 03/17/2025     Estimated Creatinine Clearance: 80.1 mL/min (A) (by C-G formula based on SCr of 0.73 mg/dL (L)).   Lab Results   Component Value Date    EGFR 88.1 03/17/2025     Results from last 7 days   Lab Units 03/16/25  1849   ALBUMIN g/dL 3.8   BILIRUBIN mg/dL 0.5   ALK PHOS U/L 68   AST (SGOT) U/L 28   ALT (SGPT) U/L 26     Results from last 7 days   Lab Units 03/17/25  0544 03/16/25  1849   CALCIUM mg/dL 8.5* 9.1   ALBUMIN g/dL  --  3.8     Results from last 7 days   Lab Units 03/16/25  1953 03/16/25  1849   PROCALCITONIN ng/mL  --  0.03   LACTATE mmol/L 2.0  --      Hemoglobin A1C   Date/Time Value Ref Range Status   03/16/2025 1849 5.40 4.80 - 5.60 % Final     Glucose   Date/Time Value Ref Range Status   03/17/2025 0611 97 70 - 130 mg/dL Final       MRI Foot Right With & Without Contrast  Result Date: 3/17/2025  1. Soft tissue wound/ulcer plantar to the fifth MTP joint and  fifth metatarsal head with underlying poorly vascularized soft tissue along the plantar lateral forefoot. No evidence for underlying abscess or osteomyelitis. 2. Generalized forefoot cellulitis. Muscular edema and atrophy attributed to chronic neuropathy. 3. Chronic arthritic changes first MTP joint with reactive marrow edema in the lateral hallux sesamoid.  This report was finalized on 3/17/2025 8:26 AM by Gabino Haider M.D on Workstation: [id number]      XR Foot 3+ View Right  Result Date: 3/16/2025  Soft tissue ulceration seen along the lateral aspect of the foot at the level of the metatarsal head of the little toe. No underlying aggressive osseous abnormality is seen.  This report was finalized on 3/16/2025 7:32 PM by Dr. Mya Kapoor M.D on Workstation: [id number]        Scheduled Meds  apixaban, 5 mg, Oral, Q12H  atorvastatin, 20 mg, Oral, Daily  budesonide-formoterol, 2 puff, Inhalation, BID - RT  digoxin, 125  mcg, Oral, Daily  dilTIAZem CD, 240 mg, Oral, Daily  folic acid, 1 mg, Oral, Daily  insulin lispro, 2-7 Units, Subcutaneous, 4x Daily AC & at Bedtime  multivitamin with minerals, 1 tablet, Oral, Daily  piperacillin-tazobactam, 3.375 g, Intravenous, Q8H  sodium chloride, 10 mL, Intravenous, Q12H  thiamine (B-1) IV, 200 mg, Intravenous, Q8H   Followed by  [START ON 3/22/2025] thiamine, 100 mg, Oral, Daily  vancomycin, 1,000 mg, Intravenous, Q12H    Continuous Infusions  Pharmacy to dose vancomycin,     PRN Meds    senna-docusate sodium **AND** polyethylene glycol **AND** bisacodyl **AND** bisacodyl    dextrose    dextrose    glucagon (human recombinant)    ipratropium-albuterol    LORazepam **OR** LORazepam **OR** LORazepam **OR** LORazepam **OR** LORazepam **OR** LORazepam    Magnesium Standard Dose Replacement - Follow Nurse / BPA Driven Protocol    ondansetron    Pharmacy to dose vancomycin    sodium chloride    sodium chloride    Pharmacy to dose vancomycin,     Diet  Diet: Diabetic; Consistent Carbohydrate; Fluid Consistency: Thin (IDDSI 0)       Assessment/Plan     Active Hospital Problems    Diagnosis  POA    **Diabetic foot infection [E11.628, L08.9]  Yes    Alcohol use disorder [F10.90]  Yes    Chronic respiratory failure with hypoxia [J96.11]  Yes    Paroxysmal atrial fibrillation [I48.0]  Yes    Tremor of both hands [R25.1]  Yes    ILD (interstitial lung disease) [J84.9]  Yes    History of cerebellar stroke [Z86.73]  Not Applicable    Hypertension [I10]  Yes      Resolved Hospital Problems   No resolved problems to display.     Patient is a 89 y.o. male     Diabetic foot wound  -Patient admitted in August/September 2024 for the same.  MRI at that time not consistent with osteomyelitis.  Treated with vancomycin initially and transition to doxycycline.  -XR right foot with soft tissue ulceration, no obvious osseous abnormality  -MRI earlier this morning no evidence of abscess or osteomyelitis. Duplex  "pending  -Blood cultures in process  -Currently on Zosyn and vancomycin  -Podiatry consulted. Consult ID he has seen in the past    Left proximal forearm lesion  -Recommend follow up with dermatology     Type 2 diabetes mellitus  -A1c 5.40%  -Hold home metformin  -Correctional insulin     Hypertension  Paroxysmal atrial fibrillation  -Home meds: Eliquis, digoxin, diltiazem  -Digoxin level normal     Alcohol use disorder with concern for withdrawal  -Patient has baseline tremor, so scoring may be difficult  -He feels like he drinks \"moderate amount\"  -On CIWA protocol with Ativan as needed in case  -Access consult     Metabolic acidosis  -Mild and resolved  -Lactate and procalcitonin okay     Interstitial lung disease  Chronic hypoxic respiratory failure  -Resume home inhalers  -Flow (L/min) (Oxygen Therapy):  [3-4] 3 (baseline 3 L/min)    Discharge  Likely home soon depending on if any procedures/intervention  Expected Discharge Date: 3/21/2025; Expected Discharge Time:     Discussed with patient and nursing staff and GHADA Bajwa MD  [facility]  03/17/25 10:12 EDT  "

## 2025-03-18 ENCOUNTER — READMISSION MANAGEMENT (OUTPATIENT)
Dept: CALL CENTER | Facility: HOSPITAL | Age: 89
End: 2025-03-18
Payer: MEDICARE

## 2025-03-18 VITALS
HEART RATE: 77 BPM | WEIGHT: 196.43 LBS | OXYGEN SATURATION: 91 % | DIASTOLIC BLOOD PRESSURE: 62 MMHG | SYSTOLIC BLOOD PRESSURE: 141 MMHG | RESPIRATION RATE: 18 BRPM | TEMPERATURE: 97.9 F | HEIGHT: 70 IN | BODY MASS INDEX: 28.12 KG/M2

## 2025-03-18 LAB
ANION GAP SERPL CALCULATED.3IONS-SCNC: 9.4 MMOL/L (ref 5–15)
BASOPHILS # BLD AUTO: 0.14 10*3/MM3 (ref 0–0.2)
BASOPHILS NFR BLD AUTO: 1.6 % (ref 0–1.5)
BUN SERPL-MCNC: 7 MG/DL (ref 8–23)
BUN/CREAT SERPL: 9.1 (ref 7–25)
CALCIUM SPEC-SCNC: 8.5 MG/DL (ref 8.6–10.5)
CHLORIDE SERPL-SCNC: 106 MMOL/L (ref 98–107)
CO2 SERPL-SCNC: 23.6 MMOL/L (ref 22–29)
CREAT SERPL-MCNC: 0.77 MG/DL (ref 0.76–1.27)
DEPRECATED RDW RBC AUTO: 45.3 FL (ref 37–54)
EGFRCR SERPLBLD CKD-EPI 2021: 86.6 ML/MIN/1.73
EOSINOPHIL # BLD AUTO: 1.22 10*3/MM3 (ref 0–0.4)
EOSINOPHIL NFR BLD AUTO: 13.7 % (ref 0.3–6.2)
ERYTHROCYTE [DISTWIDTH] IN BLOOD BY AUTOMATED COUNT: 12.4 % (ref 12.3–15.4)
GLUCOSE BLDC GLUCOMTR-MCNC: 103 MG/DL (ref 70–130)
GLUCOSE BLDC GLUCOMTR-MCNC: 98 MG/DL (ref 70–130)
GLUCOSE SERPL-MCNC: 97 MG/DL (ref 65–99)
HCT VFR BLD AUTO: 39.1 % (ref 37.5–51)
HGB BLD-MCNC: 12.8 G/DL (ref 13–17.7)
IMM GRANULOCYTES # BLD AUTO: 0.09 10*3/MM3 (ref 0–0.05)
IMM GRANULOCYTES NFR BLD AUTO: 1 % (ref 0–0.5)
LYMPHOCYTES # BLD AUTO: 1.06 10*3/MM3 (ref 0.7–3.1)
LYMPHOCYTES NFR BLD AUTO: 11.9 % (ref 19.6–45.3)
MCH RBC QN AUTO: 32.6 PG (ref 26.6–33)
MCHC RBC AUTO-ENTMCNC: 32.7 G/DL (ref 31.5–35.7)
MCV RBC AUTO: 99.5 FL (ref 79–97)
MONOCYTES # BLD AUTO: 1.18 10*3/MM3 (ref 0.1–0.9)
MONOCYTES NFR BLD AUTO: 13.2 % (ref 5–12)
NEUTROPHILS NFR BLD AUTO: 5.22 10*3/MM3 (ref 1.7–7)
NEUTROPHILS NFR BLD AUTO: 58.6 % (ref 42.7–76)
NRBC BLD AUTO-RTO: 0 /100 WBC (ref 0–0.2)
PLATELET # BLD AUTO: 223 10*3/MM3 (ref 140–450)
PMV BLD AUTO: 9.4 FL (ref 6–12)
POTASSIUM SERPL-SCNC: 4 MMOL/L (ref 3.5–5.2)
RBC # BLD AUTO: 3.93 10*6/MM3 (ref 4.14–5.8)
SODIUM SERPL-SCNC: 139 MMOL/L (ref 136–145)
VANCOMYCIN TROUGH SERPL-MCNC: 9.8 MCG/ML (ref 5–20)
WBC NRBC COR # BLD AUTO: 8.91 10*3/MM3 (ref 3.4–10.8)

## 2025-03-18 PROCEDURE — 94761 N-INVAS EAR/PLS OXIMETRY MLT: CPT

## 2025-03-18 PROCEDURE — 97530 THERAPEUTIC ACTIVITIES: CPT

## 2025-03-18 PROCEDURE — 99232 SBSQ HOSP IP/OBS MODERATE 35: CPT | Performed by: INTERNAL MEDICINE

## 2025-03-18 PROCEDURE — 97162 PT EVAL MOD COMPLEX 30 MIN: CPT

## 2025-03-18 PROCEDURE — 94664 DEMO&/EVAL PT USE INHALER: CPT

## 2025-03-18 PROCEDURE — 82948 REAGENT STRIP/BLOOD GLUCOSE: CPT

## 2025-03-18 PROCEDURE — 25010000002 THIAMINE PER 100 MG: Performed by: STUDENT IN AN ORGANIZED HEALTH CARE EDUCATION/TRAINING PROGRAM

## 2025-03-18 PROCEDURE — 80048 BASIC METABOLIC PNL TOTAL CA: CPT | Performed by: STUDENT IN AN ORGANIZED HEALTH CARE EDUCATION/TRAINING PROGRAM

## 2025-03-18 PROCEDURE — 80202 ASSAY OF VANCOMYCIN: CPT | Performed by: STUDENT IN AN ORGANIZED HEALTH CARE EDUCATION/TRAINING PROGRAM

## 2025-03-18 PROCEDURE — 94760 N-INVAS EAR/PLS OXIMETRY 1: CPT

## 2025-03-18 PROCEDURE — 94799 UNLISTED PULMONARY SVC/PX: CPT

## 2025-03-18 PROCEDURE — G0545 PR INHERENT VISIT TO INPT: HCPCS | Performed by: INTERNAL MEDICINE

## 2025-03-18 PROCEDURE — 85025 COMPLETE CBC W/AUTO DIFF WBC: CPT | Performed by: STUDENT IN AN ORGANIZED HEALTH CARE EDUCATION/TRAINING PROGRAM

## 2025-03-18 PROCEDURE — 36415 COLL VENOUS BLD VENIPUNCTURE: CPT | Performed by: STUDENT IN AN ORGANIZED HEALTH CARE EDUCATION/TRAINING PROGRAM

## 2025-03-18 RX ORDER — DOXYCYCLINE 100 MG/1
100 CAPSULE ORAL 2 TIMES DAILY
Qty: 10 CAPSULE | Refills: 0 | Status: SHIPPED | OUTPATIENT
Start: 2025-03-18 | End: 2025-03-18 | Stop reason: HOSPADM

## 2025-03-18 RX ORDER — CEPHALEXIN 500 MG/1
500 CAPSULE ORAL EVERY 6 HOURS SCHEDULED
Qty: 20 CAPSULE | Refills: 0 | Status: SHIPPED | OUTPATIENT
Start: 2025-03-18 | End: 2025-03-23

## 2025-03-18 RX ORDER — DOXYCYCLINE 100 MG/1
100 CAPSULE ORAL EVERY 12 HOURS SCHEDULED
Status: DISCONTINUED | OUTPATIENT
Start: 2025-03-18 | End: 2025-03-18 | Stop reason: HOSPADM

## 2025-03-18 RX ORDER — CEPHALEXIN 500 MG/1
500 CAPSULE ORAL EVERY 6 HOURS SCHEDULED
Status: DISCONTINUED | OUTPATIENT
Start: 2025-03-18 | End: 2025-03-18 | Stop reason: HOSPADM

## 2025-03-18 RX ORDER — DOXYCYCLINE 100 MG/1
100 CAPSULE ORAL EVERY 12 HOURS SCHEDULED
Qty: 9 CAPSULE | Refills: 0 | Status: SHIPPED | OUTPATIENT
Start: 2025-03-18 | End: 2025-03-23

## 2025-03-18 RX ADMIN — FOLIC ACID 1 MG: 1 TABLET ORAL at 08:19

## 2025-03-18 RX ADMIN — Medication 10 ML: at 08:19

## 2025-03-18 RX ADMIN — Medication 1 TABLET: at 08:18

## 2025-03-18 RX ADMIN — APIXABAN 5 MG: 5 TABLET, FILM COATED ORAL at 08:18

## 2025-03-18 RX ADMIN — ATORVASTATIN CALCIUM 20 MG: 20 TABLET, FILM COATED ORAL at 08:19

## 2025-03-18 RX ADMIN — DILTIAZEM HYDROCHLORIDE 240 MG: 120 CAPSULE, EXTENDED RELEASE ORAL at 08:18

## 2025-03-18 RX ADMIN — CEPHALEXIN 500 MG: 500 CAPSULE ORAL at 12:20

## 2025-03-18 RX ADMIN — DIGOXIN 125 MCG: 0.12 TABLET ORAL at 12:20

## 2025-03-18 RX ADMIN — BUDESONIDE AND FORMOTEROL FUMARATE DIHYDRATE 2 PUFF: 160; 4.5 AEROSOL RESPIRATORY (INHALATION) at 06:52

## 2025-03-18 RX ADMIN — DOXYCYCLINE 100 MG: 100 CAPSULE ORAL at 12:20

## 2025-03-18 RX ADMIN — THIAMINE HYDROCHLORIDE 200 MG: 100 INJECTION, SOLUTION INTRAMUSCULAR; INTRAVENOUS at 08:18

## 2025-03-18 NOTE — PROGRESS NOTES
ID note for DFI  S: No sig pain. AF.  Physical Exam:   Vital Signs   Temp:  [97.7 °F (36.5 °C)-98.2 °F (36.8 °C)] 97.9 °F (36.6 °C)  Heart Rate:  [62-79] 71  Resp:  [18-20] 18  BP: (136-158)/(56-97) 136/56    GENERAL: Awake and alert, in no acute distress.   HEENT: Oropharynx is clear. Hearing is grossly normal.   EYES: . No conjunctival injection. No lid lag.   LUNGS:normal respiratory effort.   SKIN: Left fifth plantar ulceration dressed and no cellulitis in exposed areas  PSYCHIATRIC: Appropriate mood, affect, insight, and judgment.     Results Review:  White count 8.9  Cr 0.77  Glc   Blood cultures ngtd       A/p  Left foot diabetic foot infection  Diabetes mellitus type 2, continue glycemic control efforts to prevent/control infectious complications  Infection control: Contact isolation for history of MRSA    Fortunately the MRI was negative for deep infection.  Dr Keila calixto note and appreciated, no operative mgmt.  Will need medical mgmt with abx, wound care, offloading.  DC IV abx  Given history of MRSA, I will order doxycyline 100mg po q12 x5 mor days to complete  I will also order cephalexin 500mg po q6 to cover strep as that is also possible in this situation  D/w Dr Bajwa and no objection to discharge      Thank you for this consult.  We will continue to follow along and tailor antibiotics as the patient's clinical course evolves.                                                                The above decisions regarding antimicrobials incorporates elements of engaging in complex medical decision-making associated with antimicrobial prescribing including considerations such as antimicrobial resistance patterns, emergence of new variants/strains, recent antibiotic exposure, interactions/complications from comorbidities including concurrent infections, public health considerations to minimize development of antimicrobial resistance, and emerging and re-emerging  infections.                                                        The above decisions regarding antimicrobials incorporates elements of engaging in complex medical decision-making associated with antimicrobial prescribing including considerations such as antimicrobial resistance patterns, emergence of new variants/strains, recent antibiotic exposure, interactions/complications from comorbidities including concurrent infections, public health considerations to minimize development of antimicrobial resistance, and emerging and re-emerging infections.

## 2025-03-18 NOTE — OUTREACH NOTE
Prep Survey      Flowsheet Row Responses   [facility] patient discharged from? [place]   Is LACE score < 7 ? No   Eligibility [facility]   Date of Admission 03/16/25   Date of Discharge 03/18/25   Discharge Disposition Home or Self Care   Discharge diagnosis Diabetic foot infection   Does the patient have one of the following disease processes/diagnoses(primary or secondary)? Other   Does the patient have Home health ordered? No   Is there a DME ordered? No   Prep survey completed? Yes            Susie LONGORIA - Registered Nurse

## 2025-03-18 NOTE — PLAN OF CARE
Goal Outcome Evaluation:      Patient resting in bed.Denies any pain.Continues w IV ABX. Wound care completed per orders.No distress noted.
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
## 2025-03-18 NOTE — PLAN OF CARE
Goal Outcome Evaluation:  Plan of Care Reviewed With: patient           Outcome Evaluation: Pt is a 89 yo M who was admitted with a R diabetic foot wound infection. Per podietry note pt should avoid putting pressure on wound when walking. PT provided education on post-op shoe vs heel weight bearing vs lateral foot weight bearing. Pt does not seem interested in post-op shoe due to negative previous experience and pt reports feeling off balance with heel weight bearing. Pt is agreeable to and demonstrates proper lateral foot bearing when ambulating. Pt reports he is not having any other balance or strength issues at this time. Pt has order for DC. If DC is delayed, PT will follow peripheral to monitor gait and safety.    Anticipated Discharge Disposition (PT): home, home with home health

## 2025-03-18 NOTE — DISCHARGE SUMMARY
Date of Discharge:  3/18/2025    PCP: Antonio Finley MD    Discharge Diagnosis:   Active Hospital Problems    Diagnosis  POA    **Diabetic foot infection [E11.628, L08.9]  Yes    Alcohol use disorder [F10.90]  Yes    Chronic respiratory failure with hypoxia [J96.11]  Yes    Paroxysmal atrial fibrillation [I48.0]  Yes    Tremor of both hands [R25.1]  Yes    ILD (interstitial lung disease) [J84.9]  Yes    History of cerebellar stroke [Z86.73]  Not Applicable    Hypertension [I10]  Yes      Resolved Hospital Problems   No resolved problems to display.          Consults       Date and Time Order Name Status Description    3/17/2025 10:19 AM Inpatient Infectious Diseases Consult Completed     3/16/2025 10:59 PM Inpatient Podiatry Consult Completed     3/16/2025  9:08 PM [facility] (on-call MD unless specified) Details            Hospital Course  Patient is a 89 y.o. male with a history of MRSA and recurrent diabetic foot infection presented with a diabetic foot wound. He was started on IV antibiotics. Fortunately MRI did not show any underlying abscess or osteomyelitis. He was seen by infectious diseases and podiatry. Podiatry recommended Betadine wet-to-dry bandage daily. They also recommended pressure-point to be offloaded. No surgical intervention. Currently the plan is to switch to p.o. antibiotics (doxycycline and Keflex) and he will be discharged home today.    I discussed the patient's findings and my recommendations with patient and nursing staff and Dr. Dunn.    Temp:  [97.7 °F (36.5 °C)-98.2 °F (36.8 °C)] 97.9 °F (36.6 °C)  Heart Rate:  [62-79] 71  Resp:  [18-20] 18  BP: (136-158)/(56-97) 136/56  Body mass index is 28.18 kg/m².    Physical Exam  Constitutional:       General: He is not in acute distress.     Appearance: He is not toxic-appearing.   HENT:      Head: Normocephalic and atraumatic.   Cardiovascular:      Rate and Rhythm: Normal rate and regular rhythm.   Pulmonary:      Effort: Pulmonary effort is  normal. No respiratory distress.      Breath sounds: Normal breath sounds.   Abdominal:      General: Bowel sounds are normal.      Palpations: Abdomen is soft.      Tenderness: There is no abdominal tenderness.   Musculoskeletal:         General: No swelling.   Skin:     General: Skin is warm and dry.   Neurological:      General: No focal deficit present.      Mental Status: He is alert.      Motor: Tremor (chronic) present.   Psychiatric:         Mood and Affect: Mood normal.         Behavior: Behavior normal. Behavior is not agitated or aggressive.       Disposition: Home or Self Care       Discharge Medications        New Medications        Instructions Start Date   cephalexin 500 MG capsule  Commonly known as: KEFLEX   500 mg, Oral, Every 6 Hours Scheduled      doxycycline 100 MG capsule  Commonly known as: MONODOX   100 mg, Oral, Every 12 Hours Scheduled             Changes to Medications        Instructions Start Date   atorvastatin 20 MG tablet  Commonly known as: LIPITOR  What changed: when to take this   20 mg, Oral, Daily             Continue These Medications        Instructions Start Date   apixaban 5 MG tablet tablet  Commonly known as: ELIQUIS   5 mg, Oral, Every 12 Hours Scheduled      digoxin 125 MCG tablet  Commonly known as: LANOXIN   125 mcg, Oral, Daily Digoxin      dilTIAZem  MG 24 hr capsule  Commonly known as: CARDIZEM CD   240 mg, Oral, Daily      EPINEPHrine 0.3 MG/0.3ML solution auto-injector injection  Commonly known as: EPIPEN   ADMINISTER 0.3 ML IN THE MUSCLE 1 TIME FOR 1 DOSE AS DIRECTED BY PRESCRIBER      ipratropium-albuterol 0.5-2.5 mg/3 ml nebulizer  Commonly known as: DUO-NEB   Inhale the contents of 1 vial by nebulization Every 6 (Six) Hours As Needed for Wheezing for up to 30 days.      metFORMIN  MG 24 hr tablet  Commonly known as: GLUCOPHAGE-XR   500 mg, Oral, Daily With Breakfast      O2  Commonly known as: OXYGEN   3 Liter DAILY (route: Oxygen)      Trelegy  Ellipta 200-62.5-25 MCG/ACT inhaler  Generic drug: Fluticasone-Umeclidin-Vilant   1 puff, Daily - RT             Stop These Medications      budesonide-formoterol 160-4.5 MCG/ACT inhaler  Commonly known as: SYMBICORT             Diet Instructions       Diet: Regular/House Diet, Diabetic Diets; Consistent Carbohydrate      Discharge Diet:  Regular/House Diet  Diabetic Diets       Diabetic Diet: Consistent Carbohydrate    Texture: Regular Texture (IDDSI 7)    Fluid Consistency: Thin (IDDSI 0)           Activity Instructions       Activity as Tolerated      Betadine wet-to-dry bandage daily. Offload pressure point           Additional Instructions for the Follow-ups that You Need to Schedule       Call MD for problems / concerns.   As directed             Follow-up Information       Antonio Finley MD Follow up in 1 week(s).    Specialty: Family Medicine  Why: After hospital follow up  Contact information:  [address] [facility]  [address] [place] [id number]  [phone]                            Future Appointments   Date Time Provider Department Center   1/23/2026  1:45 PM Robby Hall MD [facility]     Pending Labs       Order Current Status    Blood Culture - Blood, Arm, Left Preliminary result    Blood Culture - Blood, Hand, Right Preliminary result           Nickmónica Bajwa MD  [facility]  03/18/25 12:01 EDT    Discharge time spent greater than 30 minutes.

## 2025-03-18 NOTE — PLAN OF CARE
Problem: Adult Inpatient Plan of Care  Goal: Plan of Care Review  Outcome: Progressing  Flowsheets (Taken 3/18/2025 1417)  Progress: improving  Outcome Evaluation: Patient has been cooperative during shift. No complaints of pain, nausea or SOA. AOx4, ad qiana, SR, 3LO2 baseline. IV antibiotics stopped and PO started. Medications received from Outpatient Pharmacy. Follow up with Dr. Pedraza in 1 week. Dressing change daily and supplies sent home with patient. Will continue to monitor and assist patient as needed.  Plan of Care Reviewed With: patient  Goal: Patient-Specific Goal (Individualized)  Outcome: Progressing  Goal: Absence of Hospital-Acquired Illness or Injury  Outcome: Progressing  Intervention: Identify and Manage Fall Risk  Recent Flowsheet Documentation  Taken 3/18/2025 1315 by Porfirio Garcia RN  Safety Promotion/Fall Prevention:   assistive device/personal items within reach   fall prevention program maintained   nonskid shoes/slippers when out of bed   safety round/check completed  Taken 3/18/2025 1200 by Porfirio Garcia RN  Safety Promotion/Fall Prevention:   assistive device/personal items within reach   fall prevention program maintained   nonskid shoes/slippers when out of bed   safety round/check completed  Taken 3/18/2025 1000 by Porfirio Garcia RN  Safety Promotion/Fall Prevention:   assistive device/personal items within reach   fall prevention program maintained   nonskid shoes/slippers when out of bed   safety round/check completed  Taken 3/18/2025 0819 by Porfirio Garcia RN  Safety Promotion/Fall Prevention:   assistive device/personal items within reach   fall prevention program maintained   nonskid shoes/slippers when out of bed   safety round/check completed  Intervention: Prevent Skin Injury  Recent Flowsheet Documentation  Taken 3/18/2025 1315 by Porfirio Garcia RN  Body Position: supine  Skin Protection: incontinence pads utilized  Taken 3/18/2025 1200 by Porfirio Garcia  RN  Body Position: dangle, side of bed  Taken 3/18/2025 1000 by Porfirio Garcia RN  Body Position: dangle, side of bed  Taken 3/18/2025 0819 by Porfirio Garcia RN  Body Position: dangle, side of bed  Skin Protection: incontinence pads utilized  Goal: Optimal Comfort and Wellbeing  Outcome: Progressing  Goal: Readiness for Transition of Care  Outcome: Progressing     Problem: Comorbidity Management  Goal: Blood Glucose Level Within Target Range  Outcome: Progressing     Problem: Fall Injury Risk  Goal: Absence of Fall and Fall-Related Injury  Outcome: Progressing  Intervention: Promote Injury-Free Environment  Recent Flowsheet Documentation  Taken 3/18/2025 1315 by Porfirio Garcia RN  Safety Promotion/Fall Prevention:   assistive device/personal items within reach   fall prevention program maintained   nonskid shoes/slippers when out of bed   safety round/check completed  Taken 3/18/2025 1200 by Porfirio Garcia RN  Safety Promotion/Fall Prevention:   assistive device/personal items within reach   fall prevention program maintained   nonskid shoes/slippers when out of bed   safety round/check completed  Taken 3/18/2025 1000 by Porfirio Garcia RN  Safety Promotion/Fall Prevention:   assistive device/personal items within reach   fall prevention program maintained   nonskid shoes/slippers when out of bed   safety round/check completed  Taken 3/18/2025 0819 by Porfirio Garcia RN  Safety Promotion/Fall Prevention:   assistive device/personal items within reach   fall prevention program maintained   nonskid shoes/slippers when out of bed   safety round/check completed   Goal Outcome Evaluation:  Plan of Care Reviewed With: patient        Progress: improving  Outcome Evaluation: Patient has been cooperative during shift. No complaints of pain, nausea or SOA. AOx4, ad qiana, SR, 3LO2 baseline. IV antibiotics stopped and PO started. Medications received from Outpatient Pharmacy. Follow up with Dr. Pedraza in 1 week.  Dressing change daily and supplies sent home with patient. Will continue to monitor and assist patient as needed.

## 2025-03-19 ENCOUNTER — TRANSITIONAL CARE MANAGEMENT TELEPHONE ENCOUNTER (OUTPATIENT)
Dept: CALL CENTER | Facility: HOSPITAL | Age: 89
End: 2025-03-19
Payer: MEDICARE

## 2025-03-19 NOTE — OUTREACH NOTE
Call Center TCM Note      Flowsheet Row Responses   [facility] patient discharged from? [facility]   Does the patient have one of the following disease processes/diagnoses(primary or secondary)? Other   TCM attempt successful? Yes  [VR lists Madisyn]   Call start time 1256   Call end time 1309   Discharge diagnosis Diabetic foot infection   Is patient permission given to speak with other caregiver? Yes   List who call center can speak with Madisyn   Person spoke with today (if not patient) and relationship Madisyn   Medication alerts for this patient Madisyn reports that she has organized all the pt's meds   Meds reviewed with patient/caregiver? Yes   Is the patient having any side effects they believe may be caused by any medication additions or changes? No   Does the patient have all medications ordered at discharge? Yes   Comments Appt information provided to dtr in order for her to encourage the pt to make f/u appts.   Does the patient have an appointment with their PCP within 7-14 days of discharge? No   Nursing Interventions Patient declined scheduling/rescheduling appointment at this time   DME comments The pt has a home nebulizre machine and O2 in place.   Comments Per pt's dtr, the pt is capable of performing the wet to dry betadine daily dressing changes to the Right foot, she is unsure whether he will do so or not. The pt declined the boot to wear on Right foot r/t making him off balance, dtr reports, the dtr is unsure of pt's wt bearing status. Pt has tolerated po intake per dtr. Pt is currently sleeping his dtr reports.   Did the patient receive a copy of their discharge instructions? Yes   What is the patient's perception of their health status since discharge? Same   Is the patient/caregiver able to teach back signs and symptoms related to disease process for when to call PCP? Yes   Is the patient/caregiver able to teach back signs and symptoms related to disease process for when to call 911? Yes   TCM  call completed? Yes   Call end time [id number]            Janet Martin, GEGE    3/19/2025, 13:09 EDT

## 2025-03-21 LAB
BACTERIA SPEC AEROBE CULT: NORMAL
BACTERIA SPEC AEROBE CULT: NORMAL
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
## 2025-06-12 ENCOUNTER — TELEPHONE (OUTPATIENT)
Dept: INTERNAL MEDICINE | Facility: CLINIC | Age: 89
End: 2025-06-12
Payer: MEDICARE

## 2025-06-12 NOTE — TELEPHONE ENCOUNTER
"Hub staff attempted to follow warm transfer process and was unsuccessful     Caller: Jim Marvin \"Dereck\"    Relationship to patient: Self    Best call back number: [phone]    Patient is needing: PATIENT WAS RETURNING CALL TO OFFICE TO GET APPOINTMENT R/S FOR SOONER.     "

## 2025-06-12 NOTE — TELEPHONE ENCOUNTER
Hub staff attempted to follow warm transfer process and was unsuccessful     Caller: jessica joya    Relationship to patient: Emergency Contact    Best call back number: [phone]    Patient is needing: PATIENT DAUGHTER ADVISES PATIENT IS HAVING SWELLING IN LEGS AND THEY ARE PURPLE. CALL DISCONNECTED WHEN TRYING TO WT TO NTC.

## 2025-06-13 ENCOUNTER — APPOINTMENT (OUTPATIENT)
Dept: GENERAL RADIOLOGY | Facility: HOSPITAL | Age: 89
End: 2025-06-13
Payer: MEDICARE

## 2025-06-13 ENCOUNTER — HOSPITAL ENCOUNTER (EMERGENCY)
Facility: HOSPITAL | Age: 89
Discharge: HOME | End: 2025-06-13
Attending: EMERGENCY MEDICINE
Payer: MEDICARE

## 2025-06-13 VITALS
DIASTOLIC BLOOD PRESSURE: 62 MMHG | RESPIRATION RATE: 20 BRPM | SYSTOLIC BLOOD PRESSURE: 162 MMHG | TEMPERATURE: 98.4 F | HEART RATE: 67 BPM | OXYGEN SATURATION: 97 %

## 2025-06-13 DIAGNOSIS — I87.2 VENOUS STASIS DERMATITIS OF BOTH LOWER EXTREMITIES: Primary | ICD-10-CM

## 2025-06-13 LAB
ALBUMIN SERPL-MCNC: 3.3 G/DL (ref 3.5–5.2)
ALBUMIN/GLOB SERPL: 0.9 G/DL
ALP SERPL-CCNC: 71 U/L (ref 39–117)
ALT SERPL W P-5'-P-CCNC: 13 U/L (ref 1–41)
ANION GAP SERPL CALCULATED.3IONS-SCNC: 9.9 MMOL/L (ref 5–15)
AST SERPL-CCNC: 15 U/L (ref 1–40)
BASOPHILS # BLD AUTO: 0.19 10*3/MM3 (ref 0–0.2)
BASOPHILS NFR BLD AUTO: 2 % (ref 0–1.5)
BILIRUB SERPL-MCNC: 0.6 MG/DL (ref 0–1.2)
BILIRUB UR QL STRIP: NEGATIVE
BUN SERPL-MCNC: 10 MG/DL (ref 8–23)
BUN/CREAT SERPL: 13.5 (ref 7–25)
CALCIUM SPEC-SCNC: 9 MG/DL (ref 8.6–10.5)
CHLORIDE SERPL-SCNC: 105 MMOL/L (ref 98–107)
CLARITY UR: CLEAR
CO2 SERPL-SCNC: 25.1 MMOL/L (ref 22–29)
COLOR UR: YELLOW
CREAT SERPL-MCNC: 0.74 MG/DL (ref 0.76–1.27)
D-LACTATE SERPL-SCNC: 1.4 MMOL/L (ref 0.5–2)
DEPRECATED RDW RBC AUTO: 43.9 FL (ref 37–54)
EGFRCR SERPLBLD CKD-EPI 2021: 87.7 ML/MIN/1.73
EOSINOPHIL # BLD AUTO: 1.24 10*3/MM3 (ref 0–0.4)
EOSINOPHIL NFR BLD AUTO: 12.9 % (ref 0.3–6.2)
ERYTHROCYTE [DISTWIDTH] IN BLOOD BY AUTOMATED COUNT: 12.2 % (ref 12.3–15.4)
ETHANOL BLD-MCNC: <10 MG/DL (ref 0–10)
ETHANOL UR QL: <0.01 %
GLOBULIN UR ELPH-MCNC: 3.5 GM/DL
GLUCOSE SERPL-MCNC: 112 MG/DL (ref 65–99)
GLUCOSE UR STRIP-MCNC: NEGATIVE MG/DL
HCT VFR BLD AUTO: 36.2 % (ref 37.5–51)
HGB BLD-MCNC: 12.3 G/DL (ref 13–17.7)
HGB UR QL STRIP.AUTO: NEGATIVE
IMM GRANULOCYTES # BLD AUTO: 0.2 10*3/MM3 (ref 0–0.05)
IMM GRANULOCYTES NFR BLD AUTO: 2.1 % (ref 0–0.5)
KETONES UR QL STRIP: NEGATIVE
LEUKOCYTE ESTERASE UR QL STRIP.AUTO: NEGATIVE
LIPASE SERPL-CCNC: 50 U/L (ref 13–60)
LYMPHOCYTES # BLD AUTO: 0.99 10*3/MM3 (ref 0.7–3.1)
LYMPHOCYTES NFR BLD AUTO: 10.3 % (ref 19.6–45.3)
MAGNESIUM SERPL-MCNC: 2.1 MG/DL (ref 1.6–2.4)
MCH RBC QN AUTO: 33.2 PG (ref 26.6–33)
MCHC RBC AUTO-ENTMCNC: 34 G/DL (ref 31.5–35.7)
MCV RBC AUTO: 97.8 FL (ref 79–97)
MONOCYTES # BLD AUTO: 1.24 10*3/MM3 (ref 0.1–0.9)
MONOCYTES NFR BLD AUTO: 12.9 % (ref 5–12)
NEUTROPHILS NFR BLD AUTO: 5.78 10*3/MM3 (ref 1.7–7)
NEUTROPHILS NFR BLD AUTO: 59.8 % (ref 42.7–76)
NITRITE UR QL STRIP: NEGATIVE
NRBC BLD AUTO-RTO: 0 /100 WBC (ref 0–0.2)
NT-PROBNP SERPL-MCNC: 780 PG/ML (ref 0–1800)
PH UR STRIP.AUTO: 8 [PH] (ref 5–8)
PLATELET # BLD AUTO: 252 10*3/MM3 (ref 140–450)
PMV BLD AUTO: 9.3 FL (ref 6–12)
POTASSIUM SERPL-SCNC: 4.3 MMOL/L (ref 3.5–5.2)
PROCALCITONIN SERPL-MCNC: 0.04 NG/ML (ref 0–0.25)
PROT SERPL-MCNC: 6.8 G/DL (ref 6–8.5)
PROT UR QL STRIP: NEGATIVE
RBC # BLD AUTO: 3.7 10*6/MM3 (ref 4.14–5.8)
SODIUM SERPL-SCNC: 140 MMOL/L (ref 136–145)
SP GR UR STRIP: 1.01 (ref 1–1.03)
UROBILINOGEN UR QL STRIP: NORMAL
WBC NRBC COR # BLD AUTO: 9.64 10*3/MM3 (ref 3.4–10.8)

## 2025-06-13 PROCEDURE — 87040 BLOOD CULTURE FOR BACTERIA: CPT | Performed by: EMERGENCY MEDICINE

## 2025-06-13 PROCEDURE — 83690 ASSAY OF LIPASE: CPT | Performed by: EMERGENCY MEDICINE

## 2025-06-13 PROCEDURE — 96374 THER/PROPH/DIAG INJ IV PUSH: CPT

## 2025-06-13 PROCEDURE — 82077 ASSAY SPEC XCP UR&BREATH IA: CPT | Performed by: EMERGENCY MEDICINE

## 2025-06-13 PROCEDURE — 83880 ASSAY OF NATRIURETIC PEPTIDE: CPT | Performed by: EMERGENCY MEDICINE

## 2025-06-13 PROCEDURE — 25010000002 METHYLPREDNISOLONE PER 125 MG: Performed by: EMERGENCY MEDICINE

## 2025-06-13 PROCEDURE — 73630 X-RAY EXAM OF FOOT: CPT

## 2025-06-13 PROCEDURE — 81003 URINALYSIS AUTO W/O SCOPE: CPT | Performed by: EMERGENCY MEDICINE

## 2025-06-13 PROCEDURE — 36415 COLL VENOUS BLD VENIPUNCTURE: CPT | Performed by: EMERGENCY MEDICINE

## 2025-06-13 PROCEDURE — 84145 PROCALCITONIN (PCT): CPT | Performed by: EMERGENCY MEDICINE

## 2025-06-13 PROCEDURE — 80053 COMPREHEN METABOLIC PANEL: CPT | Performed by: EMERGENCY MEDICINE

## 2025-06-13 PROCEDURE — 83605 ASSAY OF LACTIC ACID: CPT | Performed by: EMERGENCY MEDICINE

## 2025-06-13 PROCEDURE — 85025 COMPLETE CBC W/AUTO DIFF WBC: CPT | Performed by: EMERGENCY MEDICINE

## 2025-06-13 PROCEDURE — 99283 EMERGENCY DEPT VISIT LOW MDM: CPT

## 2025-06-13 PROCEDURE — 83735 ASSAY OF MAGNESIUM: CPT | Performed by: EMERGENCY MEDICINE

## 2025-06-13 RX ORDER — METHYLPREDNISOLONE SODIUM SUCCINATE 125 MG/2ML
80 INJECTION, POWDER, LYOPHILIZED, FOR SOLUTION INTRAMUSCULAR; INTRAVENOUS ONCE
Status: COMPLETED | OUTPATIENT
Start: 2025-06-13 | End: 2025-06-13

## 2025-06-13 RX ORDER — PREDNISONE 20 MG/1
40 TABLET ORAL DAILY
Qty: 5 TABLET | Refills: 0 | Status: SHIPPED | OUTPATIENT
Start: 2025-06-13

## 2025-06-13 RX ADMIN — METHYLPREDNISOLONE SODIUM SUCCINATE 80 MG: 125 INJECTION, POWDER, FOR SOLUTION INTRAMUSCULAR; INTRAVENOUS at 14:41

## 2025-06-13 NOTE — TELEPHONE ENCOUNTER
Called Madisyn, (Patients daughter) Advised that we have his PCP Lorie Wong as his current provider. She stated that they do not want to continue care with that PCP.   Madisyn stated that this is not a new sx, she stated that his condition has been the same for months.   Madisyn stated he is under the care of podiatry for his foot wound that is not healing well.  She stated he has been hospitalized before with cellulitis, she stated that the redness/purple is spreading. I am concerned for infection and possibly circulation issues. She stated that there is now a red spot on patients arm. Patient has not seen vascular per Madisyn.  Patient lives alone, is not willing to accept a caregiver. She stated patient also has greatly increased his drinking. He starts about 5:00 in the afternoon and is up to about 1:00 in the morning drinking whisky.   She stated that the last time he was hospitalized, he needs medication for ETOH withdrawal.   Madisyn is concerned for patient safety in the home alone. His lung condition is worsening. He requires up to 6 LMP of O2 to be able to ambulate and he falls a lot due to low oxygen. He is under the care of Pulm and Cardiology.    Madisyn stated she will call patient around noon,( his usual time that he gets up) to discuss going to the ER. She said he should be agreeable, and if not she will call me back so I can discuss with patient.       I will send note to Dr Finley to discuss outpatient care moving forward.

## 2025-06-13 NOTE — ED NOTES
Pt via [facility] from home with c/o ongoing cellulitis on BLE and new onset on R and L elbow.   Seen PCP yesterday and told to come to ER for abx.
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
## 2025-06-13 NOTE — DISCHARGE INSTRUCTIONS
Complete course of steroids as prescribed, continue other current medications, close outpatient PCP follow-up next week for recheck, ED return for worsening symptoms as needed.

## 2025-06-18 LAB
BACTERIA SPEC AEROBE CULT: NORMAL
BACTERIA SPEC AEROBE CULT: NORMAL

## 2025-06-27 ENCOUNTER — APPOINTMENT (OUTPATIENT)
Dept: GENERAL RADIOLOGY | Facility: HOSPITAL | Age: 89
End: 2025-06-27
Payer: MEDICARE

## 2025-06-27 ENCOUNTER — HOSPITAL ENCOUNTER (INPATIENT)
Facility: HOSPITAL | Age: 89
LOS: 11 days | Discharge: NURSING, REHAB OR LONG-TERM CARE | End: 2025-07-08
Attending: STUDENT IN AN ORGANIZED HEALTH CARE EDUCATION/TRAINING PROGRAM | Admitting: INTERNAL MEDICINE
Payer: MEDICARE

## 2025-06-27 DIAGNOSIS — L08.9 DIABETIC INFECTION OF RIGHT FOOT: ICD-10-CM

## 2025-06-27 DIAGNOSIS — L08.9 RIGHT FOOT INFECTION: Primary | ICD-10-CM

## 2025-06-27 DIAGNOSIS — E11.628 DIABETIC INFECTION OF RIGHT FOOT: ICD-10-CM

## 2025-06-27 LAB
ALBUMIN SERPL-MCNC: 3.3 G/DL (ref 3.5–5.2)
ALBUMIN/GLOB SERPL: 1 G/DL
ALP SERPL-CCNC: 93 U/L (ref 39–117)
ALT SERPL W P-5'-P-CCNC: 46 U/L (ref 1–41)
ANION GAP SERPL CALCULATED.3IONS-SCNC: 12.5 MMOL/L (ref 5–15)
AST SERPL-CCNC: 29 U/L (ref 1–40)
BASOPHILS # BLD AUTO: 0.13 10*3/MM3 (ref 0–0.2)
BASOPHILS NFR BLD AUTO: 1 % (ref 0–1.5)
BILIRUB SERPL-MCNC: 0.6 MG/DL (ref 0–1.2)
BUN SERPL-MCNC: 16 MG/DL (ref 8–23)
BUN/CREAT SERPL: 19.8 (ref 7–25)
CALCIUM SPEC-SCNC: 9 MG/DL (ref 8.6–10.5)
CHLORIDE SERPL-SCNC: 102 MMOL/L (ref 98–107)
CO2 SERPL-SCNC: 23.5 MMOL/L (ref 22–29)
CREAT SERPL-MCNC: 0.81 MG/DL (ref 0.76–1.27)
CRP SERPL-MCNC: 14.65 MG/DL (ref 0–0.5)
D-LACTATE SERPL-SCNC: 1.5 MMOL/L (ref 0.5–2)
DEPRECATED RDW RBC AUTO: 43.9 FL (ref 37–54)
EGFRCR SERPLBLD CKD-EPI 2021: 85.3 ML/MIN/1.73
EOSINOPHIL # BLD AUTO: 0.58 10*3/MM3 (ref 0–0.4)
EOSINOPHIL NFR BLD AUTO: 4.7 % (ref 0.3–6.2)
ERYTHROCYTE [DISTWIDTH] IN BLOOD BY AUTOMATED COUNT: 12.2 % (ref 12.3–15.4)
ERYTHROCYTE [SEDIMENTATION RATE] IN BLOOD: 53 MM/HR (ref 0–20)
GLOBULIN UR ELPH-MCNC: 3.3 GM/DL
GLUCOSE BLDC GLUCOMTR-MCNC: 124 MG/DL (ref 70–130)
GLUCOSE SERPL-MCNC: 109 MG/DL (ref 65–99)
HCT VFR BLD AUTO: 35.1 % (ref 37.5–51)
HGB BLD-MCNC: 11.8 G/DL (ref 13–17.7)
IMM GRANULOCYTES # BLD AUTO: 0.2 10*3/MM3 (ref 0–0.05)
IMM GRANULOCYTES NFR BLD AUTO: 1.6 % (ref 0–0.5)
LYMPHOCYTES # BLD AUTO: 0.76 10*3/MM3 (ref 0.7–3.1)
LYMPHOCYTES NFR BLD AUTO: 6.1 % (ref 19.6–45.3)
MCH RBC QN AUTO: 32.9 PG (ref 26.6–33)
MCHC RBC AUTO-ENTMCNC: 33.6 G/DL (ref 31.5–35.7)
MCV RBC AUTO: 97.8 FL (ref 79–97)
MONOCYTES # BLD AUTO: 1.83 10*3/MM3 (ref 0.1–0.9)
MONOCYTES NFR BLD AUTO: 14.8 % (ref 5–12)
NEUTROPHILS NFR BLD AUTO: 71.8 % (ref 42.7–76)
NEUTROPHILS NFR BLD AUTO: 8.89 10*3/MM3 (ref 1.7–7)
NRBC BLD AUTO-RTO: 0 /100 WBC (ref 0–0.2)
PLATELET # BLD AUTO: 232 10*3/MM3 (ref 140–450)
PMV BLD AUTO: 9.9 FL (ref 6–12)
POTASSIUM SERPL-SCNC: 3.9 MMOL/L (ref 3.5–5.2)
PROT SERPL-MCNC: 6.6 G/DL (ref 6–8.5)
RBC # BLD AUTO: 3.59 10*6/MM3 (ref 4.14–5.8)
SODIUM SERPL-SCNC: 138 MMOL/L (ref 136–145)
WBC NRBC COR # BLD AUTO: 12.39 10*3/MM3 (ref 3.4–10.8)

## 2025-06-27 PROCEDURE — 25010000002 VANCOMYCIN 10 G RECONSTITUTED SOLUTION: Performed by: STUDENT IN AN ORGANIZED HEALTH CARE EDUCATION/TRAINING PROGRAM

## 2025-06-27 PROCEDURE — 86140 C-REACTIVE PROTEIN: CPT | Performed by: STUDENT IN AN ORGANIZED HEALTH CARE EDUCATION/TRAINING PROGRAM

## 2025-06-27 PROCEDURE — 94640 AIRWAY INHALATION TREATMENT: CPT

## 2025-06-27 PROCEDURE — 25810000003 SODIUM CHLORIDE 0.9 % SOLUTION: Performed by: STUDENT IN AN ORGANIZED HEALTH CARE EDUCATION/TRAINING PROGRAM

## 2025-06-27 PROCEDURE — 87040 BLOOD CULTURE FOR BACTERIA: CPT | Performed by: STUDENT IN AN ORGANIZED HEALTH CARE EDUCATION/TRAINING PROGRAM

## 2025-06-27 PROCEDURE — 85025 COMPLETE CBC W/AUTO DIFF WBC: CPT | Performed by: STUDENT IN AN ORGANIZED HEALTH CARE EDUCATION/TRAINING PROGRAM

## 2025-06-27 PROCEDURE — 80053 COMPREHEN METABOLIC PANEL: CPT | Performed by: STUDENT IN AN ORGANIZED HEALTH CARE EDUCATION/TRAINING PROGRAM

## 2025-06-27 PROCEDURE — 73630 X-RAY EXAM OF FOOT: CPT

## 2025-06-27 PROCEDURE — 82948 REAGENT STRIP/BLOOD GLUCOSE: CPT

## 2025-06-27 PROCEDURE — 25010000002 CEFEPIME PER 500 MG: Performed by: INTERNAL MEDICINE

## 2025-06-27 PROCEDURE — 99285 EMERGENCY DEPT VISIT HI MDM: CPT

## 2025-06-27 PROCEDURE — 94799 UNLISTED PULMONARY SVC/PX: CPT

## 2025-06-27 PROCEDURE — 36415 COLL VENOUS BLD VENIPUNCTURE: CPT

## 2025-06-27 PROCEDURE — 25010000002 THIAMINE PER 100 MG: Performed by: INTERNAL MEDICINE

## 2025-06-27 PROCEDURE — 85652 RBC SED RATE AUTOMATED: CPT | Performed by: STUDENT IN AN ORGANIZED HEALTH CARE EDUCATION/TRAINING PROGRAM

## 2025-06-27 PROCEDURE — 25010000002 CEFEPIME PER 500 MG: Performed by: STUDENT IN AN ORGANIZED HEALTH CARE EDUCATION/TRAINING PROGRAM

## 2025-06-27 PROCEDURE — 83605 ASSAY OF LACTIC ACID: CPT | Performed by: STUDENT IN AN ORGANIZED HEALTH CARE EDUCATION/TRAINING PROGRAM

## 2025-06-27 RX ORDER — SODIUM CHLORIDE 0.9 % (FLUSH) 0.9 %
10 SYRINGE (ML) INJECTION EVERY 12 HOURS SCHEDULED
Status: DISCONTINUED | OUTPATIENT
Start: 2025-06-27 | End: 2025-07-08 | Stop reason: HOSPADM

## 2025-06-27 RX ORDER — INSULIN LISPRO 100 [IU]/ML
2-7 INJECTION, SOLUTION INTRAVENOUS; SUBCUTANEOUS
Status: DISCONTINUED | OUTPATIENT
Start: 2025-06-27 | End: 2025-07-08 | Stop reason: HOSPADM

## 2025-06-27 RX ORDER — ACETAMINOPHEN 650 MG/1
650 SUPPOSITORY RECTAL EVERY 4 HOURS PRN
Status: DISCONTINUED | OUTPATIENT
Start: 2025-06-27 | End: 2025-07-08 | Stop reason: HOSPADM

## 2025-06-27 RX ORDER — SODIUM CHLORIDE 9 MG/ML
40 INJECTION, SOLUTION INTRAVENOUS AS NEEDED
Status: DISCONTINUED | OUTPATIENT
Start: 2025-06-27 | End: 2025-07-08 | Stop reason: HOSPADM

## 2025-06-27 RX ORDER — IPRATROPIUM BROMIDE AND ALBUTEROL SULFATE 2.5; .5 MG/3ML; MG/3ML
3 SOLUTION RESPIRATORY (INHALATION) EVERY 6 HOURS PRN
Status: DISCONTINUED | OUTPATIENT
Start: 2025-06-27 | End: 2025-07-08 | Stop reason: HOSPADM

## 2025-06-27 RX ORDER — LORAZEPAM 2 MG/ML
2 INJECTION INTRAMUSCULAR
Status: DISCONTINUED | OUTPATIENT
Start: 2025-06-27 | End: 2025-07-02

## 2025-06-27 RX ORDER — LORAZEPAM 1 MG/1
2 TABLET ORAL
Status: DISCONTINUED | OUTPATIENT
Start: 2025-06-27 | End: 2025-07-02

## 2025-06-27 RX ORDER — THIAMINE HYDROCHLORIDE 100 MG/ML
200 INJECTION, SOLUTION INTRAMUSCULAR; INTRAVENOUS EVERY 8 HOURS SCHEDULED
Status: DISCONTINUED | OUTPATIENT
Start: 2025-06-27 | End: 2025-07-02

## 2025-06-27 RX ORDER — PREDNISONE 20 MG/1
40 TABLET ORAL DAILY
Status: DISCONTINUED | OUTPATIENT
Start: 2025-06-27 | End: 2025-06-29

## 2025-06-27 RX ORDER — SODIUM CHLORIDE 0.9 % (FLUSH) 0.9 %
10 SYRINGE (ML) INJECTION AS NEEDED
Status: DISCONTINUED | OUTPATIENT
Start: 2025-06-27 | End: 2025-07-08 | Stop reason: HOSPADM

## 2025-06-27 RX ORDER — LORAZEPAM 2 MG/ML
1 INJECTION INTRAMUSCULAR
Status: DISCONTINUED | OUTPATIENT
Start: 2025-06-27 | End: 2025-07-02

## 2025-06-27 RX ORDER — IBUPROFEN 600 MG/1
1 TABLET ORAL
Status: DISCONTINUED | OUTPATIENT
Start: 2025-06-27 | End: 2025-07-08 | Stop reason: HOSPADM

## 2025-06-27 RX ORDER — FOLIC ACID 1 MG/1
1 TABLET ORAL DAILY
Status: DISCONTINUED | OUTPATIENT
Start: 2025-06-28 | End: 2025-07-03

## 2025-06-27 RX ORDER — LORAZEPAM 1 MG/1
1 TABLET ORAL
Status: DISCONTINUED | OUTPATIENT
Start: 2025-06-27 | End: 2025-07-02

## 2025-06-27 RX ORDER — DILTIAZEM HYDROCHLORIDE 120 MG/1
240 CAPSULE, COATED, EXTENDED RELEASE ORAL DAILY
Status: DISCONTINUED | OUTPATIENT
Start: 2025-06-27 | End: 2025-07-08 | Stop reason: HOSPADM

## 2025-06-27 RX ORDER — ATORVASTATIN CALCIUM 20 MG/1
20 TABLET, FILM COATED ORAL NIGHTLY
Status: DISCONTINUED | OUTPATIENT
Start: 2025-06-27 | End: 2025-07-08 | Stop reason: HOSPADM

## 2025-06-27 RX ORDER — NICOTINE POLACRILEX 4 MG
15 LOZENGE BUCCAL
Status: DISCONTINUED | OUTPATIENT
Start: 2025-06-27 | End: 2025-07-08 | Stop reason: HOSPADM

## 2025-06-27 RX ORDER — BUDESONIDE AND FORMOTEROL FUMARATE DIHYDRATE 160; 4.5 UG/1; UG/1
2 AEROSOL RESPIRATORY (INHALATION)
Status: DISCONTINUED | OUTPATIENT
Start: 2025-06-27 | End: 2025-07-08 | Stop reason: HOSPADM

## 2025-06-27 RX ORDER — ACETAMINOPHEN 160 MG/5ML
650 SOLUTION ORAL EVERY 4 HOURS PRN
Status: DISCONTINUED | OUTPATIENT
Start: 2025-06-27 | End: 2025-07-08 | Stop reason: HOSPADM

## 2025-06-27 RX ORDER — ACETAMINOPHEN 325 MG/1
650 TABLET ORAL EVERY 4 HOURS PRN
Status: DISCONTINUED | OUTPATIENT
Start: 2025-06-27 | End: 2025-07-08 | Stop reason: HOSPADM

## 2025-06-27 RX ORDER — DEXTROSE MONOHYDRATE 25 G/50ML
25 INJECTION, SOLUTION INTRAVENOUS
Status: DISCONTINUED | OUTPATIENT
Start: 2025-06-27 | End: 2025-07-08 | Stop reason: HOSPADM

## 2025-06-27 RX ORDER — DIGOXIN 125 MCG
125 TABLET ORAL
Status: DISCONTINUED | OUTPATIENT
Start: 2025-06-28 | End: 2025-07-08 | Stop reason: HOSPADM

## 2025-06-27 RX ORDER — SODIUM CHLORIDE 9 MG/ML
100 INJECTION, SOLUTION INTRAVENOUS CONTINUOUS
Status: ACTIVE | OUTPATIENT
Start: 2025-06-27 | End: 2025-06-28

## 2025-06-27 RX ORDER — METFORMIN HYDROCHLORIDE 500 MG/1
500 TABLET, EXTENDED RELEASE ORAL
Status: DISCONTINUED | OUTPATIENT
Start: 2025-06-28 | End: 2025-07-05

## 2025-06-27 RX ADMIN — VANCOMYCIN HYDROCHLORIDE 1750 MG: 10 INJECTION, POWDER, LYOPHILIZED, FOR SOLUTION INTRAVENOUS at 16:26

## 2025-06-27 RX ADMIN — ATORVASTATIN CALCIUM 20 MG: 20 TABLET, FILM COATED ORAL at 21:35

## 2025-06-27 RX ADMIN — CEFEPIME 2000 MG: 2 INJECTION, POWDER, FOR SOLUTION INTRAVENOUS at 23:17

## 2025-06-27 RX ADMIN — Medication 10 ML: at 21:35

## 2025-06-27 RX ADMIN — BUDESONIDE AND FORMOTEROL FUMARATE DIHYDRATE 2 PUFF: 160; 4.5 AEROSOL RESPIRATORY (INHALATION) at 21:24

## 2025-06-27 RX ADMIN — SODIUM CHLORIDE 100 ML/HR: 9 INJECTION, SOLUTION INTRAVENOUS at 18:51

## 2025-06-27 RX ADMIN — THIAMINE HYDROCHLORIDE 200 MG: 100 INJECTION, SOLUTION INTRAMUSCULAR; INTRAVENOUS at 22:33

## 2025-06-27 RX ADMIN — CEFEPIME 2000 MG: 2 INJECTION, POWDER, FOR SOLUTION INTRAVENOUS at 15:31

## 2025-06-27 NOTE — ED NOTES
"Nursing report ED to floor  Jim Marvin  89 y.o.  male    HPI :  HPI  Stated Reason for Visit: wound    Chief Complaint  Chief Complaint   Patient presents with    Wound Check       Admitting doctor:   Rodríguez Araujo MD    Admitting diagnosis:   The encounter diagnosis was Right foot infection.    Code status:   Current Code Status       Date Active Code Status Order ID Comments User Context       Prior            Allergies:   Ace inhibitors and Lisinopril    Isolation:   Contact    Intake and Output  No intake or output data in the 24 hours ending 06/27/25 1535    Weight:       06/27/25  1511   Weight: 87.3 kg (192 lb 6.4 oz)       Most recent vitals:   Vitals:    06/27/25 1417 06/27/25 1423 06/27/25 1511   BP:  138/73    Pulse: 79 75    Resp:  20    Temp: 97.7 °F (36.5 °C)     TempSrc: Oral     SpO2: (!) 85% 95%    Weight:   87.3 kg (192 lb 6.4 oz)   Height:   177.8 cm (70\")       Active LDAs/IV Access:   Lines, Drains & Airways       Active LDAs       Name Placement date Placement time Site Days    Peripheral IV 06/27/25 1439 18 G Anterior;Distal;Right;Upper Arm 06/27/25  1439  Arm  less than 1                    Labs (abnormal labs have a star):   Labs Reviewed   COMPREHENSIVE METABOLIC PANEL - Abnormal; Notable for the following components:       Result Value    Glucose 109 (*)     Albumin 3.3 (*)     ALT (SGPT) 46 (*)     All other components within normal limits    Narrative:     GFR Categories in Chronic Kidney Disease (CKD)              GFR Category          GFR (mL/min/1.73)    Interpretation  G1                    90 or greater        Normal or high (1)  G2                    60-89                Mild decrease (1)  G3a                   45-59                Mild to moderate decrease  G3b                   30-44                Moderate to severe decrease  G4                    15-29                Severe decrease  G5                    14 or less           Kidney failure    (1)In the absence of evidence " of kidney disease, neither GFR category G1 or G2 fulfill the criteria for CKD.    eGFR calculation 2021 CKD-EPI creatinine equation, which does not include race as a factor   C-REACTIVE PROTEIN - Abnormal; Notable for the following components:    C-Reactive Protein 14.65 (*)     All other components within normal limits   SEDIMENTATION RATE - Abnormal; Notable for the following components:    Sed Rate 53 (*)     All other components within normal limits   CBC WITH AUTO DIFFERENTIAL - Abnormal; Notable for the following components:    WBC 12.39 (*)     RBC 3.59 (*)     Hemoglobin 11.8 (*)     Hematocrit 35.1 (*)     MCV 97.8 (*)     RDW 12.2 (*)     Lymphocyte % 6.1 (*)     Monocyte % 14.8 (*)     Immature Grans % 1.6 (*)     Neutrophils, Absolute 8.89 (*)     Monocytes, Absolute 1.83 (*)     Eosinophils, Absolute 0.58 (*)     Immature Grans, Absolute 0.20 (*)     All other components within normal limits   BLOOD CULTURE   BLOOD CULTURE   LACTIC ACID, PLASMA   CBC AND DIFFERENTIAL    Narrative:     The following orders were created for panel order CBC & Differential.  Procedure                               Abnormality         Status                     ---------                               -----------         ------                     CBC Auto Differential[[id number]]        Abnormal            Final result                 Please view results for these tests on the individual orders.       EKG:   No orders to display       Meds given in ED:   Medications   vancomycin 1750 mg/500 mL 0.9% NS IVPB ([facility]) (has no administration in time range)   cefepime 2000 mg IVPB in 100 mL NS (MBP) (2,000 mg Intravenous New Bag 6/27/25 1531)       Imaging results:  No radiology results for the last day    Ambulatory status:   - up with assistr    Social issues:   Social History     Socioeconomic History    Marital status:    Tobacco Use    Smoking status: Former     Current packs/day: 0.00     Average packs/day: 1 pack/day  for 40.0 years (40.0 ttl pk-yrs)     Types: Cigarettes     Start date:      Quit date:      Years since quittin.5     Passive exposure: Past    Smokeless tobacco: Never    Tobacco comments:     Quit 19 years ago    Vaping Use    Vaping status: Never Used   Substance and Sexual Activity    Alcohol use: Not Currently     Comment: 15-20 francisca and diet coke a week    Drug use: Never    Sexual activity: Not Currently     Partners: Female       Peripheral Neurovascular       Neuro Cognitive       Learning       Respiratory       Abdominal Pain       Pain Assessments  Pain (Adult)  (0-10) Pain Rating: Rest: 0  (0-10) Pain Rating: Activity: 0    NIH Stroke Scale       Mandy Xiong RN  25 15:35 EDT

## 2025-06-27 NOTE — PROGRESS NOTES
"[facility] Clinical Pharmacy Services: Vancomycin Pharmacokinetic Initial Consult Note    Jim Marvin is a 89 y.o. male who is on day 1 of pharmacy to dose vancomycin.    Indication: Bone and/or Joint Infection  Consulting Provider: Dr. Araujo  Planned Duration of Therapy: 7 days  Loading Dose Ordered or Given: 1750 mg on 6/27 at 1626  Culture/Source: 6/27 blood cx in process  Target: -600 mg/L.hr   Pertinent Vanc Dosing History:   3/16-3/17: 2000 mg x1 followed by 1000 mg q12h. No levels collected, predicted  mg/L.hr. Weight and renal fx about the same as today, but 1000 mg q12h now has predicted AUC of 567 mg/L.hr  Other Antimicrobials: cefepime    Vitals/Labs  Ht: 177.8 cm (70\"); Wt: 87.3 kg (192 lb 6.4 oz)  Temp Readings from Last 1 Encounters:   06/27/25 98.1 °F (36.7 °C) (Oral)    Estimated Creatinine Clearance: 79.3 mL/min (by C-G formula based on SCr of 0.81 mg/dL).     Results from last 7 days   Lab Units 06/27/25  1433   CREATININE mg/dL 0.81   WBC 10*3/mm3 12.39*     Assessment/Plan:    Vancomycin Dose: 1750 mg IV every 24 hours  Predictive AUC level for the dose ordered is 499 mg/L.hr, which is within the target of 400-600 mg/L.hr  Vanc Trough has been ordered for 6/30 at 1500     Pharmacy will follow patient's kidney function and will adjust doses and obtain levels as necessary. Thank you for involving pharmacy in this patient's care. Please contact pharmacy with any questions or concerns.                           Denise Arroyo, [facility]  Clinical Pharmacist   "

## 2025-06-27 NOTE — H&P
Internal medicine history and physical  INTERNAL MEDICINE   [facility]       Patient Identification:  Name: Jim Marvin  Age: 89 y.o.  Sex: male  :  1935  MRN: [id number]                   Primary Care Physician: Antonio Finley MD                               Date of admission:2025    Chief Complaint: Sent to the emergency roomBy his podiatrist for IV antibiotics and further management of right foot diabetic ulcer    History of Present Illness:   Patient is a 89-year-old male who has complicated past medical history including history of CVA, tremors, diabetes, atrial fibrillation, hypertension, bilateral venous stasis and stasis dermatitis of the lower extremities as well as dyslipidemia who is on chronic anticoagulation therapy has been dealing with right foot diabetic wound for which he follows podiatry service.  Patient was recently hospitalized in March with diabetic foot infection and at that time was treated with antibiotic therapy and MRI performed at that time did not show any evidence of contiguous focus osteomyelitis.  Patient was discharged on oral antibiotic therapy with close outpatient follow-up with podiatry service as mentioned.  In the interim patient did come to the emergency room on 2025 with increasing swelling and redness of his feet and leg.  Workup included x-ray of the right foot and patient was subsequently discharged with a diagnosis of venous stasis dermatitis of both lower extremities.  Patient was not prescribed any antibiotic therapy as his presentation was thought to be more venous stasis rather than infectious process or cellulitis.  According to the patient he was seen last week by his podiatrist and wound dressing was performed and was told that everything was doing fine until today when he was examined by his podiatrist she was told that wound is not looking good and patient would need antibiotic therapy and possible surgical intervention  which is being planned for Monday.  Patient does recall taking antibiotic therapy in the interim.  In the emergency room plain x-ray of the right foot shows base of fifth proximal phalanx dislocated in relation to metatarsal head along with soft tissue swelling of the foot consistent with inflammation.  Blood cultures drawn on 6/13/2025 are negative and today blood cultures were drawn currently pending.  Patient is noted to have white blood cell count of 12.39 and normal lactic acid level.  C-reactive protein is 14.65 patient has been started on vancomycin and cefepime.  Podiatry service have been consulted.      Past Medical History:  Past Medical History:   Diagnosis Date    COPD (chronic obstructive pulmonary disease)     Hyperlipidemia     Hypertension     Stroke      Past Surgical History:  Past Surgical History:   Procedure Laterality Date    CATARACT EXTRACTION Left 07/06/2022      Home Meds:  (Not in a hospital admission)    Current Meds:     Current Facility-Administered Medications:     vancomycin 1750 mg/500 mL 0.9% NS IVPB ([facility]), 20 mg/kg, Intravenous, Once, Martell Segundo MD, 1,750 mg at 06/27/25 1626    Current Outpatient Medications:     apixaban (ELIQUIS) 5 MG tablet tablet, Take 1 tablet by mouth Every 12 (Twelve) Hours. Indications: Atrial Fibrillation, Disp: 180 tablet, Rfl: 1    atorvastatin (LIPITOR) 20 MG tablet, Take 1 tablet by mouth Daily. (Patient taking differently: Take 1 tablet by mouth Every Night.), Disp: 90 tablet, Rfl: 1    digoxin (LANOXIN) 125 MCG tablet, Take 1 tablet by mouth Daily., Disp: 90 tablet, Rfl: 1    dilTIAZem CD (CARDIZEM CD) 240 MG 24 hr capsule, Take 1 capsule by mouth Daily., Disp: 90 capsule, Rfl: 1    EPINEPHrine (EPIPEN) 0.3 MG/0.3ML solution auto-injector injection, ADMINISTER 0.3 ML IN THE MUSCLE 1 TIME FOR 1 DOSE AS DIRECTED BY PRESCRIBER, Disp: , Rfl:     Fluticasone-Umeclidin-Vilant (Trelegy Ellipta) 200-62.5-25 MCG/ACT inhaler, Inhale 1 puff Daily.,  "Disp: , Rfl:     ipratropium-albuterol (DUO-NEB) 0.5-2.5 mg/3 ml nebulizer, Inhale the contents of 1 vial by nebulization Every 6 (Six) Hours As Needed for Wheezing for up to 30 days., Disp: 360 mL, Rfl: 0    metFORMIN ER (GLUCOPHAGE-XR) 500 MG 24 hr tablet, Take 1 tablet by mouth Daily With Breakfast., Disp: 90 tablet, Rfl: 1    O2 (OXYGEN), 3 Liter DAILY (route: Oxygen), Disp: , Rfl:     predniSONE (DELTASONE) 20 MG tablet, Take 2 tablets by mouth Daily., Disp: 5 tablet, Rfl: 0  Allergies:  Allergies   Allergen Reactions    Ace Inhibitors Other (See Comments)     Cannot remember     Lisinopril Other (See Comments)     Cannot remember     Social History:   Social History     Tobacco Use    Smoking status: Former     Current packs/day: 0.00     Average packs/day: 1 pack/day for 40.0 years (40.0 ttl pk-yrs)     Types: Cigarettes     Start date:      Quit date:      Years since quittin.5     Passive exposure: Past    Smokeless tobacco: Never    Tobacco comments:     Quit 19 years ago    Substance Use Topics    Alcohol use: Not Currently     Comment: 15-20 jack and diet coke a week      Family History:  No family history on file.       Review of Systems  See history of present illness and past medical history.  Denies any fever and chills.      Vitals:   /78   Pulse 69   Temp 97.7 °F (36.5 °C) (Oral)   Resp 20   Ht 177.8 cm (70\")   Wt 87.3 kg (192 lb 6.4 oz)   SpO2 95%   BMI 27.61 kg/m²   I/O:   Intake/Output Summary (Last 24 hours) at 2025 1646  Last data filed at 2025 1601  Gross per 24 hour   Intake 100 ml   Output --   Net 100 ml     Exam:  Patient is examined using the personal protective equipment as per guidelines from infection control for this particular patient as enacted.  Hand washing was performed before and after patient interaction.  General Appearance:    Alert, cooperative, no distress, appears stated age   Head:    Normocephalic, without obvious abnormality, " atraumatic   Eyes:    PERRL, conjunctiva/corneas clear, EOM's intact, both eyes   Ears:    Normal external ear canals, both ears   Nose:   Nares normal, septum midline, mucosa normal, no drainage    or sinus tenderness   Throat:   Lips, tongue, gums normal; oral mucosa pink and moist   Neck:   Supple   Back:     Symmetric, no curvature, ROM normal, no CVA tenderness   Lungs:     Clear to auscultation bilaterally, respirations unlabored   Chest Wall:    No tenderness or deformity    Heart:  S1-S2 irregular   Abdomen:   Soft nontender   Extremities: Bilateral lower extremity venous stasis       Skin: Stasis dermatitis of bilateral lower extremities noted   Neurologic: Tremors of hands and feet alert and oriented x 3       Data Review:      I reviewed the patient's new clinical results.  Results from last 7 days   Lab Units 06/27/25  1433   WBC 10*3/mm3 12.39*   HEMOGLOBIN g/dL 11.8*   PLATELETS 10*3/mm3 232     Results from last 7 days   Lab Units 06/27/25  1433   SODIUM mmol/L 138   POTASSIUM mmol/L 3.9   CHLORIDE mmol/L 102   CO2 mmol/L 23.5   BUN mg/dL 16.0   CREATININE mg/dL 0.81   CALCIUM mg/dL 9.0   GLUCOSE mg/dL 109*     XR Foot 3+ View Right  Result Date: 6/27/2025   As described.    This report was finalized on 6/27/2025 3:42 PM by Dr. Ortiz Barber M.D on Workstation: [employer]      XR Foot 3+ View Right  Result Date: 6/13/2025   As described.    This report was finalized on 6/13/2025 1:17 PM by Dr. Ortiz Barber M.D on Workstation: [employer]        Assessment:  Active Hospital Problems    Diagnosis  POA    **Foot infection - right foot [L08.9]  Yes    Diabetic foot infection [E11.628, L08.9]  Yes    Alcohol use disorder [F10.90]  Yes    Cellulitis of right foot [L03.115]  Yes    Chronic respiratory failure with hypoxia [J96.11]  Yes    MRSA carrier [Z22.322]  Not Applicable    Paroxysmal atrial fibrillation [I48.0]  Yes    ILD (interstitial lung disease) [J84.9]  Yes    Emphysema lung [J43.9]  Yes     Obesity [E66.9]  Yes    History of cerebellar stroke [Z86.73]  Not Applicable    Hypertension [I10]  Yes       Medical decision making/care plan: See admitting orders  Right diabetic foot ulcer with surrounding cellulitis and possible contiguous focus osteomyelitis-plan is to admit the patient continue with vancomycin and Zosyn hold his anticoagulation therapy podiatry consultation and prepare him for needed surgical intervention as guided and recommended by podiatry service.  Diabetes mellitus-continue with Accu-Cheks and sliding scale coverage and watch for hypoglycemia.  Paroxysmal atrial fibrillation-hold his anticoagulation therapy but continue with his digoxin and diltiazem.  Consult cardiology service to see if he is risk for atrial fibrillation related embolic phenomena is high enough to require bridge heparin infusion.  Interstitial lung disease with emphysema-provide him with continuous pulse ox monitoring and provide oxygen supplementation on as-needed basis.  History of cerebellar stroke-continue with atorvastatin.  His anticoagulation therapy was held for preparation for surgery.    History of COPD with chronic hypoxic respiratory failure-continue with oxygen supplementation and as needed nebulizer treatment and provided with continuous pulse ox monitoring.  Hypertension-continue with Cardizem.  History of alcohol use-monitor for alcohol withdrawal.  Rodríguez Araujo MD   6/27/2025  16:46 EDT    Parts of this note may be an electronic transcription/translation of spoken language to printed text using the Dragon dictation system.
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
## 2025-06-27 NOTE — PLAN OF CARE
Problem: Adult Inpatient Plan of Care  Goal: Plan of Care Review  Outcome: Progressing  Flowsheets (Taken 6/27/2025 8469)  Progress: no change  Plan of Care Reviewed With: patient   Goal Outcome Evaluation:  Plan of Care Reviewed With: patient        Progress: no change             Pt arrived to the floor from the ER. Pt has a right foot infection. Podiatry consulted. IV fluids and antibiotics ordered. Pt has no complaints of pain at this time. Pt on 4L nasal cannula and that is his baseline. VSS.

## 2025-06-28 LAB
ALBUMIN SERPL-MCNC: 2.7 G/DL (ref 3.5–5.2)
ALBUMIN/GLOB SERPL: 0.8 G/DL
ALP SERPL-CCNC: 79 U/L (ref 39–117)
ALT SERPL W P-5'-P-CCNC: 40 U/L (ref 1–41)
ANION GAP SERPL CALCULATED.3IONS-SCNC: 11 MMOL/L (ref 5–15)
AST SERPL-CCNC: 25 U/L (ref 1–40)
BASOPHILS # BLD AUTO: 0.16 10*3/MM3 (ref 0–0.2)
BASOPHILS NFR BLD AUTO: 1.3 % (ref 0–1.5)
BILIRUB SERPL-MCNC: 0.5 MG/DL (ref 0–1.2)
BUN SERPL-MCNC: 11 MG/DL (ref 8–23)
BUN/CREAT SERPL: 18 (ref 7–25)
CALCIUM SPEC-SCNC: 8.5 MG/DL (ref 8.6–10.5)
CHLORIDE SERPL-SCNC: 105 MMOL/L (ref 98–107)
CHOLEST SERPL-MCNC: 91 MG/DL (ref 0–200)
CO2 SERPL-SCNC: 22 MMOL/L (ref 22–29)
CREAT SERPL-MCNC: 0.61 MG/DL (ref 0.76–1.27)
DEPRECATED RDW RBC AUTO: 45 FL (ref 37–54)
EGFRCR SERPLBLD CKD-EPI 2021: 93 ML/MIN/1.73
EOSINOPHIL # BLD AUTO: 1.08 10*3/MM3 (ref 0–0.4)
EOSINOPHIL NFR BLD AUTO: 8.9 % (ref 0.3–6.2)
ERYTHROCYTE [DISTWIDTH] IN BLOOD BY AUTOMATED COUNT: 12.4 % (ref 12.3–15.4)
GLOBULIN UR ELPH-MCNC: 3.3 GM/DL
GLUCOSE BLDC GLUCOMTR-MCNC: 101 MG/DL (ref 70–130)
GLUCOSE BLDC GLUCOMTR-MCNC: 124 MG/DL (ref 70–130)
GLUCOSE BLDC GLUCOMTR-MCNC: 139 MG/DL (ref 70–130)
GLUCOSE BLDC GLUCOMTR-MCNC: 161 MG/DL (ref 70–130)
GLUCOSE SERPL-MCNC: 99 MG/DL (ref 65–99)
HBA1C MFR BLD: 5.8 % (ref 4.8–5.6)
HCT VFR BLD AUTO: 32.8 % (ref 37.5–51)
HDLC SERPL-MCNC: 26 MG/DL (ref 40–60)
HGB BLD-MCNC: 10.8 G/DL (ref 13–17.7)
IMM GRANULOCYTES # BLD AUTO: 0.2 10*3/MM3 (ref 0–0.05)
IMM GRANULOCYTES NFR BLD AUTO: 1.6 % (ref 0–0.5)
LDLC SERPL CALC-MCNC: 53 MG/DL (ref 0–100)
LDLC/HDLC SERPL: 2.13 {RATIO}
LYMPHOCYTES # BLD AUTO: 0.89 10*3/MM3 (ref 0.7–3.1)
LYMPHOCYTES NFR BLD AUTO: 7.3 % (ref 19.6–45.3)
MCH RBC QN AUTO: 32.2 PG (ref 26.6–33)
MCHC RBC AUTO-ENTMCNC: 32.9 G/DL (ref 31.5–35.7)
MCV RBC AUTO: 97.9 FL (ref 79–97)
MONOCYTES # BLD AUTO: 1.67 10*3/MM3 (ref 0.1–0.9)
MONOCYTES NFR BLD AUTO: 13.7 % (ref 5–12)
NEUTROPHILS NFR BLD AUTO: 67.2 % (ref 42.7–76)
NEUTROPHILS NFR BLD AUTO: 8.18 10*3/MM3 (ref 1.7–7)
NRBC BLD AUTO-RTO: 0 /100 WBC (ref 0–0.2)
PLATELET # BLD AUTO: 228 10*3/MM3 (ref 140–450)
PMV BLD AUTO: 9.6 FL (ref 6–12)
POTASSIUM SERPL-SCNC: 3.7 MMOL/L (ref 3.5–5.2)
PROT SERPL-MCNC: 6 G/DL (ref 6–8.5)
RBC # BLD AUTO: 3.35 10*6/MM3 (ref 4.14–5.8)
SODIUM SERPL-SCNC: 138 MMOL/L (ref 136–145)
TRIGL SERPL-MCNC: 48 MG/DL (ref 0–150)
VLDLC SERPL-MCNC: 12 MG/DL (ref 5–40)
WBC NRBC COR # BLD AUTO: 12.18 10*3/MM3 (ref 3.4–10.8)

## 2025-06-28 PROCEDURE — 87147 CULTURE TYPE IMMUNOLOGIC: CPT | Performed by: INTERNAL MEDICINE

## 2025-06-28 PROCEDURE — 80053 COMPREHEN METABOLIC PANEL: CPT | Performed by: INTERNAL MEDICINE

## 2025-06-28 PROCEDURE — 97163 PT EVAL HIGH COMPLEX 45 MIN: CPT | Performed by: PHYSICAL THERAPIST

## 2025-06-28 PROCEDURE — 63710000001 REVEFENACIN 175 MCG/3ML SOLUTION: Performed by: INTERNAL MEDICINE

## 2025-06-28 PROCEDURE — 94799 UNLISTED PULMONARY SVC/PX: CPT

## 2025-06-28 PROCEDURE — 93005 ELECTROCARDIOGRAM TRACING: CPT | Performed by: INTERNAL MEDICINE

## 2025-06-28 PROCEDURE — 94664 DEMO&/EVAL PT USE INHALER: CPT

## 2025-06-28 PROCEDURE — G0545 PR INHERENT VISIT TO INPT: HCPCS | Performed by: INTERNAL MEDICINE

## 2025-06-28 PROCEDURE — 87070 CULTURE OTHR SPECIMN AEROBIC: CPT | Performed by: INTERNAL MEDICINE

## 2025-06-28 PROCEDURE — 99222 1ST HOSP IP/OBS MODERATE 55: CPT | Performed by: INTERNAL MEDICINE

## 2025-06-28 PROCEDURE — 25010000002 THIAMINE PER 100 MG: Performed by: INTERNAL MEDICINE

## 2025-06-28 PROCEDURE — 80061 LIPID PANEL: CPT | Performed by: INTERNAL MEDICINE

## 2025-06-28 PROCEDURE — 25010000002 CEFTRIAXONE PER 250 MG: Performed by: INTERNAL MEDICINE

## 2025-06-28 PROCEDURE — 93010 ELECTROCARDIOGRAM REPORT: CPT | Performed by: INTERNAL MEDICINE

## 2025-06-28 PROCEDURE — 87075 CULTR BACTERIA EXCEPT BLOOD: CPT | Performed by: INTERNAL MEDICINE

## 2025-06-28 PROCEDURE — 25010000002 VANCOMYCIN 1 G RECONSTITUTED SOLUTION 1 EACH VIAL: Performed by: INTERNAL MEDICINE

## 2025-06-28 PROCEDURE — 87186 SC STD MICRODIL/AGAR DIL: CPT | Performed by: INTERNAL MEDICINE

## 2025-06-28 PROCEDURE — 63710000001 INSULIN LISPRO (HUMAN) PER 5 UNITS: Performed by: INTERNAL MEDICINE

## 2025-06-28 PROCEDURE — 25810000003 SODIUM CHLORIDE 0.9 % SOLUTION 500 ML FLEX CONT: Performed by: INTERNAL MEDICINE

## 2025-06-28 PROCEDURE — 82948 REAGENT STRIP/BLOOD GLUCOSE: CPT

## 2025-06-28 PROCEDURE — 63710000001 PREDNISONE PER 1 MG: Performed by: INTERNAL MEDICINE

## 2025-06-28 PROCEDURE — 97530 THERAPEUTIC ACTIVITIES: CPT | Performed by: PHYSICAL THERAPIST

## 2025-06-28 PROCEDURE — 83036 HEMOGLOBIN GLYCOSYLATED A1C: CPT | Performed by: INTERNAL MEDICINE

## 2025-06-28 PROCEDURE — 99223 1ST HOSP IP/OBS HIGH 75: CPT | Performed by: INTERNAL MEDICINE

## 2025-06-28 PROCEDURE — 25010000002 CEFEPIME PER 500 MG: Performed by: INTERNAL MEDICINE

## 2025-06-28 PROCEDURE — 94760 N-INVAS EAR/PLS OXIMETRY 1: CPT

## 2025-06-28 PROCEDURE — 87205 SMEAR GRAM STAIN: CPT | Performed by: INTERNAL MEDICINE

## 2025-06-28 PROCEDURE — 85025 COMPLETE CBC W/AUTO DIFF WBC: CPT | Performed by: INTERNAL MEDICINE

## 2025-06-28 RX ADMIN — Medication 10 ML: at 08:42

## 2025-06-28 RX ADMIN — PREDNISONE 40 MG: 20 TABLET ORAL at 08:40

## 2025-06-28 RX ADMIN — Medication 10 ML: at 22:08

## 2025-06-28 RX ADMIN — FOLIC ACID 1 MG: 1 TABLET ORAL at 08:41

## 2025-06-28 RX ADMIN — BUDESONIDE AND FORMOTEROL FUMARATE DIHYDRATE 2 PUFF: 160; 4.5 AEROSOL RESPIRATORY (INHALATION) at 22:05

## 2025-06-28 RX ADMIN — BUDESONIDE AND FORMOTEROL FUMARATE DIHYDRATE 2 PUFF: 160; 4.5 AEROSOL RESPIRATORY (INHALATION) at 08:02

## 2025-06-28 RX ADMIN — CEFTRIAXONE SODIUM 2000 MG: 2 INJECTION, POWDER, FOR SOLUTION INTRAMUSCULAR; INTRAVENOUS at 14:24

## 2025-06-28 RX ADMIN — CEFEPIME 2000 MG: 2 INJECTION, POWDER, FOR SOLUTION INTRAVENOUS at 06:57

## 2025-06-28 RX ADMIN — SODIUM CHLORIDE 1000 MG: 9 INJECTION, SOLUTION INTRAVENOUS at 15:35

## 2025-06-28 RX ADMIN — DIGOXIN 125 MCG: 0.12 TABLET ORAL at 13:22

## 2025-06-28 RX ADMIN — INSULIN LISPRO 2 UNITS: 100 INJECTION, SOLUTION INTRAVENOUS; SUBCUTANEOUS at 16:32

## 2025-06-28 RX ADMIN — THIAMINE HYDROCHLORIDE 200 MG: 100 INJECTION, SOLUTION INTRAMUSCULAR; INTRAVENOUS at 22:08

## 2025-06-28 RX ADMIN — THIAMINE HYDROCHLORIDE 200 MG: 100 INJECTION, SOLUTION INTRAMUSCULAR; INTRAVENOUS at 14:24

## 2025-06-28 RX ADMIN — REVEFENACIN 175 MCG: 175 SOLUTION RESPIRATORY (INHALATION) at 07:58

## 2025-06-28 RX ADMIN — THIAMINE HYDROCHLORIDE 200 MG: 100 INJECTION, SOLUTION INTRAMUSCULAR; INTRAVENOUS at 05:51

## 2025-06-28 RX ADMIN — ATORVASTATIN CALCIUM 20 MG: 20 TABLET, FILM COATED ORAL at 22:08

## 2025-06-28 RX ADMIN — DILTIAZEM HYDROCHLORIDE 240 MG: 120 CAPSULE, COATED, EXTENDED RELEASE ORAL at 08:40

## 2025-06-28 NOTE — CONSULTS
[facility]    Patient Name: Jim Marvin  :1935  89 y.o.  LOS: 1  Encounter Provider: Robby Hall MD      Patient Care Team:  Antonio Finley MD as PCP - General (Family Medicine)  Ruben Burger Jr., MD as Consulting Physician (Urology)    Chief Complaint/Consult question: Anticoagulation recommendations    PMH/HPI: Jim Marvin is an 89-year-old male with a past medical history of chronic hypoxemic respiratory failure, COPD, emphysema, hypertension, hyperlipidemia, and carotid artery stenosis.    He was admitted in 2024 with shortness of breath.  Was found to be hypoxic and admitted for respiratory failure secondary to interstitial lung disease.  EKG showed A-fib with a rate of 170.  He was given diltiazem and digoxin in the ER with improvement in his heart rate.  He was eventually discharged on 240 mg daily of diltiazem and Lopressor 50 twice daily and Eliquis 5 twice daily.    He was last seen in 2025.  He was doing well from a cardiac standpoint.  He wears 3 L supplemental oxygen baseline.  6 L with activity.  He endorsed stable exertional dyspnea, right greater than left ankle swelling in the morning.  Denied chest pain, palpitations, orthopnea, PND, syncope.    Interval History: He was referred to the emergency department yesterday by his podiatrist for IV antibiotics and further management of his right foot diabetic ulcer. He will undergo surgical intervention once MRI is obtained. Cardiology has been consulted for anticoagulation assistance. He is currently off Eliquis.     At the time of interview, patient was in no acute distress.  He lives alone and has not been physically active due to foot issues and balance.  He otherwise denies exertional chest pain, palpitation, dizziness, or recent falls.  He had a syncopal event a year ago but has not had recurrence since being started on home oxygen which he usually uses 4 L at baseline.  He endorses stable chronic  "exertional dyspnea.  He is not aware of having had a heart attack or stroke.  He takes Eliquis at home and has not noticed significant bleeding.  He smoked up to 1 pack/day for 30-40 years but quit 30 years ago.  He reports intermittent alcohol use but adamantly denies daily consumption, and also denies substance use.    ECHO 6/7/2024    Left ventricular systolic function is normal. Left ventricular ejection fraction appears to be 66 - 70%.    Left ventricular diastolic function is consistent with (grade I) impaired relaxation.    Normal right ventricular cavity size and systolic function noted.    The left atrial cavity is mildly dilated.    Mild tricuspid valve regurgitation is present.    Calculated right ventricular systolic pressure from tricuspid regurgitation is 34 mmHg.    There is no evidence of pericardial effusion       Objective   Vital Signs  Temp:  [97.6 °F (36.4 °C)-98.6 °F (37 °C)] 97.9 °F (36.6 °C)  Heart Rate:  [65-79] 71  Resp:  [16-20] 16  BP: (122-152)/(54-78) 148/70    Intake/Output Summary (Last 24 hours) at 6/28/2025 1038  Last data filed at 6/28/2025 0836  Gross per 24 hour   Intake 640 ml   Output 550 ml   Net 90 ml     Flowsheet Rows      Flowsheet Row First Filed Value   Admission Height 177.8 cm (70\") Documented at 06/27/2025 1511   Admission Weight 87.3 kg (192 lb 6.4 oz) Documented at 06/27/2025 1511              Vitals and nursing note reviewed.   Constitutional:       Appearance: Not in distress. Frail. Chronically ill-appearing.   Neck:      Vascular: No JVR.   Pulmonary:      Effort: Pulmonary effort is normal.      Breath sounds: Normal breath sounds. No wheezing. No rhonchi. No rales.   Cardiovascular:      Normal rate. Regular rhythm.      Murmurs: There is no murmur.      Comments: Right foot under surgical wrapping.   Edema:     Peripheral edema absent.   Abdominal:      General: There is no distension.      Palpations: Abdomen is soft.      Tenderness: There is no abdominal " "tenderness.   Musculoskeletal:      Cervical back: Neck supple. Skin:     General: Skin is cool.   Neurological:      Mental Status: Alert and oriented to person, place and time.           Pertinent Test Results:  Results from last 7 days   Lab Units 06/28/25  0646 06/27/25  1433   SODIUM mmol/L 138 138   POTASSIUM mmol/L 3.7 3.9   CHLORIDE mmol/L 105 102   CO2 mmol/L 22.0 23.5   BUN mg/dL 11.0 16.0   CREATININE mg/dL 0.61* 0.81   GLUCOSE mg/dL 99 109*   CALCIUM mg/dL 8.5* 9.0   AST (SGOT) U/L 25 29   ALT (SGPT) U/L 40 46*         Results from last 7 days   Lab Units 06/28/25  0646 06/27/25  1433   WBC 10*3/mm3 12.18* 12.39*   HEMOGLOBIN g/dL 10.8* 11.8*   HEMATOCRIT % 32.8* 35.1*   PLATELETS 10*3/mm3 228 232                   Invalid input(s): \"LDLCALC\"                      Medication Review:   [Held by provider] apixaban, 5 mg, Oral, Q12H  atorvastatin, 20 mg, Oral, Nightly  budesonide-formoterol, 2 puff, Inhalation, BID - RT   And  revefenacin, 175 mcg, Nebulization, Daily - RT  cefTRIAXone, 2,000 mg, Intravenous, Q24H  digoxin, 125 mcg, Oral, Daily  dilTIAZem CD, 240 mg, Oral, Daily  folic acid, 1 mg, Oral, Daily  insulin lispro, 2-7 Units, Subcutaneous, 4x Daily AC & at Bedtime  [Held by provider] metFORMIN ER, 500 mg, Oral, Daily With Breakfast  predniSONE, 40 mg, Oral, Daily  sodium chloride, 10 mL, Intravenous, Q12H  thiamine (B-1) IV, 200 mg, Intravenous, Q8H   Followed by  [START ON 7/3/2025] thiamine, 100 mg, Oral, Daily  vancomycin, 1,750 mg, Intravenous, Q24H         O2, 3 L/min, Last Rate: 4 L/min (06/27/25 1832)  Pharmacy to dose vancomycin,   sodium chloride, 100 mL/hr, Last Rate: 100 mL/hr (06/27/25 1851)        Assessment & Plan     Active Hospital Problems    Diagnosis  POA    Diabetic infection of right foot [E11.628, L08.9]  Yes    Alcohol use disorder [F10.90]  Yes    Cellulitis of right foot [L03.115]  Yes    Chronic respiratory failure with hypoxia [J96.11]  Yes    MRSA carrier [Z22.322]  Not " Applicable    Paroxysmal atrial fibrillation [I48.0]  Yes    ILD (interstitial lung disease) [J84.9]  Yes    Emphysema lung [J43.9]  Yes    Obesity [E66.9]  Yes    History of cerebellar stroke [Z86.73]  Not Applicable    Hypertension [I10]  Yes      Resolved Hospital Problems   No resolved problems to display.        Paroxysmal atrial fibrillation: Patient was admitted last year 6/2024 for respiratory failure in the setting of emphysema/ILD.  He was found to be in A-fib with RVR on multiple meds.  ECG this admission showed NSR without acute ischemic changes.  [id number] score 3, he reports compliance with Eliquis 5 bid which is currently on hold for possible surgery.  Okay to hold off heparin bridge, reasonable to still do prophylactic Lovenox.  Continue home rate controlling medications including diltiazem 240 daily and digoxin 125 daily.  Preoperative evaluation: Possible surgery for osteomyelitis.  Patient not very physically active but denies obvious angina.  He had echo last year 6/2024 that showed LVEF 65-70%. RCRI score 0, Wayne score predicts 0.2% risk of MI or cardiac arrest intraoperatively or 30 days postop, okay to proceed to planned foot/orthopedic surgery if deemed necessary.  Osteomyelitis: Management per primary team and other specialists, possible right foot surgery as above.  Prediabetes: Hemoglobin A1c 5.8 this admission.  Patient currently does not have evidence of full-blown diabetes.  Hyperlipidemia: Continue home atorvastatin 20 daily.  Will recheck lipids while here.  COPD: Management per primary team and other specialists.  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      Robby Hall MD  [facility]  06/28/25  08:51 EDT

## 2025-06-28 NOTE — PLAN OF CARE
Goal Outcome Evaluation:  Plan of Care Reviewed With: patient        Progress: no change  Outcome Evaluation: Patient A & O. Makes needs known. Family at bedside earlier. MRI to be done, sheet completed and faxed earlier. Continues with IV fluds and IV atb therapy. Wound culture collected and results pending for R foot. Continues with CIWAs. No c/o pain. No s/s of distress noted.

## 2025-06-28 NOTE — CONSULTS
"Referring Provider: Dr. Bajwa    Reason for Consultation: \"Diabetic foot ulcer.  History of MRSA\"    History of present illness:  Jim Marvin is a 89 y.o. who I am asked to evaluate and give opinion for \"Diabetic foot ulcer.  History of MRSA.\" History is obtained from the patient and review of the old medical records which I summarize/synthesize as follows:   He presented to the emergency room yesterday afternoon with a worsening right foot wound.  He has been following with podiatry for this problem.  He was seen in the podiatry office yesterday and it was determined he would need surgical intervention due to purulent drainage from his wound.  He has not been on any antibiotics recently.    In the emergency room and since admission he has been afebrile. Labs were notable for WBC 12, CRP 14, and hemoglobin A1c 5.8%.  Plain film of the right foot shows soft tissue swelling but could not exclude osteomyelitis. Podiatry has evaluated the patient and anticipates he is going to need surgical intervention.  But first an MRI has been ordered to evaluate the extent of the infection.  He is currently being treated with vancomycin and cefepime.    Of note, our group has seen him in the fall 2024 in March 2025 due to MRSA infections of the foot.    Past Medical History:   Diagnosis Date    COPD (chronic obstructive pulmonary disease)     Hyperlipidemia     Hypertension     Stroke    MRSA infection    Past Surgical History:   Procedure Laterality Date    CATARACT EXTRACTION Left 07/06/2022       Social History:  Lives in [place], [place]  Retired      Antibiotic allergies and intolerances:  None    Medications:    Current Facility-Administered Medications:     acetaminophen (TYLENOL) tablet 650 mg, 650 mg, Oral, Q4H PRN **OR** acetaminophen (TYLENOL) 160 MG/5ML oral solution 650 mg, 650 mg, Oral, Q4H PRN **OR** acetaminophen (TYLENOL) suppository 650 mg, 650 mg, Rectal, Q4H PRN, Rodríguez Araujo MD    [Held by provider] " apixaban (ELIQUIS) tablet 5 mg, 5 mg, Oral, Q12H, Rodríguez Araujo MD    atorvastatin (LIPITOR) tablet 20 mg, 20 mg, Oral, Nightly, Rodríguez Araujo MD, 20 mg at 06/27/25 2135    budesonide-formoterol (SYMBICORT) 160-4.5 MCG/ACT inhaler 2 puff, 2 puff, Inhalation, BID - RT, 2 puff at 06/28/25 0802 **AND** revefenacin (YUPELRI) nebulizer solution 175 mcg, 175 mcg, Nebulization, Daily - RT, Rodríguez Araujo MD, 175 mcg at 06/28/25 0758    Calcium Replacement - Follow Nurse / BPA Driven Protocol, , Not Applicable, PRN, Rodríguez Araujo MD    cefepime 2000 mg IVPB in 100 mL NS (MBP), 2,000 mg, Intravenous, Q8H, Rodríguez Araujo MD, 2,000 mg at 06/28/25 0657    dextrose (D50W) (25 g/50 mL) IV injection 25 g, 25 g, Intravenous, Q15 Min PRN, Rodríguez Araujo MD    dextrose (GLUTOSE) oral gel 15 g, 15 g, Oral, Q15 Min PRN, Rodríguez Araujo MD    digoxin (LANOXIN) tablet 125 mcg, 125 mcg, Oral, Daily, Rodríguez Araujo MD    dilTIAZem CD (CARDIZEM CD) 24 hr capsule 240 mg, 240 mg, Oral, Daily, Rodríguez Araujo MD, 240 mg at 06/28/25 0840    folic acid (FOLVITE) tablet 1 mg, 1 mg, Oral, Daily, Rodríguez Araujo MD, 1 mg at 06/28/25 0841    glucagon (GLUCAGEN) injection 1 mg, 1 mg, Intramuscular, Q15 Min PRN, Rodríguez Araujo MD    insulin lispro (HUMALOG/ADMELOG) injection 2-7 Units, 2-7 Units, Subcutaneous, 4x Daily AC & at Bedtime, Rodríguez Araujo MD    ipratropium-albuterol (DUO-NEB) nebulizer solution 3 mL, 3 mL, Nebulization, Q6H PRN, Rodríguez Araujo MD    LORazepam (ATIVAN) tablet 1 mg, 1 mg, Oral, Q1H PRN **OR** LORazepam (ATIVAN) injection 1 mg, 1 mg, Intravenous, Q1H PRN **OR** LORazepam (ATIVAN) tablet 2 mg, 2 mg, Oral, Q1H PRN **OR** LORazepam (ATIVAN) injection 2 mg, 2 mg, Intravenous, Q1H PRN **OR** LORazepam (ATIVAN) injection 2 mg, 2 mg, Intravenous, Q15 Min PRN **OR** LORazepam (ATIVAN) injection 2 mg, 2 mg, Intramuscular, Q15 Min PRN, Rodríguez Araujo MD    Magnesium Standard Dose Replacement - Follow Nurse / BPA Driven Protocol, , Not Applicable,  PRN, Rodríguez Araujo MD    [Held by provider] metFORMIN ER (GLUCOPHAGE-XR) 24 hr tablet 500 mg, 500 mg, Oral, Daily With Breakfast, Rodríguez Araujo MD    O2 (OXYGEN), 3 L/min, Inhalation, Continuous, Rodríguez Araujo MD, Last Rate: 240,000 mL/hr at 06/27/25 1832, 4 L/min at 06/27/25 1832    Pharmacy to dose vancomycin, , Not Applicable, Continuous PRNVan Jawed, MD    Phosphorus Replacement - Follow Nurse / BPA Driven Protocol, , Not Applicable, PRNVan Jawed, MD    Potassium Replacement - Follow Nurse / BPA Driven Protocol, , Not Applicable, PRNVan Jawed, MD    predniSONE (DELTASONE) tablet 40 mg, 40 mg, Oral, Daily, Rodríguez Araujo MD, 40 mg at 06/28/25 0840    sodium chloride 0.9 % flush 10 mL, 10 mL, Intravenous, Q12H, Rodríguez Araujo MD, 10 mL at 06/28/25 0842    sodium chloride 0.9 % flush 10 mL, 10 mL, Intravenous, PRN, Rodríguez Araujo MD    sodium chloride 0.9 % infusion 40 mL, 40 mL, Intravenous, PRN, Rodríguez Araujo MD    sodium chloride 0.9 % infusion, 100 mL/hr, Intravenous, Continuous, Rodríguez Araujo MD, Last Rate: 100 mL/hr at 06/27/25 1851, 100 mL/hr at 06/27/25 1851    thiamine (B-1) injection 200 mg, 200 mg, Intravenous, Q8H, 200 mg at 06/28/25 0551 **FOLLOWED BY** [START ON 7/3/2025] thiamine (VITAMIN B-1) tablet 100 mg, 100 mg, Oral, Daily, Rodríguez Araujo MD    vancomycin 1750 mg/500 mL 0.9% NS IVPB ([facility]), 1,750 mg, Intravenous, Q24H, Rodríguez Araujo MD      Objective   Vital Signs   Temp:  [97.6 °F (36.4 °C)-98.6 °F (37 °C)] 97.9 °F (36.6 °C)  Heart Rate:  [65-79] 71  Resp:  [16-20] 16  BP: (122-152)/(54-78) 148/70    Physical Exam:   General: awake, alert, NAD, very nice, sitting up in bed  Eyes: no scleral icterus  Cardiovascular: Normal rate  Respiratory: normal work of breathing using nasal cannula  GI: Abdomen is soft, not tender, + bowel sounds in all four quadrants  :  no Hernandez catheter  MSK/skin: Open wound and erythema of right foot  Neurological: Alert and oriented x 3  Psychiatric: Normal  mood and affect   Vasc: PIV w/o erythema    Labs:     Lab Results   Component Value Date    WBC 12.18 (H) 06/28/2025    HGB 10.8 (L) 06/28/2025    HCT 32.8 (L) 06/28/2025    MCV 97.9 (H) 06/28/2025     06/28/2025     Lab Results   Component Value Date    GLUCOSE 99 06/28/2025    CALCIUM 8.5 (L) 06/28/2025     06/28/2025    K 3.7 06/28/2025    CO2 22.0 06/28/2025     06/28/2025    BUN 11.0 06/28/2025    CREATININE 0.61 (L) 06/28/2025    EGFR 93.0 06/28/2025    BCR 18.0 06/28/2025    ANIONGAP 11.0 06/28/2025     Lab Results   Component Value Date    ALT 40 06/28/2025     Lab Results   Component Value Date    HGBA1C 5.80 (H) 06/28/2025     Lab Results   Component Value Date    CRP 14.65 (H) 06/27/2025     Lab Results   Component Value Date    VANCOTROUGH 9.80 03/18/2025       Microbiology:  6/13 BCx: Negative  6/27 BCx: Pending      Radiology:  X-ray right foot with soft tissue swelling cannot exclude osteomyelitis    MRI right foot ordered    Isolation:  Contact for history of MRSA    ASSESSMENT/PLAN:  Chronic osteomyelitis right foot  History of MRSA  Chronic hypoxic respiratory failure  Emphysema  Interstitial lung disease    For skin and soft tissue infection and chronic osteomyelitis of the right foot, continue empiric vancomycin but change cefepime to ceftriaxone while awaiting imaging and surgical plan.  Given purulent drainage I will obtain wound culture.  We will obviously get surgical cultures as well but the exact timing of surgical intervention is not yet known.    While the patient is on vancomycin, vancomycin levels will be ordered and reviewed with dose adjustments made as needed. The patient will have a daily creatinine level checked while on vancomycin as part of monitoring this medication.     ID will follow.  Plan D/W RN.
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
## 2025-06-28 NOTE — PLAN OF CARE
Goal Outcome Evaluation:  Plan of Care Reviewed With: patient                 Mr. Marvin is an 89-year-old male.  PTA he lives alone, 2 stairs to enter with bilateral rails and basement.  He reports being independent with all functional mobility without use of any assistive device.  He presents to [facility] with right foot ulcer.  Current plan is to have surgery for debridement on Monday, 6/30/2025.  He is chronically on 4 L of O2.  He is in bed and agreeable to therapy when therapy enters the room.  He demonstrates modified independent bed mobility with head of bed elevated and bed rails.  He demonstrates modified independent sit to stand transfer with rolling walker and bed elevated.  Ambulates 5 feet with increased base of support and decreased step length bilaterally.  Patient wishes to return home after hospitalization.  At this time expect home with home health however this may change after his surgical procedure upcoming on 6/30/2025.  We will continue to monitor progress.  Plan is to see him post operatively on 7/1/2025 he is a candidate for skilled physical therapy.

## 2025-06-28 NOTE — PROGRESS NOTES
"[facility] Clinical Pharmacy Services: Vancomycin Monitoring Note    Jim Marvin is a 89 y.o. male who is on day 1 of pharmacy to dose vancomycin for Bone and/or Joint Infection.    Current Vancomycin Dose:   1750 mg IV every  24  hours  Updated Cultures and Sensitivities:   6/27 bld cx pending      Vitals/Labs  Ht: 177.8 cm (70\"); Wt: 87.3 kg (192 lb 6.4 oz)   Temp Readings from Last 1 Encounters:   06/28/25 97.7 °F (36.5 °C) (Oral)     Estimated Creatinine Clearance: 105.3 mL/min (A) (by C-G formula based on SCr of 0.61 mg/dL (L)).     Results from last 7 days   Lab Units 06/28/25  0646 06/27/25  1433   CREATININE mg/dL 0.61* 0.81   WBC 10*3/mm3 12.18* 12.39*     Assessment/Plan  Scr decreased slightly today. Predicted AUC with current dose 1750mg IV q24h= 396 mg/L.h (goal 400-600)  Vancomycin Dose: Change dose to 1000 mg IV every  12  hours; provides a predicted  mg/L.hr   Next Level Date and Time: Vanc Trough on 6/29 at 1400  We will continue to monitor patient changes and renal function     Thank you for involving pharmacy in this patient's care. Please contact pharmacy with any questions or concerns.       Sarah Lacy, PharmD  Clinical Pharmacist          "

## 2025-06-28 NOTE — PROGRESS NOTES
Name: Jim Marvin ADMIT: 2025   : 1935  PCP: Antonio Finley MD    MRN: [id number] LOS: 1 days   AGE/SEX: 89 y.o. male  ROOM: [facility]     Subjective   Subjective   Denies any complaint. Sitting on the side of the bed eating breakfast.     Objective   Objective   Vital Signs  Temp:  [97.6 °F (36.4 °C)-98.6 °F (37 °C)] 97.9 °F (36.6 °C)  Heart Rate:  [65-79] 71  Resp:  [16-20] 16  BP: (122-152)/(54-78) 148/70  SpO2:  [85 %-98 %] 93 %  on  Flow (L/min) (Oxygen Therapy):  [4] 4;   Device (Oxygen Therapy): nasal cannula  Body mass index is 27.61 kg/m².    Physical Exam  Constitutional:       General: He is not in acute distress.     Appearance: He is not toxic-appearing.   HENT:      Head: Normocephalic and atraumatic.   Cardiovascular:      Rate and Rhythm: Normal rate and regular rhythm.   Pulmonary:      Effort: Pulmonary effort is normal. No respiratory distress.      Breath sounds: Normal breath sounds. No wheezing or rhonchi.   Abdominal:      General: Bowel sounds are normal.      Palpations: Abdomen is soft.      Tenderness: There is no abdominal tenderness. There is no guarding or rebound.   Musculoskeletal:      Comments: Right foot dressed   Skin:     General: Skin is warm and dry.   Neurological:      Mental Status: He is alert and oriented to person, place, and time.   Psychiatric:         Mood and Affect: Mood normal.         Behavior: Behavior normal.     Results Review  I reviewed the patient's new clinical results.  Results from last 7 days   Lab Units 25  0646 25  1433   WBC 10*3/mm3 12.18* 12.39*   HEMOGLOBIN g/dL 10.8* 11.8*   PLATELETS 10*3/mm3 228 232     Results from last 7 days   Lab Units 25  0646 25  1433   SODIUM mmol/L 138 138   POTASSIUM mmol/L 3.7 3.9   CHLORIDE mmol/L 105 102   CO2 mmol/L 22.0 23.5   BUN mg/dL 11.0 16.0   CREATININE mg/dL 0.61* 0.81   GLUCOSE mg/dL 99 109*     Lab Results   Component Value Date    ANIONGAP 11.0 2025     Estimated  Creatinine Clearance: 105.3 mL/min (A) (by C-G formula based on SCr of 0.61 mg/dL (L)).   Lab Results   Component Value Date    EGFR 93.0 06/28/2025     Results from last 7 days   Lab Units 06/28/25  0646 06/27/25  1433   ALBUMIN g/dL 2.7* 3.3*   BILIRUBIN mg/dL 0.5 0.6   ALK PHOS U/L 79 93   AST (SGOT) U/L 25 29   ALT (SGPT) U/L 40 46*     Results from last 7 days   Lab Units 06/28/25  0646 06/27/25  1433   CALCIUM mg/dL 8.5* 9.0   ALBUMIN g/dL 2.7* 3.3*     Results from last 7 days   Lab Units 06/27/25  1523   LACTATE mmol/L 1.5     Hemoglobin A1C   Date/Time Value Ref Range Status   06/28/2025 0646 5.80 (H) 4.80 - 5.60 % Final     Glucose   Date/Time Value Ref Range Status   06/28/2025 0635 101 70 - 130 mg/dL Final   06/27/2025 2053 124 70 - 130 mg/dL Final       XR Foot 3+ View Right  Result Date: 6/27/2025   As described.    This report was finalized on 6/27/2025 3:42 PM by Dr. Ortiz Barber M.D on Workstation: [id number]        Scheduled Meds  [Held by provider] apixaban, 5 mg, Oral, Q12H  atorvastatin, 20 mg, Oral, Nightly  budesonide-formoterol, 2 puff, Inhalation, BID - RT   And  revefenacin, 175 mcg, Nebulization, Daily - RT  cefepime, 2,000 mg, Intravenous, Q8H  digoxin, 125 mcg, Oral, Daily  dilTIAZem CD, 240 mg, Oral, Daily  folic acid, 1 mg, Oral, Daily  insulin lispro, 2-7 Units, Subcutaneous, 4x Daily AC & at Bedtime  metFORMIN ER, 500 mg, Oral, Daily With Breakfast  predniSONE, 40 mg, Oral, Daily  sodium chloride, 10 mL, Intravenous, Q12H  thiamine (B-1) IV, 200 mg, Intravenous, Q8H   Followed by  [START ON 7/3/2025] thiamine, 100 mg, Oral, Daily  vancomycin, 1,750 mg, Intravenous, Q24H    Continuous Infusions  O2, 3 L/min, Last Rate: 4 L/min (06/27/25 1832)  Pharmacy to dose vancomycin,   sodium chloride, 100 mL/hr, Last Rate: 100 mL/hr (06/27/25 1851)    PRN Meds    acetaminophen **OR** acetaminophen **OR** acetaminophen    Calcium Replacement - Follow Nurse / BPA Driven Protocol    dextrose     dextrose    glucagon (human recombinant)    ipratropium-albuterol    LORazepam **OR** LORazepam **OR** LORazepam **OR** LORazepam **OR** LORazepam **OR** LORazepam    Magnesium Standard Dose Replacement - Follow Nurse / BPA Driven Protocol    Pharmacy to dose vancomycin    Phosphorus Replacement - Follow Nurse / BPA Driven Protocol    Potassium Replacement - Follow Nurse / BPA Driven Protocol    sodium chloride    sodium chloride    O2, 3 L/min, Last Rate: 4 L/min (06/27/25 1832)  Pharmacy to dose vancomycin,   sodium chloride, 100 mL/hr, Last Rate: 100 mL/hr (06/27/25 1851)    Diet  Diet: Cardiac, Diabetic, Renal; Healthy Heart (2-3 Na+); Consistent Carbohydrate; Low Sodium (2-3g), Low Potassium, Low Phosphorus; Fluid Consistency: Thin (IDDSI 0)     Date/Time CIWA-Ar Total    06/27/25 2130 2              Assessment/Plan     Active Hospital Problems    Diagnosis  POA    **Foot infection - right foot [L08.9]  Yes    Diabetic foot infection [E11.628, L08.9]  Yes    Alcohol use disorder [F10.90]  Yes    Cellulitis of right foot [L03.115]  Yes    Chronic respiratory failure with hypoxia [J96.11]  Yes    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     Patient is a 89 y.o. male     R DFU  R foot cellulitis  h/o MRSA  Continue antibiotics, consult ID  Podiatry to see  Anticoagulation on hold in case procedures    DM 2  A1c 5.80%  Correctional insulin  BS 100s    Paroxysmal atrial fibrillation  Anticoagulation on hold  Cardiology consulted    Chronic hypoxic respiratory failure   COPD  Interstitial lung disease  Flow (L/min) (Oxygen Therapy):  [4] 4 (baseline 3-4 L/min)  Consult pulmonology  Prednisone 40 on PTA list but he was not  on chronic steroids in March admission? Patient states he has been on steroids for about a year so plan stress dose steroids for procedure    History of stroke  Hypertension  Alcohol use disorder 15-20/per week. Monitoring CIWA, as needed Ativan    DVT prophylaxis  Eliquis (home med) held in case procedures    Discharge  TBD  Expected discharge date/ time has not been documented.    Discussed with patient and nursing staff    Nick Bajwa MD  [facility]  06/28/25 08:16 EDT

## 2025-06-28 NOTE — CONSULTS
Podiatry Consult Note      Patient: Jim Marvin Admit Date: 2025    Age: 89 y.o.   PCP: Antonio Finley MD    MRN: [id number]  Room: [facility]        Subjective     Chief Complaint     Chief Complaint   Patient presents with    Wound Check        HPI     This is an 89-year-old male who cemented with my practice with worsening exacerbation of diabetic foot ulcers with now cellulitis and osteomyelitis of his right foot.  I have not seen the patient for significant amount of time for the subfifth metatarsal head ulcer.  Patient would benefit from elective surgery for an osteotomy to elevate the metatarsal but unfortunately is not a great surgical candidate.  We have been doing local wound care every week.  He presented to my office yesterday and states he had not changed his bandage in a week.  Once I remove the bandage, there is significant malodor and purulent drainage present with exposed fifth metatarsal head.  I advised the patient to go straight to the hospital.    Past Medical History     Past Medical History:   Diagnosis Date    COPD (chronic obstructive pulmonary disease)     Hyperlipidemia     Hypertension     Stroke         Past Surgical History:   Procedure Laterality Date    CATARACT EXTRACTION Left 2022        Allergies   Allergen Reactions    Ace Inhibitors Other (See Comments)     Cannot remember     Lisinopril Other (See Comments)     Cannot remember        Social History     Tobacco Use   Smoking Status Former    Current packs/day: 0.00    Average packs/day: 1 pack/day for 40.0 years (40.0 ttl pk-yrs)    Types: Cigarettes    Start date:     Quit date:     Years since quittin.5    Passive exposure: Past   Smokeless Tobacco Never   Tobacco Comments    Quit 19 years ago         Objective   Physical Exam    Vitals:    25 0758   BP:    Pulse: 71   Resp: 16   Temp:    SpO2: 93%        Ulceration to the subfifth metatarsal head of the right foot.  There is exposed fifth  metatarsal with necrosis present.  There are 2 pockets of purulent drainage present along the fifth metatarsal.  No streaking up the leg.  Minimal malodor at this time.        Labs     Lab Results   Component Value Date    HGBA1C 5.80 (H) 06/28/2025    POCGLU 101 06/28/2025    SEDRATE 53 (H) 06/27/2025        CBC:      Lab 06/28/25  0646 06/27/25  1433   WBC 12.18* 12.39*   HEMOGLOBIN 10.8* 11.8*   HEMATOCRIT 32.8* 35.1*   PLATELETS 228 232   NEUTROS ABS 8.18* 8.89*   IMMATURE GRANS (ABS) 0.20* 0.20*   LYMPHS ABS 0.89 0.76   MONOS ABS 1.67* 1.83*   EOS ABS 1.08* 0.58*   MCV 97.9* 97.8*          Results for orders placed or performed during the hospital encounter of 06/13/25   Blood Culture - Blood, Hand, Right    Specimen: Hand, Right; Blood   Result Value Ref Range    Blood Culture No growth at 5 days         XR Foot 3+ View Right  Narrative: XR FOOT 3+ VW RIGHT-     INDICATIONS: Right foot wound.     TECHNIQUE: 4 VIEWS OF THE RIGHT FOOT     COMPARISON: 6/13/2025     FINDINGS:     The base of the fifth proximal phalanx is superior medially dislocated  in relation to the fifth metatarsal head. Soft tissue swelling of the  foot is seen, that may be evidence of inflammation, infection, injury,  correlate clinically. No acute fracture, erosion, or other dislocation  is identified. Arterial calcifications are conspicuous. If there is  clinical suspicion for radiographically occult osteomyelitis, MRI or  bone scan can be obtained.     Impression:    As described.           This report was finalized on 6/27/2025 3:42 PM by Dr. Ortiz Barber M.D on Workstation: [id number]          Assessment/Plan     89-year-old male with acute osteomyelitis fifth metatarsal, right foot    -Patient examined and evaluated by myself  - Currently on empiric antibiotics.  Infectious disease consulted  - Twice Daily Betadine wet-to-dry's to the right foot  - Will order MRI for evaluation of extension of infection  -Patient is going need  surgical intervention for his right foot.  Likely will need a partial fifth ray amputation with incision and drainage.  Will make final surgical decision once MRI is performed, likely early this coming week      Daniel Pedraza DPM  [facility]  Office: [phone]

## 2025-06-28 NOTE — PLAN OF CARE
Goal Outcome Evaluation:  Plan of Care Reviewed With: patient           Outcome Evaluation: Patient remains stable. Alert and oriented. VSS. Ambulating with assist x1. No complaints of pain. Voiding per BRP. IV abx given per order. 4L O2. Contact isolation for MRSA. Dressing cdi.
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
## 2025-06-28 NOTE — CONSULTS
"REASON FOR ACCESS CONSULT:   ETOH    Pt seen by Access and psychiatrist in past - 6/2024 and 3/2025 - for depression and ETOH, see notes.    Today pt found sitting up in recliner. Pt was asked regards his ETOH use to which he replied, \"I'm not going to discuss that with you... I'm 89 yrs old.. if I want a drink, I'll have a drink\".     Primary RN updated.     "

## 2025-06-29 ENCOUNTER — APPOINTMENT (OUTPATIENT)
Dept: MRI IMAGING | Facility: HOSPITAL | Age: 89
End: 2025-06-29
Payer: MEDICARE

## 2025-06-29 LAB
ANION GAP SERPL CALCULATED.3IONS-SCNC: 11.6 MMOL/L (ref 5–15)
BUN SERPL-MCNC: 10 MG/DL (ref 8–23)
BUN/CREAT SERPL: 15.9 (ref 7–25)
CALCIUM SPEC-SCNC: 8.4 MG/DL (ref 8.6–10.5)
CHLORIDE SERPL-SCNC: 107 MMOL/L (ref 98–107)
CO2 SERPL-SCNC: 21.4 MMOL/L (ref 22–29)
CREAT SERPL-MCNC: 0.63 MG/DL (ref 0.76–1.27)
DEPRECATED RDW RBC AUTO: 42.8 FL (ref 37–54)
EGFRCR SERPLBLD CKD-EPI 2021: 92.1 ML/MIN/1.73
ERYTHROCYTE [DISTWIDTH] IN BLOOD BY AUTOMATED COUNT: 12.3 % (ref 12.3–15.4)
GLUCOSE BLDC GLUCOMTR-MCNC: 107 MG/DL (ref 70–130)
GLUCOSE BLDC GLUCOMTR-MCNC: 130 MG/DL (ref 70–130)
GLUCOSE BLDC GLUCOMTR-MCNC: 147 MG/DL (ref 70–130)
GLUCOSE BLDC GLUCOMTR-MCNC: 95 MG/DL (ref 70–130)
GLUCOSE SERPL-MCNC: 105 MG/DL (ref 65–99)
HCT VFR BLD AUTO: 32 % (ref 37.5–51)
HGB BLD-MCNC: 10.8 G/DL (ref 13–17.7)
MCH RBC QN AUTO: 32.4 PG (ref 26.6–33)
MCHC RBC AUTO-ENTMCNC: 33.8 G/DL (ref 31.5–35.7)
MCV RBC AUTO: 96.1 FL (ref 79–97)
PLATELET # BLD AUTO: 236 10*3/MM3 (ref 140–450)
PMV BLD AUTO: 10.1 FL (ref 6–12)
POTASSIUM SERPL-SCNC: 3.7 MMOL/L (ref 3.5–5.2)
RBC # BLD AUTO: 3.33 10*6/MM3 (ref 4.14–5.8)
SODIUM SERPL-SCNC: 140 MMOL/L (ref 136–145)
VANCOMYCIN TROUGH SERPL-MCNC: 8.2 MCG/ML (ref 5–20)
WBC NRBC COR # BLD AUTO: 10.29 10*3/MM3 (ref 3.4–10.8)

## 2025-06-29 PROCEDURE — 76014 MR SFTY IMPLT&/FB ASMT STF 1: CPT

## 2025-06-29 PROCEDURE — 80202 ASSAY OF VANCOMYCIN: CPT | Performed by: INTERNAL MEDICINE

## 2025-06-29 PROCEDURE — 25810000003 SODIUM CHLORIDE 0.9 % SOLUTION 250 ML FLEX CONT: Performed by: INTERNAL MEDICINE

## 2025-06-29 PROCEDURE — 99232 SBSQ HOSP IP/OBS MODERATE 35: CPT | Performed by: NURSE PRACTITIONER

## 2025-06-29 PROCEDURE — 85027 COMPLETE CBC AUTOMATED: CPT | Performed by: INTERNAL MEDICINE

## 2025-06-29 PROCEDURE — 80048 BASIC METABOLIC PNL TOTAL CA: CPT | Performed by: INTERNAL MEDICINE

## 2025-06-29 PROCEDURE — 63710000001 PREDNISONE PER 1 MG: Performed by: INTERNAL MEDICINE

## 2025-06-29 PROCEDURE — 25010000002 VANCOMYCIN HCL 1.25 G RECONSTITUTED SOLUTION 1 EACH VIAL: Performed by: INTERNAL MEDICINE

## 2025-06-29 PROCEDURE — G0545 PR INHERENT VISIT TO INPT: HCPCS | Performed by: INTERNAL MEDICINE

## 2025-06-29 PROCEDURE — 94760 N-INVAS EAR/PLS OXIMETRY 1: CPT

## 2025-06-29 PROCEDURE — 82948 REAGENT STRIP/BLOOD GLUCOSE: CPT

## 2025-06-29 PROCEDURE — 99233 SBSQ HOSP IP/OBS HIGH 50: CPT | Performed by: INTERNAL MEDICINE

## 2025-06-29 PROCEDURE — 63710000001 REVEFENACIN 175 MCG/3ML SOLUTION: Performed by: INTERNAL MEDICINE

## 2025-06-29 PROCEDURE — 94799 UNLISTED PULMONARY SVC/PX: CPT

## 2025-06-29 PROCEDURE — 25010000002 THIAMINE PER 100 MG: Performed by: INTERNAL MEDICINE

## 2025-06-29 PROCEDURE — 25010000002 CEFTRIAXONE PER 250 MG: Performed by: INTERNAL MEDICINE

## 2025-06-29 PROCEDURE — 94664 DEMO&/EVAL PT USE INHALER: CPT

## 2025-06-29 PROCEDURE — 25010000002 VANCOMYCIN 1 G RECONSTITUTED SOLUTION 1 EACH VIAL: Performed by: INTERNAL MEDICINE

## 2025-06-29 PROCEDURE — 73718 MRI LOWER EXTREMITY W/O DYE: CPT

## 2025-06-29 RX ADMIN — BUDESONIDE AND FORMOTEROL FUMARATE DIHYDRATE 2 PUFF: 160; 4.5 AEROSOL RESPIRATORY (INHALATION) at 20:21

## 2025-06-29 RX ADMIN — VANCOMYCIN HYDROCHLORIDE 1250 MG: 1.25 INJECTION, POWDER, LYOPHILIZED, FOR SOLUTION INTRAVENOUS at 16:17

## 2025-06-29 RX ADMIN — THIAMINE HYDROCHLORIDE 200 MG: 100 INJECTION, SOLUTION INTRAMUSCULAR; INTRAVENOUS at 14:41

## 2025-06-29 RX ADMIN — DILTIAZEM HYDROCHLORIDE 240 MG: 120 CAPSULE, COATED, EXTENDED RELEASE ORAL at 09:01

## 2025-06-29 RX ADMIN — THIAMINE HYDROCHLORIDE 200 MG: 100 INJECTION, SOLUTION INTRAMUSCULAR; INTRAVENOUS at 06:52

## 2025-06-29 RX ADMIN — SODIUM CHLORIDE 1000 MG: 9 INJECTION, SOLUTION INTRAVENOUS at 03:14

## 2025-06-29 RX ADMIN — THIAMINE HYDROCHLORIDE 200 MG: 100 INJECTION, SOLUTION INTRAMUSCULAR; INTRAVENOUS at 21:52

## 2025-06-29 RX ADMIN — Medication 10 ML: at 09:02

## 2025-06-29 RX ADMIN — Medication 10 ML: at 21:52

## 2025-06-29 RX ADMIN — ATORVASTATIN CALCIUM 20 MG: 20 TABLET, FILM COATED ORAL at 21:52

## 2025-06-29 RX ADMIN — FOLIC ACID 1 MG: 1 TABLET ORAL at 09:02

## 2025-06-29 RX ADMIN — DIGOXIN 125 MCG: 0.12 TABLET ORAL at 11:50

## 2025-06-29 RX ADMIN — BUDESONIDE AND FORMOTEROL FUMARATE DIHYDRATE 2 PUFF: 160; 4.5 AEROSOL RESPIRATORY (INHALATION) at 10:35

## 2025-06-29 RX ADMIN — PREDNISONE 40 MG: 20 TABLET ORAL at 09:01

## 2025-06-29 RX ADMIN — REVEFENACIN 175 MCG: 175 SOLUTION RESPIRATORY (INHALATION) at 10:33

## 2025-06-29 RX ADMIN — CEFTRIAXONE SODIUM 2000 MG: 2 INJECTION, POWDER, FOR SOLUTION INTRAMUSCULAR; INTRAVENOUS at 14:41

## 2025-06-29 NOTE — PROGRESS NOTES
Name: Jim Marvin ADMIT: 2025   : 1935  PCP: Antonio Finley MD    MRN: [id number] LOS: 2 days   AGE/SEX: 89 y.o. male  ROOM: [facility]     Subjective   Subjective   Denies any complaint. Sitting on the side of the bed eating breakfast.     Objective   Objective   Vital Signs  Temp:  [97.4 °F (36.3 °C)-98 °F (36.7 °C)] 98 °F (36.7 °C)  Heart Rate:  [72-78] 78  Resp:  [16-18] 16  BP: (137-164)/(66-84) 153/70  SpO2:  [91 %-96 %] 91 %  on  Flow (L/min) (Oxygen Therapy):  [4] 4;   Device (Oxygen Therapy): nasal cannula  Body mass index is 27.61 kg/m².    Physical Exam  Constitutional:       General: He is not in acute distress.     Appearance: He is not toxic-appearing.   HENT:      Head: Normocephalic and atraumatic.   Cardiovascular:      Rate and Rhythm: Normal rate and regular rhythm.   Pulmonary:      Effort: Pulmonary effort is normal. No respiratory distress.      Breath sounds: Normal breath sounds. No wheezing or rhonchi.   Abdominal:      General: Bowel sounds are normal.      Palpations: Abdomen is soft.      Tenderness: There is no abdominal tenderness. There is no guarding or rebound.   Musculoskeletal:      Comments: Right foot dressed   Skin:     General: Skin is warm and dry.   Neurological:      Mental Status: He is alert and oriented to person, place, and time.   Psychiatric:         Mood and Affect: Mood normal.         Behavior: Behavior normal.     Results Review  I reviewed the patient's new clinical results.  Results from last 7 days   Lab Units 25  0354 25  0646 25  1433   WBC 10*3/mm3 10.29 12.18* 12.39*   HEMOGLOBIN g/dL 10.8* 10.8* 11.8*   PLATELETS 10*3/mm3 236 228 232     Results from last 7 days   Lab Units 25  0354 25  0646 25  1433   SODIUM mmol/L 140 138 138   POTASSIUM mmol/L 3.7 3.7 3.9   CHLORIDE mmol/L 107 105 102   CO2 mmol/L 21.4* 22.0 23.5   BUN mg/dL 10.0 11.0 16.0   CREATININE mg/dL 0.63* 0.61* 0.81   GLUCOSE mg/dL 105* 99 109*      Lab Results   Component Value Date    ANIONGAP 11.6 06/29/2025     Estimated Creatinine Clearance: 102 mL/min (A) (by C-G formula based on SCr of 0.63 mg/dL (L)).   Lab Results   Component Value Date    EGFR 92.1 06/29/2025     Results from last 7 days   Lab Units 06/28/25  0646 06/27/25  1433   ALBUMIN g/dL 2.7* 3.3*   BILIRUBIN mg/dL 0.5 0.6   ALK PHOS U/L 79 93   AST (SGOT) U/L 25 29   ALT (SGPT) U/L 40 46*     Results from last 7 days   Lab Units 06/29/25  0354 06/28/25  0646 06/27/25  1433   CALCIUM mg/dL 8.4* 8.5* 9.0   ALBUMIN g/dL  --  2.7* 3.3*     Results from last 7 days   Lab Units 06/27/25  1523   LACTATE mmol/L 1.5     Hemoglobin A1C   Date/Time Value Ref Range Status   06/28/2025 0646 5.80 (H) 4.80 - 5.60 % Final     Glucose   Date/Time Value Ref Range Status   06/29/2025 1116 107 70 - 130 mg/dL Final   06/29/2025 0652 95 70 - 130 mg/dL Final   06/28/2025 2011 139 (H) 70 - 130 mg/dL Final   06/28/2025 1623 161 (H) 70 - 130 mg/dL Final   06/28/2025 1137 124 70 - 130 mg/dL Final   06/28/2025 0635 101 70 - 130 mg/dL Final   06/27/2025 2053 124 70 - 130 mg/dL Final       XR Foot 3+ View Right  Result Date: 6/27/2025   As described.    This report was finalized on 6/27/2025 3:42 PM by Dr. Ortiz Barber M.D on Workstation: [id number]        Scheduled Meds  [Held by provider] apixaban, 5 mg, Oral, Q12H  atorvastatin, 20 mg, Oral, Nightly  budesonide-formoterol, 2 puff, Inhalation, BID - RT   And  revefenacin, 175 mcg, Nebulization, Daily - RT  cefTRIAXone, 2,000 mg, Intravenous, Q24H  digoxin, 125 mcg, Oral, Daily  dilTIAZem CD, 240 mg, Oral, Daily  folic acid, 1 mg, Oral, Daily  insulin lispro, 2-7 Units, Subcutaneous, 4x Daily AC & at Bedtime  [Held by provider] metFORMIN ER, 500 mg, Oral, Daily With Breakfast  predniSONE, 40 mg, Oral, Daily  sodium chloride, 10 mL, Intravenous, Q12H  thiamine (B-1) IV, 200 mg, Intravenous, Q8H   Followed by  [START ON 7/3/2025] thiamine, 100 mg, Oral,  Daily  vancomycin, 1,000 mg, Intravenous, Q12H    Continuous Infusions  O2, 3 L/min, Last Rate: 4 L/min (06/27/25 1832)  Pharmacy to dose vancomycin,     PRN Meds    acetaminophen **OR** acetaminophen **OR** acetaminophen    Calcium Replacement - Follow Nurse / BPA Driven Protocol    dextrose    dextrose    glucagon (human recombinant)    ipratropium-albuterol    LORazepam **OR** LORazepam **OR** LORazepam **OR** LORazepam **OR** LORazepam **OR** LORazepam    Magnesium Standard Dose Replacement - Follow Nurse / BPA Driven Protocol    Pharmacy to dose vancomycin    Phosphorus Replacement - Follow Nurse / BPA Driven Protocol    Potassium Replacement - Follow Nurse / BPA Driven Protocol    sodium chloride    sodium chloride    O2, 3 L/min, Last Rate: 4 L/min (06/27/25 1832)  Pharmacy to dose vancomycin,     Diet  Diet: Cardiac, Diabetic, Renal; Healthy Heart (2-3 Na+); Consistent Carbohydrate; Low Sodium (2-3g), Low Potassium, Low Phosphorus; Fluid Consistency: Thin (IDDSI 0)     Date/Time CIWA-Ar Total    06/29/25 0905 2     06/28/25 2003 3     06/28/25 0840 2     06/27/25 2135 2              Assessment/Plan     Active Hospital Problems    Diagnosis  POA    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     Patient is a 89 y.o. male     R DFU  R foot cellulitis  culture MRSA (history of MRSA)  ID managing antibiotics  Anticipate surgery  MRI is pending  Anticoagulation on hold in case procedures    DM 2  A1c 5.80%  Correctional  insulin  BS 100s    Paroxysmal atrial fibrillation  Anticoagulation on hold  Cardiology following    Chronic hypoxic respiratory failure   COPD  Interstitial lung disease  Flow (L/min) (Oxygen Therapy):  [4] 4 (baseline 3-4 L/min)  Consult pulmonology  Prednisone 40 on PTA list but today patient is not sure. RN discussed with daughter who states patient is not chronically on steroids. So will not need any stress dose.    History of stroke  Hypertension  Alcohol use disorder 15-20/per week. CIWA 2-3, as needed Ativan    DVT prophylaxis  Eliquis (home med) held in case procedures    Discharge  TBD  Expected discharge date/ time has not been documented.    Discussed with patient and nursing staff    Nick Bajwa MD  [facility]  06/29/25 13:11 EDT

## 2025-06-29 NOTE — CONSULTS
Patient Identification:  Jim Marvin  89 y.o.  male  1935  [id number]          LOS 2    Requesting physician:   Nick Bajwa MD    Reason for Consultation:    Chronic hypoxic respiratory failure, surgical clearance    History of Present Illness:   89-year-old male with a history of chronic obstructive pulmonary disease, chronic hypoxic respiratory failure on 3 to 4 L nasal cannula, paroxysmal atrial fibrillation, diabetes mellitus type 2, history of cerebrovascular accident, alcohol use disorderwho presented to the hospital for IV antibiotics for diabetic right foot ulcer.    Patient being treated for diabetic right foot ulcer with right foot cellulitis and MRSA culture.  On antibiotic therapy with vancomycin and ceftriaxone per infectious disease.  Possibility of surgical intervention with amputation pending MRI per podiatry.  Pulmonary was consulted for surgical acute clearance from a respiratory standpoint.    On evaluation patient today, states that he was diagnosed with emphysema and pulmonary fibrosis approximately 3 years ago after his RSV infection.  Prior to that had been doing fine from a respiratory standpoint without any issues.  Has been using oxygen for the past year, 3 to 4 L at rest and up to 6 L with exertion.  Denies any recent pulmonary infections in the past 6 months.  No recent hospitalizations for any pulmonary ailments in the past 6 months.  Has not had any recent surgeries, last was approximately 12 years ago for retinal detachment.  Has not undergone anesthesia in this time period either.  No previous hospitalizations with intubation or prolonged mechanical ventilation.  Was in rehab for 3 weeks recently and states that he did not like it at all.  He was instructed to use 3 pound weights while at home he was using 15 pound weights in each hand and doing approximately 20 reps daily.  Tries to stay as active as possible.  Uses walker for ambulation. Bronchodilator therapy  with Trelegy and albuterol as needed.  Former smoker, 1 pack/day for 40 years, quit in 1996.    Past Medical History:  Past Medical History:   Diagnosis Date    COPD (chronic obstructive pulmonary disease)     Hyperlipidemia     Hypertension     Stroke        Past Surgical History:  Past Surgical History:   Procedure Laterality Date    CATARACT EXTRACTION Left 07/06/2022        Home Meds:  Medications Prior to Admission   Medication Sig Dispense Refill Last Dose/Taking    apixaban (ELIQUIS) 5 MG tablet tablet Take 1 tablet by mouth Every 12 (Twelve) Hours. Indications: Atrial Fibrillation 180 tablet 1 6/27/2025    atorvastatin (LIPITOR) 20 MG tablet Take 1 tablet by mouth Daily. (Patient taking differently: Take 1 tablet by mouth Every Night.) 90 tablet 1 6/26/2025    digoxin (LANOXIN) 125 MCG tablet Take 1 tablet by mouth Daily. 90 tablet 1 6/26/2025    dilTIAZem CD (CARDIZEM CD) 240 MG 24 hr capsule Take 1 capsule by mouth Daily. 90 capsule 1 6/26/2025    Fluticasone-Umeclidin-Vilant (Trelegy Ellipta) 200-62.5-25 MCG/ACT inhaler Inhale 1 puff Daily.   6/26/2025    metFORMIN ER (GLUCOPHAGE-XR) 500 MG 24 hr tablet Take 1 tablet by mouth Daily With Breakfast. 90 tablet 1 6/26/2025    O2 (OXYGEN) 3 Liter DAILY (route: Oxygen)   6/27/2025    predniSONE (DELTASONE) 20 MG tablet Take 2 tablets by mouth Daily. 5 tablet 0 Past Week    EPINEPHrine (EPIPEN) 0.3 MG/0.3ML solution auto-injector injection ADMINISTER 0.3 ML IN THE MUSCLE 1 TIME FOR 1 DOSE AS DIRECTED BY PRESCRIBER       ipratropium-albuterol (DUO-NEB) 0.5-2.5 mg/3 ml nebulizer Inhale the contents of 1 vial by nebulization Every 6 (Six) Hours As Needed for Wheezing for up to 30 days. 360 mL 0          Allergies:  Allergies   Allergen Reactions    Ace Inhibitors Other (See Comments)     Cannot remember     Lisinopril Other (See Comments)     Cannot remember       Social History:   Social History     Socioeconomic History    Marital status:    Tobacco Use     "Smoking status: Former     Current packs/day: 0.00     Average packs/day: 1 pack/day for 40.0 years (40.0 ttl pk-yrs)     Types: Cigarettes     Start date:      Quit date:      Years since quittin.5     Passive exposure: Past    Smokeless tobacco: Never    Tobacco comments:     Quit 19 years ago    Vaping Use    Vaping status: Never Used   Substance and Sexual Activity    Alcohol use: Not Currently     Comment: 15-20 francisca and diet coke a week    Drug use: Never    Sexual activity: Not Currently     Partners: Female       Family History:  History reviewed. No pertinent family history.    Review of Systems:  12 point review of systems performed and all else negative except as per HPI above.    Objective:    PHYSICAL EXAM:    /70 (BP Location: Left arm, Patient Position: Sitting)   Pulse 78   Temp 98 °F (36.7 °C) (Oral)   Resp 16   Ht 177.8 cm (70\")   Wt 87.3 kg (192 lb 6.4 oz)   SpO2 91%   BMI 27.61 kg/m²  Body mass index is 27.61 kg/m². 91% 87.3 kg (192 lb 6.4 oz)    General: Alert, nontoxic, NAD, elderly  HEENT: NC/AT, EOMI, MMM  Neck: Supple, trachea midline  Cardiac: RRR, no murmur, gallops, rubs  Pulmonary: Diminished bilaterally, no wheezing  GI: Soft, non-tender, non-distended, normal bowel sounds  Extremities: Warm, well perfused, right foot bandaged  Skin: no visible rash  Neuro: CN II - XII grossly intact  Psychiatry: Normal mood and affect    Lab Review:   Results from last 7 days   Lab Units 25  0354 25  0646 25  1433   WBC 10*3/mm3 10.29 12.18* 12.39*   HEMOGLOBIN g/dL 10.8* 10.8* 11.8*   PLATELETS 10*3/mm3 236 228 232     Results from last 7 days   Lab Units 25  0354 25  0646 25  1433   SODIUM mmol/L 140 138 138   POTASSIUM mmol/L 3.7 3.7 3.9   CHLORIDE mmol/L 107 105 102   CO2 mmol/L 21.4* 22.0 23.5   BUN mg/dL 10.0 11.0 16.0   CREATININE mg/dL 0.63* 0.61* 0.81   GLUCOSE mg/dL 105* 99 109*   CALCIUM mg/dL 8.4* 8.5* 9.0   Estimated Creatinine " Clearance: 102 mL/min (A) (by C-G formula based on SCr of 0.63 mg/dL (L)).    Results from last 7 days   Lab Units 06/29/25  0354 06/28/25  0646 06/27/25  1523 06/27/25  1433   AST (SGOT) U/L  --  25  --  29   ALT (SGPT) U/L  --  40  --  46*   LACTATE mmol/L  --   --  1.5  --    CRP mg/dL  --   --   --  14.65*   PLATELETS 10*3/mm3 236 228  --  232              Result Review:  I have personally reviewed the results from the time of this admission to 6/29/2025 16:47 EDT and agree with these findings:  [x]  Laboratory list / accordion  [x]  Microbiology  [x]  Radiology  []  EKG/Telemetry   [x]  Cardiology/Vascular   []  Pathology  [x]  Old records  []  Other:    Assessment / Recommendations:    Chronic osteomyelitis of right fifth metatarsal  Combined pulmonary fibrosis and emphysema  Chronic hypoxic respiratory failure, 3 L at rest, up to 6 L with exertion  Diabetes mellitus type 2  Paroxysmal atrial fibrillation  History of cerebrovascular accident  Hypertension  Alcohol use disorder  History of tobacco abuse    -Patient presented to the hospital for IV antibiotics in the setting of diabetic foot ulcer and chronic osteomyelitis.  Undergoing workup by podiatry for amputation.  Pulmonary was consulted for pulmonary clearance.  -No evidence of acute exacerbation at this time.  No evidence of pulmonary infection at this time.  Stable respiratory status.  -Uses 3 L at rest and up to 6 L with exertion.  Appropriate saturations on 3 L at rest.  -Uses bronchodilator therapy with Trelegy and albuterol at home.  -Bronchodilator therapy with Symbicort and Yupelri in the hospital, DuoNe as needed.  -Patient follows with Dr. Stanley Gibbs with pulmonary at [place].  - Previously was undergoing pulmonary rehab  - Antibiotics with vancomycin and ceftriaxone for osteomyelitis    Patient is high risk for complications in the kim and postoperative phase due to the severity of his underlying lung disease with both pulmonary fibrosis  and emphysema and significant hypoxic respiratory failure.  Fortunately he is currently in stable respiratory status with no recent infections or exacerbations.  His functional status is reasonable.  Additionally plan is for peripheral surgery with a short time frame minimizing risk of complications.  At this time there is no further workup from a pulmonary standpoint and to proceed with surgery as per podiatry.  He will need to be monitored carefully with high risk for postoperative respiratory failure requiring intubation and mechanical ventilation due to his underlying parenchymal disease.    Optimization of his lung function with bronchodilators, supplemental oxygen, early ambulation, incentive spirometry, pain control.      Thank you for allowing me to participate in the care of this patient.  I will continue to follow along with you.    Gunner Galeano MD  [facility], [facility]  Pulmonary and Critical Care Medicine, Interventional Pulmonology    6/29/2025  14:06 EDT    Parts of this note may be an electronic transcription/translation of spoken language to printed text using the Dragon dictation system.

## 2025-06-29 NOTE — PROGRESS NOTES
ID progress note     CC: Follow-up diabetic right foot infection    Subjective: He says the foot is about the same today.  He just returned from MRI.  The result is pending.  He is tolerating Vanco and ceftriaxone without any side effects.      Medications:    Current Facility-Administered Medications:     acetaminophen (TYLENOL) tablet 650 mg, 650 mg, Oral, Q4H PRN **OR** acetaminophen (TYLENOL) 160 MG/5ML oral solution 650 mg, 650 mg, Oral, Q4H PRN **OR** acetaminophen (TYLENOL) suppository 650 mg, 650 mg, Rectal, Q4H PRN, Rodríguez Araujo MD    [Held by provider] apixaban (ELIQUIS) tablet 5 mg, 5 mg, Oral, Q12H, Rodríguez Araujo MD    atorvastatin (LIPITOR) tablet 20 mg, 20 mg, Oral, Nightly, Rodríguez Araujo MD, 20 mg at 06/28/25 2208    budesonide-formoterol (SYMBICORT) 160-4.5 MCG/ACT inhaler 2 puff, 2 puff, Inhalation, BID - RT, 2 puff at 06/28/25 2205 **AND** revefenacin (YUPELRI) nebulizer solution 175 mcg, 175 mcg, Nebulization, Daily - RT, Rodríguez Araujo MD, 175 mcg at 06/28/25 0758    Calcium Replacement - Follow Nurse / BPA Driven Protocol, , Not Applicable, PRN, Rodríguez Araujo MD    cefTRIAXone (ROCEPHIN) 2,000 mg in sodium chloride 0.9 % 100 mL MBP, 2,000 mg, Intravenous, Q24H, Guero Kern MD, Last Rate: 200 mL/hr at 06/28/25 1424, 2,000 mg at 06/28/25 1424    dextrose (D50W) (25 g/50 mL) IV injection 25 g, 25 g, Intravenous, Q15 Min PRN, Rodríguez Araujo MD    dextrose (GLUTOSE) oral gel 15 g, 15 g, Oral, Q15 Min PRN, Rodríguez Araujo MD    digoxin (LANOXIN) tablet 125 mcg, 125 mcg, Oral, Daily, VanRodríguez daily MD, 125 mcg at 06/28/25 1322    dilTIAZem CD (CARDIZEM CD) 24 hr capsule 240 mg, 240 mg, Oral, Daily, Rodríguez Araujo MD, 240 mg at 06/28/25 0840    folic acid (FOLVITE) tablet 1 mg, 1 mg, Oral, Daily, Rodríguez Araujo MD, 1 mg at 06/28/25 0841    glucagon (GLUCAGEN) injection 1 mg, 1 mg, Intramuscular, Q15 Min PRN, Rodríguez Araujo MD    insulin lispro (HUMALOG/ADMELOG) injection 2-7 Units, 2-7 Units,  Subcutaneous, 4x Daily AC & at Bedtime, Rodríguez Araujo MD, 2 Units at 06/28/25 1632    ipratropium-albuterol (DUO-NEB) nebulizer solution 3 mL, 3 mL, Nebulization, Q6H PRN, Rodríguez Araujo MD    LORazepam (ATIVAN) tablet 1 mg, 1 mg, Oral, Q1H PRN **OR** LORazepam (ATIVAN) injection 1 mg, 1 mg, Intravenous, Q1H PRN **OR** LORazepam (ATIVAN) tablet 2 mg, 2 mg, Oral, Q1H PRN **OR** LORazepam (ATIVAN) injection 2 mg, 2 mg, Intravenous, Q1H PRN **OR** LORazepam (ATIVAN) injection 2 mg, 2 mg, Intravenous, Q15 Min PRN **OR** LORazepam (ATIVAN) injection 2 mg, 2 mg, Intramuscular, Q15 Min PRN, Rodríguez Araujo MD    Magnesium Standard Dose Replacement - Follow Nurse / BPA Driven Protocol, , Not Applicable, PRN, Rodríguez Araujo MD    [Held by provider] metFORMIN ER (GLUCOPHAGE-XR) 24 hr tablet 500 mg, 500 mg, Oral, Daily With Breakfast, Rodríguez Araujo MD    O2 (OXYGEN), 3 L/min, Inhalation, Continuous, Rodríguez Araujo MD, Last Rate: 240,000 mL/hr at 06/27/25 1832, 4 L/min at 06/27/25 1832    Pharmacy to dose vancomycin, , Not Applicable, Continuous PRNVan Jawed, MD    Phosphorus Replacement - Follow Nurse / BPA Driven Protocol, , Not Applicable, PRNVan Jawed, MD    Potassium Replacement - Follow Nurse / BPA Driven Protocol, , Not Applicable, PRNVan Jawed, MD    predniSONE (DELTASONE) tablet 40 mg, 40 mg, Oral, Daily, Rodríguez Araujo MD, 40 mg at 06/28/25 0840    sodium chloride 0.9 % flush 10 mL, 10 mL, Intravenous, Q12H, Rodríguez Araujo MD, 10 mL at 06/28/25 2208    sodium chloride 0.9 % flush 10 mL, 10 mL, Intravenous, PRN, Rodríguez Araujo MD    sodium chloride 0.9 % infusion 40 mL, 40 mL, Intravenous, PRN, Rodríguez Araujo MD    thiamine (B-1) injection 200 mg, 200 mg, Intravenous, Q8H, 200 mg at 06/29/25 0652 **FOLLOWED BY** [START ON 7/3/2025] thiamine (VITAMIN B-1) tablet 100 mg, 100 mg, Oral, Daily, Rodríguez Araujo MD    vancomycin (VANCOCIN) 1,000 mg in sodium chloride 0.9 % 250 mL IVPB-VTB, 1,000 mg, Intravenous, Q12H,  Rodríguez Araujo MD, Last Rate: 250 mL/hr at 06/29/25 0314, 1,000 mg at 06/29/25 0314      Objective   Vital Signs   Temp:  [97.7 °F (36.5 °C)] 97.7 °F (36.5 °C)  Heart Rate:  [72-77] 76  Resp:  [16-18] 16  BP: (137-163)/(66-84) 137/66    Physical Exam:   General: awake, alert, NAD, very nice, sitting up on side of bed feeding himself breakfast  Eyes: no scleral icterus  Cardiovascular: NR  Respiratory: normal work of breathing using nasal cannula  GI: Abdomen is soft, not distended  :  no Hernandez catheter  MSK/skin: Right foot bandaged  Neurological: Alert and oriented x 3  Psychiatric: Normal mood and affect   Vasc: PIV w/o erythema    Labs:   CBC, BMP, and blood and wound cultures reviewed today  Lab Results   Component Value Date    WBC 10.29 06/29/2025    HGB 10.8 (L) 06/29/2025    HCT 32.0 (L) 06/29/2025    MCV 96.1 06/29/2025     06/29/2025     Lab Results   Component Value Date    GLUCOSE 105 (H) 06/29/2025    CALCIUM 8.4 (L) 06/29/2025     06/29/2025    K 3.7 06/29/2025    CO2 21.4 (L) 06/29/2025     06/29/2025    BUN 10.0 06/29/2025    CREATININE 0.63 (L) 06/29/2025    EGFR 92.1 06/29/2025    BCR 15.9 06/29/2025    ANIONGAP 11.6 06/29/2025     Lab Results   Component Value Date    ALT 40 06/28/2025     Lab Results   Component Value Date    HGBA1C 5.80 (H) 06/28/2025     Lab Results   Component Value Date    CRP 14.65 (H) 06/27/2025     Lab Results   Component Value Date    VANCOTROUGH 9.80 03/18/2025       Microbiology:  6/13 BCx: Negative  6/27 BCx: NGTD  6/28 right foot wound Cx: Pending    New radiology:  MRI right foot performed and the result is pending    Prior radiology:  X-ray right foot with soft tissue swelling cannot exclude osteomyelitis    Isolation:  Contact for history of MRSA    ASSESSMENT/PLAN:  Chronic osteomyelitis right foot  History of MRSA  Chronic hypoxic respiratory failure  Emphysema  Interstitial lung disease    For skin and soft tissue infection and chronic  osteomyelitis of the right foot, continue empiric vancomycin with goal -600 and ceftriaxone 2 g IV every 24 hours while awaiting imaging results and surgical plan.  Follow-up pending blood and wound cultures.    While the patient is on vancomycin, vancomycin levels will be ordered and reviewed with dose adjustments made as needed. The patient will have a daily creatinine level checked while on vancomycin as part of monitoring this medication.     ID will follow.

## 2025-06-29 NOTE — PLAN OF CARE
Goal Outcome Evaluation:  Plan of Care Reviewed With: patient           Outcome Evaluation: Patient remains stable. Alert and oriented. VSS. Ambulating with assist x1. Voiding per BRP. 4L O2. IV abx given per order. Dressing cdi. No complaints of pain. MRI done this morning.

## 2025-06-29 NOTE — PROGRESS NOTES
"CC: Follow-up preoperative evaluation, paroxysmal atrial fibrillation    Interval History: no chest pain. He reports chronic [place] at baseline. MRI right foot completed this am      Vital Signs  Temp:  [97.7 °F (36.5 °C)] 97.7 °F (36.5 °C)  Heart Rate:  [71-77] 76  Resp:  [16-18] 16  BP: (137-163)/(66-84) 137/66    Intake/Output Summary (Last 24 hours) at 6/29/2025 0754  Last data filed at 6/28/2025 1716  Gross per 24 hour   Intake 720 ml   Output --   Net 720 ml     Flowsheet Rows      Flowsheet Row First Filed Value   Admission Height 177.8 cm (70\") Documented at 06/27/2025 1511   Admission Weight 87.3 kg (192 lb 6.4 oz) Documented at 06/27/2025 1511            PHYSICAL EXAM:  General: No acute distress  Resp:NL Rate, unlabored, clear  CV:NL rate and rhythm, NL PMI, Nl S1 and S2, no Murmur, no gallop, no rub, No JVD. Normal pedal pulses  ABD:Nl sounds, no masses or tenderness, nondistended, no guarding or rebound  Neuro: alert,cooperative and oriented  Extr: No edema or cyanosis, moves all extremities      Results Review:    Results from last 7 days   Lab Units 06/29/25  0354   SODIUM mmol/L 140   POTASSIUM mmol/L 3.7   CHLORIDE mmol/L 107   CO2 mmol/L 21.4*   BUN mg/dL 10.0   CREATININE mg/dL 0.63*   GLUCOSE mg/dL 105*   CALCIUM mg/dL 8.4*         Results from last 7 days   Lab Units 06/29/25  0354   WBC 10*3/mm3 10.29   HEMOGLOBIN g/dL 10.8*   HEMATOCRIT % 32.0*   PLATELETS 10*3/mm3 236         Results from last 7 days   Lab Units 06/28/25  0646   CHOLESTEROL mg/dL 91         Results from last 7 days   Lab Units 06/28/25  0646   CHOLESTEROL mg/dL 91   TRIGLYCERIDES mg/dL 48   HDL CHOL mg/dL 26*   LDL CHOL mg/dL 53     I reviewed the patient's new clinical results.  I personally viewed and interpreted the patient's EKG/Telemetry data-normal sinus rhythm        Medication Review:   Meds reviewed    O2, 3 L/min, Last Rate: 4 L/min (06/27/25 7124)  Pharmacy to dose vancomycin,         Assessment/Plan      89-year-old " gentleman with paroxysmal atrial fibrillation: Diagnosed 6/2024. [id number] score 3, Eliquis 5 mg twice daily on hold for possible surgery.  Per Dr. Danielle Hall, no heparin bridge but prophylactic Lovenox deemed reasonable.  Continue diltiazem 240 daily and digoxin 125 daily as BP allows.  Preoperative evaluation:  for possible osteomyelitis surgical intervention.  Transthoracic echo 6/2024 that showed LVEF 65-70%..  He has acceptable surgical risk to proceed to planned foot/orthopedic surgery if deemed necessary.  Osteomyelitis, right foot: Management per primary team and infectious disease have been to treat.  Podiatry recommends partial fifth amputation with incision and drainage pending and the results of MRI to be completed today..  Prediabetes: Hemoglobin A1c 5.8 this admission.    Hyperlipidemia: Continue home atorvastatin 20 daily.  LDL 53 yesterday with low HDL 26, triglycerides 48 and TC l 91  COPD: Management per primary team and other specialists.  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  Anemia-hemoglobin stable at 10.8.    - continue to hold Eliquis   -Await results of MRI completed this am of right foot and final potential surgery plans.  -  No changes from cardiac standpoint.    -We will continue to follow.    -Please call with questions or concerns.  KATY Santoyo  06/29/25  07:54 EDT

## 2025-06-29 NOTE — PLAN OF CARE
Goal Outcome Evaluation:  Plan of Care Reviewed With: patient        Progress: no change  Outcome Evaluation: Patient sitting up in chair. A & O. Continues with CIWAs. MRI completed in early morning. Continues with IV atb therapy. Wound cx came back positive for MRSA. Up with assist x 1. Continues with contact precautions. No s/s of distress noted.

## 2025-06-30 PROBLEM — L08.9 RIGHT FOOT INFECTION: Status: ACTIVE | Noted: 2025-06-27

## 2025-06-30 LAB
ANION GAP SERPL CALCULATED.3IONS-SCNC: 8 MMOL/L (ref 5–15)
BACTERIA SPEC AEROBE CULT: ABNORMAL
BUN SERPL-MCNC: 12 MG/DL (ref 8–23)
BUN/CREAT SERPL: 20 (ref 7–25)
CALCIUM SPEC-SCNC: 8.4 MG/DL (ref 8.6–10.5)
CHLORIDE SERPL-SCNC: 107 MMOL/L (ref 98–107)
CO2 SERPL-SCNC: 23 MMOL/L (ref 22–29)
CREAT SERPL-MCNC: 0.6 MG/DL (ref 0.76–1.27)
DEPRECATED RDW RBC AUTO: 43.2 FL (ref 37–54)
EGFRCR SERPLBLD CKD-EPI 2021: 93.4 ML/MIN/1.73
ERYTHROCYTE [DISTWIDTH] IN BLOOD BY AUTOMATED COUNT: 12.3 % (ref 12.3–15.4)
GLUCOSE BLDC GLUCOMTR-MCNC: 86 MG/DL (ref 70–130)
GLUCOSE BLDC GLUCOMTR-MCNC: 89 MG/DL (ref 70–130)
GLUCOSE BLDC GLUCOMTR-MCNC: 96 MG/DL (ref 70–130)
GLUCOSE BLDC GLUCOMTR-MCNC: 99 MG/DL (ref 70–130)
GLUCOSE SERPL-MCNC: 103 MG/DL (ref 65–99)
GRAM STN SPEC: ABNORMAL
GRAM STN SPEC: ABNORMAL
HCT VFR BLD AUTO: 31.3 % (ref 37.5–51)
HGB BLD-MCNC: 10.7 G/DL (ref 13–17.7)
MCH RBC QN AUTO: 32.6 PG (ref 26.6–33)
MCHC RBC AUTO-ENTMCNC: 34.2 G/DL (ref 31.5–35.7)
MCV RBC AUTO: 95.4 FL (ref 79–97)
PLATELET # BLD AUTO: 267 10*3/MM3 (ref 140–450)
PMV BLD AUTO: 9.3 FL (ref 6–12)
POTASSIUM SERPL-SCNC: 4 MMOL/L (ref 3.5–5.2)
QT INTERVAL: 400 MS
QTC INTERVAL: 447 MS
RBC # BLD AUTO: 3.28 10*6/MM3 (ref 4.14–5.8)
SODIUM SERPL-SCNC: 138 MMOL/L (ref 136–145)
WBC NRBC COR # BLD AUTO: 10.16 10*3/MM3 (ref 3.4–10.8)

## 2025-06-30 PROCEDURE — 94664 DEMO&/EVAL PT USE INHALER: CPT

## 2025-06-30 PROCEDURE — 99233 SBSQ HOSP IP/OBS HIGH 50: CPT | Performed by: INTERNAL MEDICINE

## 2025-06-30 PROCEDURE — 25810000003 SODIUM CHLORIDE 0.9 % SOLUTION 250 ML FLEX CONT: Performed by: INTERNAL MEDICINE

## 2025-06-30 PROCEDURE — 80048 BASIC METABOLIC PNL TOTAL CA: CPT | Performed by: INTERNAL MEDICINE

## 2025-06-30 PROCEDURE — 94760 N-INVAS EAR/PLS OXIMETRY 1: CPT

## 2025-06-30 PROCEDURE — 63710000001 REVEFENACIN 175 MCG/3ML SOLUTION: Performed by: INTERNAL MEDICINE

## 2025-06-30 PROCEDURE — 94799 UNLISTED PULMONARY SVC/PX: CPT

## 2025-06-30 PROCEDURE — 99232 SBSQ HOSP IP/OBS MODERATE 35: CPT | Performed by: NURSE PRACTITIONER

## 2025-06-30 PROCEDURE — 25010000002 THIAMINE PER 100 MG: Performed by: INTERNAL MEDICINE

## 2025-06-30 PROCEDURE — 25010000002 VANCOMYCIN HCL 1.25 G RECONSTITUTED SOLUTION 1 EACH VIAL: Performed by: INTERNAL MEDICINE

## 2025-06-30 PROCEDURE — 82948 REAGENT STRIP/BLOOD GLUCOSE: CPT

## 2025-06-30 PROCEDURE — G0545 PR INHERENT VISIT TO INPT: HCPCS | Performed by: INTERNAL MEDICINE

## 2025-06-30 PROCEDURE — 85027 COMPLETE CBC AUTOMATED: CPT | Performed by: INTERNAL MEDICINE

## 2025-06-30 RX ADMIN — DIGOXIN 125 MCG: 0.12 TABLET ORAL at 11:59

## 2025-06-30 RX ADMIN — BUDESONIDE AND FORMOTEROL FUMARATE DIHYDRATE 2 PUFF: 160; 4.5 AEROSOL RESPIRATORY (INHALATION) at 20:11

## 2025-06-30 RX ADMIN — VANCOMYCIN HYDROCHLORIDE 1250 MG: 1.25 INJECTION, POWDER, LYOPHILIZED, FOR SOLUTION INTRAVENOUS at 04:41

## 2025-06-30 RX ADMIN — DILTIAZEM HYDROCHLORIDE 240 MG: 120 CAPSULE, COATED, EXTENDED RELEASE ORAL at 09:13

## 2025-06-30 RX ADMIN — ATORVASTATIN CALCIUM 20 MG: 20 TABLET, FILM COATED ORAL at 21:38

## 2025-06-30 RX ADMIN — THIAMINE HYDROCHLORIDE 200 MG: 100 INJECTION, SOLUTION INTRAMUSCULAR; INTRAVENOUS at 21:38

## 2025-06-30 RX ADMIN — Medication 10 ML: at 21:39

## 2025-06-30 RX ADMIN — BUDESONIDE AND FORMOTEROL FUMARATE DIHYDRATE 2 PUFF: 160; 4.5 AEROSOL RESPIRATORY (INHALATION) at 08:34

## 2025-06-30 RX ADMIN — VANCOMYCIN HYDROCHLORIDE 1250 MG: 1.25 INJECTION, POWDER, LYOPHILIZED, FOR SOLUTION INTRAVENOUS at 16:15

## 2025-06-30 RX ADMIN — REVEFENACIN 175 MCG: 175 SOLUTION RESPIRATORY (INHALATION) at 08:34

## 2025-06-30 RX ADMIN — THIAMINE HYDROCHLORIDE 200 MG: 100 INJECTION, SOLUTION INTRAMUSCULAR; INTRAVENOUS at 13:35

## 2025-06-30 RX ADMIN — THIAMINE HYDROCHLORIDE 200 MG: 100 INJECTION, SOLUTION INTRAMUSCULAR; INTRAVENOUS at 06:23

## 2025-06-30 RX ADMIN — Medication 10 ML: at 09:13

## 2025-06-30 RX ADMIN — FOLIC ACID 1 MG: 1 TABLET ORAL at 09:13

## 2025-06-30 NOTE — PROGRESS NOTES
Podiatry Progress Note      Patient: Jim Marvin Admit Date: 2025    Age: 89 y.o.   PCP: Antonio Finley MD    MRN: [id number]  Room: [facility]        Subjective     Chief Complaint     Chief Complaint   Patient presents with    Wound Check        HPI        patient is resting in his room, sitting up at bedside eating breakfast.  No complaints that are new to his right lower extremity.    Past Medical History     Past Medical History:   Diagnosis Date    COPD (chronic obstructive pulmonary disease)     Hyperlipidemia     Hypertension     Stroke         Past Surgical History:   Procedure Laterality Date    CATARACT EXTRACTION Left 2022        Allergies   Allergen Reactions    Ace Inhibitors Other (See Comments)     Cannot remember     Lisinopril Other (See Comments)     Cannot remember        Social History     Tobacco Use   Smoking Status Former    Current packs/day: 0.00    Average packs/day: 1 pack/day for 40.0 years (40.0 ttl pk-yrs)    Types: Cigarettes    Start date:     Quit date:     Years since quittin.5    Passive exposure: Past   Smokeless Tobacco Never   Tobacco Comments    Quit 19 years ago         Objective   Physical Exam    Vitals:    25 0424   BP: 160/80   Pulse: 65   Resp: 16   Temp: 97.5 °F (36.4 °C)   SpO2: 95%          Dressings clean dry intact to the right lower extremity.  Purulent drainage improved.  No proximal streaking up the leg present.    Labs     Lab Results   Component Value Date    HGBA1C 5.80 (H) 2025    POCGLU 96 2025    SEDRATE 53 (H) 2025        CBC:      Lab 25  0528 25  0354 25  0646 25  1433   WBC 10.16 10.29 12.18* 12.39*   HEMOGLOBIN 10.7* 10.8* 10.8* 11.8*   HEMATOCRIT 31.3* 32.0* 32.8* 35.1*   PLATELETS 267 236 228 232   NEUTROS ABS  --   --  8.18* 8.89*   IMMATURE GRANS (ABS)  --   --  0.20* 0.20*   LYMPHS ABS  --   --  0.89 0.76   MONOS ABS  --   --  1.67* 1.83*   EOS ABS  --   --  1.08* 0.58*    MCV 95.4 96.1 97.9* 97.8*          Results for orders placed or performed during the hospital encounter of 06/27/25   Blood Culture - Blood, Hand, Right    Specimen: Hand, Right; Blood   Result Value Ref Range    Blood Culture No growth at 2 days         MRI Foot Right Without Contrast  Narrative: MRI FOOT RIGHT WO CONTRAST-     Date of Exam: 6/29/2025 5:41 AM     Indication: Osteomyelitis of the fifth metatarsal.     Comparison: Radiographs 6/27/2025, 6/13/2025, and 3/16/2025. MRI  3/17/2025 and 8/31/2024.     Technique: Multiplanar, multisequence MR imaging of the midfoot and  forefoot was performed without the intravenous administration of  contrast.     FINDINGS:  SOFT TISSUES: Open wound at the lateral and plantar distal forefoot  exposing the fifth metatarsal head. Additional focal wound at the dorsal  and lateral midfoot, dorsal to the mid fifth metatarsal, and focal wound  at the dorsal base of the fifth toe. Underlying communicating complex  fluid collection between the wounds at the dorsal and lateral forefoot,  measuring approximately 5 cm x 1.5 cm x 1.2 cm, which could represent  phlegmon or developing abscess. Mild diffuse amorphous soft tissue  edema. No solid soft tissue mass. The intermetatarsal spaces are  unremarkable. The partially imaged plantar fascia is unremarkable.     BONES: Patchy T1 marrow replacement in the fifth metatarsal head and  neck with diffuse bone marrow edema like signal throughout the fifth  metatarsal, consistent with osteomyelitis. Patchy bone marrow edema like  signal in the fifth toe proximal phalanx, which could be reactive or  could reflect early osteomyelitis. No acute fracture. Degenerative  chondral cystic changes at the plantar great toe metatarsal head and the  great toe fibular sesamoid. Bipartite tibial and fibular sesamoid  morphology. No concerning osseous lesion.     JOINTS: Dorsal dislocation of the fifth MTP joint with a large joint  effusion, likely  communicating with the complex fluid collection at the  dorsal and lateral forefoot through a defect in the dorsal joint  capsule. 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 The  midfoot is well aligned.     LIGAMENTS: The Lisfranc ligament complex is intact. Suspected  full-thickness tear of the fifth toe MTP joint lateral collateral  ligament. The other MTP collateral ligaments are intact.     MUSCLES AND TENDONS: Lateral subluxation of the fifth toe flexor  tendons. The other flexor and extensor tendons are intact.  Full-thickness tear of the fifth toe plantar plate. The other plantar  plates are grossly intact. Severe diffuse muscular fatty atrophy, likely  neuropathic, with diffuse muscular edema that could also be neuropathic  or could reflect myositis.     Impression:    1. Large open wound at the lateral and plantar distal forefoot at  exposing the fifth metatarsal head with additional smaller wounds at the  dorsal and lateral midfoot and the dorsal base of the fifth toe. Complex  fluid collection communicating between the wounds, which could represent  phlegmon or developing abscess.  2. Osteomyelitis of the fifth metatarsal head and neck.  3. Dorsal dislocation of the fifth toe MTP joint with associated tear of  the plantar plate and MCP joint LCL, severe chondromalacia, and large  complex joint fluid communicating through a defect in the dorsal joint  capsule, likely septic arthritis. Bone marrow edema like signal in the  fifth toe proximal phalanx could be reactive or could represent early  osteomyelitis.  4. 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  5. 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              This report was finalized on 6/29/2025 1:21 PM by Chris Quiles MD on  Workstation: [id number]          Assessment/Plan       89-year-old male with fifth metatarsal osteomyelitis, septic arthritis fifth MTPJ    -patient examined and evaluated  - Continue antibiotics  per infectious disease and appreciate their recommendations  -MRI does show osteomyelitis of the fifth metatarsal head and neck and several other ulcerations.  This is going to require surgical intervention.  I am hopeful that I can do this all in 1 anesthesia event, however concerned with a soft tissue infection that he may need to be left open to drain for a few days and then closed during a second procedure.  - Patient is on the OR schedule for tomorrow at 1200.  N.p.o. at midnight      Daniel Pedraza DPM  Office: [phone]

## 2025-06-30 NOTE — PROGRESS NOTES
"    Patient Name: Jim Marvin  :1935  89 y.o.      Patient Care Team:  Antonio Finley MD as PCP - General (Family Medicine)  Ruben Burger Jr., MD as Consulting Physician (Urology)  Vane Rendon, [facility] as Amb[facility]    Chief Complaint: follow up atrial fibrillation , osteomyelitis     Interval History:        Objective   Vital Signs  Temp:  [97.5 °F (36.4 °C)-98.1 °F (36.7 °C)] 97.7 °F (36.5 °C)  Heart Rate:  [63-88] 71  Resp:  [16] 16  BP: (152-173)/(71-94) 165/71    Intake/Output Summary (Last 24 hours) at 2025 1452  Last data filed at 2025 1220  Gross per 24 hour   Intake 840 ml   Output --   Net 840 ml     Flowsheet Rows      Flowsheet Row First Filed Value   Admission Height 177.8 cm (70\") Documented at 2025 1511   Admission Weight 87.3 kg (192 lb 6.4 oz) Documented at 2025 1511            Physical Exam:   General Appearance:    Alert, cooperative, in no acute distress   Lungs:     Clear to auscultation.  Normal respiratory effort and rate.      Heart:    Regular rhythm and normal rate, normal S1 and S2, no murmurs, gallops or rubs.     Chest Wall:    No abnormalities observed   Abdomen:     Soft, nontender, positive bowel sounds.     Extremities:   no cyanosis, clubbing or edema.  No marked joint deformities.  Adequate musculoskeletal strength.       Results Review:    Results from last 7 days   Lab Units 25  0528   SODIUM mmol/L 138   POTASSIUM mmol/L 4.0   CHLORIDE mmol/L 107   CO2 mmol/L 23.0   BUN mg/dL 12.0   CREATININE mg/dL 0.60*   GLUCOSE mg/dL 103*   CALCIUM mg/dL 8.4*         Results from last 7 days   Lab Units 25  0528   WBC 10*3/mm3 10.16   HEMOGLOBIN g/dL 10.7*   HEMATOCRIT % 31.3*   PLATELETS 10*3/mm3 267             Results from last 7 days   Lab Units 25  0646   CHOLESTEROL mg/dL 91   TRIGLYCERIDES mg/dL 48   HDL CHOL mg/dL 26*   LDL CHOL mg/dL 53               Medication Review:   [Held by provider] apixaban, 5 " mg, Oral, Q12H  atorvastatin, 20 mg, Oral, Nightly  budesonide-formoterol, 2 puff, Inhalation, BID - RT   And  revefenacin, 175 mcg, Nebulization, Daily - RT  digoxin, 125 mcg, Oral, Daily  dilTIAZem CD, 240 mg, Oral, Daily  folic acid, 1 mg, Oral, Daily  insulin lispro, 2-7 Units, Subcutaneous, 4x Daily AC & at Bedtime  [Held by provider] metFORMIN ER, 500 mg, Oral, Daily With Breakfast  sodium chloride, 10 mL, Intravenous, Q12H  thiamine (B-1) IV, 200 mg, Intravenous, Q8H   Followed by  [START ON 7/3/2025] thiamine, 100 mg, Oral, Daily  vancomycin, 1,250 mg, Intravenous, Q12H         O2, 3 L/min, Last Rate: 4 L/min (06/27/25 1832)  Pharmacy to dose vancomycin,         Assessment & Plan   Chronic osteomyelitis and septic arthritis right foot\  MRSA infection  Chronic hypoxic respiratory failure , interstitial lung disease  PAF, apixaban on hold. Pulse is regular. He is not on tele.   Emphysema   Hyperlipidemia   History of tobacco abuse     To OR tomorrow. Will see again Wednesday.     KATY Duckworth  [facility]  06/30/25  14:52 EDT

## 2025-06-30 NOTE — PLAN OF CARE
Goal Outcome Evaluation:   Patient remains stable. Aox4. VSS. 4 L NC. Ambulating with stand by assistance. No complaints of pain. Betadine wet-to-dry dressing change to right foot performed today per podiatry's note from 6/28. Plan is for surgery tomorrow (7/1) with , NPO at midnight. All needs currently met, will continue to monitor.

## 2025-06-30 NOTE — PROGRESS NOTES
Name: Jim Marvin ADMIT: 2025   : 1935  PCP: Antonio Finley MD    MRN: [id number] LOS: 3 days   AGE/SEX: 89 y.o. male  ROOM: [facility]     Subjective   Subjective   No new complaints. Sitting on the side of the bed eating breakfast.     Objective   Objective   Vital Signs  Temp:  [97.5 °F (36.4 °C)-98.1 °F (36.7 °C)] 98.1 °F (36.7 °C)  Heart Rate:  [63-88] 63  Resp:  [16] 16  BP: (152-173)/(70-94) 152/94  SpO2:  [91 %-99 %] 99 %  on  Flow (L/min) (Oxygen Therapy):  [4] 4;   Device (Oxygen Therapy): nasal cannula  Body mass index is 27.61 kg/m².    Physical Exam  Constitutional:       General: He is not in acute distress.     Appearance: He is not toxic-appearing.   HENT:      Head: Normocephalic and atraumatic.   Cardiovascular:      Rate and Rhythm: Normal rate and regular rhythm.   Pulmonary:      Effort: Pulmonary effort is normal. No respiratory distress.      Breath sounds: Normal breath sounds. No wheezing or rhonchi.   Abdominal:      General: Bowel sounds are normal.      Palpations: Abdomen is soft.      Tenderness: There is no abdominal tenderness. There is no guarding or rebound.   Musculoskeletal:      Comments: Right foot dressed   Skin:     General: Skin is warm and dry.   Neurological:      Mental Status: He is alert and oriented to person, place, and time.   Psychiatric:         Mood and Affect: Mood normal.         Behavior: Behavior normal.     Results Review  I reviewed the patient's new clinical results.  Results from last 7 days   Lab Units 25  0528 25  0354 25  0646 25  1433   WBC 10*3/mm3 10.16 10.29 12.18* 12.39*   HEMOGLOBIN g/dL 10.7* 10.8* 10.8* 11.8*   PLATELETS 10*3/mm3 267 236 228 232     Results from last 7 days   Lab Units 25  0528 25  0354 25  0646 25  1433   SODIUM mmol/L 138 140 138 138   POTASSIUM mmol/L 4.0 3.7 3.7 3.9   CHLORIDE mmol/L 107 107 105 102   CO2 mmol/L 23.0 21.4* 22.0 23.5   BUN mg/dL 12.0 10.0 11.0 16.0    CREATININE mg/dL 0.60* 0.63* 0.61* 0.81   GLUCOSE mg/dL 103* 105* 99 109*     Lab Results   Component Value Date    ANIONGAP 8.0 06/30/2025     Estimated Creatinine Clearance: 107.1 mL/min (A) (by C-G formula based on SCr of 0.6 mg/dL (L)).   Lab Results   Component Value Date    EGFR 93.4 06/30/2025     Results from last 7 days   Lab Units 06/28/25  0646 06/27/25  1433   ALBUMIN g/dL 2.7* 3.3*   BILIRUBIN mg/dL 0.5 0.6   ALK PHOS U/L 79 93   AST (SGOT) U/L 25 29   ALT (SGPT) U/L 40 46*     Results from last 7 days   Lab Units 06/30/25  0528 06/29/25  0354 06/28/25  0646 06/27/25  1433   CALCIUM mg/dL 8.4* 8.4* 8.5* 9.0   ALBUMIN g/dL  --   --  2.7* 3.3*     Results from last 7 days   Lab Units 06/27/25  1523   LACTATE mmol/L 1.5     Hemoglobin A1C   Date/Time Value Ref Range Status   06/28/2025 0646 5.80 (H) 4.80 - 5.60 % Final     Glucose   Date/Time Value Ref Range Status   06/30/2025 0631 96 70 - 130 mg/dL Final   06/29/2025 2138 130 70 - 130 mg/dL Final   06/29/2025 1604 147 (H) 70 - 130 mg/dL Final   06/29/2025 1116 107 70 - 130 mg/dL Final   06/29/2025 0652 95 70 - 130 mg/dL Final   06/28/2025 2011 139 (H) 70 - 130 mg/dL Final   06/28/2025 1623 161 (H) 70 - 130 mg/dL Final       MRI Foot Right Without Contrast  Result Date: 6/29/2025   1. Large open wound at the lateral and plantar distal forefoot at exposing the fifth metatarsal head with additional smaller wounds at the dorsal and lateral midfoot and the dorsal base of the fifth toe. Complex fluid collection communicating between the wounds, which could represent phlegmon or developing abscess. 2. Osteomyelitis of the fifth metatarsal head and neck. 3. Dorsal dislocation of the fifth toe MTP joint with associated tear of the plantar plate and MCP joint LCL, severe chondromalacia, and large complex joint fluid communicating through a defect in the dorsal joint capsule, likely septic arthritis. Bone marrow edema like signal in the fifth toe proximal phalanx  could be reactive or could represent early osteomyelitis. 4. Moderate to severe chondromalacia/DJD at the great toe MTP joint with a moderate joint effusion and diffuse nodular thickening and/or intra-articular debris. 5. 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     This report was finalized on 6/29/2025 1:21 PM by Chris Quiles MD on Workstation: [id number]        Scheduled Meds  [Held by provider] apixaban, 5 mg, Oral, Q12H  atorvastatin, 20 mg, Oral, Nightly  budesonide-formoterol, 2 puff, Inhalation, BID - RT   And  revefenacin, 175 mcg, Nebulization, Daily - RT  cefTRIAXone, 2,000 mg, Intravenous, Q24H  digoxin, 125 mcg, Oral, Daily  dilTIAZem CD, 240 mg, Oral, Daily  folic acid, 1 mg, Oral, Daily  insulin lispro, 2-7 Units, Subcutaneous, 4x Daily AC & at Bedtime  [Held by provider] metFORMIN ER, 500 mg, Oral, Daily With Breakfast  sodium chloride, 10 mL, Intravenous, Q12H  thiamine (B-1) IV, 200 mg, Intravenous, Q8H   Followed by  [START ON 7/3/2025] thiamine, 100 mg, Oral, Daily  vancomycin, 1,250 mg, Intravenous, Q12H    Continuous Infusions  O2, 3 L/min, Last Rate: 4 L/min (06/27/25 1832)  Pharmacy to dose vancomycin,     PRN Meds    acetaminophen **OR** acetaminophen **OR** acetaminophen    Calcium Replacement - Follow Nurse / BPA Driven Protocol    dextrose    dextrose    glucagon (human recombinant)    ipratropium-albuterol    LORazepam **OR** LORazepam **OR** LORazepam **OR** LORazepam **OR** LORazepam **OR** LORazepam    Magnesium Standard Dose Replacement - Follow Nurse / BPA Driven Protocol    Pharmacy to dose vancomycin    Phosphorus Replacement - Follow Nurse / BPA Driven Protocol    Potassium Replacement - Follow Nurse / BPA Driven Protocol    sodium chloride    sodium chloride    O2, 3 L/min, Last Rate: 4 L/min (06/27/25 1832)  Pharmacy to dose vancomycin,     Diet  Diet: Cardiac, Diabetic,  Renal; Healthy Heart (2-3 Na+); Consistent Carbohydrate; Low Sodium (2-3g), Low Potassium, Low Phosphorus; Fluid Consistency: Thin (IDDSI 0)     Date/Time CIWA-Ar Total    06/29/25 2022 2     06/29/25 0905 2     06/28/25 2003 3              Assessment/Plan     Active Hospital Problems    Diagnosis  POA    **Right foot infection [L08.9]  Unknown    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     Patient is a 89 y.o. male     R DFU  R foot cellulitis  culture MRSA (history of MRSA)  ID managing antibiotics  Anticipate surgery tomorrow  MRI noted  Anticoagulation on hold for surgery    DM 2  A1c 5.80%  Correctional insulin  BS 100s    Paroxysmal atrial fibrillation  Anticoagulation on hold  Cardiology following    Chronic hypoxic respiratory failure   COPD  Interstitial lung disease  Flow (L/min) (Oxygen Therapy):  [4] 4 (baseline 3-4 L/min)  Pulmonology following. He is not on steroids chronically    History of stroke  Hypertension  Alcohol use disorder 15-20/per week. CIWA 2-3, as needed Ativan (has not required any)    DVT prophylaxis  Eliquis (home med) held for surgery    Discharge  TBD  Expected Discharge Date: 7/4/2025; Expected Discharge Time:     Discussed with patient and nursing staff    Nick Bajwa MD  [facility]  06/30/25 09:23 EDT

## 2025-06-30 NOTE — PROGRESS NOTES
Consult Daily Progress Note  [facility]   06/30/25      Patient Name:  Jim Marvin  MRN:  [id number]   YOB: 1935  Age: 89 y.o.  Sex: male  LOS: 3    Reason for Consult:  Chronic hypoxic respiratory failure, surgical clearance    Hospital Course:   89-year-old male with CPFT, chronic hypoxic respiratory failure presented for diabetic right foot ulcer with chronic osteomyelitis.    Interval History:  No acute events overnight  Respiratory status remains stable  On 4 L nasal cannula  No shortness of breath at rest  No worsening in his cough  No nausea vomiting  Sitting on side of bed eating meal comfortably    Physical Exam:  Vitals:    06/30/25 1155   BP: 165/71   Pulse: 71   Resp: 16   Temp: 97.7 °F (36.5 °C)   SpO2: 92%       Intake/Output    Intake/Output Summary (Last 24 hours) at 6/30/2025 1600  Last data filed at 6/30/2025 1220  Gross per 24 hour   Intake 840 ml   Output --   Net 840 ml     General: Alert, nontoxic, NAD, elderly  HEENT: NC/AT, EOMI, MMM  Neck: Supple, trachea midline  Cardiac: RRR, no murmur, gallops, rubs  Pulmonary: Diminished bilaterally, no wheezing  GI: Soft, non-tender, non-distended, normal bowel sounds  Extremities: Warm, well perfused, right foot bandaged  Skin: no visible rash  Neuro: CN II - XII grossly intact  Psychiatry: Normal mood and affect    Data Review:  Results from last 7 days   Lab Units 06/30/25  0528 06/29/25  0354 06/28/25  0646 06/27/25  1433   WBC 10*3/mm3 10.16 10.29 12.18* 12.39*   HEMOGLOBIN g/dL 10.7* 10.8* 10.8* 11.8*   PLATELETS 10*3/mm3 267 236 228 232     Results from last 7 days   Lab Units 06/30/25  0528 06/29/25  0354 06/28/25  0646 06/27/25  1433   SODIUM mmol/L 138 140 138 138   POTASSIUM mmol/L 4.0 3.7 3.7 3.9   CHLORIDE mmol/L 107 107 105 102   CO2 mmol/L 23.0 21.4* 22.0 23.5   BUN mg/dL 12.0 10.0 11.0 16.0   CREATININE mg/dL 0.60* 0.63* 0.61* 0.81   GLUCOSE mg/dL 103* 105* 99 109*   CALCIUM mg/dL 8.4* 8.4* 8.5* 9.0    Estimated Creatinine Clearance: 107.1 mL/min (A) (by C-G formula based on SCr of 0.6 mg/dL (L)).    Results from last 7 days   Lab Units 06/30/25  0528 06/29/25  0354 06/28/25  0646 06/27/25  1523 06/27/25  1433   AST (SGOT) U/L  --   --  25  --  29   ALT (SGPT) U/L  --   --  40  --  46*   LACTATE mmol/L  --   --   --  1.5  --    CRP mg/dL  --   --   --   --  14.65*   PLATELETS 10*3/mm3 267 236 228  --  232             Result Review:  I have personally reviewed the results from the time of this admission to 6/30/2025 16:00 EDT and agree with these findings:  [x]  Laboratory list / accordion  [x]  Microbiology  [x]  Radiology  []  EKG/Telemetry   [x]  Cardiology/Vascular   []  Pathology  [x]  Old records  []  Other:    ASSESSMENT  /  PLAN:    Chronic osteomyelitis of right fifth metatarsal  Combined pulmonary fibrosis and emphysema  Chronic hypoxic respiratory failure, 3 L at rest, up to 6 L with exertion  Diabetes mellitus type 2  Paroxysmal atrial fibrillation  History of cerebrovascular accident  Hypertension  Alcohol use disorder  History of tobacco abuse     -Patient presented to the hospital for IV antibiotics in the setting of diabetic foot ulcer and chronic osteomyelitis.  Undergoing workup by podiatry for amputation.  Pulmonary was consulted for pulmonary clearance.  -No evidence of acute exacerbation at this time.  No evidence of pulmonary infection at this time.  Stable respiratory status.  -Uses 3 L at rest and up to 6 L with exertion.  Appropriate saturations on 3 L at rest.  -Uses bronchodilator therapy with Trelegy and albuterol at home.  -Bronchodilator therapy with Symbicort and Yupelri in the hospital, DuoNeb as needed.  -Patient follows with Dr. Stanley Gibbs with pulmonary at [place].  - Previously was undergoing pulmonary rehab  - Antibiotics with vancomycin and ceftriaxone for osteomyelitis     Patient is high risk for complications in the kim and postoperative phase due to the severity of his  underlying lung disease with both pulmonary fibrosis and emphysema and significant hypoxic respiratory failure.  Fortunately he is currently in stable respiratory status with no recent infections or exacerbations.  His functional status is reasonable.  Additionally plan is for peripheral surgery with a short time frame minimizing risk of complications.  At this time there is no further workup from a pulmonary standpoint and to proceed with surgery as per podiatry.  He will need to be monitored carefully with high risk for postoperative respiratory failure requiring intubation and mechanical ventilation due to his underlying parenchymal disease.     Optimization of his lung function with bronchodilators, supplemental oxygen, early ambulation, incentive spirometry, pain control.      Plan is for OR tomorrow with podiatry.  Pulmonary will follow closely for postoperative optimization of pulmonary function.    Thank you for allowing us to participate in this patients care. Pulmonary will continue to follow.     Gunner Galeano MD  [facility]  Pulmonary and Critical Care Medicine, Interventional Pulmonology    Parts of this note may be an electronic transcription/translation of spoken language to printed text using the Dragon dictation system.

## 2025-06-30 NOTE — PLAN OF CARE
Goal Outcome Evaluation:  Plan of Care Reviewed With: patient           Outcome Evaluation: Patient remains stable. Alert and oriented. VSS. Ambulating with assist x1. 4L O2. Contact isolation for MRSA. IV abx given per order. No complaints of pain. Possible surgery this week.

## 2025-06-30 NOTE — PROGRESS NOTES
Discharge Planning Assessment  [facility]     Patient Name: Jim Marvin  MRN: [id number]  Today's Date: 6/30/2025    Admit Date: 6/27/2025    Plan: Home alone pending progress   Discharge Needs Assessment       Row Name 06/30/25 1152       Living Environment    People in Home alone    Current Living Arrangements home    Potentially Unsafe Housing Conditions none    Primary Care Provided by self    Provides Primary Care For no one    Family Caregiver if Needed child(linda), adult    Family Caregiver Names Madisyn Mcgarry [phone] daughter    Quality of Family Relationships supportive    Able to Return to Prior Arrangements yes       Resource/Environmental Concerns    Resource/Environmental Concerns none    Transportation Concerns none       Transition Planning    Patient/Family Anticipates Transition to home    Transportation Anticipated family or friend will provide       Discharge Needs Assessment    Equipment Currently Used at Home oxygen                   Discharge Plan       Row Name 06/30/25 1154       Plan    Plan Home alone pending progress    Plan Comments Spoke with pt at bedside to screen for DCp/needs.  Pt did confirm facesheet information as correct including PCP as Dr. Finley.  Pt reports that he watt slive alone in a  home and is planning to return backk home at NH.  Pt reports that at baseline he is independnet withhis self care and mobility.  Pt reports that he does have a rollign wlaker, bath bench, nebulizer, pulse ox and O2 ([place]).  Pt reoprts that he di go to [facility] about 1 year ago but felt that there was not enough rehab there for him,   Pt also used HH in the past but could not rememeber agency.  Pt stated that his  daughter picks up his home meds from [facility] and assist him in putting them into a med planner.   Pt stated that he get his grocerries delivered.  Pt currently denies any DC needs.  CCP did inform pt that CCP would be follwoign with tx team to assist with DC needs.   Pt to go to OR 7/1 with podiatry for foot infection.  [facility] ID followoing for antibx recs.                  Continued Care and Services - Admitted Since 6/27/2025    No active coordination exists.       Selected Continued Care - Episodes Includes continued care and service providers with selected services from the active episodes listed below      Chronic Care Management Episode start date: 6/30/2025   There are no active outsourced providers for this episode.                 Expected Discharge Date and Time       Expected Discharge Date Expected Discharge Time    Jul 4, 2025            Demographic Summary       Row Name 06/30/25 1151       General Information    Admission Type inpatient    Arrived From home    Referral Source admission list    Reason for Consult discharge planning    Preferred Language English                   Functional Status       Row Name 06/30/25 1152       Functional Status    Usual Activity Tolerance moderate    Current Activity Tolerance fair       Functional Status, IADL    Medications independent;assistive person    Meal Preparation independent;assistive person    Housekeeping independent;assistive person    Laundry independent;assistive person    Shopping independent;assistive person       Mental Status Summary    Recent Changes in Mental Status/Cognitive Functioning no changes                   Psychosocial    No documentation.                  Abuse/Neglect    No documentation.                  Legal    No documentation.                  Substance Abuse    No documentation.                  Patient Forms    No documentation.                     FREDI Ramírez

## 2025-06-30 NOTE — PROGRESS NOTES
ID progress note     CC: Follow-up diabetic right foot infection due to MRSA    Subjective: No acute events.  No fever.  He said his foot is about the same.  MRI showed cellulitis, septic arthritis, and osteomyelitis.  He tells me he is tolerating vancomycin and ceftriaxone without any side effects.  Plan is for him to go to the operating room tomorrow.      Medications:    Current Facility-Administered Medications:     acetaminophen (TYLENOL) tablet 650 mg, 650 mg, Oral, Q4H PRN **OR** acetaminophen (TYLENOL) 160 MG/5ML oral solution 650 mg, 650 mg, Oral, Q4H PRN **OR** acetaminophen (TYLENOL) suppository 650 mg, 650 mg, Rectal, Q4H PRN, Rodríguez Araujo MD    [Held by provider] apixaban (ELIQUIS) tablet 5 mg, 5 mg, Oral, Q12H, Rodríguez Araujo MD    atorvastatin (LIPITOR) tablet 20 mg, 20 mg, Oral, Nightly, Rodríguez Araujo MD, 20 mg at 06/29/25 2152    budesonide-formoterol (SYMBICORT) 160-4.5 MCG/ACT inhaler 2 puff, 2 puff, Inhalation, BID - RT, 2 puff at 06/30/25 0834 **AND** revefenacin (YUPELRI) nebulizer solution 175 mcg, 175 mcg, Nebulization, Daily - RT, Rodríguez Araujo MD, 175 mcg at 06/30/25 0834    Calcium Replacement - Follow Nurse / BPA Driven Protocol, , Not Applicable, PRN, Rodríguez Araujo MD    cefTRIAXone (ROCEPHIN) 2,000 mg in sodium chloride 0.9 % 100 mL MBP, 2,000 mg, Intravenous, Q24H, Guero Kern MD, Last Rate: 200 mL/hr at 06/29/25 1441, 2,000 mg at 06/29/25 1441    dextrose (D50W) (25 g/50 mL) IV injection 25 g, 25 g, Intravenous, Q15 Min PRN, Rodríguez Araujo MD    dextrose (GLUTOSE) oral gel 15 g, 15 g, Oral, Q15 Min PRN, Rodríguez Araujo MD    digoxin (LANOXIN) tablet 125 mcg, 125 mcg, Oral, Daily, Rodríguez Araujo MD, 125 mcg at 06/29/25 1150    dilTIAZem CD (CARDIZEM CD) 24 hr capsule 240 mg, 240 mg, Oral, Daily, Rodríguez Araujo MD, 240 mg at 06/30/25 0913    folic acid (FOLVITE) tablet 1 mg, 1 mg, Oral, Daily, Rodríguez Araujo MD, 1 mg at 06/30/25 0913    glucagon (GLUCAGEN) injection 1 mg, 1  mg, Intramuscular, Q15 Min PRN, Rodríguez Araujo MD    insulin lispro (HUMALOG/ADMELOG) injection 2-7 Units, 2-7 Units, Subcutaneous, 4x Daily AC & at Bedtime, Rodríguez Araujo MD, 2 Units at 06/28/25 1632    ipratropium-albuterol (DUO-NEB) nebulizer solution 3 mL, 3 mL, Nebulization, Q6H PRN, Rodríguez Araujo MD    LORazepam (ATIVAN) tablet 1 mg, 1 mg, Oral, Q1H PRN **OR** LORazepam (ATIVAN) injection 1 mg, 1 mg, Intravenous, Q1H PRN **OR** LORazepam (ATIVAN) tablet 2 mg, 2 mg, Oral, Q1H PRN **OR** LORazepam (ATIVAN) injection 2 mg, 2 mg, Intravenous, Q1H PRN **OR** LORazepam (ATIVAN) injection 2 mg, 2 mg, Intravenous, Q15 Min PRN **OR** LORazepam (ATIVAN) injection 2 mg, 2 mg, Intramuscular, Q15 Min PRN, Rodríguez Araujo MD    Magnesium Standard Dose Replacement - Follow Nurse / BPA Driven Protocol, , Not Applicable, PRNVan Jawed, MD    [Held by provider] metFORMIN ER (GLUCOPHAGE-XR) 24 hr tablet 500 mg, 500 mg, Oral, Daily With Breakfast, Rodríguez Araujo MD    O2 (OXYGEN), 3 L/min, Inhalation, Continuous, Rodríguez Araujo MD, Last Rate: 240,000 mL/hr at 06/27/25 1832, 4 L/min at 06/27/25 1832    Pharmacy to dose vancomycin, , Not Applicable, Continuous PRNVan Jawed, MD    Phosphorus Replacement - Follow Nurse / BPA Driven Protocol, , Not Applicable, PRNVan Jawed, MD    Potassium Replacement - Follow Nurse / BPA Driven Protocol, , Not Applicable, PRNVan Jawed, MD    sodium chloride 0.9 % flush 10 mL, 10 mL, Intravenous, Q12H, Rodríguez Araujo MD, 10 mL at 06/30/25 0913    sodium chloride 0.9 % flush 10 mL, 10 mL, Intravenous, PRNVan Jawed, MD    sodium chloride 0.9 % infusion 40 mL, 40 mL, Intravenous, PRN, Rodríguez Araujo MD    thiamine (B-1) injection 200 mg, 200 mg, Intravenous, Q8H, 200 mg at 06/30/25 0623 **FOLLOWED BY** [START ON 7/3/2025] thiamine (VITAMIN B-1) tablet 100 mg, 100 mg, Oral, Daily, Rodríguez Araujo MD    Vancomycin HCl 1,250 mg in sodium chloride 0.9 % 250 mL VTB, 1,250 mg, Intravenous, Q12H,  Rodríguez Araujo MD, Last Rate: 200 mL/hr at 06/30/25 0441, 1,250 mg at 06/30/25 0441      Objective   Vital Signs   Temp:  [97.5 °F (36.4 °C)-98.1 °F (36.7 °C)] 98.1 °F (36.7 °C)  Heart Rate:  [63-88] 63  Resp:  [16] 16  BP: (152-173)/(70-94) 152/94    Physical Exam:   General: awake, alert, NAD, very nice, sitting up in chair  Eyes: no scleral icterus  Cardiovascular: Normal rate  Respiratory: normal work of breathing without wheezing  GI: Abdomen is soft, not tender  :  no Hernandez catheter  MSK/skin: Right foot bandaged  Neurological: Alert and oriented x 3  Psychiatric: Normal mood and affect   Vasc: PIV in left upper extremity w/o erythema    Labs:   CBC, BMP, Vanco level, and blood and wound cultures reviewed today  Lab Results   Component Value Date    WBC 10.16 06/30/2025    HGB 10.7 (L) 06/30/2025    HCT 31.3 (L) 06/30/2025    MCV 95.4 06/30/2025     06/30/2025     Lab Results   Component Value Date    GLUCOSE 103 (H) 06/30/2025    CALCIUM 8.4 (L) 06/30/2025     06/30/2025    K 4.0 06/30/2025    CO2 23.0 06/30/2025     06/30/2025    BUN 12.0 06/30/2025    CREATININE 0.60 (L) 06/30/2025    EGFR 93.4 06/30/2025    BCR 20.0 06/30/2025    ANIONGAP 8.0 06/30/2025     Lab Results   Component Value Date    ALT 40 06/28/2025     Lab Results   Component Value Date    HGBA1C 5.80 (H) 06/28/2025     Lab Results   Component Value Date    CRP 14.65 (H) 06/27/2025     Lab Results   Component Value Date    VANCOTROUGH 8.20 06/29/2025       Microbiology:  6/13 BCx: Negative  6/27 BCx: NGTD  6/28 right foot wound Cx: Pending    New radiology:  MRI right foot shows wound, osteomyelitis, and septic arthritis    Prior radiology:  X-ray right foot with soft tissue swelling cannot exclude osteomyelitis    Isolation:  Contact for history of MRSA    ASSESSMENT/PLAN:  Chronic osteomyelitis and septic arthritis right foot  MRSA infection  Chronic hypoxic respiratory failure  Emphysema  Interstitial lung  disease    MRI confirms deep infection.  Wound culture with MRSA.  Continue vancomycin with goal -600.  Stop ceftriaxone.  Noted plans for him to go to the operating room tomorrow for surgical debridement.  I will follow-up operative details.  Please send additional operative cultures.  Will D/W podiatrist after surgery to get a better understanding of extent of infection and extent of source control.    While the patient is on vancomycin, vancomycin levels will be ordered and reviewed with dose adjustments made as needed. The patient will have a daily creatinine level checked while on vancomycin as part of monitoring this medication.     ID will follow.

## 2025-07-01 ENCOUNTER — APPOINTMENT (OUTPATIENT)
Dept: GENERAL RADIOLOGY | Facility: HOSPITAL | Age: 89
End: 2025-07-01
Payer: MEDICARE

## 2025-07-01 ENCOUNTER — ANESTHESIA (OUTPATIENT)
Dept: PERIOP | Facility: HOSPITAL | Age: 89
End: 2025-07-01
Payer: MEDICARE

## 2025-07-01 ENCOUNTER — ANESTHESIA EVENT (OUTPATIENT)
Dept: PERIOP | Facility: HOSPITAL | Age: 89
End: 2025-07-01
Payer: MEDICARE

## 2025-07-01 LAB
ANION GAP SERPL CALCULATED.3IONS-SCNC: 9 MMOL/L (ref 5–15)
BUN SERPL-MCNC: 10 MG/DL (ref 8–23)
BUN/CREAT SERPL: 16.4 (ref 7–25)
CALCIUM SPEC-SCNC: 8.6 MG/DL (ref 8.6–10.5)
CHLORIDE SERPL-SCNC: 104 MMOL/L (ref 98–107)
CO2 SERPL-SCNC: 25 MMOL/L (ref 22–29)
CREAT SERPL-MCNC: 0.61 MG/DL (ref 0.76–1.27)
DEPRECATED RDW RBC AUTO: 43.6 FL (ref 37–54)
EGFRCR SERPLBLD CKD-EPI 2021: 93 ML/MIN/1.73
ERYTHROCYTE [DISTWIDTH] IN BLOOD BY AUTOMATED COUNT: 12.3 % (ref 12.3–15.4)
GLUCOSE BLDC GLUCOMTR-MCNC: 101 MG/DL (ref 70–130)
GLUCOSE BLDC GLUCOMTR-MCNC: 104 MG/DL (ref 70–130)
GLUCOSE BLDC GLUCOMTR-MCNC: 120 MG/DL (ref 70–130)
GLUCOSE BLDC GLUCOMTR-MCNC: 126 MG/DL (ref 70–130)
GLUCOSE BLDC GLUCOMTR-MCNC: 91 MG/DL (ref 70–130)
GLUCOSE SERPL-MCNC: 93 MG/DL (ref 65–99)
HCT VFR BLD AUTO: 35.5 % (ref 37.5–51)
HGB BLD-MCNC: 12.3 G/DL (ref 13–17.7)
MCH RBC QN AUTO: 33.2 PG (ref 26.6–33)
MCHC RBC AUTO-ENTMCNC: 34.6 G/DL (ref 31.5–35.7)
MCV RBC AUTO: 95.9 FL (ref 79–97)
PLATELET # BLD AUTO: 305 10*3/MM3 (ref 140–450)
PMV BLD AUTO: 9.3 FL (ref 6–12)
POTASSIUM SERPL-SCNC: 4 MMOL/L (ref 3.5–5.2)
RBC # BLD AUTO: 3.7 10*6/MM3 (ref 4.14–5.8)
SODIUM SERPL-SCNC: 138 MMOL/L (ref 136–145)
WBC NRBC COR # BLD AUTO: 9.19 10*3/MM3 (ref 3.4–10.8)

## 2025-07-01 PROCEDURE — 85027 COMPLETE CBC AUTOMATED: CPT | Performed by: INTERNAL MEDICINE

## 2025-07-01 PROCEDURE — 25010000002 ONDANSETRON PER 1 MG: Performed by: NURSE ANESTHETIST, CERTIFIED REGISTERED

## 2025-07-01 PROCEDURE — 87147 CULTURE TYPE IMMUNOLOGIC: CPT | Performed by: PODIATRIST

## 2025-07-01 PROCEDURE — 88311 DECALCIFY TISSUE: CPT | Performed by: PODIATRIST

## 2025-07-01 PROCEDURE — 94760 N-INVAS EAR/PLS OXIMETRY 1: CPT

## 2025-07-01 PROCEDURE — 25010000002 PHENYLEPHRINE 10 MG/ML SOLUTION: Performed by: NURSE ANESTHETIST, CERTIFIED REGISTERED

## 2025-07-01 PROCEDURE — 0Y6M0ZF DETACHMENT AT RIGHT FOOT, PARTIAL 5TH RAY, OPEN APPROACH: ICD-10-PCS | Performed by: PODIATRIST

## 2025-07-01 PROCEDURE — G0545 PR INHERENT VISIT TO INPT: HCPCS | Performed by: INTERNAL MEDICINE

## 2025-07-01 PROCEDURE — 25010000002 VANCOMYCIN HCL 1.25 G RECONSTITUTED SOLUTION 1 EACH VIAL: Performed by: PODIATRIST

## 2025-07-01 PROCEDURE — 87186 SC STD MICRODIL/AGAR DIL: CPT | Performed by: PODIATRIST

## 2025-07-01 PROCEDURE — 87116 MYCOBACTERIA CULTURE: CPT | Performed by: PODIATRIST

## 2025-07-01 PROCEDURE — 63710000001 REVEFENACIN 175 MCG/3ML SOLUTION: Performed by: INTERNAL MEDICINE

## 2025-07-01 PROCEDURE — 87176 TISSUE HOMOGENIZATION CULTR: CPT | Performed by: PODIATRIST

## 2025-07-01 PROCEDURE — 80048 BASIC METABOLIC PNL TOTAL CA: CPT | Performed by: INTERNAL MEDICINE

## 2025-07-01 PROCEDURE — 25010000002 PROPOFOL 10 MG/ML EMULSION: Performed by: NURSE ANESTHETIST, CERTIFIED REGISTERED

## 2025-07-01 PROCEDURE — 25810000003 SODIUM CHLORIDE 0.9 % SOLUTION 250 ML FLEX CONT: Performed by: INTERNAL MEDICINE

## 2025-07-01 PROCEDURE — 88305 TISSUE EXAM BY PATHOLOGIST: CPT | Performed by: PODIATRIST

## 2025-07-01 PROCEDURE — 73630 X-RAY EXAM OF FOOT: CPT

## 2025-07-01 PROCEDURE — 82948 REAGENT STRIP/BLOOD GLUCOSE: CPT

## 2025-07-01 PROCEDURE — 87206 SMEAR FLUORESCENT/ACID STAI: CPT | Performed by: PODIATRIST

## 2025-07-01 PROCEDURE — 87075 CULTR BACTERIA EXCEPT BLOOD: CPT | Performed by: PODIATRIST

## 2025-07-01 PROCEDURE — 25010000002 FENTANYL CITRATE (PF) 50 MCG/ML SOLUTION: Performed by: NURSE ANESTHETIST, CERTIFIED REGISTERED

## 2025-07-01 PROCEDURE — 94799 UNLISTED PULMONARY SVC/PX: CPT

## 2025-07-01 PROCEDURE — 25010000002 VANCOMYCIN HCL 1.25 G RECONSTITUTED SOLUTION 1 EACH VIAL: Performed by: INTERNAL MEDICINE

## 2025-07-01 PROCEDURE — 87205 SMEAR GRAM STAIN: CPT | Performed by: PODIATRIST

## 2025-07-01 PROCEDURE — 25010000002 THIAMINE PER 100 MG: Performed by: INTERNAL MEDICINE

## 2025-07-01 PROCEDURE — 87015 SPECIMEN INFECT AGNT CONCNTJ: CPT | Performed by: PODIATRIST

## 2025-07-01 PROCEDURE — 25010000002 BUPIVACAINE 0.5 % SOLUTION: Performed by: PODIATRIST

## 2025-07-01 PROCEDURE — 25010000002 THIAMINE PER 100 MG: Performed by: PODIATRIST

## 2025-07-01 PROCEDURE — 87102 FUNGUS ISOLATION CULTURE: CPT | Performed by: PODIATRIST

## 2025-07-01 PROCEDURE — 25010000002 SUCCINYLCHOLINE PER 20 MG: Performed by: NURSE ANESTHETIST, CERTIFIED REGISTERED

## 2025-07-01 PROCEDURE — 87070 CULTURE OTHR SPECIMN AEROBIC: CPT | Performed by: PODIATRIST

## 2025-07-01 PROCEDURE — 25810000003 SODIUM CHLORIDE 0.9 % SOLUTION 250 ML FLEX CONT: Performed by: PODIATRIST

## 2025-07-01 PROCEDURE — 25010000002 LIDOCAINE 1 % SOLUTION: Performed by: PODIATRIST

## 2025-07-01 PROCEDURE — 99233 SBSQ HOSP IP/OBS HIGH 50: CPT | Performed by: INTERNAL MEDICINE

## 2025-07-01 PROCEDURE — 0JXQ0ZB TRANSFER RIGHT FOOT SUBCUTANEOUS TISSUE AND FASCIA WITH SKIN AND SUBCUTANEOUS TISSUE, OPEN APPROACH: ICD-10-PCS | Performed by: PODIATRIST

## 2025-07-01 PROCEDURE — 25810000003 LACTATED RINGERS PER 1000 ML: Performed by: ANESTHESIOLOGY

## 2025-07-01 PROCEDURE — 94664 DEMO&/EVAL PT USE INHALER: CPT

## 2025-07-01 PROCEDURE — 25010000002 LIDOCAINE 2% SOLUTION: Performed by: NURSE ANESTHETIST, CERTIFIED REGISTERED

## 2025-07-01 RX ORDER — LIDOCAINE HYDROCHLORIDE 10 MG/ML
INJECTION, SOLUTION INFILTRATION; PERINEURAL AS NEEDED
Status: DISCONTINUED | OUTPATIENT
Start: 2025-07-01 | End: 2025-07-01 | Stop reason: HOSPADM

## 2025-07-01 RX ORDER — ONDANSETRON 2 MG/ML
4 INJECTION INTRAMUSCULAR; INTRAVENOUS ONCE AS NEEDED
Status: DISCONTINUED | OUTPATIENT
Start: 2025-07-01 | End: 2025-07-01 | Stop reason: HOSPADM

## 2025-07-01 RX ORDER — SODIUM CHLORIDE, SODIUM LACTATE, POTASSIUM CHLORIDE, CALCIUM CHLORIDE 600; 310; 30; 20 MG/100ML; MG/100ML; MG/100ML; MG/100ML
9 INJECTION, SOLUTION INTRAVENOUS CONTINUOUS
Status: ACTIVE | OUTPATIENT
Start: 2025-07-01 | End: 2025-07-02

## 2025-07-01 RX ORDER — HYDROCODONE BITARTRATE AND ACETAMINOPHEN 5; 325 MG/1; MG/1
1 TABLET ORAL ONCE AS NEEDED
Status: DISCONTINUED | OUTPATIENT
Start: 2025-07-01 | End: 2025-07-01 | Stop reason: HOSPADM

## 2025-07-01 RX ORDER — PHENYLEPHRINE HYDROCHLORIDE 10 MG/ML
INJECTION INTRAVENOUS AS NEEDED
Status: DISCONTINUED | OUTPATIENT
Start: 2025-07-01 | End: 2025-07-01 | Stop reason: SURG

## 2025-07-01 RX ORDER — ONDANSETRON 2 MG/ML
INJECTION INTRAMUSCULAR; INTRAVENOUS AS NEEDED
Status: DISCONTINUED | OUTPATIENT
Start: 2025-07-01 | End: 2025-07-01 | Stop reason: SURG

## 2025-07-01 RX ORDER — EPHEDRINE SULFATE 50 MG/ML
INJECTION INTRAVENOUS AS NEEDED
Status: DISCONTINUED | OUTPATIENT
Start: 2025-07-01 | End: 2025-07-01 | Stop reason: SURG

## 2025-07-01 RX ORDER — HYDRALAZINE HYDROCHLORIDE 20 MG/ML
5 INJECTION INTRAMUSCULAR; INTRAVENOUS
Status: DISCONTINUED | OUTPATIENT
Start: 2025-07-01 | End: 2025-07-01 | Stop reason: HOSPADM

## 2025-07-01 RX ORDER — DROPERIDOL 2.5 MG/ML
0.62 INJECTION, SOLUTION INTRAMUSCULAR; INTRAVENOUS
Status: DISCONTINUED | OUTPATIENT
Start: 2025-07-01 | End: 2025-07-01 | Stop reason: HOSPADM

## 2025-07-01 RX ORDER — NALOXONE HCL 0.4 MG/ML
0.2 VIAL (ML) INJECTION AS NEEDED
Status: DISCONTINUED | OUTPATIENT
Start: 2025-07-01 | End: 2025-07-01 | Stop reason: HOSPADM

## 2025-07-01 RX ORDER — LABETALOL HYDROCHLORIDE 5 MG/ML
5 INJECTION, SOLUTION INTRAVENOUS
Status: DISCONTINUED | OUTPATIENT
Start: 2025-07-01 | End: 2025-07-01 | Stop reason: HOSPADM

## 2025-07-01 RX ORDER — LIDOCAINE HYDROCHLORIDE 10 MG/ML
0.5 INJECTION, SOLUTION INFILTRATION; PERINEURAL ONCE AS NEEDED
Status: DISCONTINUED | OUTPATIENT
Start: 2025-07-01 | End: 2025-07-01 | Stop reason: HOSPADM

## 2025-07-01 RX ORDER — LIDOCAINE HYDROCHLORIDE 20 MG/ML
INJECTION, SOLUTION INFILTRATION; PERINEURAL AS NEEDED
Status: DISCONTINUED | OUTPATIENT
Start: 2025-07-01 | End: 2025-07-01 | Stop reason: SURG

## 2025-07-01 RX ORDER — SODIUM CHLORIDE 0.9 % (FLUSH) 0.9 %
3-10 SYRINGE (ML) INJECTION AS NEEDED
Status: DISCONTINUED | OUTPATIENT
Start: 2025-07-01 | End: 2025-07-01 | Stop reason: HOSPADM

## 2025-07-01 RX ORDER — PROMETHAZINE HYDROCHLORIDE 25 MG/1
25 SUPPOSITORY RECTAL ONCE AS NEEDED
Status: DISCONTINUED | OUTPATIENT
Start: 2025-07-01 | End: 2025-07-01 | Stop reason: HOSPADM

## 2025-07-01 RX ORDER — FLUMAZENIL 0.1 MG/ML
0.2 INJECTION INTRAVENOUS AS NEEDED
Status: DISCONTINUED | OUTPATIENT
Start: 2025-07-01 | End: 2025-07-01 | Stop reason: HOSPADM

## 2025-07-01 RX ORDER — PROMETHAZINE HYDROCHLORIDE 25 MG/1
25 TABLET ORAL ONCE AS NEEDED
Status: DISCONTINUED | OUTPATIENT
Start: 2025-07-01 | End: 2025-07-01 | Stop reason: HOSPADM

## 2025-07-01 RX ORDER — FENTANYL CITRATE 50 UG/ML
50 INJECTION, SOLUTION INTRAMUSCULAR; INTRAVENOUS ONCE AS NEEDED
Status: DISCONTINUED | OUTPATIENT
Start: 2025-07-01 | End: 2025-07-01 | Stop reason: HOSPADM

## 2025-07-01 RX ORDER — DIPHENHYDRAMINE HYDROCHLORIDE 50 MG/ML
12.5 INJECTION, SOLUTION INTRAMUSCULAR; INTRAVENOUS
Status: DISCONTINUED | OUTPATIENT
Start: 2025-07-01 | End: 2025-07-01 | Stop reason: HOSPADM

## 2025-07-01 RX ORDER — FENTANYL CITRATE 50 UG/ML
INJECTION, SOLUTION INTRAMUSCULAR; INTRAVENOUS AS NEEDED
Status: DISCONTINUED | OUTPATIENT
Start: 2025-07-01 | End: 2025-07-01 | Stop reason: SURG

## 2025-07-01 RX ORDER — MAGNESIUM HYDROXIDE 1200 MG/15ML
LIQUID ORAL AS NEEDED
Status: DISCONTINUED | OUTPATIENT
Start: 2025-07-01 | End: 2025-07-01 | Stop reason: HOSPADM

## 2025-07-01 RX ORDER — HYDROCODONE BITARTRATE AND ACETAMINOPHEN 7.5; 325 MG/1; MG/1
1 TABLET ORAL EVERY 4 HOURS PRN
Status: DISCONTINUED | OUTPATIENT
Start: 2025-07-01 | End: 2025-07-01 | Stop reason: HOSPADM

## 2025-07-01 RX ORDER — IPRATROPIUM BROMIDE AND ALBUTEROL SULFATE 2.5; .5 MG/3ML; MG/3ML
3 SOLUTION RESPIRATORY (INHALATION) ONCE AS NEEDED
Status: DISCONTINUED | OUTPATIENT
Start: 2025-07-01 | End: 2025-07-01 | Stop reason: HOSPADM

## 2025-07-01 RX ORDER — SODIUM CHLORIDE 0.9 % (FLUSH) 0.9 %
3 SYRINGE (ML) INJECTION EVERY 12 HOURS SCHEDULED
Status: DISCONTINUED | OUTPATIENT
Start: 2025-07-01 | End: 2025-07-01 | Stop reason: HOSPADM

## 2025-07-01 RX ORDER — EPHEDRINE SULFATE 50 MG/ML
5 INJECTION, SOLUTION INTRAVENOUS ONCE AS NEEDED
Status: DISCONTINUED | OUTPATIENT
Start: 2025-07-01 | End: 2025-07-01 | Stop reason: HOSPADM

## 2025-07-01 RX ORDER — SUCCINYLCHOLINE CHLORIDE 20 MG/ML
INJECTION INTRAMUSCULAR; INTRAVENOUS AS NEEDED
Status: DISCONTINUED | OUTPATIENT
Start: 2025-07-01 | End: 2025-07-01 | Stop reason: SURG

## 2025-07-01 RX ORDER — HYDROMORPHONE HYDROCHLORIDE 1 MG/ML
0.25 INJECTION, SOLUTION INTRAMUSCULAR; INTRAVENOUS; SUBCUTANEOUS
Status: DISCONTINUED | OUTPATIENT
Start: 2025-07-01 | End: 2025-07-01 | Stop reason: HOSPADM

## 2025-07-01 RX ORDER — FAMOTIDINE 10 MG/ML
20 INJECTION, SOLUTION INTRAVENOUS ONCE
Status: DISCONTINUED | OUTPATIENT
Start: 2025-07-01 | End: 2025-07-01 | Stop reason: HOSPADM

## 2025-07-01 RX ORDER — BUPIVACAINE HYDROCHLORIDE 5 MG/ML
INJECTION, SOLUTION PERINEURAL AS NEEDED
Status: DISCONTINUED | OUTPATIENT
Start: 2025-07-01 | End: 2025-07-01 | Stop reason: HOSPADM

## 2025-07-01 RX ORDER — PROPOFOL 10 MG/ML
VIAL (ML) INTRAVENOUS AS NEEDED
Status: DISCONTINUED | OUTPATIENT
Start: 2025-07-01 | End: 2025-07-01 | Stop reason: SURG

## 2025-07-01 RX ORDER — ATROPINE SULFATE 0.4 MG/ML
0.4 INJECTION, SOLUTION INTRAMUSCULAR; INTRAVENOUS; SUBCUTANEOUS ONCE AS NEEDED
Status: DISCONTINUED | OUTPATIENT
Start: 2025-07-01 | End: 2025-07-01 | Stop reason: HOSPADM

## 2025-07-01 RX ORDER — FENTANYL CITRATE 50 UG/ML
25 INJECTION, SOLUTION INTRAMUSCULAR; INTRAVENOUS
Status: DISCONTINUED | OUTPATIENT
Start: 2025-07-01 | End: 2025-07-01 | Stop reason: HOSPADM

## 2025-07-01 RX ORDER — MIDAZOLAM HYDROCHLORIDE 1 MG/ML
0.5 INJECTION, SOLUTION INTRAMUSCULAR; INTRAVENOUS
Status: DISCONTINUED | OUTPATIENT
Start: 2025-07-01 | End: 2025-07-01 | Stop reason: HOSPADM

## 2025-07-01 RX ADMIN — THIAMINE HYDROCHLORIDE 200 MG: 100 INJECTION, SOLUTION INTRAMUSCULAR; INTRAVENOUS at 20:46

## 2025-07-01 RX ADMIN — EPHEDRINE SULFATE 10 MG: 50 INJECTION INTRAVENOUS at 12:41

## 2025-07-01 RX ADMIN — DILTIAZEM HYDROCHLORIDE 240 MG: 120 CAPSULE, COATED, EXTENDED RELEASE ORAL at 08:37

## 2025-07-01 RX ADMIN — PHENYLEPHRINE HYDROCHLORIDE 100 MCG: 10 INJECTION INTRAVENOUS at 12:32

## 2025-07-01 RX ADMIN — FENTANYL CITRATE 25 MCG: 50 INJECTION, SOLUTION INTRAMUSCULAR; INTRAVENOUS at 12:48

## 2025-07-01 RX ADMIN — PHENYLEPHRINE HYDROCHLORIDE 100 MCG: 10 INJECTION INTRAVENOUS at 13:02

## 2025-07-01 RX ADMIN — EPHEDRINE SULFATE 10 MG: 50 INJECTION INTRAVENOUS at 12:25

## 2025-07-01 RX ADMIN — PROPOFOL 100 MG: 10 INJECTION, EMULSION INTRAVENOUS at 12:11

## 2025-07-01 RX ADMIN — BUDESONIDE AND FORMOTEROL FUMARATE DIHYDRATE 2 PUFF: 160; 4.5 AEROSOL RESPIRATORY (INHALATION) at 21:04

## 2025-07-01 RX ADMIN — SODIUM CHLORIDE, POTASSIUM CHLORIDE, SODIUM LACTATE AND CALCIUM CHLORIDE: 600; 310; 30; 20 INJECTION, SOLUTION INTRAVENOUS at 11:58

## 2025-07-01 RX ADMIN — VANCOMYCIN HYDROCHLORIDE 1250 MG: 1.25 INJECTION, POWDER, LYOPHILIZED, FOR SOLUTION INTRAVENOUS at 15:04

## 2025-07-01 RX ADMIN — EPHEDRINE SULFATE 10 MG: 50 INJECTION INTRAVENOUS at 12:48

## 2025-07-01 RX ADMIN — BUDESONIDE AND FORMOTEROL FUMARATE DIHYDRATE 2 PUFF: 160; 4.5 AEROSOL RESPIRATORY (INHALATION) at 06:50

## 2025-07-01 RX ADMIN — SUCCINYLCHOLINE CHLORIDE 200 MG: 20 INJECTION, SOLUTION INTRAMUSCULAR; INTRAVENOUS; PARENTERAL at 12:11

## 2025-07-01 RX ADMIN — ONDANSETRON 4 MG: 2 INJECTION, SOLUTION INTRAMUSCULAR; INTRAVENOUS at 13:05

## 2025-07-01 RX ADMIN — VANCOMYCIN HYDROCHLORIDE 1250 MG: 1.25 INJECTION, POWDER, LYOPHILIZED, FOR SOLUTION INTRAVENOUS at 04:47

## 2025-07-01 RX ADMIN — REVEFENACIN 175 MCG: 175 SOLUTION RESPIRATORY (INHALATION) at 06:50

## 2025-07-01 RX ADMIN — LIDOCAINE HYDROCHLORIDE 80 MG: 20 INJECTION, SOLUTION INFILTRATION; PERINEURAL at 12:09

## 2025-07-01 RX ADMIN — PROPOFOL 100 MG: 10 INJECTION, EMULSION INTRAVENOUS at 12:09

## 2025-07-01 RX ADMIN — Medication 10 ML: at 20:46

## 2025-07-01 RX ADMIN — THIAMINE HYDROCHLORIDE 200 MG: 100 INJECTION, SOLUTION INTRAMUSCULAR; INTRAVENOUS at 06:22

## 2025-07-01 RX ADMIN — FENTANYL CITRATE 25 MCG: 50 INJECTION, SOLUTION INTRAMUSCULAR; INTRAVENOUS at 13:00

## 2025-07-01 RX ADMIN — PHENYLEPHRINE HYDROCHLORIDE 100 MCG: 10 INJECTION INTRAVENOUS at 12:44

## 2025-07-01 RX ADMIN — ATORVASTATIN CALCIUM 20 MG: 20 TABLET, FILM COATED ORAL at 20:46

## 2025-07-01 NOTE — SIGNIFICANT NOTE
07/01/25 0754   OTHER   Discipline physical therapist   Rehab Time/Intention   Session Not Performed other (see comments)  (Pt going for I&D and amputation this AM. PT to follow up next service date for re-assessment as appropriate.)   Therapy Assessment/Plan (PT)   Criteria for Skilled Interventions Met (PT) yes   Recommendation   PT - Next Appointment 07/02/25

## 2025-07-01 NOTE — PROGRESS NOTES
Consult Daily Progress Note  [facility]   07/01/25      Patient Name:  Jim Marvin  MRN:  [id number]   YOB: 1935  Age: 89 y.o.  Sex: male  LOS: 4    Reason for Consult:  Chronic hypoxic respiratory failure, surgical clearance    Hospital Course:   89-year-old male with CPFT, chronic hypoxic respiratory failure presented for diabetic right foot ulcer with chronic osteomyelitis.    Interval History:  No acute events overnight  Underwent partial fifth ray amputation today with podiatry  Doing well postprocedure  Respiratory status remained stable  No worsening shortness of breath  No worsening cough  No nausea or vomiting  Mild swelling of lower lip  Daughter and grandson at bedside    Physical Exam:  Vitals:    07/01/25 1415   BP: 179/81   Pulse: 74   Resp: 20   Temp:    SpO2: 94%       Intake/Output    Intake/Output Summary (Last 24 hours) at 7/1/2025 1451  Last data filed at 7/1/2025 1322  Gross per 24 hour   Intake 1010 ml   Output --   Net 1010 ml     General: Alert, nontoxic, NAD, elderly  HEENT: NC/AT, EOMI, MMM  Neck: Supple, trachea midline  Cardiac: RRR, no murmur, gallops, rubs  Pulmonary: Diminished bilaterally, no wheezing  GI: Soft, non-tender, non-distended, normal bowel sounds  Extremities: Warm, well perfused, right foot bandaged  Skin: no visible rash  Neuro: CN II - XII grossly intact  Psychiatry: Normal mood and affect    Data Review:  Results from last 7 days   Lab Units 07/01/25 0518 06/30/25 0528 06/29/25  0354 06/28/25  0646 06/27/25  1433   WBC 10*3/mm3 9.19 10.16 10.29 12.18* 12.39*   HEMOGLOBIN g/dL 12.3* 10.7* 10.8* 10.8* 11.8*   PLATELETS 10*3/mm3 305 267 236 228 232     Results from last 7 days   Lab Units 07/01/25 0518 06/30/25  0528 06/29/25  0354 06/28/25  0646 06/27/25  1433   SODIUM mmol/L 138 138 140 138 138   POTASSIUM mmol/L 4.0 4.0 3.7 3.7 3.9   CHLORIDE mmol/L 104 107 107 105 102   CO2 mmol/L 25.0 23.0 21.4* 22.0 23.5   BUN mg/dL 10.0 12.0  10.0 11.0 16.0   CREATININE mg/dL 0.61* 0.60* 0.63* 0.61* 0.81   GLUCOSE mg/dL 93 103* 105* 99 109*   CALCIUM mg/dL 8.6 8.4* 8.4* 8.5* 9.0   Estimated Creatinine Clearance: 105.3 mL/min (A) (by C-G formula based on SCr of 0.61 mg/dL (L)).    Results from last 7 days   Lab Units 07/01/25  0518 06/30/25  0528 06/29/25  0354 06/28/25  0646 06/27/25  1523 06/27/25  1433   AST (SGOT) U/L  --   --   --  25  --  29   ALT (SGPT) U/L  --   --   --  40  --  46*   LACTATE mmol/L  --   --   --   --  1.5  --    CRP mg/dL  --   --   --   --   --  14.65*   PLATELETS 10*3/mm3 305 267 236 228  --  232             Result Review:  I have personally reviewed the results from the time of this admission to 7/1/2025 14:51 EDT and agree with these findings:  [x]  Laboratory list / accordion  [x]  Microbiology  [x]  Radiology  []  EKG/Telemetry   [x]  Cardiology/Vascular   []  Pathology  [x]  Old records  []  Other:    ASSESSMENT  /  PLAN:    Chronic osteomyelitis of right fifth metatarsal  Combined pulmonary fibrosis and emphysema  Chronic hypoxic respiratory failure, 3 L at rest, up to 6 L with exertion  Diabetes mellitus type 2  Paroxysmal atrial fibrillation  History of cerebrovascular accident  Hypertension  Alcohol use disorder  History of tobacco abuse     -Patient presented to the hospital for IV antibiotics in the setting of diabetic foot ulcer and chronic osteomyelitis.  Undergoing workup by podiatry for amputation.  Pulmonary was consulted for pulmonary clearance.  -No evidence of acute exacerbation at this time.  No evidence of pulmonary infection at this time.  Stable respiratory status.  -Uses 3 L at rest and up to 6 L with exertion.  Appropriate saturations on 3 L at rest.  -Uses bronchodilator therapy with Trelegy and albuterol at home.  -Bronchodilator therapy with Symbicort and Yupelri in the hospital, DuoNeb as needed.  -Patient follows with Dr. Stanley Gibbs with pulmonary at [place].  - Previously was undergoing pulmonary  rehab  - Antibiotics with vancomycin and ceftriaxone for osteomyelitis  -Tolerated OR today well with stable respiratory status  - Encouraged up as tolerated, pain control, incentive spirometry consistently.    Thank you for allowing us to participate in this patients care. Pulmonary will continue to follow.     Gunner Galeano MD  [facility]  Pulmonary and Critical Care Medicine, Interventional Pulmonology    Parts of this note may be an electronic transcription/translation of spoken language to printed text using the Dragon dictation system.

## 2025-07-01 NOTE — PLAN OF CARE
Goal Outcome Evaluation:  Plan of Care Reviewed With: patient        Progress: no change  Outcome Evaluation: VSS, 3L at rest baseline, currently on 4. POD 0 R partial 5th toe amputation. Dressing cdi. Numbness, NVI. WBAT heel only in post op shoe. Voiding function intact per urinal. Assist x 1 BRP. Consistent carb diet, ACHS. No complaints of pain. IV abx infusing. Operative cultures pending. Plans to DC home when ready

## 2025-07-01 NOTE — PROGRESS NOTES
Continued Stay Note  [facility]     Patient Name: Jim Marvin  MRN: [id number]  Today's Date: 7/1/2025    Admit Date: 6/27/2025    Plan: Home alone with assist from family and pending HH/Home Infusion  per [facility]   Discharge Plan       Row Name 07/01/25 1040       Plan    Plan Home alone with assist from family and pending HH/Home Infusion  per [facility]    Plan Comments Spoke with Dr. Concha TAYLOR MD who stated thsat pt will need IV antibx at DC and he is going to try to make it a Q24 dose to make it easier for pt and family to manage. Spoke with pt's daughter Madisyn at bedside to review pt's DCP to home with family assist and  the need for daily IV antibx.  Pt and daughter stated that they feel they will be able to manage pt's needs at home with HH and Home Infusion.  Referral has been sent to [facility] by secure message to follow pt to HH/Home Infusion and DME needs at DC.  Pt did again decline wanting to wild consider a SNF at [place].                   Discharge Codes    No documentation.                 Expected Discharge Date and Time       Expected Discharge Date Expected Discharge Time    Jul 4, 2025               Gypsy Julien MSW

## 2025-07-01 NOTE — PROGRESS NOTES
ID progress note     CC: Follow-up diabetic right foot infection due to MRSA    Subjective: No acute events.  No fever.  He said his foot is about the same.  He is going to the OR today around 11 AM.  No side effects from vancomycin.      Medications:    Current Facility-Administered Medications:     acetaminophen (TYLENOL) tablet 650 mg, 650 mg, Oral, Q4H PRN **OR** acetaminophen (TYLENOL) 160 MG/5ML oral solution 650 mg, 650 mg, Oral, Q4H PRN **OR** acetaminophen (TYLENOL) suppository 650 mg, 650 mg, Rectal, Q4H PRN, Rodríguez Araujo MD    [Held by provider] apixaban (ELIQUIS) tablet 5 mg, 5 mg, Oral, Q12H, Rodríguez Araujo MD    atorvastatin (LIPITOR) tablet 20 mg, 20 mg, Oral, Nightly, Rodríguez Araujo MD, 20 mg at 06/30/25 2138    budesonide-formoterol (SYMBICORT) 160-4.5 MCG/ACT inhaler 2 puff, 2 puff, Inhalation, BID - RT, 2 puff at 07/01/25 0650 **AND** revefenacin (YUPELRI) nebulizer solution 175 mcg, 175 mcg, Nebulization, Daily - RT, Rodríguez Araujo MD, 175 mcg at 07/01/25 0650    Calcium Replacement - Follow Nurse / BPA Driven Protocol, , Not Applicable, PRN, Rodríguez Araujo MD    dextrose (D50W) (25 g/50 mL) IV injection 25 g, 25 g, Intravenous, Q15 Min PRN, Rodríguez Araujo MD    dextrose (GLUTOSE) oral gel 15 g, 15 g, Oral, Q15 Min PRN, Rodríguez Araujo MD    digoxin (LANOXIN) tablet 125 mcg, 125 mcg, Oral, Daily, Rodríguez Araujo MD, 125 mcg at 06/30/25 1159    dilTIAZem CD (CARDIZEM CD) 24 hr capsule 240 mg, 240 mg, Oral, Daily, Rodríguez Araujo MD, 240 mg at 07/01/25 0837    folic acid (FOLVITE) tablet 1 mg, 1 mg, Oral, Daily, Rodríguez Araujo MD, 1 mg at 06/30/25 0913    glucagon (GLUCAGEN) injection 1 mg, 1 mg, Intramuscular, Q15 Min PRN, Rodríguez Araujo MD    insulin lispro (HUMALOG/ADMELOG) injection 2-7 Units, 2-7 Units, Subcutaneous, 4x Daily AC & at Bedtime, Rodríguez Araujo MD, 2 Units at 06/28/25 1632    ipratropium-albuterol (DUO-NEB) nebulizer solution 3 mL, 3 mL, Nebulization, Q6H PRN, Rodríguez Araujo MD    LORazepam  (ATIVAN) tablet 1 mg, 1 mg, Oral, Q1H PRN **OR** LORazepam (ATIVAN) injection 1 mg, 1 mg, Intravenous, Q1H PRN **OR** LORazepam (ATIVAN) tablet 2 mg, 2 mg, Oral, Q1H PRN **OR** LORazepam (ATIVAN) injection 2 mg, 2 mg, Intravenous, Q1H PRN **OR** LORazepam (ATIVAN) injection 2 mg, 2 mg, Intravenous, Q15 Min PRN **OR** LORazepam (ATIVAN) injection 2 mg, 2 mg, Intramuscular, Q15 Min PRN, Rodríguez Araujo MD    Magnesium Standard Dose Replacement - Follow Nurse / BPA Driven Protocol, , Not Applicable, Van ABDALLA Jawed, MD    [Held by provider] metFORMIN ER (GLUCOPHAGE-XR) 24 hr tablet 500 mg, 500 mg, Oral, Daily With Breakfast, Rodríguez Araujo MD    O2 (OXYGEN), 3 L/min, Inhalation, Continuous, Rodríguez Araujo MD, Last Rate: 240,000 mL/hr at 06/27/25 1832, 4 L/min at 06/27/25 1832    Pharmacy to dose vancomycin, , Not Applicable, Continuous PRN, Guero Kern MD    Phosphorus Replacement - Follow Nurse / BPA Driven Protocol, , Not Applicable, PRNVan Jawed, MD    Potassium Replacement - Follow Nurse / BPA Driven Protocol, , Not Applicable, PRNVan Jawed, MD    sodium chloride 0.9 % flush 10 mL, 10 mL, Intravenous, Q12H, Rodríguez Araujo MD, 10 mL at 06/30/25 2139    sodium chloride 0.9 % flush 10 mL, 10 mL, Intravenous, PRNVan Jawed, MD    sodium chloride 0.9 % infusion 40 mL, 40 mL, Intravenous, PRNVan Jawed, MD    thiamine (B-1) injection 200 mg, 200 mg, Intravenous, Q8H, 200 mg at 07/01/25 0622 **FOLLOWED BY** [START ON 7/3/2025] thiamine (VITAMIN B-1) tablet 100 mg, 100 mg, Oral, Daily, Rodríguez Araujo MD    Vancomycin HCl 1,250 mg in sodium chloride 0.9 % 250 mL VTB, 1,250 mg, Intravenous, Q12H, Rodríguez Araujo MD, Last Rate: 200 mL/hr at 07/01/25 0447, 1,250 mg at 07/01/25 0447      Objective   Vital Signs   Temp:  [97.3 °F (36.3 °C)-98.9 °F (37.2 °C)] 98.9 °F (37.2 °C)  Heart Rate:  [66-81] 81  Resp:  [16-20] 18  BP: (165-182)/(66-89) 172/89    Physical Exam:   General: awake, alert, NAD, very  nice, sitting up in bed  Eyes: no scleral icterus  Cardiovascular: NR  Respiratory: normal work of breathing without wheezing  GI: Abdomen is soft, not tender  :  no Hernandez catheter  MSK/skin: Right foot bandaged  Neurological: Alert and oriented x 3  Psychiatric: Normal mood and affect     Labs:   CBC, BMP, and blood and wound cultures reviewed today  Lab Results   Component Value Date    WBC 9.19 07/01/2025    HGB 12.3 (L) 07/01/2025    HCT 35.5 (L) 07/01/2025    MCV 95.9 07/01/2025     07/01/2025     Lab Results   Component Value Date    GLUCOSE 93 07/01/2025    CALCIUM 8.6 07/01/2025     07/01/2025    K 4.0 07/01/2025    CO2 25.0 07/01/2025     07/01/2025    BUN 10.0 07/01/2025    CREATININE 0.61 (L) 07/01/2025    EGFR 93.0 07/01/2025    BCR 16.4 07/01/2025    ANIONGAP 9.0 07/01/2025     Lab Results   Component Value Date    ALT 40 06/28/2025     Lab Results   Component Value Date    HGBA1C 5.80 (H) 06/28/2025     Lab Results   Component Value Date    CRP 14.65 (H) 06/27/2025     Lab Results   Component Value Date    VANCOTROUGH 8.20 06/29/2025       Microbiology:  6/13 BCx: Negative  6/27 BCx: NGTD  6/28 right foot wound Cx: MRSA (susceptible to clindamycin, doxycycline, Bactrim, and vancomycin)    Prior radiology:  MRI right foot shows wound, osteomyelitis, and septic arthritis    X-ray right foot with soft tissue swelling cannot exclude osteomyelitis    Isolation:  Contact for history of MRSA    ASSESSMENT/PLAN:  Chronic osteomyelitis and septic arthritis right foot  MRSA infection  Chronic hypoxic respiratory failure  Emphysema  Interstitial lung disease    MRI confirmed deep infection.  Wound culture with MRSA.  Continue vancomycin with goal -600.  He is going to the OR today for surgical debridement.  I will follow-up operative details.  Please send additional operative cultures.  Will D/W podiatrist after surgery to get a better understanding of extent of infection and extent of  source control.    While the patient is on vancomycin, vancomycin levels will be ordered and reviewed with dose adjustments made as needed. The patient will have a daily creatinine level checked while on vancomycin as part of monitoring this medication.     Check postoperative CRP in the a.m.    ID will follow.  D/W  regarding home antibiotics.

## 2025-07-01 NOTE — PLAN OF CARE
Goal Outcome Evaluation:  Plan of Care Reviewed With: patient           Outcome Evaluation: Patient remains stable. Alert and oriented. VSS. 4L O2. Ambulating with stand by assist. Voiding per BRP. Dressing cdi. No complaints of pain. Strict NPO. IV abx given per order. ACHS. Plans for surgery today.

## 2025-07-01 NOTE — PROGRESS NOTES
Name: Jim Marvin ADMIT: 2025   : 1935  PCP: Antonio Finley MD    MRN: [id number] LOS: 4 days   AGE/SEX: 89 y.o. male  ROOM: [facility]     Subjective   Subjective   No new complaints. No chest pain or shortness of breath     Objective   Objective   Vital Signs  Temp:  [97.3 °F (36.3 °C)-98.9 °F (37.2 °C)] 98.9 °F (37.2 °C)  Heart Rate:  [63-81] 81  Resp:  [16-20] 18  BP: (152-182)/(66-94) 172/89  SpO2:  [92 %-99 %] 96 %  on  Flow (L/min) (Oxygen Therapy):  [4] 4;   Device (Oxygen Therapy): nasal cannula  Body mass index is 27.61 kg/m².    Physical Exam  Constitutional:       General: He is not in acute distress.     Appearance: He is not toxic-appearing.   HENT:      Head: Normocephalic and atraumatic.   Cardiovascular:      Rate and Rhythm: Normal rate and regular rhythm.   Pulmonary:      Effort: Pulmonary effort is normal. No respiratory distress.      Breath sounds: Normal breath sounds. No wheezing or rhonchi.   Abdominal:      General: Bowel sounds are normal.      Palpations: Abdomen is soft.      Tenderness: There is no abdominal tenderness. There is no guarding or rebound.   Musculoskeletal:      Comments: Right foot dressed   Skin:     General: Skin is warm and dry.   Neurological:      Mental Status: He is alert and oriented to person, place, and time.   Psychiatric:         Mood and Affect: Mood normal.         Behavior: Behavior normal.     Results Review  I reviewed the patient's new clinical results.  Results from last 7 days   Lab Units 25  0518 25  0528 25  0354 25  0646   WBC 10*3/mm3 9.19 10.16 10.29 12.18*   HEMOGLOBIN g/dL 12.3* 10.7* 10.8* 10.8*   PLATELETS 10*3/mm3 305 267 236 228     Results from last 7 days   Lab Units 25  0518 25  0528 25  0354 25  0646   SODIUM mmol/L 138 138 140 138   POTASSIUM mmol/L 4.0 4.0 3.7 3.7   CHLORIDE mmol/L 104 107 107 105   CO2 mmol/L 25.0 23.0 21.4* 22.0   BUN mg/dL 10.0 12.0 10.0 11.0   CREATININE  mg/dL 0.61* 0.60* 0.63* 0.61*   GLUCOSE mg/dL 93 103* 105* 99     Lab Results   Component Value Date    ANIONGAP 9.0 07/01/2025     Estimated Creatinine Clearance: 105.3 mL/min (A) (by C-G formula based on SCr of 0.61 mg/dL (L)).   Lab Results   Component Value Date    EGFR 93.0 07/01/2025     Results from last 7 days   Lab Units 06/28/25  0646 06/27/25  1433   ALBUMIN g/dL 2.7* 3.3*   BILIRUBIN mg/dL 0.5 0.6   ALK PHOS U/L 79 93   AST (SGOT) U/L 25 29   ALT (SGPT) U/L 40 46*     Results from last 7 days   Lab Units 07/01/25  0518 06/30/25  0528 06/29/25  0354 06/28/25  0646 06/27/25  1433   CALCIUM mg/dL 8.6 8.4* 8.4* 8.5* 9.0   ALBUMIN g/dL  --   --   --  2.7* 3.3*     Results from last 7 days   Lab Units 06/27/25  1523   LACTATE mmol/L 1.5     Glucose   Date/Time Value Ref Range Status   07/01/2025 0620 101 70 - 130 mg/dL Final   06/30/2025 2138 99 70 - 130 mg/dL Final   06/30/2025 1605 89 70 - 130 mg/dL Final   06/30/2025 1126 86 70 - 130 mg/dL Final   06/30/2025 0631 96 70 - 130 mg/dL Final   06/29/2025 2138 130 70 - 130 mg/dL Final   06/29/2025 1604 147 (H) 70 - 130 mg/dL Final       No radiology results for the last day      Scheduled Meds  [Held by provider] apixaban, 5 mg, Oral, Q12H  atorvastatin, 20 mg, Oral, Nightly  budesonide-formoterol, 2 puff, Inhalation, BID - RT   And  revefenacin, 175 mcg, Nebulization, Daily - RT  digoxin, 125 mcg, Oral, Daily  dilTIAZem CD, 240 mg, Oral, Daily  folic acid, 1 mg, Oral, Daily  insulin lispro, 2-7 Units, Subcutaneous, 4x Daily AC & at Bedtime  [Held by provider] metFORMIN ER, 500 mg, Oral, Daily With Breakfast  sodium chloride, 10 mL, Intravenous, Q12H  thiamine (B-1) IV, 200 mg, Intravenous, Q8H   Followed by  [START ON 7/3/2025] thiamine, 100 mg, Oral, Daily  vancomycin, 1,250 mg, Intravenous, Q12H    Continuous Infusions  O2, 3 L/min, Last Rate: 4 L/min (06/27/25 1832)  Pharmacy to dose vancomycin,     PRN Meds    acetaminophen **OR** acetaminophen **OR**  acetaminophen    Calcium Replacement - Follow Nurse / BPA Driven Protocol    dextrose    dextrose    glucagon (human recombinant)    ipratropium-albuterol    LORazepam **OR** LORazepam **OR** LORazepam **OR** LORazepam **OR** LORazepam **OR** LORazepam    Magnesium Standard Dose Replacement - Follow Nurse / BPA Driven Protocol    Pharmacy to dose vancomycin    Phosphorus Replacement - Follow Nurse / BPA Driven Protocol    Potassium Replacement - Follow Nurse / BPA Driven Protocol    sodium chloride    sodium chloride    O2, 3 L/min, Last Rate: 4 L/min (06/27/25 1832)  Pharmacy to dose vancomycin,     Diet  NPO Diet NPO Type: Strict NPO     Date/Time CIWA-Ar Total    06/30/25 2049 2     06/29/25 2022 2     06/29/25 0905 2              Assessment/Plan     Active Hospital Problems    Diagnosis  POA    **Right foot infection [L08.9]  Unknown    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     Patient is a 89 y.o. male     R DFU  R foot cellulitis  culture MRSA (history of MRSA)  ID managing antibiotics  MRI open wound, phlegmon/abscess, osteomyelitis fifth metatarsal head neck, septic arthritis, muscular edema  Surgery today  Anticoagulation on hold    DM 2  A1c 5.80%  Correctional insulin  BS 100s    Paroxysmal atrial fibrillation  Anticoagulation on hold  Cardiology following    Chronic hypoxic respiratory failure   COPD  Interstitial lung disease  Flow (L/min) (Oxygen Therapy):  [4] 4 (baseline 3-4 L/min)  Pulmonology following. He is  not on steroids chronically    History of stroke  Hypertension  Alcohol use disorder 15-20/per week. CIWA 2, as needed Ativan (has not required any)    DVT prophylaxis  Eliquis (home med) held for surgery    Discharge  TBD  Expected Discharge Date: 7/4/2025; Expected Discharge Time:     Discussed with patient and nursing staff    Nick Bajwa MD  [facility]  07/01/25 07:46 EDT

## 2025-07-01 NOTE — ANESTHESIA PROCEDURE NOTES
Airway  Reason: elective    Date/Time: 7/1/2025 12:13 PM  Airway not difficult    General Information and Staff    Patient location during procedure: OR  Anesthesiologist: Ck Dowell MD  CRNA/CAA: Estelle Ayala CRNA    Indications and Patient Condition  Indications for airway management: airway protection    Preoxygenated: yes    Mask difficulty assessment: 1 - vent by mask    Final Airway Details    Final airway type: endotracheal airway      Successful airway: ETT  Cuffed: yes   Successful intubation technique: direct laryngoscopy  Adjuncts used in placement: intubating stylet  Endotracheal tube insertion site: oral  Blade: Priti  Blade size: 3  ETT size (mm): 8.0  Cormack-Lehane Classification: grade I - full view of glottis  Placement verified by: chest auscultation and capnometry   Measured from: lips  ETT/EBT  to lips (cm): 21  Number of attempts at approach: 1  Assessment: lips, teeth, and gum same as pre-op and atraumatic intubation

## 2025-07-01 NOTE — DISCHARGE PLACEMENT REQUEST
"Jim Garcia \"Dereck\" (89 y.o. Male)       Date of Birth   1935    Social Security Number       Address   [address] [place], [place] [address]    Home Phone   [phone]    MRN   [id number]       Spiritism   None    Marital Status                               Admission Date   6/27/2025    Admission Type   Emergency    Admitting Provider   Rodríguez Araujo MD    Attending Provider   Nick Bajwa MD    Department, Room/Bed   [facility] MAIN OR, [facility] Main OR/MAIN OR       Discharge Date       Discharge Disposition       Discharge Destination                                 Attending Provider: Nick Bajwa MD    Allergies: Ace Inhibitors, Lisinopril    Isolation: Contact   Infection: MRSA (08/30/24)   Code Status: CPR    Ht: 177.8 cm (70\")   Wt: 87.3 kg (192 lb 6.4 oz)    Admission Cmt: None   Principal Problem: Right foot infection [L08.9]                   Active Insurance as of 6/27/2025       Primary Coverage       Payor Plan Insurance Group Employer/Plan Group    MEDICARE MEDICARE A & B        Payor Plan Address Payor Plan Phone Number Payor Plan Fax Number Effective Dates    [address] [phone]  10/1/2002 - None Entered    [facility] [id number]         Subscriber Name Subscriber Birth Date Member ID       JIM GARCIA 1935 [id number]               Secondary Coverage       Payor Plan Insurance Group Employer/Plan Group    HUMANA HUMANA [facility]              [id number]       Payor Plan Address Payor Plan Phone Number Payor Plan Fax Number Effective Dates    [address] [address]   9/1/2013 - None Entered    [facility] [id number]         Subscriber Name Subscriber Birth Date Member ID       JIM GARCIA 1935 [id number]                     Emergency Contacts        (Rel.) Home Phone Work Phone Mobile Phone    jessica joya (Daughter) [phone] -- --                "

## 2025-07-01 NOTE — ANESTHESIA PREPROCEDURE EVALUATION
Anesthesia Evaluation     NPO Solid Status: > 8 hours             Airway   Mallampati: II  Dental      Pulmonary    (+) a smoker Former, COPD,  Cardiovascular     (+) hypertension, dysrhythmias Atrial Fib, hyperlipidemia      Neuro/Psych  GI/Hepatic/Renal/Endo      Musculoskeletal     Abdominal    Substance History      OB/GYN          Other                    Anesthesia Plan    ASA 3     general       Anesthetic plan, risks, benefits, and alternatives have been provided, discussed and informed consent has been obtained with: patient.    CODE STATUS:    Code Status (Patient has no pulse and is not breathing): CPR (Attempt to Resuscitate)  Medical Interventions (Patient has pulse or is breathing): Full Support

## 2025-07-01 NOTE — PROGRESS NOTES
"[facility] Clinical Pharmacy Services: Vancomycin Monitoring Note    Jim Marvin is a 89 y.o. male who is on day 4 of pharmacy to dose vancomycin for Bone and/or Joint Infection.    Current Vancomycin Dose:   1250 mg IV every  12  hours  Updated Cultures and Sensitivities:   6/27 bld cx NG x24h  6/28 wound cx MRSA  7/01 Tissue cx in process  Results from last 7 days   Lab Units 06/29/25  1356   VANCOMYCIN TR mcg/mL 8.20     Vitals/Labs  Ht: 177.8 cm (70\"); Wt: 87.3 kg (192 lb 6.4 oz)   Temp Readings from Last 1 Encounters:   07/01/25 97.6 °F (36.4 °C) (Oral)     Estimated Creatinine Clearance: 105.3 mL/min (A) (by C-G formula based on SCr of 0.61 mg/dL (L)).     Results from last 7 days   Lab Units 07/01/25  0518 06/30/25  0528 06/29/25  0354   CREATININE mg/dL 0.61* 0.60* 0.63*   WBC 10*3/mm3 9.19 10.16 10.29     Assessment/Plan  Renal function remains stable. MRI confirmed deep infection. Patient went to OR today for surgical debridement. Consult extended through 8/8/25 per Dr. Kern - will adjust vancomycin order to match.   Vancomycin Dose: Continue vancomycin 1250 mg IV every  12  hours; provides a predicted steady state  mg/L.hr  (goal 400-600)  Next Level Date and Time: Vanc Trough 7/2 @ 1400  We will continue to monitor patient changes and renal function. F/u OR cultures.    Thank you for involving pharmacy in this patient's care. Please contact pharmacy with any questions or concerns.       Willie Burk, [facility]  Clinical Pharmacist                "

## 2025-07-02 LAB
ALBUMIN SERPL-MCNC: 3 G/DL (ref 3.5–5.2)
ALP SERPL-CCNC: 76 U/L (ref 39–117)
ALT SERPL W P-5'-P-CCNC: 29 U/L (ref 1–41)
ANION GAP SERPL CALCULATED.3IONS-SCNC: 10.6 MMOL/L (ref 5–15)
AST SERPL-CCNC: 17 U/L (ref 1–40)
BACTERIA SPEC AEROBE CULT: NORMAL
BACTERIA SPEC AEROBE CULT: NORMAL
BILIRUB CONJ SERPL-MCNC: 0.2 MG/DL (ref 0–0.3)
BILIRUB INDIRECT SERPL-MCNC: 0.3 MG/DL
BILIRUB SERPL-MCNC: 0.5 MG/DL (ref 0–1.2)
BUN SERPL-MCNC: 11 MG/DL (ref 8–23)
BUN/CREAT SERPL: 13.3 (ref 7–25)
CALCIUM SPEC-SCNC: 8.8 MG/DL (ref 8.6–10.5)
CHLORIDE SERPL-SCNC: 101 MMOL/L (ref 98–107)
CK SERPL-CCNC: 48 U/L (ref 20–200)
CO2 SERPL-SCNC: 26.4 MMOL/L (ref 22–29)
CREAT SERPL-MCNC: 0.83 MG/DL (ref 0.76–1.27)
CRP SERPL-MCNC: 6.31 MG/DL (ref 0–0.5)
DEPRECATED RDW RBC AUTO: 41.6 FL (ref 37–54)
EGFRCR SERPLBLD CKD-EPI 2021: 84.7 ML/MIN/1.73
ERYTHROCYTE [DISTWIDTH] IN BLOOD BY AUTOMATED COUNT: 12 % (ref 12.3–15.4)
GLUCOSE BLDC GLUCOMTR-MCNC: 115 MG/DL (ref 70–130)
GLUCOSE BLDC GLUCOMTR-MCNC: 120 MG/DL (ref 70–130)
GLUCOSE BLDC GLUCOMTR-MCNC: 132 MG/DL (ref 70–130)
GLUCOSE BLDC GLUCOMTR-MCNC: 151 MG/DL (ref 70–130)
GLUCOSE SERPL-MCNC: 124 MG/DL (ref 65–99)
HCT VFR BLD AUTO: 37 % (ref 37.5–51)
HGB BLD-MCNC: 12.4 G/DL (ref 13–17.7)
MCH RBC QN AUTO: 32.1 PG (ref 26.6–33)
MCHC RBC AUTO-ENTMCNC: 33.5 G/DL (ref 31.5–35.7)
MCV RBC AUTO: 95.9 FL (ref 79–97)
PLATELET # BLD AUTO: 210 10*3/MM3 (ref 140–450)
PMV BLD AUTO: 10.3 FL (ref 6–12)
POTASSIUM SERPL-SCNC: 3.8 MMOL/L (ref 3.5–5.2)
PROT SERPL-MCNC: 6.6 G/DL (ref 6–8.5)
RBC # BLD AUTO: 3.86 10*6/MM3 (ref 4.14–5.8)
SODIUM SERPL-SCNC: 138 MMOL/L (ref 136–145)
VANCOMYCIN TROUGH SERPL-MCNC: 21 MCG/ML (ref 5–20)
WBC NRBC COR # BLD AUTO: 11.72 10*3/MM3 (ref 3.4–10.8)

## 2025-07-02 PROCEDURE — 80048 BASIC METABOLIC PNL TOTAL CA: CPT | Performed by: PODIATRIST

## 2025-07-02 PROCEDURE — 94799 UNLISTED PULMONARY SVC/PX: CPT

## 2025-07-02 PROCEDURE — 25810000003 SODIUM CHLORIDE 0.9 % SOLUTION 250 ML FLEX CONT: Performed by: INTERNAL MEDICINE

## 2025-07-02 PROCEDURE — 82550 ASSAY OF CK (CPK): CPT | Performed by: PODIATRIST

## 2025-07-02 PROCEDURE — 86140 C-REACTIVE PROTEIN: CPT | Performed by: PODIATRIST

## 2025-07-02 PROCEDURE — 94761 N-INVAS EAR/PLS OXIMETRY MLT: CPT

## 2025-07-02 PROCEDURE — 97530 THERAPEUTIC ACTIVITIES: CPT

## 2025-07-02 PROCEDURE — 99232 SBSQ HOSP IP/OBS MODERATE 35: CPT | Performed by: STUDENT IN AN ORGANIZED HEALTH CARE EDUCATION/TRAINING PROGRAM

## 2025-07-02 PROCEDURE — 85027 COMPLETE CBC AUTOMATED: CPT | Performed by: PODIATRIST

## 2025-07-02 PROCEDURE — G0545 PR INHERENT VISIT TO INPT: HCPCS | Performed by: INTERNAL MEDICINE

## 2025-07-02 PROCEDURE — 25010000002 THIAMINE PER 100 MG: Performed by: PODIATRIST

## 2025-07-02 PROCEDURE — 80202 ASSAY OF VANCOMYCIN: CPT | Performed by: INTERNAL MEDICINE

## 2025-07-02 PROCEDURE — 97164 PT RE-EVAL EST PLAN CARE: CPT

## 2025-07-02 PROCEDURE — 63710000001 REVEFENACIN 175 MCG/3ML SOLUTION: Performed by: PODIATRIST

## 2025-07-02 PROCEDURE — 80076 HEPATIC FUNCTION PANEL: CPT | Performed by: PODIATRIST

## 2025-07-02 PROCEDURE — 82948 REAGENT STRIP/BLOOD GLUCOSE: CPT

## 2025-07-02 PROCEDURE — 94664 DEMO&/EVAL PT USE INHALER: CPT

## 2025-07-02 PROCEDURE — 99233 SBSQ HOSP IP/OBS HIGH 50: CPT | Performed by: INTERNAL MEDICINE

## 2025-07-02 PROCEDURE — 25010000002 VANCOMYCIN HCL 1.25 G RECONSTITUTED SOLUTION 1 EACH VIAL: Performed by: INTERNAL MEDICINE

## 2025-07-02 PROCEDURE — 94760 N-INVAS EAR/PLS OXIMETRY 1: CPT

## 2025-07-02 RX ORDER — SODIUM CHLORIDE 0.9 % (FLUSH) 0.9 %
20 SYRINGE (ML) INJECTION AS NEEDED
OUTPATIENT
Start: 2025-07-02

## 2025-07-02 RX ORDER — SODIUM CHLORIDE 0.9 % (FLUSH) 0.9 %
10 SYRINGE (ML) INJECTION AS NEEDED
OUTPATIENT
Start: 2025-07-02

## 2025-07-02 RX ORDER — SODIUM CHLORIDE 0.9 % (FLUSH) 0.9 %
10 SYRINGE (ML) INJECTION EVERY 12 HOURS SCHEDULED
OUTPATIENT
Start: 2025-07-02

## 2025-07-02 RX ADMIN — BUDESONIDE AND FORMOTEROL FUMARATE DIHYDRATE 2 PUFF: 160; 4.5 AEROSOL RESPIRATORY (INHALATION) at 20:27

## 2025-07-02 RX ADMIN — THIAMINE HYDROCHLORIDE 200 MG: 100 INJECTION, SOLUTION INTRAMUSCULAR; INTRAVENOUS at 14:41

## 2025-07-02 RX ADMIN — VANCOMYCIN HYDROCHLORIDE 1250 MG: 1.25 INJECTION, POWDER, LYOPHILIZED, FOR SOLUTION INTRAVENOUS at 03:01

## 2025-07-02 RX ADMIN — VANCOMYCIN HYDROCHLORIDE 1250 MG: 1.25 INJECTION, POWDER, LYOPHILIZED, FOR SOLUTION INTRAVENOUS at 14:40

## 2025-07-02 RX ADMIN — DILTIAZEM HYDROCHLORIDE 240 MG: 120 CAPSULE, COATED, EXTENDED RELEASE ORAL at 08:21

## 2025-07-02 RX ADMIN — FOLIC ACID 1 MG: 1 TABLET ORAL at 08:21

## 2025-07-02 RX ADMIN — BUDESONIDE AND FORMOTEROL FUMARATE DIHYDRATE 2 PUFF: 160; 4.5 AEROSOL RESPIRATORY (INHALATION) at 06:56

## 2025-07-02 RX ADMIN — Medication 10 ML: at 08:12

## 2025-07-02 RX ADMIN — ATORVASTATIN CALCIUM 20 MG: 20 TABLET, FILM COATED ORAL at 22:21

## 2025-07-02 RX ADMIN — REVEFENACIN 175 MCG: 175 SOLUTION RESPIRATORY (INHALATION) at 06:56

## 2025-07-02 RX ADMIN — Medication 10 ML: at 22:21

## 2025-07-02 RX ADMIN — DIGOXIN 125 MCG: 0.12 TABLET ORAL at 12:33

## 2025-07-02 RX ADMIN — THIAMINE HYDROCHLORIDE 200 MG: 100 INJECTION, SOLUTION INTRAMUSCULAR; INTRAVENOUS at 06:15

## 2025-07-02 NOTE — PROGRESS NOTES
Podiatry Progress Note      Patient: Jim Marvin Admit Date: 2025    Age: 89 y.o.   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  Patient states his pain is controlled.  His dressing is clean dry intact.  He is sitting up in bed eating breakfast.    Past Medical History     Past Medical History:   Diagnosis Date    COPD (chronic obstructive pulmonary disease)     Hyperlipidemia     Hypertension     Stroke         Past Surgical History:   Procedure Laterality Date    CATARACT EXTRACTION Left 2022        Allergies   Allergen Reactions    Ace Inhibitors Other (See Comments)     Cannot remember     Lisinopril Other (See Comments)     Cannot remember        Social History     Tobacco Use   Smoking Status Former    Current packs/day: 0.00    Average packs/day: 1 pack/day for 40.0 years (40.0 ttl pk-yrs)    Types: Cigarettes    Start date:     Quit date:     Years since quittin.5    Passive exposure: Past   Smokeless Tobacco Never   Tobacco Comments    Quit 19 years ago         Objective   Physical Exam    Vitals:    25 0701   BP:    Pulse: 78   Resp: 18   Temp:    SpO2: 94%          Dressing to the right foot is clean dry and intact    Labs     Lab Results   Component Value Date    HGBA1C 5.80 (H) 2025    POCGLU 132 (H) 2025    SEDRATE 53 (H) 2025        CBC:      Lab 25  0518 25  0528 25  0354 25  0646 25  1433   WBC 9.19 10.16 10.29 12.18* 12.39*   HEMOGLOBIN 12.3* 10.7* 10.8* 10.8* 11.8*   HEMATOCRIT 35.5* 31.3* 32.0* 32.8* 35.1*   PLATELETS 305 267 236 228 232   NEUTROS ABS  --   --   --  8.18* 8.89*   IMMATURE GRANS (ABS)  --   --   --  0.20* 0.20*   LYMPHS ABS  --   --   --  0.89 0.76   MONOS  ABS  --   --   --  1.67* 1.83*   EOS ABS  --   --   --  1.08* 0.58*   MCV 95.9 95.4 96.1 97.9* 97.8*          Results for orders placed or performed during the hospital encounter of 06/27/25   Blood Culture - Blood, Hand, Right    Specimen: Hand, Right; Blood   Result Value Ref Range    Blood Culture No growth at 4 days         XR Foot 3+ View Right  Narrative: XR FOOT 3+ VW RIGHT-     INDICATIONS: Postoperative evaluation.     TECHNIQUE: 3 views of the right foot     COMPARISON: 6/27/2025     FINDINGS:     Partial amputation change of the fifth ray is seen at the level of the  mid fifth metatarsal, with adjacent surgical soft tissue swelling and  gas. No acute fracture, erosion, or dislocation is identified. Arterial  calcifications are conspicuous.     Impression:    Postsurgical changes.           This report was finalized on 7/1/2025 2:03 PM by Dr. 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  Patient to have PICC line placed  - Will continue to follow patient      Daniel Pedraza DPM  Office: [phone]

## 2025-07-02 NOTE — PROGRESS NOTES
ID progress note     CC: Follow-up diabetic right foot infection due to MRSA    Subjective: No acute events.  No fever.  He says his procedure went well.  Tolerating vancomycin without any side effects.    Medications:    Current Facility-Administered Medications:     acetaminophen (TYLENOL) tablet 650 mg, 650 mg, Oral, Q4H PRN **OR** acetaminophen (TYLENOL) 160 MG/5ML oral solution 650 mg, 650 mg, Oral, Q4H PRN **OR** acetaminophen (TYLENOL) suppository 650 mg, 650 mg, Rectal, Q4H PRN, Daniel Pedraza, DPM    [Held by provider] apixaban (ELIQUIS) tablet 5 mg, 5 mg, Oral, Q12H, Rodríguez Araujo MD    atorvastatin (LIPITOR) tablet 20 mg, 20 mg, Oral, Nightly, Daniel Pedraza DPM, 20 mg at 07/01/25 2046    budesonide-formoterol (SYMBICORT) 160-4.5 MCG/ACT inhaler 2 puff, 2 puff, Inhalation, BID - RT, 2 puff at 07/02/25 0656 **AND** revefenacin (YUPELRI) nebulizer solution 175 mcg, 175 mcg, Nebulization, Daily - RT, Daniel Pedraza DPM, 175 mcg at 07/02/25 0656    Calcium Replacement - Follow Nurse / BPA Driven Protocol, , Not Applicable, PRN, Daniel Pedraza, DPM    dextrose (D50W) (25 g/50 mL) IV injection 25 g, 25 g, Intravenous, Q15 Min PRN, Daniel Pedraza, DPM    dextrose (GLUTOSE) oral gel 15 g, 15 g, Oral, Q15 Min PRN, Daniel Pedraza, DPM    digoxin (LANOXIN) tablet 125 mcg, 125 mcg, Oral, Daily, Daniel Pedraza DPM, 125 mcg at 06/30/25 1159    dilTIAZem CD (CARDIZEM CD) 24 hr capsule 240 mg, 240 mg, Oral, Daily, KeilaDaniel luke, DPM, 240 mg at 07/02/25 0821    folic acid (FOLVITE) tablet 1 mg, 1 mg, Oral, Daily, Daniel Pedraza, DPM, 1 mg at 07/02/25 0821    glucagon (GLUCAGEN) injection 1 mg, 1 mg, Intramuscular, Q15 Min PRN, Daniel Pedraza DPM    insulin lispro (HUMALOG/ADMELOG) injection 2-7 Units, 2-7 Units, Subcutaneous, 4x Daily AC & at Bedtime, Daniel Pedraza DPM, 2 Units at 06/28/25 1632    ipratropium-albuterol (DUO-NEB) nebulizer solution 3 mL, 3 mL, Nebulization, Q6H PRN, Keila,  JASMYN Sanchez    lactated ringers infusion, 9 mL/hr, Intravenous, Continuous, Hugo Obrien MD, New Bag at 07/01/25 1158    [Transfer Hold] LORazepam (ATIVAN) tablet 1 mg, 1 mg, Oral, Q1H PRN **OR** [Transfer Hold] LORazepam (ATIVAN) injection 1 mg, 1 mg, Intravenous, Q1H PRN **OR** [Transfer Hold] LORazepam (ATIVAN) tablet 2 mg, 2 mg, Oral, Q1H PRN **OR** [Transfer Hold] LORazepam (ATIVAN) injection 2 mg, 2 mg, Intravenous, Q1H PRN **OR** [Transfer Hold] LORazepam (ATIVAN) injection 2 mg, 2 mg, Intravenous, Q15 Min PRN **OR** [Transfer Hold] LORazepam (ATIVAN) injection 2 mg, 2 mg, Intramuscular, Q15 Min PRN, Rodríguez Araujo MD    Magnesium Standard Dose Replacement - Follow Nurse / BPA Driven Protocol, , Not Applicable, PRNKeila William, DPM    [Held by provider] metFORMIN ER (GLUCOPHAGE-XR) 24 hr tablet 500 mg, 500 mg, Oral, Daily With Breakfast, Rodríguez Araujo MD    O2 (OXYGEN), 3 L/min, Inhalation, Continuous, Daniel Pedraza DPM, Last Rate: 240,000 mL/hr at 06/27/25 1832, 4 L/min at 06/27/25 1832    Pharmacy to dose vancomycin, , Not Applicable, Continuous PRNKeila William, DPM    Phosphorus Replacement - Follow Nurse / BPA Driven Protocol, , Not Applicable, PRNKeila William, DPM    Potassium Replacement - Follow Nurse / BPA Driven Protocol, , Not Applicable, PRNKeila William, DPM    sodium chloride 0.9 % flush 10 mL, 10 mL, Intravenous, Q12H, Daniel Pedraza DPM, 10 mL at 07/02/25 0812    sodium chloride 0.9 % flush 10 mL, 10 mL, Intravenous, PRN, Daniel Pedraza DPM    sodium chloride 0.9 % infusion 40 mL, 40 mL, Intravenous, PRN, Daniel Pedraza DPM    thiamine (B-1) injection 200 mg, 200 mg, Intravenous, Q8H, 200 mg at 07/02/25 0615 **FOLLOWED BY** [START ON 7/3/2025] thiamine (VITAMIN B-1) tablet 100 mg, 100 mg, Oral, Daily, Daniel Pedraza DPM    Vancomycin HCl 1,250 mg in sodium chloride 0.9 % 250 mL VTB, 1,250 mg, Intravenous, Q12H, Guero Kern MD, Last Rate:  "200 mL/hr at 07/02/25 0301, 1,250 mg at 07/02/25 0301      Objective   Vital Signs   Temp:  [97.2 °F (36.2 °C)-98.3 °F (36.8 °C)] 98.2 °F (36.8 °C)  Heart Rate:  [58-97] 78  Resp:  [16-20] 18  BP: (116-179)/(61-97) 145/75    Physical Exam:   General: awake, alert, NAD, very nice, sitting up in bed  Eyes: no scleral icterus  Cardiovascular: Normal rate  Respiratory: normal work of breathing  GI: Abdomen is soft, not tender or distended  :  no Hernandez catheter  MSK/skin: Right foot bandaged  Neurological: Alert and oriented x 3  Psychiatric: Normal mood and affect     Labs:   CBC, BMP, CRP, Vanco level, and blood, operative, and wound cultures reviewed today  Lab Results   Component Value Date    WBC 9.19 07/01/2025    HGB 12.3 (L) 07/01/2025    HCT 35.5 (L) 07/01/2025    MCV 95.9 07/01/2025     07/01/2025     Lab Results   Component Value Date    GLUCOSE 93 07/01/2025    CALCIUM 8.6 07/01/2025     07/01/2025    K 4.0 07/01/2025    CO2 25.0 07/01/2025     07/01/2025    BUN 10.0 07/01/2025    CREATININE 0.61 (L) 07/01/2025    EGFR 93.0 07/01/2025    BCR 16.4 07/01/2025    ANIONGAP 9.0 07/01/2025     Lab Results   Component Value Date    ALT 40 06/28/2025     Lab Results   Component Value Date    HGBA1C 5.80 (H) 06/28/2025     Lab Results   Component Value Date    CRP 14.65 (H) 06/27/2025     Lab Results   Component Value Date    VANCOTROUGH 8.20 06/29/2025     No results found for: \"CKTOTAL\"    Microbiology:  6/13 BCx: Negative  6/27 BCx: Negative  6/28 right foot wound Cx: MRSA (susceptible to clindamycin, doxycycline, Bactrim, and vancomycin)  7/1 OR Cx: \"Growth present, too young to evaluate\"    Prior radiology:  MRI right foot shows wound, osteomyelitis, and septic arthritis    X-ray right foot with soft tissue swelling cannot exclude osteomyelitis    Isolation:  Contact for history of MRSA    ASSESSMENT/PLAN:  Chronic osteomyelitis and septic arthritis right foot  MRSA infection  Chronic hypoxic " respiratory failure  Emphysema  Interstitial lung disease    On 7/1/2025, he went to the operating room with podiatry for partial fifth ray amputation.  Operative cultures are pending.  A pre-- operative culture grew MRSA which she has had multiple times in the past.  Continue vancomycin 1250 mg IV every 12 hours with goal -600.  He is going to need 6 weeks of antibiotics with stop date 8/12/2025 at which time he will follow-up with me in the infectious diseases clinic.  I will go ahead and order a single-lumen PICC line.    I will follow-up his operative cultures to make sure no additional bacteria are present.    ID will follow.  D/W Dr. Bajwa regarding antibiotic plans.
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
## 2025-07-02 NOTE — PROGRESS NOTES
Hospital Follow Up    LOS:  LOS: 5 days   Patient Name: Jim Marvin  Age/Sex: 89 y.o. male  : 1935  MRN: [id number]    Day of Service: 25   Length of Stay: 5  Encounter Provider: Hector Martell MD  Place of Service: [facility] CARDIOLOGY  Patient Care Team:  Antonio Finley MD as PCP - General (Family Medicine)  Ruben Burger Jr., MD as Consulting Physician (Urology)  Vane Rendon RN as Ambulatory  ([facility], [facility])    Subjective:     Chief Complaint: Osteomyelitis    Interval History: Tolerated surgery well.  Has no cardiovascular complaints today.  He overall is in good spirits    Objective:     Objective:  Temp:  [97.2 °F (36.2 °C)-98.3 °F (36.8 °C)] 98.2 °F (36.8 °C)  Heart Rate:  [58-97] 78  Resp:  [16-20] 18  BP: (116-179)/(61-91) 145/75     Intake/Output Summary (Last 24 hours) at 2025 1155  Last data filed at 2025 0820  Gross per 24 hour   Intake 1290 ml   Output 475 ml   Net 815 ml     Body mass index is 27.61 kg/m².      25  1511   Weight: 87.3 kg (192 lb 6.4 oz)     Weight change:       Physical Exam:   General : Alert, cooperative, in no acute distress.  Neuro: Alert,cooperative and oriented.  Lungs: CTAB. Normal respiratory effort and rate.  CV: Irregular rhythm with normal rate, normal S1 and S2, no murmurs, gallops or rubs.  ABD: Soft, nontender, nondistended. Positive bowel sounds.  Extr: No edema or cyanosis, moves all extremities.    Lab Review:   Results from last 7 days   Lab Units 25  0939 25  0518 25  0354 25  0646   SODIUM mmol/L 138 138   < > 138   POTASSIUM mmol/L 3.8 4.0   < > 3.7   CHLORIDE mmol/L 101 104   < > 105   CO2 mmol/L 26.4 25.0   < > 22.0   BUN mg/dL 11.0 10.0   < > 11.0   CREATININE mg/dL 0.83 0.61*   < > 0.61*   GLUCOSE mg/dL 124* 93   < > 99   CALCIUM mg/dL 8.8 8.6   < > 8.5*   AST (SGOT) U/L 17  --   --  25   ALT (SGPT) U/L 29  --   --  40    < > = values in this interval  not displayed.     Results from last 7 days   Lab Units 07/02/25  0939   CK TOTAL U/L 48     Results from last 7 days   Lab Units 07/02/25  0939 07/01/25  0518   WBC 10*3/mm3 11.72* 9.19   HEMOGLOBIN g/dL 12.4* 12.3*   HEMATOCRIT % 37.0* 35.5*   PLATELETS 10*3/mm3 210 305             Results from last 7 days   Lab Units 06/28/25  0646   CHOLESTEROL mg/dL 91   TRIGLYCERIDES mg/dL 48   HDL CHOL mg/dL 26*             I reviewed the patient's new clinical results.  I personally viewed and interpreted the patient's EKG  Current Medications:   Scheduled Meds:[Held by provider] apixaban, 5 mg, Oral, Q12H  atorvastatin, 20 mg, Oral, Nightly  budesonide-formoterol, 2 puff, Inhalation, BID - RT   And  revefenacin, 175 mcg, Nebulization, Daily - RT  digoxin, 125 mcg, Oral, Daily  dilTIAZem CD, 240 mg, Oral, Daily  folic acid, 1 mg, Oral, Daily  insulin lispro, 2-7 Units, Subcutaneous, 4x Daily AC & at Bedtime  [Held by provider] metFORMIN ER, 500 mg, Oral, Daily With Breakfast  sodium chloride, 10 mL, Intravenous, Q12H  thiamine (B-1) IV, 200 mg, Intravenous, Q8H   Followed by  [START ON 7/3/2025] thiamine, 100 mg, Oral, Daily  vancomycin, 1,250 mg, Intravenous, Q12H      Continuous Infusions:lactated ringers, 9 mL/hr, Last Rate: Stopped (07/02/25 1133)  O2, 3 L/min, Last Rate: 4 L/min (06/27/25 1832)  Pharmacy to dose vancomycin,         Allergies:  Allergies   Allergen Reactions    Ace Inhibitors Other (See Comments)     Cannot remember     Lisinopril Other (See Comments)     Cannot remember       Assessment:   Chronic osteomyelitis and septic arthritis of the right foot status post amputation  MRSA infection  ILD  Paroxysmal atrial fibrillation  Hyperlipidemia  Tobacco use    Plan:      -No cardiovascular complaints.  Has some irregularity in his pulse today but his rate is normal and he feels well.  I would not recommend any change to his therapy.  Continue diltiazem and digoxin.  - Restart Eliquis when able from a  surgical perspective    Follow-up in clinic as scheduled.  We will sign off.  Please call with questions.    Hector Martell MD  07/02/25  11:55 EDT

## 2025-07-02 NOTE — PROGRESS NOTES
Consult Daily Progress Note  [facility]   07/02/25      Patient Name:  Jim Marvin  MRN:  [id number]   YOB: 1935  Age: 89 y.o.  Sex: male  LOS: 5    Reason for Consult:  Chronic hypoxic respiratory failure, surgical clearance    Hospital Course:   89-year-old male with CPFT, chronic hypoxic respiratory failure presented for diabetic right foot ulcer with chronic osteomyelitis.    Interval History:  No acute events overnight  Stable respiratory status  Breathing remains unchanged  No worsening cough or sputum  No fevers or chills  No nausea or vomiting  Laying in bed comfortably    Physical Exam:  Vitals:    07/02/25 0701   BP:    Pulse: 78   Resp: 18   Temp:    SpO2: 94%       Intake/Output    Intake/Output Summary (Last 24 hours) at 7/2/2025 0832  Last data filed at 7/2/2025 0820  Gross per 24 hour   Intake 1310 ml   Output 475 ml   Net 835 ml     General: Alert, nontoxic, NAD, elderly  HEENT: NC/AT, EOMI, MMM  Neck: Supple, trachea midline  Cardiac: RRR, no murmur, gallops, rubs  Pulmonary: Diminished bilaterally, no wheezing  GI: Soft, non-tender, non-distended, normal bowel sounds  Extremities: Warm, well perfused, right foot bandaged  Skin: no visible rash  Neuro: CN II - XII grossly intact  Psychiatry: Normal mood and affect    Data Review:  Results from last 7 days   Lab Units 07/01/25 0518 06/30/25  0528 06/29/25  0354 06/28/25  0646 06/27/25  1433   WBC 10*3/mm3 9.19 10.16 10.29 12.18* 12.39*   HEMOGLOBIN g/dL 12.3* 10.7* 10.8* 10.8* 11.8*   PLATELETS 10*3/mm3 305 267 236 228 232     Results from last 7 days   Lab Units 07/01/25 0518 06/30/25 0528 06/29/25  0354 06/28/25  0646 06/27/25  1433   SODIUM mmol/L 138 138 140 138 138   POTASSIUM mmol/L 4.0 4.0 3.7 3.7 3.9   CHLORIDE mmol/L 104 107 107 105 102   CO2 mmol/L 25.0 23.0 21.4* 22.0 23.5   BUN mg/dL 10.0 12.0 10.0 11.0 16.0   CREATININE mg/dL 0.61* 0.60* 0.63* 0.61* 0.81   GLUCOSE mg/dL 93 103* 105* 99 109*    CALCIUM mg/dL 8.6 8.4* 8.4* 8.5* 9.0   Estimated Creatinine Clearance: 105.3 mL/min (A) (by C-G formula based on SCr of 0.61 mg/dL (L)).    Results from last 7 days   Lab Units 07/01/25  0518 06/30/25  0528 06/29/25  0354 06/28/25  0646 06/27/25  1523 06/27/25  1433   AST (SGOT) U/L  --   --   --  25  --  29   ALT (SGPT) U/L  --   --   --  40  --  46*   LACTATE mmol/L  --   --   --   --  1.5  --    CRP mg/dL  --   --   --   --   --  14.65*   PLATELETS 10*3/mm3 305 267 236 228  --  232             Result Review:  I have personally reviewed the results from the time of this admission to 7/2/2025 08:32 EDT and agree with these findings:  [x]  Laboratory list / accordion  [x]  Microbiology  [x]  Radiology  []  EKG/Telemetry   [x]  Cardiology/Vascular   []  Pathology  [x]  Old records  []  Other:    ASSESSMENT  /  PLAN:    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  Underwent right fifth metatarsal partial amputation by podiatry.  -No evidence of acute exacerbation at this time.  No evidence of pulmonary infection at this time.  Stable respiratory status.  -Uses 3 L at rest and up to 6 L with exertion.  Appropriate saturations on 3 L at rest.  -Uses bronchodilator therapy with Trelegy and albuterol at home.  -Bronchodilator therapy with Symbicort and Yupelri in the hospital, DuoNeb as needed.  -Patient follows with Dr. Stanley Gibbs with pulmonary at [place].  - Previously was undergoing pulmonary rehab  - Antibiotics with vancomycin and ceftriaxone for osteomyelitis, plan is for PICC line  placement and home IV antibiotics (EOT 8/12)  -Tolerated OR well with stable respiratory status  - Encouraged up as tolerated, pain control, incentive spirometry consistently.  - Stable for discharge from pulmonary standpoint.  Follow-up with his pulmonologist as an outpatient as previously scheduled.    Thank you for allowing us to participate in this patients care. Pulmonary will continue to follow.     Gunner Galeano MD  [facility]  Pulmonary and Critical Care Medicine, Interventional Pulmonology    Parts of this note may be an electronic transcription/translation of spoken language to printed text using the Dragon dictation system.

## 2025-07-02 NOTE — CASE MANAGEMENT/SOCIAL WORK
Continued Stay Note  Psychiatric     Patient Name: Jim Marvin  MRN: [id number]  Today's Date: 7/2/2025    Admit Date: 6/27/2025    Plan: Home with [facility] for home health SN and [facility] for home IV ABX   Discharge Plan       Row Name 07/02/25 1233       Plan    Plan Home with [facility] for home health SN and I for home IV ABX    Patient/Family in Agreement with Plan yes    Provided Post Acute Provider List? Refused    Plan Comments Patient has no HH or infusion preference therefore agreeable to [facility] for home health and [facility] for IV ABX at home. [facility] accepted and following.  [facility] to provide home IV ABX per Dr Kern's orders at D/C.                   Discharge Codes    No documentation.                 Expected Discharge Date and Time       Expected Discharge Date Expected Discharge Time    Jul 4, 2025               Evie Pierson RN

## 2025-07-02 NOTE — PROGRESS NOTES
Name: Jim Marvin ADMIT: 2025   : 1935  PCP: Antonio Finley MD    MRN: [id number] LOS: 5 days   AGE/SEX: 89 y.o. male  ROOM: [facility]     Subjective   Subjective   No new complaints. No chest pain or shortness of breath     Objective   Objective   Vital Signs  Temp:  [97.2 °F (36.2 °C)-98.9 °F (37.2 °C)] 98.2 °F (36.8 °C)  Heart Rate:  [58-97] 78  Resp:  [16-20] 18  BP: (116-179)/(61-97) 145/75  SpO2:  [90 %-100 %] 94 %  on  Flow (L/min) (Oxygen Therapy):  [4] 4;   Device (Oxygen Therapy): nasal cannula  Body mass index is 27.61 kg/m².    Physical Exam  Constitutional:       General: He is not in acute distress.     Appearance: He is not toxic-appearing.   HENT:      Head: Normocephalic and atraumatic.   Cardiovascular:      Rate and Rhythm: Normal rate and regular rhythm.   Pulmonary:      Effort: Pulmonary effort is normal. No respiratory distress.      Breath sounds: Normal breath sounds. No wheezing or rhonchi.   Abdominal:      General: Bowel sounds are normal.      Palpations: Abdomen is soft.      Tenderness: There is no abdominal tenderness. There is no guarding or rebound.   Musculoskeletal:      Comments: Right foot dressed, postop changes   Skin:     General: Skin is warm and dry.   Neurological:      Mental Status: He is alert and oriented to person, place, and time.   Psychiatric:         Mood and Affect: Mood normal.         Behavior: Behavior normal.     Results Review  I reviewed the patient's new clinical results.  Results from last 7 days   Lab Units 25  0354 25  0646   WBC 10*3/mm3 9.19 10.16 10.29 12.18*   HEMOGLOBIN g/dL 12.3* 10.7* 10.8* 10.8*   PLATELETS 10*3/mm3 305 267 236 228     Results from last 7 days   Lab Units 2518 25  0528 25  0354 25  0646   SODIUM mmol/L 138 138 140 138   POTASSIUM mmol/L 4.0 4.0 3.7 3.7   CHLORIDE mmol/L 104 107 107 105   CO2 mmol/L 25.0 23.0 21.4* 22.0   BUN mg/dL 10.0 12.0 10.0  11.0   CREATININE mg/dL 0.61* 0.60* 0.63* 0.61*   GLUCOSE mg/dL 93 103* 105* 99     Lab Results   Component Value Date    ANIONGAP 9.0 07/01/2025     Estimated Creatinine Clearance: 105.3 mL/min (A) (by C-G formula based on SCr of 0.61 mg/dL (L)).   Lab Results   Component Value Date    EGFR 93.0 07/01/2025     Results from last 7 days   Lab Units 06/28/25  0646 06/27/25  1433   ALBUMIN g/dL 2.7* 3.3*   BILIRUBIN mg/dL 0.5 0.6   ALK PHOS U/L 79 93   AST (SGOT) U/L 25 29   ALT (SGPT) U/L 40 46*     Results from last 7 days   Lab Units 07/01/25  0518 06/30/25  0528 06/29/25  0354 06/28/25  0646 06/27/25  1433   CALCIUM mg/dL 8.6 8.4* 8.4* 8.5* 9.0   ALBUMIN g/dL  --   --   --  2.7* 3.3*     Results from last 7 days   Lab Units 06/27/25  1523   LACTATE mmol/L 1.5     Glucose   Date/Time Value Ref Range Status   07/02/2025 0643 132 (H) 70 - 130 mg/dL Final   07/01/2025 2052 126 70 - 130 mg/dL Final   07/01/2025 1617 120 70 - 130 mg/dL Final   07/01/2025 1327 104 70 - 130 mg/dL Final   07/01/2025 1111 91 70 - 130 mg/dL Final   07/01/2025 0620 101 70 - 130 mg/dL Final   06/30/2025 2138 99 70 - 130 mg/dL Final       XR Foot 3+ View Right  Result Date: 7/1/2025   Postsurgical changes.    This report was finalized on 7/1/2025 2:03 PM by Dr. Ortiz Barber M.D on Workstation: [id number]          Scheduled Meds  [Held by provider] apixaban, 5 mg, Oral, Q12H  atorvastatin, 20 mg, Oral, Nightly  budesonide-formoterol, 2 puff, Inhalation, BID - RT   And  revefenacin, 175 mcg, Nebulization, Daily - RT  digoxin, 125 mcg, Oral, Daily  dilTIAZem CD, 240 mg, Oral, Daily  folic acid, 1 mg, Oral, Daily  insulin lispro, 2-7 Units, Subcutaneous, 4x Daily AC & at Bedtime  [Held by provider] metFORMIN ER, 500 mg, Oral, Daily With Breakfast  sodium chloride, 10 mL, Intravenous, Q12H  thiamine (B-1) IV, 200 mg, Intravenous, Q8H   Followed by  [START ON 7/3/2025] thiamine, 100 mg, Oral, Daily  vancomycin, 1,250 mg, Intravenous,  Q12H    Continuous Infusions  lactated ringers, 9 mL/hr  O2, 3 L/min, Last Rate: 4 L/min (06/27/25 1832)  Pharmacy to dose vancomycin,     PRN Meds    acetaminophen **OR** acetaminophen **OR** acetaminophen    Calcium Replacement - Follow Nurse / BPA Driven Protocol    dextrose    dextrose    glucagon (human recombinant)    ipratropium-albuterol    [Transfer Hold] LORazepam **OR** [Transfer Hold] LORazepam **OR** [Transfer Hold] LORazepam **OR** [Transfer Hold] LORazepam **OR** [Transfer Hold] LORazepam **OR** [Transfer Hold] LORazepam    Magnesium Standard Dose Replacement - Follow Nurse / BPA Driven Protocol    Pharmacy to dose vancomycin    Phosphorus Replacement - Follow Nurse / BPA Driven Protocol    Potassium Replacement - Follow Nurse / BPA Driven Protocol    sodium chloride    sodium chloride    lactated ringers, 9 mL/hr  O2, 3 L/min, Last Rate: 4 L/min (06/27/25 1832)  Pharmacy to dose vancomycin,     Diet  Diet: Diabetic; Consistent Carbohydrate; Fluid Consistency: Thin (IDDSI 0)     Date/Time CIWA-Ar Total    07/01/25 0840 2     06/30/25 2049 2              Assessment/Plan     Active Hospital Problems    Diagnosis  POA    **Right foot infection [L08.9]  Unknown    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     Patient is a 89 y.o. male     R DFU  R foot cellulitis  culture MRSA (history of MRSA)  ID managing antibiotics  MRI 6/29/2025 open wound, phlegmon/abscess, osteomyelitis fifth metatarsal head neck, septic  arthritis, muscular edema  Status post partial fifth ray amputation and full thickness rotational flap right foot 7/1/2025  Anticoagulation on hold until okay with surgery    DM 2  A1c 5.80%  Correctional insulin  BS 100s    Paroxysmal atrial fibrillation  Anticoagulation on hold until okay with surgery  Cardiology following    Chronic hypoxic respiratory failure   COPD  Interstitial lung disease  Flow (L/min) (Oxygen Therapy):  [4] 4 (baseline 3-4 L/min)  Pulmonology following. He is not on steroids chronically    History of stroke  Hypertension  Alcohol use disorder 15-20/per week. CIWA 2, as needed Ativan (has not required any)    DVT prophylaxis  Eliquis (home med) resume when okay with surgery    Discharge  TBD  Expected Discharge Date: 7/4/2025; Expected Discharge Time:     Discussed with patient and nursing staff and Dr. Concha Bajwa MD  [facility]  07/02/25 07:14 EDT

## 2025-07-02 NOTE — NURSING NOTE
Left message for IV team letting them know picc line order was placed and consent/contract signed and in the chart.

## 2025-07-02 NOTE — PLAN OF CARE
"Goal Outcome Evaluation:  Plan of Care Reviewed With: patient        Progress: no change  Outcome Evaluation: Pt seen for PT re-eval this date as he is now POD1 R Partial 5th ray amputation and I&D. WBAT, heel only, in a post-op shoe per MD. Today, pt was SBA for bed mobility, CGA for STS to RW, and CG/Gentry for ambulation of 35' w/ a RW. Pt had one overt LOB requiring Gentry to correct and maintain his balance. Pt ambulated on 6L O2 via NC desatting from 94% down to 85% and required 3 minutes of seated rest to recover to 88%. End of session pt was able to be placed back on 4L and maintains >92%. For several minutes pt was educated on safety/sequencing w/ gait/RW, precautions, home environment safety, benefit of rehab as pt is unlikely to be able to manuever his RW and IV pole, falls prevention etc. Pt appears easily agitated at times and reports he will \"110% not go to rehab\" as he believes having his antibiotics at 24hr and family assist will be easily attainable. Discussed w/ RN and CCP. PT will cont to follow and rec rehab at [place].    Anticipated Discharge Disposition (PT): skilled nursing facility                        "

## 2025-07-02 NOTE — PLAN OF CARE
Goal Outcome Evaluation:      Aox4. Forgetful at times. Vss. Dressing c/d/I, dressing change done as ordered. ACHS SSI. Assist x 1 to BRP. Voiding per urinal. CIWA protocol maintained. No c/o pain this shift. Picc line consent completed, IV team notified. Discharge plan to home once medically cleared.

## 2025-07-02 NOTE — PROGRESS NOTES
"[facility] Clinical Pharmacy Services: Vancomycin Monitoring Note    Jim Marvin is a 89 y.o. male who is on day 5 of pharmacy to dose vancomycin for Bone and/or Joint Infection.    Current Vancomycin Dose:   1250 mg IV every  12  hours  Updated Cultures and Sensitivities:   6/27 bld cx NG x4d  6/28 wound cx MRSA  7/01 Tissue cx in process  Results from last 7 days   Lab Units 07/02/25  1359 06/29/25  1356   VANCOMYCIN TR mcg/mL 21.00* 8.20     Vitals/Labs  Ht: 177.8 cm (70\"); Wt: 87.3 kg (192 lb 6.4 oz)   Temp Readings from Last 1 Encounters:   07/02/25 98.2 °F (36.8 °C) (Oral)     Estimated Creatinine Clearance: 77.4 mL/min (by C-G formula based on SCr of 0.83 mg/dL).     Results from last 7 days   Lab Units 07/02/25  0939 07/01/25  0518 06/30/25  0528   CREATININE mg/dL 0.83 0.61* 0.60*   WBC 10*3/mm3 11.72* 9.19 10.16     Assessment/Plan  Trough resulted slightly elevated at 21 mcg/mL ~ 11 hours post-dose. SCr has increased as well. This afternoon's dose was given prior to trough result. Will adjust starting with next dose.     Vancomycin Dose: vancomycin 1000 mg IV every  12  hours; provides a predicted steady state  mg/L.hr  (goal 400-600)  Next Level Date and Time: Vanc Trough 7/4 @ 0800 -- may need to check random before this if SCr continues to climb.  We will continue to monitor patient changes and renal function. F/u OR cultures.    Thank you for involving pharmacy in this patient's care. Please contact pharmacy with any questions or concerns.       Renetta Mata, [facility]  Clinical Pharmacist      "

## 2025-07-02 NOTE — PLAN OF CARE
Goal Outcome Evaluation:         Patient AOX4 but forgetful, on 4L of oxygen, IV antibiotics administered, dressing on RLE intact, no bleeding/drainage. He had a calm rest, reoriented and was carried along with his plan of care. No complain made. I will continue to monitor and render care during the shift.

## 2025-07-03 LAB
ANION GAP SERPL CALCULATED.3IONS-SCNC: 8.8 MMOL/L (ref 5–15)
BACTERIA SPEC ANAEROBE CULT: NORMAL
BUN SERPL-MCNC: 10 MG/DL (ref 8–23)
BUN/CREAT SERPL: 14.1 (ref 7–25)
CALCIUM SPEC-SCNC: 8.5 MG/DL (ref 8.6–10.5)
CHLORIDE SERPL-SCNC: 104 MMOL/L (ref 98–107)
CO2 SERPL-SCNC: 25.2 MMOL/L (ref 22–29)
CREAT SERPL-MCNC: 0.71 MG/DL (ref 0.76–1.27)
CYTO UR: NORMAL
EGFRCR SERPLBLD CKD-EPI 2021: 88.8 ML/MIN/1.73
GLUCOSE BLDC GLUCOMTR-MCNC: 101 MG/DL (ref 70–130)
GLUCOSE BLDC GLUCOMTR-MCNC: 106 MG/DL (ref 70–130)
GLUCOSE BLDC GLUCOMTR-MCNC: 110 MG/DL (ref 70–130)
GLUCOSE BLDC GLUCOMTR-MCNC: 140 MG/DL (ref 70–130)
GLUCOSE SERPL-MCNC: 99 MG/DL (ref 65–99)
LAB AP CASE REPORT: NORMAL
LAB AP CLINICAL INFORMATION: NORMAL
PATH REPORT.FINAL DX SPEC: NORMAL
PATH REPORT.GROSS SPEC: NORMAL
POTASSIUM SERPL-SCNC: 3.8 MMOL/L (ref 3.5–5.2)
SODIUM SERPL-SCNC: 138 MMOL/L (ref 136–145)

## 2025-07-03 PROCEDURE — 25010000002 VANCOMYCIN 1 G RECONSTITUTED SOLUTION 1 EACH VIAL: Performed by: INTERNAL MEDICINE

## 2025-07-03 PROCEDURE — 25810000003 SODIUM CHLORIDE 0.9 % SOLUTION 250 ML FLEX CONT: Performed by: INTERNAL MEDICINE

## 2025-07-03 PROCEDURE — 82948 REAGENT STRIP/BLOOD GLUCOSE: CPT

## 2025-07-03 PROCEDURE — 99233 SBSQ HOSP IP/OBS HIGH 50: CPT | Performed by: INTERNAL MEDICINE

## 2025-07-03 PROCEDURE — 80048 BASIC METABOLIC PNL TOTAL CA: CPT | Performed by: PODIATRIST

## 2025-07-03 PROCEDURE — 94799 UNLISTED PULMONARY SVC/PX: CPT

## 2025-07-03 PROCEDURE — G0545 PR INHERENT VISIT TO INPT: HCPCS | Performed by: INTERNAL MEDICINE

## 2025-07-03 RX ADMIN — BUDESONIDE AND FORMOTEROL FUMARATE DIHYDRATE 2 PUFF: 160; 4.5 AEROSOL RESPIRATORY (INHALATION) at 20:37

## 2025-07-03 RX ADMIN — FOLIC ACID 1 MG: 1 TABLET ORAL at 08:20

## 2025-07-03 RX ADMIN — VANCOMYCIN HYDROCHLORIDE 1000 MG: 1 INJECTION, POWDER, LYOPHILIZED, FOR SOLUTION INTRAVENOUS at 21:09

## 2025-07-03 RX ADMIN — DILTIAZEM HYDROCHLORIDE 240 MG: 120 CAPSULE, COATED, EXTENDED RELEASE ORAL at 08:20

## 2025-07-03 RX ADMIN — Medication 100 MG: at 08:22

## 2025-07-03 RX ADMIN — DIGOXIN 125 MCG: 0.12 TABLET ORAL at 12:22

## 2025-07-03 RX ADMIN — Medication 10 ML: at 21:12

## 2025-07-03 RX ADMIN — ATORVASTATIN CALCIUM 20 MG: 20 TABLET, FILM COATED ORAL at 21:12

## 2025-07-03 RX ADMIN — Medication 10 ML: at 09:07

## 2025-07-03 RX ADMIN — APIXABAN 5 MG: 5 TABLET, FILM COATED ORAL at 21:12

## 2025-07-03 RX ADMIN — VANCOMYCIN HYDROCHLORIDE 1000 MG: 1 INJECTION, POWDER, LYOPHILIZED, FOR SOLUTION INTRAVENOUS at 08:19

## 2025-07-03 NOTE — DISCHARGE PLACEMENT REQUEST
"Jim Garcia \"Dereck\" (89 y.o. Male)       Date of Birth   1935    Social Security Number       Address   [address] [place], [place] [address]    Home Phone   [phone]    MRN   [id number]       [place]   None    Marital Status                               Admission Date   6/27/2025    Admission Type   Emergency    Admitting Provider   Rodríguez Araujo MD    Attending Provider   Nick Bajwa MD    Department, Room/Bed   [address], P787/1       Discharge Date       Discharge Disposition       Discharge Destination                                 Attending Provider: Nick Bajwa MD    Allergies: Ace Inhibitors, Lisinopril    Isolation: Contact   Infection: MRSA (08/30/24)   Code Status: CPR    Ht: 177.8 cm (70\")   Wt: 87.3 kg (192 lb 6.4 oz)    Admission Cmt: None   Principal Problem: Right foot infection [L08.9]                   Active Insurance as of 6/27/2025       Primary Coverage       Payor Plan Insurance Group Employer/Plan Group    MEDICARE MEDICARE A & B        Payor Plan Address Payor Plan Phone Number Payor Plan Fax Number Effective Dates    [address] [phone]  10/1/2002 - None Entered    [facility] [id number]         Subscriber Name Subscriber Birth Date Member ID       JIM GARCIA 1935 [id number]               Secondary Coverage       Payor Plan Insurance Group Employer/Plan Group    HUMANA HUMANA [facility]              [id number]       Payor Plan Address Payor Plan Phone Number Payor Plan Fax Number Effective Dates    [address] [address]   9/1/2013 - None Entered    [facility] [id number]         Subscriber Name Subscriber Birth Date Member ID       JIM GARCIA 1935 [id number]                     Emergency Contacts        (Rel.) Home Phone Work Phone Mobile Phone    jessica joya (Daughter) [phone] -- --            "

## 2025-07-03 NOTE — PLAN OF CARE
Goal Outcome Evaluation:  Plan of Care Reviewed With: patient           Outcome Evaluation: Patient remains stable. Alert and oriented. VSS. Ambulating with assist x1. Voiding per BRP. Dressing cdi. No complaints of pain. PICC to be inserted for long term antibiotic use. Plans to d/c home.

## 2025-07-03 NOTE — PROGRESS NOTES
Continued Stay Note  [facility]     Patient Name: Jim Marvin  MRN: [id number]  Today's Date: 7/3/2025    Admit Date: 6/27/2025    Plan: SNF TBD, pending approval and will require precert   Discharge Plan       Row Name 07/03/25 1610       Plan    Plan SNF TBD, pending approval and will require precert    Patient/Family in Agreement with Plan yes    Plan Comments Spoke with patient and daughter Madisyn in great extent regarding d/c planning. Had meeting with patient in room and discussed the importance of care at d/c regarding the IV antibiotics. Patient is now agreeable to d/c to SNF as his daughter may not be available daily to assist with his IV antibiotics. Gave list of facilities, patient first choice is [facility]. Other referrals sent to [facility] and [facility]. Daughter would like to possibly tour some facilities and review the SNF list to possibly send other referrals. Patient will require precert, plan will now be to d/c to SNF at [facility]. Pending facility and precert.                   Discharge Codes    No documentation.                 Expected Discharge Date and Time       Expected Discharge Date Expected Discharge Time    Jul 4, 2025               Ama Simmons RN

## 2025-07-03 NOTE — PROGRESS NOTES
ID progress note     CC: Follow-up diabetic right foot infection due to MRSA    Subjective: No acute events.  No fever.  Foot feels about the same.  No side effects from vancomycin.    Medications:    Current Facility-Administered Medications:     acetaminophen (TYLENOL) tablet 650 mg, 650 mg, Oral, Q4H PRN **OR** acetaminophen (TYLENOL) 160 MG/5ML oral solution 650 mg, 650 mg, Oral, Q4H PRN **OR** acetaminophen (TYLENOL) suppository 650 mg, 650 mg, Rectal, Q4H PRN, Daniel Pedraza DPM    [Held by provider] apixaban (ELIQUIS) tablet 5 mg, 5 mg, Oral, Q12H, Rodríguez Araujo MD    atorvastatin (LIPITOR) tablet 20 mg, 20 mg, Oral, Nightly, Daniel Pedraza DPM, 20 mg at 07/02/25 2221    budesonide-formoterol (SYMBICORT) 160-4.5 MCG/ACT inhaler 2 puff, 2 puff, Inhalation, BID - RT, 2 puff at 07/02/25 2027 **AND** revefenacin (YUPELRI) nebulizer solution 175 mcg, 175 mcg, Nebulization, Daily - RT, Daniel Pedraza DPM, 175 mcg at 07/02/25 0656    Calcium Replacement - Follow Nurse / BPA Driven Protocol, , Not Applicable, PRN, Daniel Pedraza, DPM    dextrose (D50W) (25 g/50 mL) IV injection 25 g, 25 g, Intravenous, Q15 Min PRN, Daniel Pedraza, DPM    dextrose (GLUTOSE) oral gel 15 g, 15 g, Oral, Q15 Min PRN, Daniel Pedraza, DPM    digoxin (LANOXIN) tablet 125 mcg, 125 mcg, Oral, Daily, Daniel Pedraza DPM, 125 mcg at 07/02/25 1233    dilTIAZem CD (CARDIZEM CD) 24 hr capsule 240 mg, 240 mg, Oral, Daily, KeilaDaniel luke, DPM, 240 mg at 07/03/25 0820    folic acid (FOLVITE) tablet 1 mg, 1 mg, Oral, Daily, KeilaDaniel luke, DPM, 1 mg at 07/03/25 0820    glucagon (GLUCAGEN) injection 1 mg, 1 mg, Intramuscular, Q15 Min PRN, Daniel Pedraza DPM    insulin lispro (HUMALOG/ADMELOG) injection 2-7 Units, 2-7 Units, Subcutaneous, 4x Daily AC & at Bedtime, Daniel Pedraza DPM, 2 Units at 06/28/25 1632    ipratropium-albuterol (DUO-NEB) nebulizer solution 3 mL, 3 mL, Nebulization, Q6H PRN, Daniel Pedraza DPM     Magnesium Standard Dose Replacement - Follow Nurse / BPA Driven Protocol, , Not Applicable, PRN, Daniel Pedraza, DPM    [Held by provider] metFORMIN ER (GLUCOPHAGE-XR) 24 hr tablet 500 mg, 500 mg, Oral, Daily With Breakfast, Rodríguez Araujo MD    O2 (OXYGEN), 3 L/min, Inhalation, Continuous, Daniel Pedraza DPM, Last Rate: 240,000 mL/hr at 25 1832, 4 L/min at 25 183    Pharmacy to dose vancomycin, , Not Applicable, Continuous PRN, Keila Daniel, DPM    Phosphorus Replacement - Follow Nurse / BPA Driven Protocol, , Not Applicable, PRN, KeilaDaniel, DPM    Potassium Replacement - Follow Nurse / BPA Driven Protocol, , Not Applicable, PRN, Keila Daniel, DPM    sodium chloride 0.9 % flush 10 mL, 10 mL, Intravenous, Q12H, Daniel Pedraza, DPM, 10 mL at 25 2221    sodium chloride 0.9 % flush 10 mL, 10 mL, Intravenous, PRN, Daniel Pedraza, DPM    sodium chloride 0.9 % infusion 40 mL, 40 mL, Intravenous, PRN, Daniel Pedraza, DPM    [] thiamine (B-1) injection 200 mg, 200 mg, Intravenous, Q8H, 200 mg at 25 1441 **FOLLOWED BY** thiamine (VITAMIN B-1) tablet 100 mg, 100 mg, Oral, Daily, Dainel Pedraza, DPM, 100 mg at 25 0822    vancomycin (VANCOCIN) 1,000 mg in sodium chloride 0.9 % 250 mL IVPB-VTB, 1,000 mg, Intravenous, Q12H, Guero Kern MD, Last Rate: 250 mL/hr at 25 0819, 1,000 mg at 25 0819      Objective   Vital Signs   Temp:  [98 °F (36.7 °C)-98.3 °F (36.8 °C)] 98 °F (36.7 °C)  Heart Rate:  [74-82] 74  Resp:  [18] 18  BP: (118-146)/(49-74) 146/74    Physical Exam:   General: awake, alert, NAD, very nice, sitting up on side of bed feeding himself breakfast  Eyes: no scleral icterus  Cardiovascular: NR  Respiratory: normal work of breathing on nasal cannula  GI: Abdomen is soft, not tender or distended  :  no Hernandez catheter  MSK/skin: Right foot bandaged  Neurological: Alert and oriented x 3  Psychiatric: Normal mood and affect    Vascular: LUE PIV without erythema    Labs:   BMP, Vanco level, and operative wound cultures reviewed today  Lab Results   Component Value Date    WBC 11.72 (H) 07/02/2025    HGB 12.4 (L) 07/02/2025    HCT 37.0 (L) 07/02/2025    MCV 95.9 07/02/2025     07/02/2025     Lab Results   Component Value Date    GLUCOSE 99 07/03/2025    CALCIUM 8.5 (L) 07/03/2025     07/03/2025    K 3.8 07/03/2025    CO2 25.2 07/03/2025     07/03/2025    BUN 10.0 07/03/2025    CREATININE 0.71 (L) 07/03/2025    EGFR 88.8 07/03/2025    BCR 14.1 07/03/2025    ANIONGAP 8.8 07/03/2025     Lab Results   Component Value Date    ALT 29 07/02/2025     Lab Results   Component Value Date    HGBA1C 5.80 (H) 06/28/2025     Lab Results   Component Value Date    CRP 6.31 (H) 07/02/2025     Lab Results   Component Value Date    VANCOTROUGH 21.00 (H) 07/02/2025     Lab Results   Component Value Date    CKTOTAL 48 07/02/2025       Microbiology:  6/13 BCx: Negative  6/27 BCx: Negative  6/28 right foot wound Cx: MRSA (susceptible to clindamycin, doxycycline, Bactrim, and vancomycin)  7/1 OR Cx: Scant growth MRSA    Prior radiology:  MRI right foot shows wound, osteomyelitis, and septic arthritis    X-ray right foot with soft tissue swelling cannot exclude osteomyelitis    Isolation:  Contact for history of MRSA    ASSESSMENT/PLAN:  Chronic osteomyelitis and septic arthritis right foot  MRSA infection  Chronic hypoxic respiratory failure  Emphysema  Interstitial lung disease    On 7/1/2025, he went to the operating room with podiatry for partial fifth ray amputation.  Operative cultures grew MRSA consistent with his pre-- operative wound culture.  While in the hospital and if he goes to rehab, continue vancomycin 1250 mg IV every 12 hours x 6 weeks with stop date 8/12/2025 at which time he will follow-up with me in the infectious diseases clinic.  I I have ordered a single-lumen PICC line.  He will need a weekly CBC with differential, BMP,  Vanco level, and CRP faxed to me at [phone].    If he goes home, then the plan would be daptomycin 700 mg IV every 24 hours with the same stop date.  Weekly labs would be CBC with differential, CMP, CPK, and CRP.    He tells me a family meeting is taking place later today to discuss his disposition.    D/W Dr. Bajwa.    I will follow-up the results of the family meeting today.

## 2025-07-03 NOTE — PROGRESS NOTES
Consult Daily Progress Note  [facility]   07/03/25      Patient Name:  Jim Marvin  MRN:  [id number]   YOB: 1935  Age: 89 y.o.  Sex: male  LOS: 6    Reason for Consult:  Chronic hypoxic respiratory failure, surgical clearance    Hospital Course:   89-year-old male with CPFT, chronic hypoxic respiratory failure presented for diabetic right foot ulcer with chronic osteomyelitis.    Interval History:  No acute events overnight  Respiratory status remained stable  No worsening cough or sputum  Sitting up in chair comfortably  Eating his lunch  Ongoing evaluation for discharge  Family is at bedside    Physical Exam:  Vitals:    07/03/25 0612   BP: 146/74   Pulse: 74   Resp: 18   Temp: 98 °F (36.7 °C)   SpO2: 95%       Intake/Output    Intake/Output Summary (Last 24 hours) at 7/3/2025 1201  Last data filed at 7/3/2025 0821  Gross per 24 hour   Intake 960 ml   Output 600 ml   Net 360 ml     General: Alert, nontoxic, NAD, elderly  HEENT: NC/AT, EOMI, MMM  Neck: Supple, trachea midline  Cardiac: RRR, no murmur, gallops, rubs  Pulmonary: Diminished bilaterally, no wheezing  GI: Soft, non-tender, non-distended, normal bowel sounds  Extremities: Warm, well perfused, right foot bandaged  Skin: no visible rash  Neuro: CN II - XII grossly intact  Psychiatry: Normal mood and affect    Data Review:  Results from last 7 days   Lab Units 07/02/25  0939 07/01/25  0518 06/30/25  0528 06/29/25  0354 06/28/25  0646 06/27/25  1433   WBC 10*3/mm3 11.72* 9.19 10.16 10.29 12.18* 12.39*   HEMOGLOBIN g/dL 12.4* 12.3* 10.7* 10.8* 10.8* 11.8*   PLATELETS 10*3/mm3 210 305 267 236 228 232     Results from last 7 days   Lab Units 07/03/25  0608 07/02/25  0939 07/01/25  0518 06/30/25  0528 06/29/25  0354 06/28/25  0646 06/27/25  1433   SODIUM mmol/L 138 138 138 138 140 138 138   POTASSIUM mmol/L 3.8 3.8 4.0 4.0 3.7 3.7 3.9   CHLORIDE mmol/L 104 101 104 107 107 105 102   CO2 mmol/L 25.2 26.4 25.0 23.0 21.4* 22.0  23.5   BUN mg/dL 10.0 11.0 10.0 12.0 10.0 11.0 16.0   CREATININE mg/dL 0.71* 0.83 0.61* 0.60* 0.63* 0.61* 0.81   GLUCOSE mg/dL 99 124* 93 103* 105* 99 109*   CALCIUM mg/dL 8.5* 8.8 8.6 8.4* 8.4* 8.5* 9.0   Estimated Creatinine Clearance: 90.5 mL/min (A) (by C-G formula based on SCr of 0.71 mg/dL (L)).    Results from last 7 days   Lab Units 07/02/25  0939 07/01/25  0518 06/30/25  0528 06/29/25  0354 06/28/25  0646 06/27/25  1523 06/27/25  1433   AST (SGOT) U/L 17  --   --   --  25  --  29   ALT (SGPT) U/L 29  --   --   --  40  --  46*   LACTATE mmol/L  --   --   --   --   --  1.5  --    CRP mg/dL 6.31*  --   --   --   --   --  14.65*   PLATELETS 10*3/mm3 210 305 267   < > 228  --  232    < > = values in this interval not displayed.             Result Review:  I have personally reviewed the results from the time of this admission to 7/3/2025 12:01 EDT and agree with these findings:  [x]  Laboratory list / accordion  [x]  Microbiology  [x]  Radiology  []  EKG/Telemetry   [x]  Cardiology/Vascular   []  Pathology  [x]  Old records  []  Other:    ASSESSMENT  /  PLAN:    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  Underwent right fifth metatarsal partial amputation by podiatry.  -No evidence of acute exacerbation at this time.  No evidence of pulmonary infection at this time.  Stable respiratory status.  -Uses 3 L at rest and up to 6 L with exertion.  Appropriate saturations on 3 L at rest.  -Uses bronchodilator therapy with Trelegy and albuterol at home.  -Bronchodilator therapy with Symbicort and  Yupelri in the hospital, DuoNeb as needed.  -Patient follows with Dr. Stanley Gibbs with pulmonary at [facility].  - Previously was undergoing pulmonary rehab  - Antibiotics with vancomycin and ceftriaxone for osteomyelitis, PICC line placed and plan is for outpatient daptomycin until 8/12   -Tolerated OR well with stable respiratory status  - Encouraged up as tolerated, pain control, incentive spirometry consistently.  - Stable for discharge from pulmonary standpoint.  Follow-up with his pulmonologist as an outpatient as previously scheduled.    Thank you for allowing us to participate in this patients care. Pulmonary will sign off at this time.  Please contact us further questions or concerns arise.  Gunner Galeano MD  [facility]  Pulmonary and Critical Care Medicine, Interventional Pulmonology    Parts of this note may be an electronic transcription/translation of spoken language to printed text using the Dragon dictation system.

## 2025-07-03 NOTE — PLAN OF CARE
Goal Outcome Evaluation:            Vss. Aox4. Assist x1 to BRP with walker. Dressing c/d/I. Abx administered as ordered. No c/o pain this shift. ACHS SSI. CIWA protocol discontinued. Picc line placement pending. Dc plan for SNF, most likely Monday per CCP.

## 2025-07-03 NOTE — PROGRESS NOTES
Name: Jim Marvin ADMIT: 2025   : 1935  PCP: Antonio Finley MD    MRN: [id number] LOS: 6 days   AGE/SEX: 89 y.o. male  ROOM: [facility]     Subjective   Subjective   No new complaints. No chest pain or shortness of breath. A few family members at bedside.     Objective   Objective   Vital Signs  Temp:  [98 °F (36.7 °C)-98.3 °F (36.8 °C)] 98 °F (36.7 °C)  Heart Rate:  [74-82] 77  Resp:  [18] 18  BP: (118-146)/(49-74) 146/74  SpO2:  [92 %-95 %] 95 %  on  Flow (L/min) (Oxygen Therapy):  [4] 4;   Device (Oxygen Therapy): nasal cannula  Body mass index is 27.61 kg/m².    Physical Exam  Constitutional:       General: He is not in acute distress.     Appearance: He is not toxic-appearing.   HENT:      Head: Normocephalic and atraumatic.   Cardiovascular:      Rate and Rhythm: Normal rate and regular rhythm.   Pulmonary:      Effort: Pulmonary effort is normal. No respiratory distress.      Breath sounds: Normal breath sounds. No wheezing or rhonchi.   Abdominal:      General: Bowel sounds are normal.      Palpations: Abdomen is soft.      Tenderness: There is no abdominal tenderness. There is no guarding or rebound.   Musculoskeletal:      Comments: Right foot dressed, postop changes   Skin:     General: Skin is warm and dry.   Neurological:      Mental Status: He is alert and oriented to person, place, and time.   Psychiatric:         Mood and Affect: Mood normal.         Behavior: Behavior normal.     Results Review  I reviewed the patient's new clinical results.  Results from last 7 days   Lab Units 25  0939 25  0518 25  0528 25  0354   WBC 10*3/mm3 11.72* 9.19 10.16 10.29   HEMOGLOBIN g/dL 12.4* 12.3* 10.7* 10.8*   PLATELETS 10*3/mm3 210 305 267 236     Results from last 7 days   Lab Units 25  0608 25  0939 25  0518 25  0528   SODIUM mmol/L 138 138 138 138   POTASSIUM mmol/L 3.8 3.8 4.0 4.0   CHLORIDE mmol/L 104 101 104 107   CO2 mmol/L 25.2 26.4 25.0 23.0    BUN mg/dL 10.0 11.0 10.0 12.0   CREATININE mg/dL 0.71* 0.83 0.61* 0.60*   GLUCOSE mg/dL 99 124* 93 103*     Lab Results   Component Value Date    ANIONGAP 8.8 07/03/2025     Estimated Creatinine Clearance: 90.5 mL/min (A) (by C-G formula based on SCr of 0.71 mg/dL (L)).   Lab Results   Component Value Date    EGFR 88.8 07/03/2025     Results from last 7 days   Lab Units 07/02/25  0939 06/28/25  0646 06/27/25  1433   ALBUMIN g/dL 3.0* 2.7* 3.3*   BILIRUBIN mg/dL 0.5 0.5 0.6   ALK PHOS U/L 76 79 93   AST (SGOT) U/L 17 25 29   ALT (SGPT) U/L 29 40 46*     Results from last 7 days   Lab Units 07/03/25  0608 07/02/25  0939 07/01/25  0518 06/30/25  0528 06/29/25  0354 06/28/25  0646 06/27/25  1433   CALCIUM mg/dL 8.5* 8.8 8.6 8.4*   < > 8.5* 9.0   ALBUMIN g/dL  --  3.0*  --   --   --  2.7* 3.3*    < > = values in this interval not displayed.     Results from last 7 days   Lab Units 06/27/25  1523   LACTATE mmol/L 1.5     Glucose   Date/Time Value Ref Range Status   07/03/2025 1101 101 70 - 130 mg/dL Final   07/03/2025 0616 106 70 - 130 mg/dL Final   07/02/2025 2154 115 70 - 130 mg/dL Final   07/02/2025 1556 151 (H) 70 - 130 mg/dL Final   07/02/2025 1043 120 70 - 130 mg/dL Final   07/02/2025 0643 132 (H) 70 - 130 mg/dL Final   07/01/2025 2052 126 70 - 130 mg/dL Final       XR Foot 3+ View Right  Result Date: 7/1/2025   Postsurgical changes.    This report was finalized on 7/1/2025 2:03 PM by PARVEEN PhillipsD on Workstation: [id number]          Scheduled Meds  [Held by provider] apixaban, 5 mg, Oral, Q12H  atorvastatin, 20 mg, Oral, Nightly  budesonide-formoterol, 2 puff, Inhalation, BID - RT   And  revefenacin, 175 mcg, Nebulization, Daily - RT  digoxin, 125 mcg, Oral, Daily  dilTIAZem CD, 240 mg, Oral, Daily  folic acid, 1 mg, Oral, Daily  insulin lispro, 2-7 Units, Subcutaneous, 4x Daily AC & at Bedtime  [Held by provider] metFORMIN ER, 500 mg, Oral, Daily With Breakfast  sodium chloride, 10 mL, Intravenous,  Q12H  thiamine, 100 mg, Oral, Daily  vancomycin, 1,000 mg, Intravenous, Q12H    Continuous Infusions  O2, 3 L/min, Last Rate: 4 L/min (06/27/25 1832)  Pharmacy to dose vancomycin,     PRN Meds    acetaminophen **OR** acetaminophen **OR** acetaminophen    Calcium Replacement - Follow Nurse / BPA Driven Protocol    dextrose    dextrose    glucagon (human recombinant)    ipratropium-albuterol    Magnesium Standard Dose Replacement - Follow Nurse / BPA Driven Protocol    Pharmacy to dose vancomycin    Phosphorus Replacement - Follow Nurse / BPA Driven Protocol    Potassium Replacement - Follow Nurse / BPA Driven Protocol    sodium chloride    sodium chloride    O2, 3 L/min, Last Rate: 4 L/min (06/27/25 1832)  Pharmacy to dose vancomycin,     Diet  Diet: Diabetic; Consistent Carbohydrate; Fluid Consistency: Thin (IDDSI 0)     Date/Time CIWA-Ar Total    07/03/25 0819 5     07/02/25 2047 4     07/02/25 1350 5              Assessment/Plan     Active Hospital Problems    Diagnosis  POA    **Right foot infection [L08.9]  Unknown    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     Patient is a 89 y.o. male     R DFU  R foot cellulitis  culture MRSA (history of MRSA)  MRI 6/29/2025 open wound, phlegmon/abscess, osteomyelitis fifth metatarsal head neck, septic arthritis, muscular edema  Status post partial fifth ray amputation and full thickness rotational flap right foot 7/1/2025  ID managing antibiotics: Vancomycin stop date 8/12/2025 with  weekly CBC with differential, BMP, Vanco level, and CRP     DM 2  A1c 5.80%  Correctional insulin  BS 100s    Paroxysmal atrial fibrillation  Resume Eliquis  Cardiology following    Chronic hypoxic respiratory failure   COPD  Interstitial lung disease  Flow (L/min) (Oxygen Therapy):  [4] 4 (baseline 3-4 L/min)  Pulmonology signing off. He is not on steroids chronically    History of stroke  Hypertension  Alcohol use disorder 15-20/per week. CIWA 4-5, as needed Ativan (has not required any)    DVT prophylaxis  Eliquis (home med)     Discharge  TBD  Expected Discharge Date: 7/4/2025; Expected Discharge Time:     Discussed with patient, family, nursing staff, and CCP (ccp regarding placement). Discussed with Dr. Cook to resume apixaban    Nick Bajwa MD  [facility]  07/03/25 13:48 EDT

## 2025-07-04 LAB
ANION GAP SERPL CALCULATED.3IONS-SCNC: 7.2 MMOL/L (ref 5–15)
BACTERIA SPEC AEROBE CULT: ABNORMAL
BACTERIA SPEC AEROBE CULT: ABNORMAL
BUN SERPL-MCNC: 11 MG/DL (ref 8–23)
BUN/CREAT SERPL: 15.5 (ref 7–25)
CALCIUM SPEC-SCNC: 8.4 MG/DL (ref 8.6–10.5)
CHLORIDE SERPL-SCNC: 104 MMOL/L (ref 98–107)
CO2 SERPL-SCNC: 24.8 MMOL/L (ref 22–29)
CREAT SERPL-MCNC: 0.71 MG/DL (ref 0.76–1.27)
EGFRCR SERPLBLD CKD-EPI 2021: 88.8 ML/MIN/1.73
GLUCOSE BLDC GLUCOMTR-MCNC: 89 MG/DL (ref 70–130)
GLUCOSE BLDC GLUCOMTR-MCNC: 93 MG/DL (ref 70–130)
GLUCOSE BLDC GLUCOMTR-MCNC: 99 MG/DL (ref 70–130)
GLUCOSE SERPL-MCNC: 99 MG/DL (ref 65–99)
GRAM STN SPEC: ABNORMAL
GRAM STN SPEC: ABNORMAL
POTASSIUM SERPL-SCNC: 3.7 MMOL/L (ref 3.5–5.2)
SODIUM SERPL-SCNC: 136 MMOL/L (ref 136–145)
VANCOMYCIN TROUGH SERPL-MCNC: 13.5 MCG/ML (ref 5–20)

## 2025-07-04 PROCEDURE — 94799 UNLISTED PULMONARY SVC/PX: CPT

## 2025-07-04 PROCEDURE — 25010000002 VANCOMYCIN 1 G RECONSTITUTED SOLUTION 1 EACH VIAL: Performed by: INTERNAL MEDICINE

## 2025-07-04 PROCEDURE — 80048 BASIC METABOLIC PNL TOTAL CA: CPT | Performed by: INTERNAL MEDICINE

## 2025-07-04 PROCEDURE — G0545 PR INHERENT VISIT TO INPT: HCPCS | Performed by: INTERNAL MEDICINE

## 2025-07-04 PROCEDURE — 94761 N-INVAS EAR/PLS OXIMETRY MLT: CPT

## 2025-07-04 PROCEDURE — 97110 THERAPEUTIC EXERCISES: CPT

## 2025-07-04 PROCEDURE — 99233 SBSQ HOSP IP/OBS HIGH 50: CPT | Performed by: INTERNAL MEDICINE

## 2025-07-04 PROCEDURE — 63710000001 REVEFENACIN 175 MCG/3ML SOLUTION: Performed by: PODIATRIST

## 2025-07-04 PROCEDURE — 82948 REAGENT STRIP/BLOOD GLUCOSE: CPT

## 2025-07-04 PROCEDURE — 25810000003 SODIUM CHLORIDE 0.9 % SOLUTION 250 ML FLEX CONT: Performed by: INTERNAL MEDICINE

## 2025-07-04 PROCEDURE — 94664 DEMO&/EVAL PT USE INHALER: CPT

## 2025-07-04 PROCEDURE — 80202 ASSAY OF VANCOMYCIN: CPT | Performed by: INTERNAL MEDICINE

## 2025-07-04 RX ADMIN — DILTIAZEM HYDROCHLORIDE 240 MG: 120 CAPSULE, COATED, EXTENDED RELEASE ORAL at 08:42

## 2025-07-04 RX ADMIN — REVEFENACIN 175 MCG: 175 SOLUTION RESPIRATORY (INHALATION) at 06:54

## 2025-07-04 RX ADMIN — VANCOMYCIN HYDROCHLORIDE 1000 MG: 1 INJECTION, POWDER, LYOPHILIZED, FOR SOLUTION INTRAVENOUS at 22:51

## 2025-07-04 RX ADMIN — APIXABAN 5 MG: 5 TABLET, FILM COATED ORAL at 22:51

## 2025-07-04 RX ADMIN — ATORVASTATIN CALCIUM 20 MG: 20 TABLET, FILM COATED ORAL at 22:51

## 2025-07-04 RX ADMIN — VANCOMYCIN HYDROCHLORIDE 1000 MG: 1 INJECTION, POWDER, LYOPHILIZED, FOR SOLUTION INTRAVENOUS at 10:28

## 2025-07-04 RX ADMIN — DIGOXIN 125 MCG: 0.12 TABLET ORAL at 13:18

## 2025-07-04 RX ADMIN — APIXABAN 5 MG: 5 TABLET, FILM COATED ORAL at 08:42

## 2025-07-04 RX ADMIN — Medication 10 ML: at 22:52

## 2025-07-04 RX ADMIN — BUDESONIDE AND FORMOTEROL FUMARATE DIHYDRATE 2 PUFF: 160; 4.5 AEROSOL RESPIRATORY (INHALATION) at 20:29

## 2025-07-04 RX ADMIN — BUDESONIDE AND FORMOTEROL FUMARATE DIHYDRATE 2 PUFF: 160; 4.5 AEROSOL RESPIRATORY (INHALATION) at 06:55

## 2025-07-04 RX ADMIN — Medication 10 ML: at 08:42
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
## 2025-07-04 NOTE — PLAN OF CARE
"Goal Outcome Evaluation:  Plan of Care Reviewed With: patient        Progress: no change  Outcome Evaluation: Pt seen for PT session. Pt reports he has been up ambulating in room earlier with nsg staff. Pt declined OOB activity due to \"doctor telling him to stay off his foot\". Upon review of podiatry notes pt encouraged to continue \"minimal weightbearing in postoperative shoe\". PT orders state heel weightbearing only in post op shoe. Reinforced education regarding activity restrictions with patient. Pt continued to decline OOB activity but was agreeable to sitting EOB for LE ther ex. PT will continue to follow for strengthening and mobility as able. Rec DC to SNF.                             "

## 2025-07-04 NOTE — PROGRESS NOTES
[facility] Clinical Pharmacy Services: Vancomycin Monitoring Note    Jim Marvin is a 89 y.o. male who is on pharmacy to dose vancomycin through 8/8/25 for Bone and/or Joint Infection.    Previous Vancomycin Dose: 1000 mg IV every 12 hours    Vitals/Labs  Weight: 87.3 kg (192 lb 6.4 oz)  BMI (Calculated): 27.6  Temp:  [97 °F (36.1 °C)-99 °F (37.2 °C)] 97.6 °F (36.4 °C)   Results from last 7 days   Lab Units 07/04/25  0748 07/04/25  0426 07/03/25  0608 07/02/25  1359 07/02/25  0939 07/01/25  0518 06/30/25  0528 06/29/25  1356 06/29/25  0354 06/28/25  0646   CREATININE mg/dL  --  0.71* 0.71*  --  0.83 0.61* 0.60*  --  0.63* 0.61*   VANCOMYCIN TR mcg/mL 13.50  --   --  21.00*  --   --   --  8.20  --   --      Estimated Creatinine Clearance: 90.5 mL/min (A) (by C-G formula based on SCr of 0.71 mg/dL (L)).     Assessment/Plan  Vancomycin Dose: 1000 mg IV every 12 hours; provides a predicted steady-state  mg/L.hr   Next Level: Vanc Trough on 7/7 at 0830  We will continue to monitor patient changes and renal function     Thank you for involving pharmacy in this patient's care. Please contact pharmacy with any questions or concerns.       Adonis Zayas III, PharmD  Clinical Pharmacist

## 2025-07-04 NOTE — PROGRESS NOTES
Name: Jim Marvin ADMIT: 2025   : 1935  PCP: Antonio Finley MD    MRN: [id number] LOS: 7 days   AGE/SEX: 89 y.o. male  ROOM: [facility]     Subjective   Subjective   No new complaints. No family members at bedside.     Objective   Objective   Vital Signs  Temp:  [97 °F (36.1 °C)-99 °F (37.2 °C)] 97.6 °F (36.4 °C)  Heart Rate:  [72-86] 72  Resp:  [16-20] 16  BP: (139-163)/(60-80) 157/80  SpO2:  [93 %-97 %] 93 %  on  Flow (L/min) (Oxygen Therapy):  [3-4] 3;   Device (Oxygen Therapy): nasal cannula  Body mass index is 27.61 kg/m².    Physical Exam  Constitutional:       General: He is not in acute distress.     Appearance: He is not toxic-appearing.   HENT:      Head: Normocephalic and atraumatic.   Cardiovascular:      Rate and Rhythm: Normal rate and regular rhythm.   Pulmonary:      Effort: Pulmonary effort is normal. No respiratory distress.      Breath sounds: Normal breath sounds. No wheezing or rhonchi.   Abdominal:      General: Bowel sounds are normal.      Palpations: Abdomen is soft.      Tenderness: There is no abdominal tenderness. There is no guarding or rebound.   Musculoskeletal:      Comments: Right foot dressed, postop changes   Skin:     General: Skin is warm and dry.   Neurological:      Mental Status: He is alert and oriented to person, place, and time.   Psychiatric:         Mood and Affect: Mood normal.         Behavior: Behavior normal.     Results Review  I reviewed the patient's new clinical results.  Results from last 7 days   Lab Units 25  0939 25  0518 25  0528 25  0354   WBC 10*3/mm3 11.72* 9.19 10.16 10.29   HEMOGLOBIN g/dL 12.4* 12.3* 10.7* 10.8*   PLATELETS 10*3/mm3 210 305 267 236     Results from last 7 days   Lab Units 25  0426 25  0608 25  0939 25  0518   SODIUM mmol/L 136 138 138 138   POTASSIUM mmol/L 3.7 3.8 3.8 4.0   CHLORIDE mmol/L 104 104 101 104   CO2 mmol/L 24.8 25.2 26.4 25.0   BUN mg/dL 11.0 10.0 11.0 10.0    CREATININE mg/dL 0.71* 0.71* 0.83 0.61*   GLUCOSE mg/dL 99 99 124* 93     Lab Results   Component Value Date    ANIONGAP 7.2 07/04/2025     Estimated Creatinine Clearance: 90.5 mL/min (A) (by C-G formula based on SCr of 0.71 mg/dL (L)).   Lab Results   Component Value Date    EGFR 88.8 07/04/2025     Results from last 7 days   Lab Units 07/02/25  0939 06/28/25  0646 06/27/25  1433   ALBUMIN g/dL 3.0* 2.7* 3.3*   BILIRUBIN mg/dL 0.5 0.5 0.6   ALK PHOS U/L 76 79 93   AST (SGOT) U/L 17 25 29   ALT (SGPT) U/L 29 40 46*     Results from last 7 days   Lab Units 07/04/25  0426 07/03/25  0608 07/02/25  0939 07/01/25  0518 06/29/25  0354 06/28/25  0646 06/27/25  1433   CALCIUM mg/dL 8.4* 8.5* 8.8 8.6   < > 8.5* 9.0   ALBUMIN g/dL  --   --  3.0*  --   --  2.7* 3.3*    < > = values in this interval not displayed.     Results from last 7 days   Lab Units 06/27/25  1523   LACTATE mmol/L 1.5     Glucose   Date/Time Value Ref Range Status   07/04/2025 0612 99 70 - 130 mg/dL Final   07/03/2025 2057 140 (H) 70 - 130 mg/dL Final   07/03/2025 1600 110 70 - 130 mg/dL Final   07/03/2025 1101 101 70 - 130 mg/dL Final   07/03/2025 0616 106 70 - 130 mg/dL Final   07/02/2025 2154 115 70 - 130 mg/dL Final   07/02/2025 1556 151 (H) 70 - 130 mg/dL Final       No radiology results for the last day        Scheduled Meds  apixaban, 5 mg, Oral, Q12H  atorvastatin, 20 mg, Oral, Nightly  budesonide-formoterol, 2 puff, Inhalation, BID - RT   And  revefenacin, 175 mcg, Nebulization, Daily - RT  digoxin, 125 mcg, Oral, Daily  dilTIAZem CD, 240 mg, Oral, Daily  insulin lispro, 2-7 Units, Subcutaneous, 4x Daily AC & at Bedtime  [Held by provider] metFORMIN ER, 500 mg, Oral, Daily With Breakfast  sodium chloride, 10 mL, Intravenous, Q12H  vancomycin, 1,000 mg, Intravenous, Q12H    Continuous Infusions  O2, 3 L/min, Last Rate: 4 L/min (06/27/25 1832)  Pharmacy to dose vancomycin,     PRN Meds    acetaminophen **OR** acetaminophen **OR** acetaminophen     Calcium Replacement - Follow Nurse / BPA Driven Protocol    dextrose    dextrose    glucagon (human recombinant)    ipratropium-albuterol    Magnesium Standard Dose Replacement - Follow Nurse / BPA Driven Protocol    Pharmacy to dose vancomycin    Phosphorus Replacement - Follow Nurse / BPA Driven Protocol    Potassium Replacement - Follow Nurse / BPA Driven Protocol    sodium chloride    sodium chloride    O2, 3 L/min, Last Rate: 4 L/min (06/27/25 1832)  Pharmacy to dose vancomycin,     Diet  Diet: Diabetic; Consistent Carbohydrate; Fluid Consistency: Thin (IDDSI 0)     Date/Time CIWA-Ar Total    07/03/25 2130 3     07/03/25 0819 5     07/02/25 2047 4     07/02/25 1350 5              Assessment/Plan     Active Hospital Problems    Diagnosis  POA    **Right foot infection [L08.9]  Unknown    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     Patient is a 89 y.o. 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 He is not on steroids chronically    History of stroke  Hypertension  Alcohol use disorder 15-20/per week. CIWA 3-5, as needed Ativan (has not required any). I think can stop checking CIWA    DVT prophylaxis  Eliquis (home med)     Discharge  Plan for SNF  Expected Discharge Date: 7/7/2025; Expected Discharge Time:     Discussed with patient and nursing staff     Nick Bajwa MD  [facility]  07/04/25 10:39 EDT

## 2025-07-04 NOTE — PROGRESS NOTES
Podiatry Progress Note      Patient: Jim Marvin Admit Date: 2025    Age: 89 y.o.   PCP: Antonio Finley MD    MRN: [id number]  Room: [facility]        Subjective     Chief Complaint     Chief Complaint   Patient presents with    Wound Check        HPI     Patient resting in bed, no complaints of pain today.  Postop shoe in place.    Past Medical History     Past Medical History:   Diagnosis Date    COPD (chronic obstructive pulmonary disease)     Hyperlipidemia     Hypertension     Stroke         Past Surgical History:   Procedure Laterality Date    CATARACT EXTRACTION Left 2022    INCISION AND DRAINAGE LEG Right 2025    Procedure: INCISION AND DRAINAGE, RIGHT FOOT;  Surgeon: Daniel Pedraza DPM;  Location: [facility];  Service: Podiatry;  Laterality: Right;    TRANS METATARSAL AMPUTATION Right 2025    Procedure: Partial fifth ray amputation, right foot;  Surgeon: Daniel Pedraza DPM;  Location: [facility];  Service: Podiatry;  Laterality: Right;        Allergies   Allergen Reactions    Ace Inhibitors Other (See Comments)     Cannot remember     Lisinopril Other (See Comments)     Cannot remember        Social History     Tobacco Use   Smoking Status Former    Current packs/day: 0.00    Average packs/day: 1 pack/day for 40.0 years (40.0 ttl pk-yrs)    Types: Cigarettes    Start date:     Quit date:     Years since quittin.5    Passive exposure: Past   Smokeless Tobacco Never   Tobacco Comments    Quit 19 years ago         Objective   Physical Exam    Vitals:    25 0811   BP: 157/80   Pulse: 72   Resp: 16   Temp: 97.6 °F (36.4 °C)   SpO2: 93%        Incisions are well coapted, sutures are intact.  No purulent drainage noted.  Flap is intact with good capillary refill.  Erythema and edema improved.      Labs     Lab Results   Component Value Date    HGBA1C 5.80 (H) 2025    POCGLU 99 2025    SEDRATE 53 (H) 2025        CBC:      Lab 25  0939  07/01/25  0518 06/30/25  0528 06/29/25  0354 06/28/25  0646 06/27/25  1433   WBC 11.72* 9.19 10.16 10.29 12.18* 12.39*   HEMOGLOBIN 12.4* 12.3* 10.7* 10.8* 10.8* 11.8*   HEMATOCRIT 37.0* 35.5* 31.3* 32.0* 32.8* 35.1*   PLATELETS 210 305 267 236 228 232   NEUTROS ABS  --   --   --   --  8.18* 8.89*   IMMATURE GRANS (ABS)  --   --   --   --  0.20* 0.20*   LYMPHS ABS  --   --   --   --  0.89 0.76   MONOS ABS  --   --   --   --  1.67* 1.83*   EOS ABS  --   --   --   --  1.08* 0.58*   MCV 95.9 95.9 95.4 96.1 97.9* 97.8*          Results for orders placed or performed during the hospital encounter of 06/27/25   Blood Culture - Blood, Hand, Right    Specimen: Hand, Right; Blood   Result Value Ref Range    Blood Culture No growth at 5 days         XR Foot 3+ View Right  Narrative: XR FOOT 3+ VW RIGHT-     INDICATIONS: Postoperative evaluation.     TECHNIQUE: 3 views of the right foot     COMPARISON: 6/27/2025     FINDINGS:     Partial amputation change of the fifth ray is seen at the level of the  mid fifth metatarsal, with adjacent surgical soft tissue swelling and  gas. No acute fracture, erosion, or dislocation is identified. Arterial  calcifications are conspicuous.     Impression:    Postsurgical changes.           This report was finalized on 7/1/2025 2:03 PM by Dr. Ortiz Barber M.D on Workstation: [id number]          Assessment/Plan     89-year-old male postop day #3 from partial fifth ray amputation and flap closure of the right foot    -Patient examined and evaluated by myself  - New dry sterile dressing with Betadine and gauze applied to the right lower extremity today  - Continue minimal weightbearing in postoperative shoe  - Infectious disease at bedside and plan for PICC line and antibiotics.  Appreciate their recommendations  - Patient is okay for discharge from my standpoint.  He was instructed to have close follow-up with myself.  If he is still here early next week, I will still stop up to see him and  likely change his dressing at that time.      Daniel Pedraza DPM  Office: [phone]

## 2025-07-04 NOTE — PLAN OF CARE
Goal Outcome Evaluation:  Plan of Care Reviewed With: patient        Progress: improving  Outcome Evaluation: POD#3 Of Partial Fifth Ray Amputation Right Foot I & D. Dressing to foot dry/intact. VSS. Zero complain of pain at this time. Voiding fine. Orthopedic shoe in place. Patient is in contact isolation for MRSA. Education provided on infection control and blood sugar monitoring. Patient verbalized understanding.

## 2025-07-04 NOTE — PLAN OF CARE
Goal Outcome Evaluation:         Vss. Dressing c/d/I, dressing change completed this shift by MD. Grahamx4. Forgetful intermittently. No c/o pain this shift. Assist x1 to BRP. ACHS SSI. Abx administered as ordered. Picc line placement pending from IV team. Discharge plan pending to facility, possibly Monday pending referrals.

## 2025-07-04 NOTE — PROGRESS NOTES
ID progress note     CC: Follow-up diabetic right foot infection due to MRSA    Subjective: No acute events.  No fever.  Foot feels about the same.  No side effects from vancomycin.  Has not gotten his PICC line yet.    Medications:    Current Facility-Administered Medications:     acetaminophen (TYLENOL) tablet 650 mg, 650 mg, Oral, Q4H PRN **OR** acetaminophen (TYLENOL) 160 MG/5ML oral solution 650 mg, 650 mg, Oral, Q4H PRN **OR** acetaminophen (TYLENOL) suppository 650 mg, 650 mg, Rectal, Q4H PRN, KeilaDaniel, DPM    apixaban (ELIQUIS) tablet 5 mg, 5 mg, Oral, Q12H, Nick Bajwa MD, 5 mg at 07/04/25 0842    atorvastatin (LIPITOR) tablet 20 mg, 20 mg, Oral, Nightly, KeilaDaneil, DPM, 20 mg at 07/03/25 2112    budesonide-formoterol (SYMBICORT) 160-4.5 MCG/ACT inhaler 2 puff, 2 puff, Inhalation, BID - RT, 2 puff at 07/04/25 0655 **AND** revefenacin (YUPELRI) nebulizer solution 175 mcg, 175 mcg, Nebulization, Daily - RT, Daniel Pedraza DPM, 175 mcg at 07/04/25 0654    Calcium Replacement - Follow Nurse / BPA Driven Protocol, , Not Applicable, PRN, Keila, Daniel, DPM    dextrose (D50W) (25 g/50 mL) IV injection 25 g, 25 g, Intravenous, Q15 Min PRN, KeilaDaniel, DPM    dextrose (GLUTOSE) oral gel 15 g, 15 g, Oral, Q15 Min PRN, KeilaDaniel, DPM    digoxin (LANOXIN) tablet 125 mcg, 125 mcg, Oral, Daily, KeilaDaniel, DPM, 125 mcg at 07/03/25 1222    dilTIAZem CD (CARDIZEM CD) 24 hr capsule 240 mg, 240 mg, Oral, Daily, Keila, Daniel, DPM, 240 mg at 07/04/25 0842    glucagon (GLUCAGEN) injection 1 mg, 1 mg, Intramuscular, Q15 Min PRN, KeilaDaniel luke, DPM    insulin lispro (HUMALOG/ADMELOG) injection 2-7 Units, 2-7 Units, Subcutaneous, 4x Daily AC & at Bedtime, Daniel Pedraza DPM, 2 Units at 06/28/25 1632    ipratropium-albuterol (DUO-NEB) nebulizer solution 3 mL, 3 mL, Nebulization, Q6H PRN, Daniel Pedraza DPM    Magnesium Standard Dose Replacement - Follow Nurse / BPA Driven  Protocol, , Not Applicable, PRN, Daniel Pedraza DPM    [Held by provider] metFORMIN ER (GLUCOPHAGE-XR) 24 hr tablet 500 mg, 500 mg, Oral, Daily With Breakfast, Rodríguez Araujo MD    O2 (OXYGEN), 3 L/min, Inhalation, Continuous, Daniel Pedraza DPFARRUKH, Last Rate: 240,000 mL/hr at 06/27/25 1832, 4 L/min at 06/27/25 1832    Pharmacy to dose vancomycin, , Not Applicable, Continuous PRN, Daniel Pedraza DPFARRUKH    Phosphorus Replacement - Follow Nurse / BPA Driven Protocol, , Not Applicable, PRN, KeilaDaniel camilo DPM    Potassium Replacement - Follow Nurse / BPA Driven Protocol, , Not Applicable, PRN, Daniel Pedraza DPM    sodium chloride 0.9 % flush 10 mL, 10 mL, Intravenous, Q12H, Daniel Pedraza DPM, 10 mL at 07/03/25 2112    sodium chloride 0.9 % flush 10 mL, 10 mL, Intravenous, PRN, Daniel Pedraza DPFARRUKH    sodium chloride 0.9 % infusion 40 mL, 40 mL, Intravenous, PRN, Daniel Pedraza DPFARRUKH    vancomycin (VANCOCIN) 1,000 mg in sodium chloride 0.9 % 250 mL IVPB-VTB, 1,000 mg, Intravenous, Q12H, Guero Kern MD, Last Rate: 250 mL/hr at 07/03/25 2109, 1,000 mg at 07/03/25 2109      Objective   Vital Signs   Temp:  [97 °F (36.1 °C)-99 °F (37.2 °C)] 97.6 °F (36.4 °C)  Heart Rate:  [72-86] 72  Resp:  [16-20] 16  BP: (139-163)/(60-80) 157/80    Physical Exam:   General: awake, alert, NAD, very nice, lying in bed  Eyes: no scleral icterus  Cardiovascular: Normal rate  Respiratory: normal work of breathing without wheezing on nasal cannula  GI: Abdomen is nondistended  :  no Hernandez catheter  MSK/skin: Right foot incision is dry, clean, sutures  Neurological: Alert and oriented x 3  Psychiatric: Normal mood and affect   Vascular: LUE PIV without erythema    Labs:   BMP, Vanco level, and operative wound cultures reviewed today  Lab Results   Component Value Date    WBC 11.72 (H) 07/02/2025    HGB 12.4 (L) 07/02/2025    HCT 37.0 (L) 07/02/2025    MCV 95.9 07/02/2025     07/02/2025     Lab Results    Component Value Date    GLUCOSE 99 07/04/2025    CALCIUM 8.4 (L) 07/04/2025     07/04/2025    K 3.7 07/04/2025    CO2 24.8 07/04/2025     07/04/2025    BUN 11.0 07/04/2025    CREATININE 0.71 (L) 07/04/2025    EGFR 88.8 07/04/2025    BCR 15.5 07/04/2025    ANIONGAP 7.2 07/04/2025     Lab Results   Component Value Date    ALT 29 07/02/2025     Lab Results   Component Value Date    HGBA1C 5.80 (H) 06/28/2025     Lab Results   Component Value Date    CRP 6.31 (H) 07/02/2025     Lab Results   Component Value Date    VANCOTROUGH 13.50 07/04/2025     Lab Results   Component Value Date    CKTOTAL 48 07/02/2025       Microbiology:  6/13 BCx: Negative  6/27 BCx: Negative  6/28 right foot wound Cx: MRSA (susceptible to clindamycin, doxycycline, Bactrim, and vancomycin)  7/1 OR Cx: Scant growth MRSA    Prior radiology:  MRI right foot shows wound, osteomyelitis, and septic arthritis    X-ray right foot with soft tissue swelling cannot exclude osteomyelitis    Isolation:  Contact for history of MRSA    ASSESSMENT/PLAN:  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  Operative cultures grew MRSA consistent with his pre-- operative wound culture.  I recommend that he be treated with vancomycin 1 g IV every 12 hours x 6 weeks with stop date 8/12/2025 at which time he will follow-up with me in the infectious diseases clinic.  I I have ordered a single-lumen PICC line.  He will need a weekly CBC with differential, BMP, Vanco level, and CRP faxed to me at [phone].    D/W Dr. Pedraza.    Thank you for allowing me to be involved in the care of this patient. Infectious diseases will sign off at this time with antibiotics plan in place, but please call me at [phone] if any further ID questions or new ID  concerns.

## 2025-07-05 LAB
ANION GAP SERPL CALCULATED.3IONS-SCNC: 9.2 MMOL/L (ref 5–15)
BASOPHILS # BLD AUTO: 0.14 10*3/MM3 (ref 0–0.2)
BASOPHILS NFR BLD AUTO: 1.4 % (ref 0–1.5)
BUN SERPL-MCNC: 11 MG/DL (ref 8–23)
BUN/CREAT SERPL: 14.9 (ref 7–25)
CALCIUM SPEC-SCNC: 8.5 MG/DL (ref 8.6–10.5)
CHLORIDE SERPL-SCNC: 104 MMOL/L (ref 98–107)
CO2 SERPL-SCNC: 22.8 MMOL/L (ref 22–29)
CREAT SERPL-MCNC: 0.74 MG/DL (ref 0.76–1.27)
DEPRECATED RDW RBC AUTO: 41.9 FL (ref 37–54)
EGFRCR SERPLBLD CKD-EPI 2021: 87.7 ML/MIN/1.73
EOSINOPHIL # BLD AUTO: 1.37 10*3/MM3 (ref 0–0.4)
EOSINOPHIL NFR BLD AUTO: 13.8 % (ref 0.3–6.2)
ERYTHROCYTE [DISTWIDTH] IN BLOOD BY AUTOMATED COUNT: 11.8 % (ref 12.3–15.4)
GLUCOSE BLDC GLUCOMTR-MCNC: 100 MG/DL (ref 70–130)
GLUCOSE BLDC GLUCOMTR-MCNC: 107 MG/DL (ref 70–130)
GLUCOSE BLDC GLUCOMTR-MCNC: 107 MG/DL (ref 70–130)
GLUCOSE BLDC GLUCOMTR-MCNC: 110 MG/DL (ref 70–130)
GLUCOSE SERPL-MCNC: 93 MG/DL (ref 65–99)
HCT VFR BLD AUTO: 33.7 % (ref 37.5–51)
HGB BLD-MCNC: 11.3 G/DL (ref 13–17.7)
IMM GRANULOCYTES # BLD AUTO: 0.29 10*3/MM3 (ref 0–0.05)
IMM GRANULOCYTES NFR BLD AUTO: 2.9 % (ref 0–0.5)
LYMPHOCYTES # BLD AUTO: 1.39 10*3/MM3 (ref 0.7–3.1)
LYMPHOCYTES NFR BLD AUTO: 14 % (ref 19.6–45.3)
MCH RBC QN AUTO: 32.5 PG (ref 26.6–33)
MCHC RBC AUTO-ENTMCNC: 33.5 G/DL (ref 31.5–35.7)
MCV RBC AUTO: 96.8 FL (ref 79–97)
MONOCYTES # BLD AUTO: 1.15 10*3/MM3 (ref 0.1–0.9)
MONOCYTES NFR BLD AUTO: 11.6 % (ref 5–12)
NEUTROPHILS NFR BLD AUTO: 5.59 10*3/MM3 (ref 1.7–7)
NEUTROPHILS NFR BLD AUTO: 56.3 % (ref 42.7–76)
NRBC BLD AUTO-RTO: 0 /100 WBC (ref 0–0.2)
PLATELET # BLD AUTO: 313 10*3/MM3 (ref 140–450)
PMV BLD AUTO: 8.6 FL (ref 6–12)
POTASSIUM SERPL-SCNC: 4.1 MMOL/L (ref 3.5–5.2)
RBC # BLD AUTO: 3.48 10*6/MM3 (ref 4.14–5.8)
SODIUM SERPL-SCNC: 136 MMOL/L (ref 136–145)
WBC NRBC COR # BLD AUTO: 9.93 10*3/MM3 (ref 3.4–10.8)

## 2025-07-05 PROCEDURE — 94799 UNLISTED PULMONARY SVC/PX: CPT

## 2025-07-05 PROCEDURE — 94761 N-INVAS EAR/PLS OXIMETRY MLT: CPT

## 2025-07-05 PROCEDURE — 80048 BASIC METABOLIC PNL TOTAL CA: CPT | Performed by: INTERNAL MEDICINE

## 2025-07-05 PROCEDURE — 82948 REAGENT STRIP/BLOOD GLUCOSE: CPT

## 2025-07-05 PROCEDURE — 63710000001 REVEFENACIN 175 MCG/3ML SOLUTION: Performed by: PODIATRIST

## 2025-07-05 PROCEDURE — 94760 N-INVAS EAR/PLS OXIMETRY 1: CPT

## 2025-07-05 PROCEDURE — 85025 COMPLETE CBC W/AUTO DIFF WBC: CPT | Performed by: HOSPITALIST

## 2025-07-05 PROCEDURE — 94664 DEMO&/EVAL PT USE INHALER: CPT

## 2025-07-05 PROCEDURE — 25810000003 SODIUM CHLORIDE 0.9 % SOLUTION 250 ML FLEX CONT: Performed by: INTERNAL MEDICINE

## 2025-07-05 PROCEDURE — 25010000002 VANCOMYCIN 1 G RECONSTITUTED SOLUTION 1 EACH VIAL: Performed by: INTERNAL MEDICINE

## 2025-07-05 RX ADMIN — VANCOMYCIN HYDROCHLORIDE 1000 MG: 1 INJECTION, POWDER, LYOPHILIZED, FOR SOLUTION INTRAVENOUS at 09:18

## 2025-07-05 RX ADMIN — BUDESONIDE AND FORMOTEROL FUMARATE DIHYDRATE 2 PUFF: 160; 4.5 AEROSOL RESPIRATORY (INHALATION) at 11:07

## 2025-07-05 RX ADMIN — ATORVASTATIN CALCIUM 20 MG: 20 TABLET, FILM COATED ORAL at 21:19

## 2025-07-05 RX ADMIN — BUDESONIDE AND FORMOTEROL FUMARATE DIHYDRATE 2 PUFF: 160; 4.5 AEROSOL RESPIRATORY (INHALATION) at 20:11

## 2025-07-05 RX ADMIN — VANCOMYCIN HYDROCHLORIDE 1000 MG: 1 INJECTION, POWDER, LYOPHILIZED, FOR SOLUTION INTRAVENOUS at 21:19

## 2025-07-05 RX ADMIN — REVEFENACIN 175 MCG: 175 SOLUTION RESPIRATORY (INHALATION) at 11:04

## 2025-07-05 RX ADMIN — DILTIAZEM HYDROCHLORIDE 240 MG: 120 CAPSULE, COATED, EXTENDED RELEASE ORAL at 09:18

## 2025-07-05 RX ADMIN — APIXABAN 5 MG: 5 TABLET, FILM COATED ORAL at 09:17

## 2025-07-05 RX ADMIN — Medication 10 ML: at 21:20

## 2025-07-05 RX ADMIN — DIGOXIN 125 MCG: 0.12 TABLET ORAL at 12:38

## 2025-07-05 RX ADMIN — Medication 10 ML: at 09:18

## 2025-07-05 RX ADMIN — APIXABAN 5 MG: 5 TABLET, FILM COATED ORAL at 21:19

## 2025-07-05 NOTE — PROGRESS NOTES
"Patient Name: Jim Marvin  YOB: 1935  MRN: [id number]  Admission date: 6/27/2025  Reason for Encounter: [facility] Clinical Nutrition Assessment     Subjective    Subjective Information     Pt is a 89 y.o. male with a history of CVA, tremors, DM, a-fib, HTN, bilateral venous stasis, and stasis dermatitis of the lower extremities, and dyslipidemia who presented for further management of right foot diabetic ulcer. POD#5 Of Partial Fifth Toe Amputation.     Assessment    H&P and Current Problems      H&P  Past Medical History:   Diagnosis Date    COPD (chronic obstructive pulmonary disease)     Hyperlipidemia     Hypertension     Stroke       Past Surgical History:   Procedure Laterality Date    CATARACT EXTRACTION Left 07/06/2022    INCISION AND DRAINAGE LEG Right 7/1/2025    Procedure: INCISION AND DRAINAGE, RIGHT FOOT;  Surgeon: Daniel Pedraza DPM;  Location: [facility];  Service: Podiatry;  Laterality: Right;    TRANS METATARSAL AMPUTATION Right 7/1/2025    Procedure: Partial fifth ray amputation, right foot;  Surgeon: Daniel Pedraza DPM;  Location: [facility];  Service: Podiatry;  Laterality: Right;      Current Problems   Admission Diagnosis:  Foot infection [L08.9]  Right foot infection [L08.9]    Problem List:    Right foot infection    Hypertension    History of cerebellar stroke    Obesity    Emphysema lung    ILD (interstitial lung disease)    Paroxysmal atrial fibrillation    MRSA carrier    Chronic respiratory failure with hypoxia    Cellulitis of right foot    Alcohol use disorder    Diabetic infection of right foot      Other Applicable Nutrition Information:        Anthropometrics      BMI, Height, Weight Estimated body mass index is 27.61 kg/m² as calculated from the following:    Height as of this encounter: 177.8 cm (70\").    Weight as of this encounter: 87.3 kg (192 lb 6.4 oz).    Weight Method: Standing scale       Trending Weight Changes 7/3:No " significant changes       Weight History  Wt Readings from Last 10 Encounters:   06/27/25 87.3 kg (192 lb 6.4 oz)   03/16/25 89.1 kg (196 lb 6.9 oz)   01/17/25 89.9 kg (198 lb 3.2 oz)   09/14/24 88.9 kg (196 lb)   09/13/24 89.2 kg (196 lb 9.6 oz)   09/02/24 87.1 kg (192 lb 0.3 oz)   08/09/24 88.5 kg (195 lb)   07/15/24 88.5 kg (195 lb)   06/24/24 88.7 kg (195 lb 8.8 oz)   06/08/24 94.3 kg (207 lb 14.3 oz)        Labs      Comment: Reviewed, management per MD.     Results from last 7 days   Lab Units 07/05/25  0408 07/04/25  0426 07/03/25  0608 07/02/25  0939   SODIUM mmol/L 136 136 138 138   POTASSIUM mmol/L 4.1 3.7 3.8 3.8   GLUCOSE mg/dL 93 99 99 124*   BUN mg/dL 11.0 11.0 10.0 11.0   CREATININE mg/dL 0.74* 0.71* 0.71* 0.83   CALCIUM mg/dL 8.5* 8.4* 8.5* 8.8   ALBUMIN g/dL  --   --   --  3.0*   CRP mg/dL  --   --   --  6.31*   BILIRUBIN mg/dL  --   --   --  0.5   ALK PHOS U/L  --   --   --  76   AST (SGOT) U/L  --   --   --  17   ALT (SGPT) U/L  --   --   --  29     Results from last 7 days   Lab Units 07/05/25  0408 07/02/25  0939 07/01/25  0518   PLATELETS 10*3/mm3 313 210 305   HEMOGLOBIN g/dL 11.3* 12.4* 12.3*   HEMATOCRIT % 33.7* 37.0* 35.5*     Lab Results   Component Value Date    HGBA1C 5.80 (H) 06/28/2025          Medications       Scheduled Medications apixaban, 5 mg, Oral, Q12H  atorvastatin, 20 mg, Oral, Nightly  budesonide-formoterol, 2 puff, Inhalation, BID - RT   And  revefenacin, 175 mcg, Nebulization, Daily - RT  digoxin, 125 mcg, Oral, Daily  dilTIAZem CD, 240 mg, Oral, Daily  insulin lispro, 2-7 Units, Subcutaneous, 4x Daily AC & at Bedtime  sodium chloride, 10 mL, Intravenous, Q12H  vancomycin, 1,000 mg, Intravenous, Q12H        Infusions O2, 3 L/min, Last Rate: 4 L/min (06/27/25 0462)  Pharmacy to dose vancomycin,         PRN Medications   acetaminophen **OR** acetaminophen **OR** acetaminophen    Calcium Replacement - Follow Nurse / BPA Driven Protocol    dextrose    dextrose    glucagon  (human recombinant)    ipratropium-albuterol    Magnesium Standard Dose Replacement - Follow Nurse / BPA Driven Protocol    Pharmacy to dose vancomycin    Phosphorus Replacement - Follow Nurse / BPA Driven Protocol    Potassium Replacement - Follow Nurse / BPA Driven Protocol    sodium chloride    sodium chloride     Physical Findings      Chewing/Swallowing    No issues identified at this time   Dentition Mouth/Teeth WDL: .WDL except, teeth   Teeth Symptoms: tooth/teeth missing   Edema   1+ (trace), 2+ (mild)    Gastrointestinal last bowel movement: 7/1   Skin surgical incision: right fifth toe open, location of wound: right posterior    Lines/Drains none   I/O reviewed      Nutrition Focused Physical Exam     NFPE Not indicated at this time  07/05/25       Malnutrition Severity Assessment            (1)   Current Nutrition Orders & Evaluation of Intake      Oral Nutrition     Food Allergies  and Intolerances NKFA   Current PO Diet Diet: Diabetic; Consistent Carbohydrate; Fluid Consistency: Thin (IDDSI 0)   Oral Nutrition Supplement None     Trending % PO Intake %, mostly 100%     Estimated Requirements         Weight used  87.3 kg    Calories  2619 -  3056 kcals (30 kcal/kg, 35 kcal/kg)    Protein  105 - 131 g (1.2 - 1.5 gm/kg)    Fluid  (1 mL/kcal)       Assessment & Plan   Nutrition Diagnosis and Goals       Nutrition Diagnosis Problem: Increased Nutrient Needs  Etiology: Medical Diagnosis - right diabetic foot ulcer   Signs/Symptoms: Protein        Goal(s) Accepts Oral Nutrition Supplement and Skin Integrity to Improve     Nutrition Intervention and Prescription       Intervention  Start oral nutrition supplement      Diet Prescription     Supplement Prescription [place]on BID    Education Provided     (3)  Monitoring/Evaluation       Monitor/Evaluation Per Protocol and Skin Status      Electronically signed by:  Estelle Cassidy RD  07/05/25 13:24 EDT

## 2025-07-05 NOTE — PROGRESS NOTES
Name: Jim Marvin ADMIT: 2025   : 1935  PCP: Antonio Finley MD    MRN: [id number] LOS: 8 days   AGE/SEX: 89 y.o. male  ROOM: [facility]     Subjective   Subjective   No acute events overnight.  Patient not having any pain in his foot.  No chest pain or shortness of breath.  Eating and drinking well.    Objective   Objective     Vital Signs  Temp:  [97 °F (36.1 °C)-97.9 °F (36.6 °C)] 97 °F (36.1 °C)  Heart Rate:  [73-87] 85  Resp:  [16] 16  BP: (138-151)/(62-75) 140/69  SpO2:  [91 %-98 %] 95 %  on  Flow (L/min) (Oxygen Therapy):  [3] 3;   Device (Oxygen Therapy): nasal cannula  Body mass index is 27.61 kg/m².    Physical Exam  General: Alert, no acute distress.  Sitting up in chair at bedside eating breakfast.  ENT: No conjunctival injection or scleral icterus. Moist mucous membranes.   Neuro: Eyes open and moving in all directions, moving all extremities, face symmetric, no focal deficits.   Lungs: Clear to auscultation bilaterally. No wheeze or crackles. No distress.   Heart: RRR, no murmurs. No edema.  Abdomen: Soft, non-tender, non-distended. Normal bowel sounds.   Ext: Right foot with postop dressing in place and surgical shoe  Skin: No rashes or lesions. IV site without swelling or erythema.     Results Review     I reviewed the patient's new clinical results:  Results from last 7 days   Lab Units 25  0408 25  0939 25  0518 25  0528   WBC 10*3/mm3 9.93 11.72* 9.19 10.16   HEMOGLOBIN g/dL 11.3* 12.4* 12.3* 10.7*   PLATELETS 10*3/mm3 313 210 305 267     Results from last 7 days   Lab Units 25  0408 25  0426 25  0608 25  0939   SODIUM mmol/L 136 136 138 138   POTASSIUM mmol/L 4.1 3.7 3.8 3.8   CHLORIDE mmol/L 104 104 104 101   CO2 mmol/L 22.8 24.8 25.2 26.4   BUN mg/dL 11.0 11.0 10.0 11.0   CREATININE mg/dL 0.74* 0.71* 0.71* 0.83   GLUCOSE mg/dL 93 99 99 124*   EGFR mL/min/1.73 87.7 88.8 88.8 84.7     Results from last 7 days   Lab Units  07/02/25  0939   ALBUMIN g/dL 3.0*   BILIRUBIN mg/dL 0.5   ALK PHOS U/L 76   AST (SGOT) U/L 17   ALT (SGPT) U/L 29     Results from last 7 days   Lab Units 07/05/25  0408 07/04/25  0426 07/03/25  0608 07/02/25  0939   CALCIUM mg/dL 8.5* 8.4* 8.5* 8.8   ALBUMIN g/dL  --   --   --  3.0*       Glucose   Date/Time Value Ref Range Status   07/05/2025 0621 100 70 - 130 mg/dL Final   07/04/2025 2025 89 70 - 130 mg/dL Final   07/04/2025 1603 93 70 - 130 mg/dL Final   07/04/2025 0612 99 70 - 130 mg/dL Final   07/03/2025 2057 140 (H) 70 - 130 mg/dL Final   07/03/2025 1600 110 70 - 130 mg/dL Final   07/03/2025 1101 101 70 - 130 mg/dL Final       No radiology results for the last day    I have personally reviewed all medications:  Scheduled Medications  apixaban, 5 mg, Oral, Q12H  atorvastatin, 20 mg, Oral, Nightly  budesonide-formoterol, 2 puff, Inhalation, BID - RT   And  revefenacin, 175 mcg, Nebulization, Daily - RT  digoxin, 125 mcg, Oral, Daily  dilTIAZem CD, 240 mg, Oral, Daily  insulin lispro, 2-7 Units, Subcutaneous, 4x Daily AC & at Bedtime  [Held by provider] metFORMIN ER, 500 mg, Oral, Daily With Breakfast  sodium chloride, 10 mL, Intravenous, Q12H  vancomycin, 1,000 mg, Intravenous, Q12H    Infusions  O2, 3 L/min, Last Rate: 4 L/min (06/27/25 1832)  Pharmacy to dose vancomycin,     Diet  Diet: Diabetic; Consistent Carbohydrate; Fluid Consistency: Thin (IDDSI 0)      Intake/Output Summary (Last 24 hours) at 7/5/2025 1026  Last data filed at 7/5/2025 0827  Gross per 24 hour   Intake 1070 ml   Output --   Net 1070 ml       Assessment/Plan     Active Hospital Problems    Diagnosis  POA    **Right foot infection [L08.9]  Unknown    Diabetic infection of right foot [E11.628, L08.9]  Yes    Alcohol use disorder [F10.90]  Yes    Cellulitis of right foot [L03.115]  Yes    Chronic respiratory failure with hypoxia [J96.11]  Yes    MRSA carrier [Z22.322]  Not Applicable    Paroxysmal atrial fibrillation [I48.0]  Yes    ILD  (interstitial lung disease) [J84.9]  Yes    Emphysema lung [J43.9]  Yes    Obesity [E66.9]  Yes    History of cerebellar stroke [Z86.73]  Not Applicable    Hypertension [I10]  Yes      Resolved Hospital Problems   No resolved problems to display.       89 y.o. male with Right foot infection.    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 He is not on steroids chronically     History of stroke Eliquis, statin  Hypertension blood pressure trend acceptable, continue current meds.  Titrate as needed.  Alcohol use disorder 15-20/per week. CIWA now discontinued.    Eliquis (home med) for DVT prophylaxis.  Full code.  Discussed with patient and nursing staff.  Anticipate discharge to SNU facility once arrangements have been made.      Yumi Linder MD  [facility]  07/05/25  10:26 EDT

## 2025-07-05 NOTE — PLAN OF CARE
Goal Outcome Evaluation:           Progress: improving  Outcome Evaluation: VSS, on 3L O2, denies pain, up in chair, up to bathroom w/ standby assist, falls precautions maintained, accu checks w/ no SSI needed. Dressing CDI. contact isolation maintained, IV abx per order, awaiting discharge plan

## 2025-07-05 NOTE — PLAN OF CARE
Goal Outcome Evaluation:  Plan of Care Reviewed With: patient        Progress: improving  Outcome Evaluation: POD#5 Of Partial Fifth Toe Amputation and I &D Of Right Foot. Dressing to foot dry / intact. boot in place. VSS. Zero complain of pain. Dressing changed by MD yesterday. Ambulating with assistance. Voiding per brp. Patient in contact isolation for MRSA. Education provided on safety and blood sugar monitoring. Patient verbalized understanding.

## 2025-07-06 LAB
ANION GAP SERPL CALCULATED.3IONS-SCNC: 9 MMOL/L (ref 5–15)
BACTERIA SPEC ANAEROBE CULT: NORMAL
BASOPHILS # BLD AUTO: 0.17 10*3/MM3 (ref 0–0.2)
BASOPHILS NFR BLD AUTO: 1.9 % (ref 0–1.5)
BUN SERPL-MCNC: 13 MG/DL (ref 8–23)
BUN/CREAT SERPL: 18.1 (ref 7–25)
CALCIUM SPEC-SCNC: 8.7 MG/DL (ref 8.6–10.5)
CHLORIDE SERPL-SCNC: 105 MMOL/L (ref 98–107)
CO2 SERPL-SCNC: 24 MMOL/L (ref 22–29)
CREAT SERPL-MCNC: 0.72 MG/DL (ref 0.76–1.27)
DEPRECATED RDW RBC AUTO: 43.9 FL (ref 37–54)
EGFRCR SERPLBLD CKD-EPI 2021: 88.4 ML/MIN/1.73
EOSINOPHIL # BLD AUTO: 1.36 10*3/MM3 (ref 0–0.4)
EOSINOPHIL NFR BLD AUTO: 14.9 % (ref 0.3–6.2)
ERYTHROCYTE [DISTWIDTH] IN BLOOD BY AUTOMATED COUNT: 12.3 % (ref 12.3–15.4)
GLUCOSE BLDC GLUCOMTR-MCNC: 105 MG/DL (ref 70–130)
GLUCOSE BLDC GLUCOMTR-MCNC: 123 MG/DL (ref 70–130)
GLUCOSE BLDC GLUCOMTR-MCNC: 138 MG/DL (ref 70–130)
GLUCOSE BLDC GLUCOMTR-MCNC: 139 MG/DL (ref 70–130)
GLUCOSE SERPL-MCNC: 106 MG/DL (ref 65–99)
HCT VFR BLD AUTO: 35.4 % (ref 37.5–51)
HGB BLD-MCNC: 11.5 G/DL (ref 13–17.7)
IMM GRANULOCYTES # BLD AUTO: 0.25 10*3/MM3 (ref 0–0.05)
IMM GRANULOCYTES NFR BLD AUTO: 2.7 % (ref 0–0.5)
LYMPHOCYTES # BLD AUTO: 1.58 10*3/MM3 (ref 0.7–3.1)
LYMPHOCYTES NFR BLD AUTO: 17.3 % (ref 19.6–45.3)
MCH RBC QN AUTO: 31.7 PG (ref 26.6–33)
MCHC RBC AUTO-ENTMCNC: 32.5 G/DL (ref 31.5–35.7)
MCV RBC AUTO: 97.5 FL (ref 79–97)
MONOCYTES # BLD AUTO: 1.1 10*3/MM3 (ref 0.1–0.9)
MONOCYTES NFR BLD AUTO: 12 % (ref 5–12)
NEUTROPHILS NFR BLD AUTO: 4.69 10*3/MM3 (ref 1.7–7)
NEUTROPHILS NFR BLD AUTO: 51.2 % (ref 42.7–76)
NRBC BLD AUTO-RTO: 0 /100 WBC (ref 0–0.2)
PLATELET # BLD AUTO: 307 10*3/MM3 (ref 140–450)
PMV BLD AUTO: 8.6 FL (ref 6–12)
POTASSIUM SERPL-SCNC: 4.1 MMOL/L (ref 3.5–5.2)
RBC # BLD AUTO: 3.63 10*6/MM3 (ref 4.14–5.8)
SODIUM SERPL-SCNC: 138 MMOL/L (ref 136–145)
WBC NRBC COR # BLD AUTO: 9.15 10*3/MM3 (ref 3.4–10.8)

## 2025-07-06 PROCEDURE — C1751 CATH, INF, PER/CENT/MIDLINE: HCPCS

## 2025-07-06 PROCEDURE — 02HV33Z INSERTION OF INFUSION DEVICE INTO SUPERIOR VENA CAVA, PERCUTANEOUS APPROACH: ICD-10-PCS | Performed by: STUDENT IN AN ORGANIZED HEALTH CARE EDUCATION/TRAINING PROGRAM

## 2025-07-06 PROCEDURE — 82948 REAGENT STRIP/BLOOD GLUCOSE: CPT

## 2025-07-06 PROCEDURE — 85025 COMPLETE CBC W/AUTO DIFF WBC: CPT | Performed by: HOSPITALIST

## 2025-07-06 PROCEDURE — 63710000001 REVEFENACIN 175 MCG/3ML SOLUTION: Performed by: PODIATRIST

## 2025-07-06 PROCEDURE — 94664 DEMO&/EVAL PT USE INHALER: CPT

## 2025-07-06 PROCEDURE — 80048 BASIC METABOLIC PNL TOTAL CA: CPT | Performed by: INTERNAL MEDICINE

## 2025-07-06 PROCEDURE — 94799 UNLISTED PULMONARY SVC/PX: CPT

## 2025-07-06 PROCEDURE — 25810000003 SODIUM CHLORIDE 0.9 % SOLUTION 250 ML FLEX CONT: Performed by: INTERNAL MEDICINE

## 2025-07-06 PROCEDURE — 25010000002 VANCOMYCIN 1 G RECONSTITUTED SOLUTION 1 EACH VIAL: Performed by: INTERNAL MEDICINE

## 2025-07-06 RX ORDER — SODIUM CHLORIDE 0.9 % (FLUSH) 0.9 %
20 SYRINGE (ML) INJECTION AS NEEDED
Status: DISCONTINUED | OUTPATIENT
Start: 2025-07-06 | End: 2025-07-08 | Stop reason: HOSPADM

## 2025-07-06 RX ORDER — SODIUM CHLORIDE 0.9 % (FLUSH) 0.9 %
10 SYRINGE (ML) INJECTION AS NEEDED
Status: DISCONTINUED | OUTPATIENT
Start: 2025-07-06 | End: 2025-07-08 | Stop reason: HOSPADM

## 2025-07-06 RX ORDER — SODIUM CHLORIDE 0.9 % (FLUSH) 0.9 %
10 SYRINGE (ML) INJECTION EVERY 12 HOURS SCHEDULED
Status: DISCONTINUED | OUTPATIENT
Start: 2025-07-06 | End: 2025-07-08 | Stop reason: HOSPADM

## 2025-07-06 RX ORDER — SODIUM CHLORIDE 9 MG/ML
40 INJECTION, SOLUTION INTRAVENOUS AS NEEDED
Status: DISCONTINUED | OUTPATIENT
Start: 2025-07-06 | End: 2025-07-08 | Stop reason: HOSPADM

## 2025-07-06 RX ORDER — SODIUM CHLORIDE 0.9 % (FLUSH) 0.9 %
10 SYRINGE (ML) INJECTION EVERY 12 HOURS SCHEDULED
OUTPATIENT
Start: 2025-07-06

## 2025-07-06 RX ADMIN — Medication 10 ML: at 09:11

## 2025-07-06 RX ADMIN — VANCOMYCIN HYDROCHLORIDE 1000 MG: 1 INJECTION, POWDER, LYOPHILIZED, FOR SOLUTION INTRAVENOUS at 09:10

## 2025-07-06 RX ADMIN — ATORVASTATIN CALCIUM 20 MG: 20 TABLET, FILM COATED ORAL at 21:58

## 2025-07-06 RX ADMIN — APIXABAN 5 MG: 5 TABLET, FILM COATED ORAL at 21:58

## 2025-07-06 RX ADMIN — BUDESONIDE AND FORMOTEROL FUMARATE DIHYDRATE 2 PUFF: 160; 4.5 AEROSOL RESPIRATORY (INHALATION) at 08:55

## 2025-07-06 RX ADMIN — Medication 10 ML: at 22:02

## 2025-07-06 RX ADMIN — BUDESONIDE AND FORMOTEROL FUMARATE DIHYDRATE 2 PUFF: 160; 4.5 AEROSOL RESPIRATORY (INHALATION) at 20:36

## 2025-07-06 RX ADMIN — DIGOXIN 125 MCG: 0.12 TABLET ORAL at 12:44

## 2025-07-06 RX ADMIN — APIXABAN 5 MG: 5 TABLET, FILM COATED ORAL at 09:11

## 2025-07-06 RX ADMIN — DILTIAZEM HYDROCHLORIDE 240 MG: 120 CAPSULE, COATED, EXTENDED RELEASE ORAL at 09:11

## 2025-07-06 RX ADMIN — Medication 10 ML: at 22:03

## 2025-07-06 RX ADMIN — VANCOMYCIN HYDROCHLORIDE 1000 MG: 1 INJECTION, POWDER, LYOPHILIZED, FOR SOLUTION INTRAVENOUS at 21:58

## 2025-07-06 RX ADMIN — REVEFENACIN 175 MCG: 175 SOLUTION RESPIRATORY (INHALATION) at 08:52

## 2025-07-06 NOTE — SIGNIFICANT NOTE
"   07/06/25 1538   PICC Single Lumen 07/06/25 Right Basilic   Placement date: If unknown, DO NOT use \"Add Comment\" note/Placement time: If unknown, DO NOT use \"Add Comment\" note: 07/06/25 1538   Hand Hygiene Completed: Yes  Size (Fr): 4  Description (optional): Lot#[id number]  EXP;12-  Length (cm): 42 cm  O...   Site Assessment Clean;Dry;Intact   #1 Lumen Status Blood return noted;Capped;Flushed;Normal saline locked   Length pari (cm) 42 cm   Line Care Connections checked and tightened   Extremity Circumference (cm) 33 cm   Dressing Type Border Dressing;Securing device;Antimicrobial dressing/disc   Dressing Status Clean;Dry;Intact   Dressing Intervention New dressing   Dressing Change Due 07/13/25   Indication/Daily Review of Necessity long-term IV access >7 days     Sharp Count Correct 3 needles, 2 guidewires, and 1 scalpel      "

## 2025-07-06 NOTE — PLAN OF CARE
Goal Outcome Evaluation:  Plan of Care Reviewed With: patient        Progress: improving  Outcome Evaluation: POD#6 of Fifth Toe Amputation. Dressing to right foot dry /intact. VSS. Zero compalin of pain. Ambulating with assistance.  Patient in isolation . Education provided on safety and blood sugar monitoring. Patient verbalized understanding.

## 2025-07-06 NOTE — PLAN OF CARE
Goal Outcome Evaluation:  Plan of Care Reviewed With: patient        Progress: improving  Outcome Evaluation: VSS, on 3L O2, NWB to RLE w/ orthopedic shoe in place - dressing CDI. Denies pain, no n/v, PICC line placed today without issue. Up w/ standby assist and walker to bathroom. Contact and falls precautions maintained. Pt eager for d/c. Pt updated on plan of care.

## 2025-07-06 NOTE — PROGRESS NOTES
Name: Jim Marvin ADMIT: 2025   : 1935  PCP: Antonio Finley MD    MRN: [id number] LOS: 9 days   AGE/SEX: 89 y.o. male  ROOM: [facility]     Subjective   Subjective   No acute events overnight.  Patient states he is feeling pretty well today.  No chest pain or shortness of breath.  No pain in his foot.    Objective   Objective     Vital Signs  Temp:  [96.5 °F (35.8 °C)-97.8 °F (36.6 °C)] 96.7 °F (35.9 °C)  Heart Rate:  [69-98] 70  Resp:  [18-20] 18  BP: (131-164)/(59-75) 157/66  SpO2:  [92 %-97 %] 97 %  on  Flow (L/min) (Oxygen Therapy):  [2-4] 4;   Device (Oxygen Therapy): nasal cannula  Body mass index is 27.61 kg/m².    Physical Exam  General: Lying in bed sleeping but arouses easily to voice.  No distress.  ENT: No conjunctival injection or scleral icterus. Moist mucous membranes.   Neuro: Eyes open and moving in all directions, moving all extremities, face symmetric, no focal deficits.   Lungs: Clear to auscultation bilaterally. No wheeze or crackles. No distress.   Heart: RRR, no murmurs. No edema.  Abdomen: Soft, non-tender, non-distended. Normal bowel sounds.   Ext: Right foot with postop dressing in place and surgical shoe  Skin: No rashes or lesions. IV site without swelling or erythema.     Results Review     I reviewed the patient's new clinical results:  Results from last 7 days   Lab Units 25  0535 25  0408 25  0939 25  0518   WBC 10*3/mm3 9.15 9.93 11.72* 9.19   HEMOGLOBIN g/dL 11.5* 11.3* 12.4* 12.3*   PLATELETS 10*3/mm3 307 313 210 305     Results from last 7 days   Lab Units 25  0535 25  0408 25  0426 25  0608   SODIUM mmol/L 138 136 136 138   POTASSIUM mmol/L 4.1 4.1 3.7 3.8   CHLORIDE mmol/L 105 104 104 104   CO2 mmol/L 24.0 22.8 24.8 25.2   BUN mg/dL 13.0 11.0 11.0 10.0   CREATININE mg/dL 0.72* 0.74* 0.71* 0.71*   GLUCOSE mg/dL 106* 93 99 99   EGFR mL/min/1.73 88.4 87.7 88.8 88.8     Results from last 7 days   Lab Units  07/02/25  0939   ALBUMIN g/dL 3.0*   BILIRUBIN mg/dL 0.5   ALK PHOS U/L 76   AST (SGOT) U/L 17   ALT (SGPT) U/L 29     Results from last 7 days   Lab Units 07/06/25  0535 07/05/25  0408 07/04/25  0426 07/03/25  0608 07/02/25  0939   CALCIUM mg/dL 8.7 8.5* 8.4* 8.5* 8.8   ALBUMIN g/dL  --   --   --   --  3.0*       Glucose   Date/Time Value Ref Range Status   07/06/2025 0627 105 70 - 130 mg/dL Final   07/05/2025 2020 107 70 - 130 mg/dL Final   07/05/2025 1555 110 70 - 130 mg/dL Final   07/05/2025 1057 107 70 - 130 mg/dL Final   07/05/2025 0621 100 70 - 130 mg/dL Final   07/04/2025 2025 89 70 - 130 mg/dL Final   07/04/2025 1603 93 70 - 130 mg/dL Final       No radiology results for the last day    I have personally reviewed all medications:  Scheduled Medications  apixaban, 5 mg, Oral, Q12H  atorvastatin, 20 mg, Oral, Nightly  budesonide-formoterol, 2 puff, Inhalation, BID - RT   And  revefenacin, 175 mcg, Nebulization, Daily - RT  digoxin, 125 mcg, Oral, Daily  dilTIAZem CD, 240 mg, Oral, Daily  insulin lispro, 2-7 Units, Subcutaneous, 4x Daily AC & at Bedtime  sodium chloride, 10 mL, Intravenous, Q12H  vancomycin, 1,000 mg, Intravenous, Q12H    Infusions  O2, 3 L/min, Last Rate: 4 L/min (06/27/25 1832)  Pharmacy to dose vancomycin,     Diet  Diet: Diabetic; Consistent Carbohydrate; Fluid Consistency: Thin (IDDSI 0)      Intake/Output Summary (Last 24 hours) at 7/6/2025 0953  Last data filed at 7/5/2025 1700  Gross per 24 hour   Intake 690 ml   Output --   Net 690 ml       Assessment/Plan     Active Hospital Problems    Diagnosis  POA    **Right foot infection [L08.9]  Unknown    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       89 y.o. male with Right foot infection.    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  Will discontinue daily labs.  If still here in a couple days, can plan to recheck.     DM 2  A1c 5.80%  Correctional insulin  Titrate insulin as needed based on blood glucose trends     Paroxysmal atrial fibrillation  Eliquis  Digoxin, diltiazem     Chronic hypoxic respiratory failure   COPD  Interstitial lung disease  Flow (L/min) (Oxygen Therapy):  4L (baseline 3-4 L/min)  Pulmonology signed off. He is not on steroids chronically     History of stroke Eliquis, statin  Hypertension blood pressure trend acceptable, continue current meds.  Titrate as needed.  Alcohol use disorder 15-20/per week. CIWA now discontinued.    Eliquis (home med) for DVT prophylaxis.  Full code.  Discussed with patient and nursing staff.  Anticipate discharge to SNU facility once arrangements have been made.      Yumi Linder MD  [facility]  07/06/25  09:53 EDT

## 2025-07-07 LAB
ANION GAP SERPL CALCULATED.3IONS-SCNC: 10 MMOL/L (ref 5–15)
BUN SERPL-MCNC: 14 MG/DL (ref 8–23)
BUN/CREAT SERPL: 21.5 (ref 7–25)
CALCIUM SPEC-SCNC: 8.7 MG/DL (ref 8.6–10.5)
CHLORIDE SERPL-SCNC: 104 MMOL/L (ref 98–107)
CO2 SERPL-SCNC: 22 MMOL/L (ref 22–29)
CREAT SERPL-MCNC: 0.65 MG/DL (ref 0.76–1.27)
EGFRCR SERPLBLD CKD-EPI 2021: 91.2 ML/MIN/1.73
GLUCOSE BLDC GLUCOMTR-MCNC: 107 MG/DL (ref 70–130)
GLUCOSE BLDC GLUCOMTR-MCNC: 109 MG/DL (ref 70–130)
GLUCOSE BLDC GLUCOMTR-MCNC: 117 MG/DL (ref 70–130)
GLUCOSE BLDC GLUCOMTR-MCNC: 120 MG/DL (ref 70–130)
GLUCOSE SERPL-MCNC: 107 MG/DL (ref 65–99)
POTASSIUM SERPL-SCNC: 4 MMOL/L (ref 3.5–5.2)
SODIUM SERPL-SCNC: 136 MMOL/L (ref 136–145)
VANCOMYCIN TROUGH SERPL-MCNC: 14.4 MCG/ML (ref 5–20)

## 2025-07-07 PROCEDURE — 94799 UNLISTED PULMONARY SVC/PX: CPT

## 2025-07-07 PROCEDURE — 82948 REAGENT STRIP/BLOOD GLUCOSE: CPT

## 2025-07-07 PROCEDURE — G0545 PR INHERENT VISIT TO INPT: HCPCS | Performed by: INTERNAL MEDICINE

## 2025-07-07 PROCEDURE — 94664 DEMO&/EVAL PT USE INHALER: CPT

## 2025-07-07 PROCEDURE — 80202 ASSAY OF VANCOMYCIN: CPT | Performed by: PODIATRIST

## 2025-07-07 PROCEDURE — 25010000002 VANCOMYCIN 1 G RECONSTITUTED SOLUTION 1 EACH VIAL: Performed by: INTERNAL MEDICINE

## 2025-07-07 PROCEDURE — 80048 BASIC METABOLIC PNL TOTAL CA: CPT | Performed by: INTERNAL MEDICINE

## 2025-07-07 PROCEDURE — 25810000003 SODIUM CHLORIDE 0.9 % SOLUTION 250 ML FLEX CONT: Performed by: INTERNAL MEDICINE

## 2025-07-07 PROCEDURE — 99233 SBSQ HOSP IP/OBS HIGH 50: CPT | Performed by: INTERNAL MEDICINE

## 2025-07-07 PROCEDURE — 94760 N-INVAS EAR/PLS OXIMETRY 1: CPT

## 2025-07-07 PROCEDURE — 63710000001 REVEFENACIN 175 MCG/3ML SOLUTION: Performed by: PODIATRIST

## 2025-07-07 RX ADMIN — ATORVASTATIN CALCIUM 20 MG: 20 TABLET, FILM COATED ORAL at 21:36

## 2025-07-07 RX ADMIN — Medication 10 ML: at 09:01

## 2025-07-07 RX ADMIN — REVEFENACIN 175 MCG: 175 SOLUTION RESPIRATORY (INHALATION) at 08:05

## 2025-07-07 RX ADMIN — DILTIAZEM HYDROCHLORIDE 240 MG: 120 CAPSULE, COATED, EXTENDED RELEASE ORAL at 09:01

## 2025-07-07 RX ADMIN — ACETAMINOPHEN 650 MG: 325 TABLET ORAL at 21:36

## 2025-07-07 RX ADMIN — BUDESONIDE AND FORMOTEROL FUMARATE DIHYDRATE 2 PUFF: 160; 4.5 AEROSOL RESPIRATORY (INHALATION) at 20:27

## 2025-07-07 RX ADMIN — APIXABAN 5 MG: 5 TABLET, FILM COATED ORAL at 21:36

## 2025-07-07 RX ADMIN — BUDESONIDE AND FORMOTEROL FUMARATE DIHYDRATE 2 PUFF: 160; 4.5 AEROSOL RESPIRATORY (INHALATION) at 08:08

## 2025-07-07 RX ADMIN — Medication 10 ML: at 09:14

## 2025-07-07 RX ADMIN — VANCOMYCIN HYDROCHLORIDE 1000 MG: 1 INJECTION, POWDER, LYOPHILIZED, FOR SOLUTION INTRAVENOUS at 21:36

## 2025-07-07 RX ADMIN — VANCOMYCIN HYDROCHLORIDE 1000 MG: 1 INJECTION, POWDER, LYOPHILIZED, FOR SOLUTION INTRAVENOUS at 09:01

## 2025-07-07 RX ADMIN — APIXABAN 5 MG: 5 TABLET, FILM COATED ORAL at 09:01

## 2025-07-07 RX ADMIN — DIGOXIN 125 MCG: 0.12 TABLET ORAL at 12:20

## 2025-07-07 NOTE — PROGRESS NOTES
Continued Stay Note  [facility]     Patient Name: Jim Marvin  MRN: [id number]  Today's Date: 7/7/2025    Admit Date: 6/27/2025    Plan: SNF TBD, pending approval and will require precert   Discharge Plan       Row Name 07/07/25 1705       Plan    Plan Comments Spoke with pts daughter, discussed possbility to send mass referral for SNF. Daughter is agreeable to d/c to [facility], per daughter she will discuss with patient tonight. Pharmacy updated and packet will be on chart. CCP to arrange WC van transport at d/c. Plans to d/c to [facility]. Per Liane/Edu, bed available and patient approved.                   Discharge Codes    No documentation.                 Expected Discharge Date and Time       Expected Discharge Date Expected Discharge Time    Jul 7, 2025               Ama Simmons RN

## 2025-07-07 NOTE — SIGNIFICANT NOTE
07/07/25 1321   OTHER   Discipline physical therapist   Rehab Time/Intention   Session Not Performed other (see comments)  (Pt adamantly declined PT, stating he is not supposed to put any weight on his foot. Reinforced education with pt that he is cleared for limited WBing with post op shoe per podiatry. Pt continues to decline PT at this time. Will follow up as able.)   Recommendation   PT - Next Appointment 07/08/25

## 2025-07-07 NOTE — PROGRESS NOTES
[facility] Clinical Pharmacy Services: Vancomycin Monitoring Note    Jim Marvin is a 89 y.o. male who is on pharmacy to dose vancomycin through 8/8/25 for Bone and/or Joint Infection.    Previous Vancomycin Dose: 1000 mg IV every 12 hours    Results from last 7 days   Lab Units 07/07/25  0500 07/04/25  0748 07/02/25  1359   VANCOMYCIN TR mcg/mL 14.40 13.50 21.00*     Vitals/Labs  Weight: 87.3 kg (192 lb 6.4 oz)  BMI (Calculated): 27.6  Temp:  [96.8 °F (36 °C)-97.9 °F (36.6 °C)] 97.9 °F (36.6 °C)   Results from last 7 days   Lab Units 07/07/25  0500 07/06/25  0535 07/05/25  0408 07/04/25  0748 07/04/25  0426 07/03/25  0608 07/02/25  1359 07/02/25  0939 07/01/25  0518   CREATININE mg/dL 0.65* 0.72* 0.74*  --  0.71* 0.71*  --  0.83 0.61*   VANCOMYCIN TR mcg/mL 14.40  --   --  13.50  --   --  21.00*  --   --      Estimated Creatinine Clearance: 98.9 mL/min (A) (by C-G formula based on SCr of 0.65 mg/dL (L)).     Assessment/Plan    Level resulted at 14.4 mcg/mL ~ 7 hours post dose. AUC within goal, SCr stable. Continue current dose.     Vancomycin Dose: 1000 mg IV every 12 hours; provides a predicted steady-state  mg/L.hr   Next Level: none at this time  We will continue to monitor patient changes and renal function     Thank you for involving pharmacy in this patient's care. Please contact pharmacy with any questions or concerns.       Renetta Mata [facility]  Clinical Pharmacist

## 2025-07-07 NOTE — PROGRESS NOTES
Name: Jim Marvin ADMIT: 2025   : 1935  PCP: Antonio Finley MD    MRN: [id number] LOS: 10 days   AGE/SEX: 89 y.o. male  ROOM: [facility]     Subjective   Subjective   No acute events overnight.  Patient sitting up on edge of bed this morning.  States he is feeling well.  No pain in his foot.  No chest pain or shortness of breath.  He is eager for discharge to rehab.    Objective   Objective     Vital Signs  Temp:  [96.8 °F (36 °C)-98.6 °F (37 °C)] 97.3 °F (36.3 °C)  Heart Rate:  [70-96] 70  Resp:  [18] 18  BP: (133-174)/(70-73) 147/73  SpO2:  [94 %-97 %] 96 %  on  Flow (L/min) (Oxygen Therapy):  [4] 4;   Device (Oxygen Therapy): nasal cannula  Body mass index is 27.61 kg/m².    Physical Exam  General: Sitting up on edge of bed.  Answers questions appropriately.  No distress.  ENT: No conjunctival injection or scleral icterus. Moist mucous membranes.   Neuro: Eyes open and moving in all directions, moving all extremities, face symmetric, no focal deficits.   Lungs: Clear to auscultation bilaterally. No wheeze or crackles. No distress.   Heart: RRR, no murmurs. No edema.  Abdomen: Soft, non-tender, non-distended. Normal bowel sounds.   Ext: Right foot with postop dressing in place and surgical shoe  Skin: No rashes or lesions. IV site without swelling or erythema.     Results Review     I reviewed the patient's new clinical results:  Results from last 7 days   Lab Units 25  0535 25  0408 25  0939 25  0518   WBC 10*3/mm3 9.15 9.93 11.72* 9.19   HEMOGLOBIN g/dL 11.5* 11.3* 12.4* 12.3*   PLATELETS 10*3/mm3 307 313 210 305     Results from last 7 days   Lab Units 25  0500 25  0535 25  0408 25  0426   SODIUM mmol/L 136 138 136 136   POTASSIUM mmol/L 4.0 4.1 4.1 3.7   CHLORIDE mmol/L 104 105 104 104   CO2 mmol/L 22.0 24.0 22.8 24.8   BUN mg/dL 14.0 13.0 11.0 11.0   CREATININE mg/dL 0.65* 0.72* 0.74* 0.71*   GLUCOSE mg/dL 107* 106* 93 99   EGFR mL/min/1.73 91.2  88.4 87.7 88.8     Results from last 7 days   Lab Units 07/02/25  0939   ALBUMIN g/dL 3.0*   BILIRUBIN mg/dL 0.5   ALK PHOS U/L 76   AST (SGOT) U/L 17   ALT (SGPT) U/L 29     Results from last 7 days   Lab Units 07/07/25  0500 07/06/25  0535 07/05/25  0408 07/04/25  0426 07/03/25  0608 07/02/25  0939   CALCIUM mg/dL 8.7 8.7 8.5* 8.4*   < > 8.8   ALBUMIN g/dL  --   --   --   --   --  3.0*    < > = values in this interval not displayed.       Glucose   Date/Time Value Ref Range Status   07/07/2025 0642 109 70 - 130 mg/dL Final   07/06/2025 2028 138 (H) 70 - 130 mg/dL Final   07/06/2025 1611 123 70 - 130 mg/dL Final   07/06/2025 1106 139 (H) 70 - 130 mg/dL Final   07/06/2025 0627 105 70 - 130 mg/dL Final   07/05/2025 2020 107 70 - 130 mg/dL Final   07/05/2025 1555 110 70 - 130 mg/dL Final       No radiology results for the last day    I have personally reviewed all medications:  Scheduled Medications  apixaban, 5 mg, Oral, Q12H  atorvastatin, 20 mg, Oral, Nightly  budesonide-formoterol, 2 puff, Inhalation, BID - RT   And  revefenacin, 175 mcg, Nebulization, Daily - RT  digoxin, 125 mcg, Oral, Daily  dilTIAZem CD, 240 mg, Oral, Daily  insulin lispro, 2-7 Units, Subcutaneous, 4x Daily AC & at Bedtime  sodium chloride, 10 mL, Intravenous, Q12H  sodium chloride, 10 mL, Intravenous, Q12H  vancomycin, 1,000 mg, Intravenous, Q12H    Infusions  O2, 3 L/min, Last Rate: 4 L/min (06/27/25 1832)  Pharmacy to dose vancomycin,     Diet  Diet: Diabetic; Consistent Carbohydrate; Fluid Consistency: Thin (IDDSI 0)    No intake or output data in the 24 hours ending 07/07/25 0940      Assessment/Plan     Active Hospital Problems    Diagnosis  POA    **Right foot infection [L08.9]  Unknown    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       89 y.o. male with Right foot infection.    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  Recheck labs if patient still here in a couple days.     DM 2  A1c 5.80%  Correctional insulin  Titrate insulin as needed based on blood glucose trends     Paroxysmal atrial fibrillation  Eliquis  Digoxin, diltiazem     Chronic hypoxic respiratory failure   COPD  Interstitial lung disease  Flow (L/min) (Oxygen Therapy):  4L (baseline 3-4 L/min)  Pulmonology signed off. He is not on steroids chronically     History of stroke Eliquis, statin  Hypertension blood pressure trend acceptable, continue current meds.  Titrate as needed.  Alcohol use disorder 15-20/per week. CIWA now discontinued.    Eliquis (home med) for DVT prophylaxis.  Full code.  Discussed with patient and nursing staff.  Anticipate discharge to SNU facility once arrangements have been made.      Yumi Linder MD  [facility]  07/07/25  09:40 EDT

## 2025-07-07 NOTE — PROGRESS NOTES
ID progress note     CC: Follow-up diabetic right foot infection due to MRSA    Subjective: No acute events.  No fever.  CHRISTINEE PICC placed yesterday without any difficulty.  He says he is tolerating vancomycin without any side effects.  He is hoping for discharge soon.    Medications:    Current Facility-Administered Medications:     acetaminophen (TYLENOL) tablet 650 mg, 650 mg, Oral, Q4H PRN **OR** acetaminophen (TYLENOL) 160 MG/5ML oral solution 650 mg, 650 mg, Oral, Q4H PRN **OR** acetaminophen (TYLENOL) suppository 650 mg, 650 mg, Rectal, Q4H PRN, KeilaDaniel luke, DPM    apixaban (ELIQUIS) tablet 5 mg, 5 mg, Oral, Q12H, Nick Bajwa MD, 5 mg at 07/06/25 2158    atorvastatin (LIPITOR) tablet 20 mg, 20 mg, Oral, Nightly, KeilaDaniel camilo, DPM, 20 mg at 07/06/25 2158    budesonide-formoterol (SYMBICORT) 160-4.5 MCG/ACT inhaler 2 puff, 2 puff, Inhalation, BID - RT, 2 puff at 07/07/25 0808 **AND** revefenacin (YUPELRI) nebulizer solution 175 mcg, 175 mcg, Nebulization, Daily - RT, KeilaDaniel luke, DPM, 175 mcg at 07/07/25 0805    Calcium Replacement - Follow Nurse / BPA Driven Protocol, , Not Applicable, PRN, KeilaDaniel luke, DPM    dextrose (D50W) (25 g/50 mL) IV injection 25 g, 25 g, Intravenous, Q15 Min PRN, KeilaDaniel luke, DPM    dextrose (GLUTOSE) oral gel 15 g, 15 g, Oral, Q15 Min PRN, Daniel Pedraza, DPM    digoxin (LANOXIN) tablet 125 mcg, 125 mcg, Oral, Daily, KeilaDaniel, DPM, 125 mcg at 07/06/25 1244    dilTIAZem CD (CARDIZEM CD) 24 hr capsule 240 mg, 240 mg, Oral, Daily, KeilaDaniel camilo, DPM, 240 mg at 07/06/25 0911    glucagon (GLUCAGEN) injection 1 mg, 1 mg, Intramuscular, Q15 Min PRN, Daniel Pedraza DPM    insulin lispro (HUMALOG/ADMELOG) injection 2-7 Units, 2-7 Units, Subcutaneous, 4x Daily AC & at Bedtime, Daniel Pedraza DPM, 2 Units at 06/28/25 1632    ipratropium-albuterol (DUO-NEB) nebulizer solution 3 mL, 3 mL, Nebulization, Q6H PRN, Daniel Pedraza DPM    Magnesium  Standard Dose Replacement - Follow Nurse / BPA Driven Protocol, , Not Applicable, PRN, Keila, Daniel, DPM    O2 (OXYGEN), 3 L/min, Inhalation, Continuous, Keila, Daniel, DPM, Last Rate: 240,000 mL/hr at 06/27/25 1832, 4 L/min at 06/27/25 1832    Pharmacy to dose vancomycin, , Not Applicable, Continuous PRN, Keila, Daniel, DPM    Phosphorus Replacement - Follow Nurse / BPA Driven Protocol, , Not Applicable, PRN, Keila, Daniel, DPM    Potassium Replacement - Follow Nurse / BPA Driven Protocol, , Not Applicable, PRN, Keila, Daniel, DPM    sodium chloride 0.9 % flush 10 mL, 10 mL, Intravenous, Q12H, Keila, Daniel, DPM, 10 mL at 07/06/25 2203    sodium chloride 0.9 % flush 10 mL, 10 mL, Intravenous, PRN, Keila, Daniel, DPM    sodium chloride 0.9 % flush 10 mL, 10 mL, Intravenous, Q12H, Guero Kern MD, 10 mL at 07/06/25 2202    sodium chloride 0.9 % flush 10 mL, 10 mL, Intravenous, PRN, Guero Kern MD    sodium chloride 0.9 % flush 20 mL, 20 mL, Intravenous, PRN, Guero Kern MD    sodium chloride 0.9 % infusion 40 mL, 40 mL, Intravenous, PRN, Keila, Daniel, DPM    sodium chloride 0.9 % infusion 40 mL, 40 mL, Intravenous, PRNConcha Nathan Michael, MD    vancomycin (VANCOCIN) 1,000 mg in sodium chloride 0.9 % 250 mL IVPB-VTB, 1,000 mg, Intravenous, Q12H, Guero Kern MD, Last Rate: 250 mL/hr at 07/06/25 2158, 1,000 mg at 07/06/25 2158      Objective   Vital Signs   Temp:  [96.8 °F (36 °C)-98.6 °F (37 °C)] 97.3 °F (36.3 °C)  Heart Rate:  [70-96] 70  Resp:  [18] 18  BP: (133-174)/(70-73) 147/73    Physical Exam:   General: awake, alert, NAD, very nice, sitting up on side of bed feeding himself breakfast  Eyes: no scleral icterus  Cardiovascular: Normal rate  Respiratory: normal work of breathing   GI: Abdomen is soft  :  no Hernandez catheter  MSK/skin: Right foot bandaged  Neurological: Alert and oriented x 3  Psychiatric: Normal mood  and affect   Vascular: RUE PICC without erythema    Labs:   CBC, BMP, and vancomycin level reviewed today  Lab Results   Component Value Date    WBC 9.15 07/06/2025    HGB 11.5 (L) 07/06/2025    HCT 35.4 (L) 07/06/2025    MCV 97.5 (H) 07/06/2025     07/06/2025     Lab Results   Component Value Date    GLUCOSE 107 (H) 07/07/2025    CALCIUM 8.7 07/07/2025     07/07/2025    K 4.0 07/07/2025    CO2 22.0 07/07/2025     07/07/2025    BUN 14.0 07/07/2025    CREATININE 0.65 (L) 07/07/2025    EGFR 91.2 07/07/2025    BCR 21.5 07/07/2025    ANIONGAP 10.0 07/07/2025     Lab Results   Component Value Date    ALT 29 07/02/2025     Lab Results   Component Value Date    HGBA1C 5.80 (H) 06/28/2025     Lab Results   Component Value Date    CRP 6.31 (H) 07/02/2025     Lab Results   Component Value Date    VANCOTROUGH 14.40 07/07/2025     Lab Results   Component Value Date    CKTOTAL 48 07/02/2025       Microbiology:  6/13 BCx: Negative  6/27 BCx: Negative  6/28 right foot wound Cx: MRSA (susceptible to clindamycin, doxycycline, Bactrim, and vancomycin)  7/1 OR Cx: Scant growth MRSA    Prior radiology:  MRI right foot shows wound, osteomyelitis, and septic arthritis    X-ray right foot with soft tissue swelling cannot exclude osteomyelitis    Isolation:  Contact for history of MRSA    ASSESSMENT/PLAN:  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  Operative cultures grew MRSA consistent with his pre-- operative wound culture.  I recommend that he be treated with vancomycin 1 g IV every 12 hours (please note final dose may be changed by pharmacist prior to discharge) x 6 weeks with stop date 8/12/2025 at which time he will follow-up with me in the infectious diseases clinic.  RUE PICC line is in place.  He  will need a weekly CBC with differential, BMP, Vanco level, and CRP faxed to me at [phone].    D/W Dr. Linder.    Please call me at [phone] if any further ID questions or concerns.  Otherwise ID will sign off for now.

## 2025-07-08 VITALS
SYSTOLIC BLOOD PRESSURE: 164 MMHG | RESPIRATION RATE: 16 BRPM | HEIGHT: 70 IN | BODY MASS INDEX: 27.54 KG/M2 | DIASTOLIC BLOOD PRESSURE: 71 MMHG | WEIGHT: 192.4 LBS | HEART RATE: 82 BPM | OXYGEN SATURATION: 96 % | TEMPERATURE: 95.8 F

## 2025-07-08 LAB
FUNGUS WND CULT: NORMAL
GLUCOSE BLDC GLUCOMTR-MCNC: 102 MG/DL (ref 70–130)
GLUCOSE BLDC GLUCOMTR-MCNC: 107 MG/DL (ref 70–130)
MYCOBACTERIUM SPEC CULT: NORMAL
NIGHT BLUE STAIN TISS: NORMAL

## 2025-07-08 PROCEDURE — 25810000003 SODIUM CHLORIDE 0.9 % SOLUTION 250 ML FLEX CONT: Performed by: INTERNAL MEDICINE

## 2025-07-08 PROCEDURE — 94760 N-INVAS EAR/PLS OXIMETRY 1: CPT

## 2025-07-08 PROCEDURE — 94664 DEMO&/EVAL PT USE INHALER: CPT

## 2025-07-08 PROCEDURE — 94799 UNLISTED PULMONARY SVC/PX: CPT

## 2025-07-08 PROCEDURE — 25010000002 VANCOMYCIN 1 G RECONSTITUTED SOLUTION 1 EACH VIAL: Performed by: INTERNAL MEDICINE

## 2025-07-08 PROCEDURE — 63710000001 REVEFENACIN 175 MCG/3ML SOLUTION: Performed by: PODIATRIST

## 2025-07-08 PROCEDURE — 82948 REAGENT STRIP/BLOOD GLUCOSE: CPT

## 2025-07-08 RX ADMIN — Medication 10 ML: at 08:48

## 2025-07-08 RX ADMIN — Medication 10 ML: at 08:47

## 2025-07-08 RX ADMIN — DILTIAZEM HYDROCHLORIDE 240 MG: 120 CAPSULE, COATED, EXTENDED RELEASE ORAL at 08:51

## 2025-07-08 RX ADMIN — REVEFENACIN 175 MCG: 175 SOLUTION RESPIRATORY (INHALATION) at 07:36

## 2025-07-08 RX ADMIN — APIXABAN 5 MG: 5 TABLET, FILM COATED ORAL at 08:51

## 2025-07-08 RX ADMIN — VANCOMYCIN HYDROCHLORIDE 1000 MG: 1 INJECTION, POWDER, LYOPHILIZED, FOR SOLUTION INTRAVENOUS at 08:51

## 2025-07-08 RX ADMIN — BUDESONIDE AND FORMOTEROL FUMARATE DIHYDRATE 2 PUFF: 160; 4.5 AEROSOL RESPIRATORY (INHALATION) at 07:38

## 2025-07-08 NOTE — PLAN OF CARE
Goal Outcome Evaluation:   Pt rested well overnight.  PICC working well.  Tolerating IV vanc.   Ambulated to bathroom with walker and 1 assist.  VSS.

## 2025-07-08 NOTE — CASE MANAGEMENT/SOCIAL WORK
Case Management Discharge Note      Final Note: Skilled bed at [facility] via [facility]. Sherry MCMILLAN    Provided Post Acute Provider List?: Refused    Selected Continued Care - Discharged on 7/8/2025 Admission date: 6/27/2025 - Discharge disposition: Rehab Facility or Unit (DC - External)      Destination Coordination complete.      Service Provider Services Address Phone Fax Patient Preferred    [facility] [address] [address] [phone] --              Durable Medical Equipment    No services have been selected for the patient.                Dialysis/Infusion    No services have been selected for the patient.                Home Medical Care Coordination complete.      Service Provider Services Address Phone Fax Patient Preferred    [facility] [address], [address], [place] [address] [phone] --              Therapy    No services have been selected for the patient.                Community Resources    No services have been selected for the patient.                Community [facility]    No services have been selected for the patient.                    Selected Continued Care - Episodes Includes continued care and service providers with selected services from the active episodes listed below      Chronic Care Management Episode start date: 6/30/2025   There are no active outsourced providers for this episode.                 Transportation Services  Transportation: W/C Jian  W/C Jian: [facility]    Final Discharge Disposition Code: 03 - skilled nursing facility (SNF)

## 2025-07-08 NOTE — CASE MANAGEMENT/SOCIAL WORK
Continued Stay Note  [facility]     Patient Name: Jim Marvin  MRN: [id number]  Today's Date: 7/8/2025    Admit Date: 6/27/2025    Plan: Skilled bed at [place]; [facility] WC van for 11:45 A.M   Discharge Plan       Row Name 07/08/25 1007       Plan    Plan Skilled bed at [place]; [facility] WC van for 11:45 A.M    Plan Comments CCP spoke with Liane/Edu; bed available today. CCP updated patient at bedside and he is in agreement to d/c plan. CCP updated patient's daughter, Madisyn. CCP arranged [facility] WC van for 11:45 A.M. CCP updated patient on transport time and RN. Packet given to RN. PICC is in place and Liane is aware of IV abx needs. Sherry MCMILLAN                   Discharge Codes    No documentation.                 Expected Discharge Date and Time       Expected Discharge Date Expected Discharge Time    Jul 8, 2025               HIPOLITO Joshua

## 2025-07-08 NOTE — DISCHARGE SUMMARY
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        Hospital Course     Mr. Marvin is a 89 y.o. male with a history of type 2 diabetes mellitus, atrial fibrillation, COPD, interstitial lung disease, chronic hypoxic respiratory failure, and previous stroke who presented to [facility] initially complaining of foot infection.  Please see the admitting history and physical for further details.  He was admitted to the hospital for further evaluation and treatment.      Patient with evidence of diabetic foot ulcer on admission.  Infectious disease and podiatry were consulted for further evaluation.  MRI was completed and showed evidence of suggested abscess and osteomyelitis of the fifth metatarsal with concern for septic arthritis.  The patient was taken to the OR for partial fifth ray amputation and full-thickness rotational flap of the right foot on .  He tolerated this procedure well.  Operative cultures ultimately grew MRSA.  The patient was transition to vancomycin which he will remain on for 6 weeks duration.  PICC line placed prior to discharge.  Stop date is 2025. He will need a weekly CBC  with differential, BMP, Vanco level, and CRP faxed to Dr. Kern at [phone].  Outpatient follow-up will be arranged in the infectious disease clinic when antibiotics have been completed.  Overall, patient with good clinical improvement.  Oxygen requirements remain consistent with home baseline.  Home medications are continued as previously prescribed.  PT/OT were consulted and SNF was recommended.  The patient is being discharged to SNF in satisfactory condition.  The patient needs follow-up with podiatry scheduled for this week.      Day of Discharge     Subjective:  No acute events overnight.  Patient states he is feeling well today.  No chest pain or shortness of breath.  Eating and drinking well.  He has been afebrile.  Eager for discharge to rehab.    Physical Exam:  Temp:  [95.7 °F (35.4 °C)-97.9 °F (36.6 °C)] 95.7 °F (35.4 °C)  Heart Rate:  [68-84] 68  Resp:  [16-18] 16  BP: (135-165)/(66-88) 165/81  Body mass index is 27.61 kg/m².  Physical Exam  General: Sitting up in bed.  Answers questions appropriately.  No distress.  ENT: No conjunctival injection or scleral icterus. Moist mucous membranes.   Neuro: Eyes open and moving in all directions, moving all extremities, face symmetric, no focal deficits.   Lungs: Clear to auscultation bilaterally. No wheeze or crackles. No distress.   Heart: RRR, no murmurs. No edema.  Abdomen: Soft, non-tender, non-distended. Normal bowel sounds.   Ext: Right foot with postop dressing in place and surgical shoe  Skin: No rashes or lesions. IV site without swelling or erythema.     Consultants     Consult Orders (all) (From admission, onward)       Start     Ordered    07/02/25 1005  Inpatient IV Team Consult PICC 1 Lumen  Once        Provider:  (Not yet assigned)    07/02/25 1004    06/29/25 1315  Inpatient Pulmonology Consult  Once        Specialty:  Pulmonary Disease  Provider:  Dov Alvarado MD    06/29/25 1326    06/28/25 1011  Inpatient Access Center  Consult  Once        Provider:  (Not yet assigned)    06/28/25 1010    06/28/25 0825  Inpatient Infectious Diseases Consult  Once        Specialty:  Infectious Diseases  Provider:  Figueroa Dunn MD    06/28/25 0824    06/27/25 2135  Inpatient Cardiology Consult  Once        Specialty:  Cardiology  Provider:  Robby Hall MD    06/27/25 2135 06/27/25 1825  Inpatient Podiatry Consult  Once        Specialty:  Podiatry  Provider:  Daniel Pedraza DPM    06/27/25 1824 06/27/25 1519  [facility] (on-call MD unless specified) Details  Once        Specialty:  Hospitalist  Provider:  (Not yet assigned)    06/27/25 1518                  Procedures     Partial fifth ray amputation, right foot, INCISION AND DRAINAGE, RIGHT FOOT    Imaging Results (All)       Procedure Component Value Units Date/Time    XR Foot 3+ View Right [[id number]] Collected: 07/01/25 1354     Updated: 07/01/25 1406    Narrative:      XR FOOT 3+ VW RIGHT-     INDICATIONS: Postoperative evaluation.     TECHNIQUE: 3 views of the right foot     COMPARISON: 6/27/2025     FINDINGS:     Partial amputation change of the fifth ray is seen at the level of the  mid fifth metatarsal, with adjacent surgical soft tissue swelling and  gas. No acute fracture, erosion, or dislocation is identified. Arterial  calcifications are conspicuous.       Impression:         Postsurgical changes.           This report was finalized on 7/1/2025 2:03 PM by Dr. Ortiz Barber M.D on Workstation: [id number]       MRI Foot Right Without Contrast [[id number]] Collected: 06/29/25 1307     Updated: 06/29/25 1324    Narrative:      MRI FOOT RIGHT WO CONTRAST-     Date of Exam: 6/29/2025 5:41 AM     Indication: Osteomyelitis of the fifth metatarsal.     Comparison: Radiographs 6/27/2025, 6/13/2025, and 3/16/2025. MRI  3/17/2025 and 8/31/2024.     Technique: Multiplanar, multisequence MR imaging of the midfoot and  forefoot was performed without the intravenous administration  of  contrast.     FINDINGS:  SOFT TISSUES: Open wound at the lateral and plantar distal forefoot  exposing the fifth metatarsal head. Additional focal wound at the dorsal  and lateral midfoot, dorsal to the mid fifth metatarsal, and focal wound  at the dorsal base of the fifth toe. Underlying communicating complex  fluid collection between the wounds at the dorsal and lateral forefoot,  measuring approximately 5 cm x 1.5 cm x 1.2 cm, which could represent  phlegmon or developing abscess. Mild diffuse amorphous soft tissue  edema. No solid soft tissue mass. The intermetatarsal spaces are  unremarkable. The partially imaged plantar fascia is unremarkable.     BONES: Patchy T1 marrow replacement in the fifth metatarsal head and  neck with diffuse bone marrow edema like signal throughout the fifth  metatarsal, consistent with osteomyelitis. Patchy bone marrow edema like  signal in the fifth toe proximal phalanx, which could be reactive or  could reflect early osteomyelitis. No acute fracture. Degenerative  chondral cystic changes at the plantar great toe metatarsal head and the  great toe fibular sesamoid. Bipartite tibial and fibular sesamoid  morphology. No concerning osseous lesion.     JOINTS: Dorsal dislocation of the fifth MTP joint with a large joint  effusion, likely communicating with the complex fluid collection at the  dorsal and lateral forefoot through a defect in the dorsal joint  capsule. 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 The  midfoot is well aligned.     LIGAMENTS: The Lisfranc ligament complex is intact. Suspected  full-thickness tear of the fifth toe MTP joint lateral collateral  ligament. The other MTP collateral ligaments are intact.     MUSCLES AND TENDONS: Lateral subluxation of the fifth toe flexor  tendons. The other flexor and extensor  tendons are intact.  Full-thickness tear of the fifth toe plantar plate. The other plantar  plates are grossly intact. Severe diffuse muscular fatty atrophy, likely  neuropathic, with diffuse muscular edema that could also be neuropathic  or could reflect myositis.       Impression:         1. Large open wound at the lateral and plantar distal forefoot at  exposing the fifth metatarsal head with additional smaller wounds at the  dorsal and lateral midfoot and the dorsal base of the fifth toe. Complex  fluid collection communicating between the wounds, which could represent  phlegmon or developing abscess.  2. Osteomyelitis of the fifth metatarsal head and neck.  3. Dorsal dislocation of the fifth toe MTP joint with associated tear of  the plantar plate and MCP joint LCL, severe chondromalacia, and large  complex joint fluid communicating through a defect in the dorsal joint  capsule, likely septic arthritis. Bone marrow edema like signal in the  fifth toe proximal phalanx could be reactive or could represent early  osteomyelitis.  4. 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  5. 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              This report was finalized on 6/29/2025 1:21 PM by Chris Quiles MD on  Workstation: [id number]       XR Foot 3+ View Right [[id number]] Collected: 06/27/25 1536     Updated: 06/27/25 1545    Narrative:      XR FOOT 3+ VW RIGHT-     INDICATIONS: Right foot wound.     TECHNIQUE: 4 VIEWS OF THE RIGHT FOOT     COMPARISON: 6/13/2025     FINDINGS:     The base of the fifth proximal phalanx is superior medially dislocated  in relation to the fifth metatarsal head. Soft tissue swelling of the  foot is seen, that may be evidence of inflammation, infection, injury,  correlate  clinically. No acute fracture, erosion, or other dislocation  is identified. Arterial calcifications are conspicuous. If there is  clinical suspicion for radiographically occult osteomyelitis, MRI or  bone scan can be obtained.       Impression:         As described.           This report was finalized on 6/27/2025 3:42 PM by Dr. Ortiz Barber M.D on Workstation: [id number]             Results for orders placed during the hospital encounter of 03/16/25    Duplex Venous Lower Extremity - Right CAR 03/17/2025 12:27 PM    Interpretation Summary    Normal right lower extremity venous duplex scan.    Results for orders placed during the hospital encounter of 06/04/24    Adult Transthoracic Echo Complete W/ Cont if Necessary Per Protocol 06/07/2024  1:10 PM    Interpretation Summary    Left ventricular systolic function is normal. Left ventricular ejection fraction appears to be 66 - 70%.    Left ventricular diastolic function is consistent with (grade I) impaired relaxation.    Normal right ventricular cavity size and systolic function noted.    The left atrial cavity is mildly dilated.    Mild tricuspid valve regurgitation is present.    Calculated right ventricular systolic pressure from tricuspid regurgitation is 34 mmHg.    There is no evidence of pericardial effusion    Pertinent Labs     Results from last 7 days   Lab Units 07/06/25  0535 07/05/25  0408 07/02/25  0939   WBC 10*3/mm3 9.15 9.93 11.72*   HEMOGLOBIN g/dL 11.5* 11.3* 12.4*   PLATELETS 10*3/mm3 307 313 210     Results from last 7 days   Lab Units 07/07/25  0500 07/06/25  0535 07/05/25  0408 07/04/25  0426   SODIUM mmol/L 136 138 136 136   POTASSIUM mmol/L 4.0 4.1 4.1 3.7   CHLORIDE mmol/L 104 105 104 104   CO2 mmol/L 22.0 24.0 22.8 24.8   BUN mg/dL 14.0 13.0 11.0 11.0   CREATININE mg/dL 0.65* 0.72* 0.74* 0.71*   GLUCOSE mg/dL 107* 106* 93 99   EGFR mL/min/1.73 91.2 88.4 87.7 88.8     Results from last 7 days   Lab Units 07/02/25  0939   ALBUMIN g/dL  "3.0*   BILIRUBIN mg/dL 0.5   ALK PHOS U/L 76   AST (SGOT) U/L 17   ALT (SGPT) U/L 29     Results from last 7 days   Lab Units 07/07/25  0500 07/06/25  0535 07/05/25  0408 07/04/25  0426 07/03/25  0608 07/02/25  0939   CALCIUM mg/dL 8.7 8.7 8.5* 8.4*   < > 8.8   ALBUMIN g/dL  --   --   --   --   --  3.0*    < > = values in this interval not displayed.       Results from last 7 days   Lab Units 07/02/25  0939   CK TOTAL U/L 48           Invalid input(s): \"LDLCALC\"          Test Results Pending at Discharge     Pending Results       None          Discharge Details        Discharge Medications        New Medications        Instructions Start Date   vancomycin 1,000 mg in sodium chloride 0.9 % 250 mL IVPB   1,000 mg, Intravenous, Every 12 Hours             Changes to Medications        Instructions Start Date   atorvastatin 20 MG tablet  Commonly known as: LIPITOR  What changed: when to take this   20 mg, Oral, Daily             Continue These Medications        Instructions Start Date   apixaban 5 MG tablet tablet  Commonly known as: ELIQUIS   5 mg, Oral, Every 12 Hours Scheduled      digoxin 125 MCG tablet  Commonly known as: LANOXIN   125 mcg, Oral, Daily Digoxin      dilTIAZem  MG 24 hr capsule  Commonly known as: CARDIZEM CD   240 mg, Oral, Daily      EPINEPHrine 0.3 MG/0.3ML solution auto-injector injection  Commonly known as: EPIPEN   ADMINISTER 0.3 ML IN THE MUSCLE 1 TIME FOR 1 DOSE AS DIRECTED BY PRESCRIBER      ipratropium-albuterol 0.5-2.5 mg/3 ml nebulizer  Commonly known as: DUO-NEB   Inhale the contents of 1 vial by nebulization Every 6 (Six) Hours As Needed for Wheezing for up to 30 days.      metFORMIN  MG 24 hr tablet  Commonly known as: GLUCOPHAGE-XR   500 mg, Oral, Daily With Breakfast      O2  Commonly known as: OXYGEN   3 Liter DAILY (route: Oxygen)      Trelegy Ellipta 200-62.5-25 MCG/ACT inhaler  Generic drug: Fluticasone-Umeclidin-Vilant   1 puff, Daily - RT             Stop These " Medications      predniSONE 20 MG tablet  Commonly known as: DELTASONE              Allergies   Allergen Reactions    Ace Inhibitors Other (See Comments)     Cannot remember     Lisinopril Other (See Comments)     Cannot remember       Discharge Disposition:  Rehab Facility or Unit (DC - External)      Discharge Diet:  Diet Order   Procedures    Diet: Diabetic; Consistent Carbohydrate; Fluid Consistency: Thin (IDDSI 0)       Discharge Activity: Minimal weightbearing in postoperative shoe      CODE STATUS:    Code Status and Medical Interventions: CPR (Attempt to Resuscitate); Full Support   Ordered at: 06/27/25 5111     Code Status (Patient has no pulse and is not breathing):    CPR (Attempt to Resuscitate)     Medical Interventions (Patient has pulse or is breathing):    Full Support       Future Appointments   Date Time Provider Department Center   8/12/2025  1:30 PM Guero Kern MD [facility]   1/23/2026  1:45 PM Robby Hall MD [facility]     Additional Instructions for the Follow-ups that You Need to Schedule       Ambulatory Referral to Home Health   As directed      Face to Face Visit Date: 7/2/2025   Follow-up provider for Plan of Care?: I will be treating the patient on an ongoing basis.  Please send me the Plan of Care for signature.   Follow-up provider: HARMEET MIRANDA [[id number]]   Reason/Clinical Findings: R ft osteomyelitis, S/P I&D R ft   Describe mobility limitations that make leaving home difficult: Assist of one plus device with difficulty   Nursing/Therapeutic Services Requested: Skilled Nursing Physical Therapy   Skilled nursing orders: Medication education (May accept orders from Dr Kern, ID as needed) Infusion therapy   PT orders: Therapeutic exercise Strengthening Home safety assessment   Frequency: 1 Week 1               Contact information for follow-up providers       Antonio Finley MD .    Specialty: Family Medicine  Contact information:  [address]  [place] [address] [phone]               Daniel Pedraza DPM Follow up in 3 day(s).    Specialty: Podiatry  Contact information:  [address]  [address]  [place] [address] [phone]                       Contact information for after-discharge care       Destination       [facility] .    Service: Skilled Nursing  Contact information:  [address]   [place] [address] [phone]                     Home Medical Care       [facility] .    Service: Home Nursing  Contact information:  [address], [address]  [facility] [id number] [phone]                                   Additional Instructions for the Follow-ups that You Need to Schedule       Ambulatory Referral to Home Health   As directed      Face to Face Visit Date: 7/2/2025   Follow-up provider for Plan of Care?: I will be treating the patient on an ongoing basis.  Please send me the Plan of Care for signature.   Follow-up provider: DANIEL PEDRAZA [[id number]]   Reason/Clinical Findings: R ft osteomyelitis, S/P I&D R ft   Describe mobility limitations that make leaving home difficult: Assist of one plus device with difficulty   Nursing/Therapeutic Services Requested: Skilled Nursing Physical Therapy   Skilled nursing orders: Medication education (May accept orders from Dr Kern, ID as needed) Infusion therapy   PT orders: Therapeutic exercise Strengthening Home safety assessment   Frequency: 1 Week 1            Time Spent on Discharge:  Greater than 30 minutes      Yumi Linder MD  [facility]  07/08/25  09:48 EDT

## 2025-07-08 NOTE — PROGRESS NOTES
Patient Name: Jim Marvin  YOB: 1935  MRN: [id number]  Admission date: 6/27/2025  Reason for Encounter: Follow-up/Progress Note      [facility] Nutrition Progress Note       Nutrition Intervention Updates: Continue current interventions.      Subjective: Checking on po intakes. PICC placed 7/6. Plans for dc to rehab.       PO Diet: Diet: Diabetic; Consistent Carbohydrate; Fluid Consistency: Thin (IDDSI 0)   PO Supplements: Brenton BID   PO Intake:  % x 3 meals yesterday       Current nutrition support: -   Nutrition support review: -       Labs: Reviewed, management per MD.        GI Function:  Last BM 7/5 per documentation        Brief Weight Review:    Wt Readings from Last 1 Encounters:   06/27/25 1511 87.3 kg (192 lb 6.4 oz)        Results from last 7 days   Lab Units 07/07/25  0500 07/06/25  0535 07/05/25  0408 07/03/25  0608 07/02/25  0939   SODIUM mmol/L 136 138 136   < > 138   POTASSIUM mmol/L 4.0 4.1 4.1   < > 3.8   CHLORIDE mmol/L 104 105 104   < > 101   CO2 mmol/L 22.0 24.0 22.8   < > 26.4   BUN mg/dL 14.0 13.0 11.0   < > 11.0   CREATININE mg/dL 0.65* 0.72* 0.74*   < > 0.83   CALCIUM mg/dL 8.7 8.7 8.5*   < > 8.8   BILIRUBIN mg/dL  --   --   --   --  0.5   ALK PHOS U/L  --   --   --   --  76   ALT (SGPT) U/L  --   --   --   --  29   AST (SGOT) U/L  --   --   --   --  17   GLUCOSE mg/dL 107* 106* 93   < > 124*    < > = values in this interval not displayed.     Results from last 7 days   Lab Units 07/06/25  0535   PLATELETS 10*3/mm3 307   HEMOGLOBIN g/dL 11.5*   HEMATOCRIT % 35.4*     Lab Results   Component Value Date    HGBA1C 5.80 (H) 06/28/2025       Electronically signed by:  Estelle Cassidy RD  07/08/25 08:42 EDT

## 2025-07-15 LAB
FUNGUS WND CULT: NORMAL
MYCOBACTERIUM SPEC CULT: NORMAL
NIGHT BLUE STAIN TISS: NORMAL

## 2025-07-16 ENCOUNTER — PATIENT OUTREACH (OUTPATIENT)
Dept: CASE MANAGEMENT | Facility: OTHER | Age: 89
End: 2025-07-16
Payer: MEDICARE

## 2025-07-16 DIAGNOSIS — I10 PRIMARY HYPERTENSION: ICD-10-CM

## 2025-07-16 DIAGNOSIS — J43.9 PULMONARY EMPHYSEMA, UNSPECIFIED EMPHYSEMA TYPE: ICD-10-CM

## 2025-07-16 DIAGNOSIS — I48.91 ATRIAL FIBRILLATION WITH RVR: Primary | ICD-10-CM

## 2025-07-16 NOTE — OUTREACH NOTE
AMBULATORY CASE MANAGEMENT NOTE    Names and Relationships of Patient/Support Persons: Contact: [facility]; Relationship: Other -     Care Coordination    Spoke with SW. Patient does not have a discharge date.     SNF Follow-up    Questions/Answers      Flowsheet Row Responses   Acute Facility Discharged From [facility]   Acute Discharge Date 07/08/25   Acute Facility Diagnoses Right foot infection, Diabetic infection of right foot, Alcohol use disorder, Cellulitis of right foot, Chronic respiratory failure with hypoxia, Paroxysmal atrial fibrillation, Interstitial lung disease, Emphysema lung, Hypertension   Name of the Skilled Nursing Facility? [facility]  Ambulatory Case Management    7/16/2025, 14:26 EDT

## 2025-07-22 LAB
FUNGUS WND CULT: NORMAL
MYCOBACTERIUM SPEC CULT: NORMAL
NIGHT BLUE STAIN TISS: NORMAL

## 2025-07-23 ENCOUNTER — TELEPHONE (OUTPATIENT)
Dept: CASE MANAGEMENT | Facility: OTHER | Age: 89
End: 2025-07-23
Payer: MEDICARE

## 2025-07-29 LAB
FUNGUS WND CULT: NORMAL
MYCOBACTERIUM SPEC CULT: NORMAL
NIGHT BLUE STAIN TISS: NORMAL

## 2025-08-05 LAB
MYCOBACTERIUM SPEC CULT: NORMAL
NIGHT BLUE STAIN TISS: NORMAL

## 2025-08-12 ENCOUNTER — OFFICE VISIT (OUTPATIENT)
Dept: INFECTIOUS DISEASES | Facility: CLINIC | Age: 89
End: 2025-08-12
Payer: MEDICARE

## 2025-08-12 VITALS
HEART RATE: 85 BPM | RESPIRATION RATE: 14 BRPM | DIASTOLIC BLOOD PRESSURE: 79 MMHG | TEMPERATURE: 97.4 F | SYSTOLIC BLOOD PRESSURE: 136 MMHG

## 2025-08-12 DIAGNOSIS — M86.679 CHRONIC OSTEOMYELITIS OF FOOT: ICD-10-CM

## 2025-08-12 DIAGNOSIS — A49.02 MRSA INFECTION: Primary | ICD-10-CM

## 2025-08-12 DIAGNOSIS — R60.0 EDEMA OF RIGHT UPPER EXTREMITY: ICD-10-CM

## 2025-08-12 DIAGNOSIS — D49.2 SKIN GROWTH: ICD-10-CM

## 2025-08-12 DIAGNOSIS — Z79.2 LONG TERM (CURRENT) USE OF ANTIBIOTICS: ICD-10-CM

## 2025-08-12 LAB
MYCOBACTERIUM SPEC CULT: NORMAL
NIGHT BLUE STAIN TISS: NORMAL

## 2025-08-12 RX ORDER — VANCOMYCIN HCL-SODIUM CHLORIDE IV SOLN 1.25 GM/250ML-0.9% 1.25-0.9/25 GM/ML-%
1250 SOLUTION INTRAVENOUS EVERY 12 HOURS SCHEDULED
COMMUNITY
End: 2025-08-12

## 2025-08-13 ENCOUNTER — PATIENT ROUNDING (BHMG ONLY) (OUTPATIENT)
Dept: INFECTIOUS DISEASES | Facility: CLINIC | Age: 89
End: 2025-08-13
Payer: MEDICARE

## 2025-08-13 ENCOUNTER — TELEPHONE (OUTPATIENT)
Dept: INFECTIOUS DISEASES | Facility: CLINIC | Age: 89
End: 2025-08-13
Payer: MEDICARE

## 2025-08-21 ENCOUNTER — TELEPHONE (OUTPATIENT)
Dept: INTERNAL MEDICINE | Facility: CLINIC | Age: 89
End: 2025-08-21
Payer: MEDICARE

## (undated) DEVICE — PENCL SMOKE/EVAC MEGADYNE TELESCP 10FT

## (undated) DEVICE — BNDG,ELSTC,MATRIX,STRL,4"X5YD,LF,HOOK&LP: Brand: MEDLINE

## (undated) DEVICE — NDL HYPO PRECISIONGLIDE REG 25G 1 1/2

## (undated) DEVICE — BANDAGE,GAUZE,CONFORMING,4"X75",STRL,LF: Brand: MEDLINE

## (undated) DEVICE — BNDG ELAS CO-FLEX SLF ADHR 4IN5YD LF STRL

## (undated) DEVICE — GLV SURG SENSICARE PI MIC PF SZ7 LF STRL

## (undated) DEVICE — WEBRIL* CAST PADDING: Brand: DEROYAL

## (undated) DEVICE — TRAP FLD MINIVAC MEGADYNE 100ML

## (undated) DEVICE — STOCKINETTE,IMPERVIOUS,12X48,STERILE: Brand: MEDLINE

## (undated) DEVICE — DRESSING,GAUZE,XEROFORM,CURAD,1"X8",ST: Brand: CURAD

## (undated) DEVICE — SUT ETHLN 2/0 PS 18IN 585H

## (undated) DEVICE — SPONGE,LAP,18"X18",DLX,XR,ST,5/PK,40/PK: Brand: MEDLINE

## (undated) DEVICE — PRECISION THIN (9.0 X 0.38 X 25.0MM)

## (undated) DEVICE — BNDG ESMARK STRL 6INX12FT LF

## (undated) DEVICE — GLV SURG SENSICARE PI LF PF 7.5 GRN STRL

## (undated) DEVICE — DRAPE,U/ SHT,SPLIT,PLAS,STERIL: Brand: MEDLINE

## (undated) DEVICE — PK ORTHO MINOR TOWER 40

## (undated) DEVICE — SPNG GZ WOVN 4X4IN 12PLY 10/BX STRL

## (undated) DEVICE — STRIP PACKING W IODOFORM 1/4

## (undated) DEVICE — ANTIBACTERIAL UNDYED BRAIDED (POLYGLACTIN 910), SYNTHETIC ABSORBABLE SUTURE: Brand: COATED VICRYL

## (undated) DEVICE — SOL ISO/ALC 70PCT 4OZ

## (undated) DEVICE — SUT VIC 2/0 CT2 27IN J269H

## (undated) DEVICE — MAT FLR ABS LIQUILOC 28X56IN PNK

## (undated) DEVICE — SKIN PREP TRAY 4 COMPARTM TRAY: Brand: MEDLINE INDUSTRIES, INC.

## (undated) DEVICE — APPL CHLORAPREP HI/LITE 26ML ORNG